# Patient Record
Sex: FEMALE | Race: WHITE | NOT HISPANIC OR LATINO | Employment: PART TIME | URBAN - METROPOLITAN AREA
[De-identification: names, ages, dates, MRNs, and addresses within clinical notes are randomized per-mention and may not be internally consistent; named-entity substitution may affect disease eponyms.]

---

## 2017-02-03 ENCOUNTER — GENERIC CONVERSION - ENCOUNTER (OUTPATIENT)
Dept: OTHER | Facility: OTHER | Age: 47
End: 2017-02-03

## 2017-02-06 ENCOUNTER — GENERIC CONVERSION - ENCOUNTER (OUTPATIENT)
Dept: OTHER | Facility: OTHER | Age: 47
End: 2017-02-06

## 2017-02-07 ENCOUNTER — ALLSCRIPTS OFFICE VISIT (OUTPATIENT)
Dept: OTHER | Facility: OTHER | Age: 47
End: 2017-02-07

## 2017-02-07 ENCOUNTER — LAB REQUISITION (OUTPATIENT)
Dept: LAB | Facility: HOSPITAL | Age: 47
End: 2017-02-07
Payer: MEDICARE

## 2017-02-07 DIAGNOSIS — N89.8 OTHER SPECIFIED NONINFLAMMATORY DISORDERS OF VAGINA: ICD-10-CM

## 2017-02-07 LAB
BACTERIA UR QL AUTO: NORMAL
BILIRUB UR QL STRIP: NEGATIVE
CLARITY UR: CLEAR
CLUE CELL (HISTORICAL): NORMAL
COLOR UR: YELLOW
GLUCOSE UR STRIP-MCNC: NEGATIVE MG/DL
HGB UR QL STRIP.AUTO: NEGATIVE
HYPHAL YEAST (HISTORICAL): NORMAL
KETONES UR STRIP-MCNC: NEGATIVE MG/DL
KOH PREP (HISTORICAL): NORMAL
LEUKOCYTE ESTERASE UR QL STRIP: NEGATIVE
NITRITE UR QL STRIP: NEGATIVE
PH UR STRIP.AUTO: 7 [PH] (ref 4.5–8)
PROT UR STRIP-MCNC: NEGATIVE MG/DL
SP GR UR STRIP.AUTO: 1.01 (ref 1–1.03)
TRICHOMONAS (HISTORICAL): NORMAL
UROBILINOGEN UR QL STRIP.AUTO: 1 E.U./DL
YEAST (HISTORICAL): NORMAL

## 2017-02-07 PROCEDURE — 87086 URINE CULTURE/COLONY COUNT: CPT | Performed by: OBSTETRICS & GYNECOLOGY

## 2017-02-07 PROCEDURE — 81003 URINALYSIS AUTO W/O SCOPE: CPT | Performed by: OBSTETRICS & GYNECOLOGY

## 2017-02-08 LAB — BACTERIA UR CULT: NORMAL

## 2017-02-14 ENCOUNTER — GENERIC CONVERSION - ENCOUNTER (OUTPATIENT)
Dept: OTHER | Facility: OTHER | Age: 47
End: 2017-02-14

## 2017-02-20 ENCOUNTER — GENERIC CONVERSION - ENCOUNTER (OUTPATIENT)
Dept: OTHER | Facility: OTHER | Age: 47
End: 2017-02-20

## 2017-05-04 ENCOUNTER — GENERIC CONVERSION - ENCOUNTER (OUTPATIENT)
Dept: OTHER | Facility: OTHER | Age: 47
End: 2017-05-04

## 2017-07-10 ENCOUNTER — GENERIC CONVERSION - ENCOUNTER (OUTPATIENT)
Dept: OTHER | Facility: OTHER | Age: 47
End: 2017-07-10

## 2017-08-30 ENCOUNTER — GENERIC CONVERSION - ENCOUNTER (OUTPATIENT)
Dept: OTHER | Facility: OTHER | Age: 47
End: 2017-08-30

## 2017-10-26 ENCOUNTER — GENERIC CONVERSION - ENCOUNTER (OUTPATIENT)
Dept: OTHER | Facility: OTHER | Age: 47
End: 2017-10-26

## 2017-10-26 DIAGNOSIS — R92.2 INCONCLUSIVE MAMMOGRAM: ICD-10-CM

## 2017-10-26 DIAGNOSIS — Z12.31 ENCOUNTER FOR SCREENING MAMMOGRAM FOR MALIGNANT NEOPLASM OF BREAST: ICD-10-CM

## 2017-11-11 ENCOUNTER — HOSPITAL ENCOUNTER (OUTPATIENT)
Dept: RADIOLOGY | Age: 47
Discharge: HOME | End: 2017-11-11
Payer: MEDICARE

## 2017-11-11 DIAGNOSIS — Z12.31 ENCOUNTER FOR SCREENING MAMMOGRAM FOR MALIGNANT NEOPLASM OF BREAST: ICD-10-CM

## 2017-11-11 PROCEDURE — 77063 BREAST TOMOSYNTHESIS BI: CPT

## 2017-11-11 PROCEDURE — G0202 SCR MAMMO BI INCL CAD: HCPCS

## 2017-11-14 ENCOUNTER — GENERIC CONVERSION - ENCOUNTER (OUTPATIENT)
Dept: OTHER | Facility: OTHER | Age: 47
End: 2017-11-14

## 2017-11-17 ENCOUNTER — GENERIC CONVERSION - ENCOUNTER (OUTPATIENT)
Dept: OTHER | Facility: OTHER | Age: 47
End: 2017-11-17

## 2017-11-30 ENCOUNTER — GENERIC CONVERSION - ENCOUNTER (OUTPATIENT)
Dept: OTHER | Facility: OTHER | Age: 47
End: 2017-11-30

## 2017-12-18 ENCOUNTER — GENERIC CONVERSION - ENCOUNTER (OUTPATIENT)
Dept: OTHER | Facility: OTHER | Age: 47
End: 2017-12-18

## 2018-01-08 ENCOUNTER — GENERIC CONVERSION - ENCOUNTER (OUTPATIENT)
Dept: OTHER | Facility: OTHER | Age: 48
End: 2018-01-08

## 2018-01-10 NOTE — MISCELLANEOUS
Message   Recorded as Task   Date: 11/14/2017 08:10 AM, Created By: Eugene Vieira   Task Name: Go to Result   Assigned To: La Brandon   Regarding Patient: Loretta Lacy, Status: In Progress   Comment:    Eugene Vieira - 14 Nov 2017 8:10 AM     TASK CREATED  her 3D mammogram is normal, they are calling her tissue extremely dense, could consider ABUS but would have to check coverage, may not be covered, if not, continue with annual 3D mammogram, she is not at elevated risk   Beverly Alexander - 14 Nov 2017 2:13 PM     TASK IN PROGRESS   CatherineBeverly - 14 Nov 2017 2:23 PM     TASK EDITED  letter and script mailed to pt        Active Problems    1  Acute right lower quadrant pain (789 03,338 19) (R10 31)   2  Contraception (V25 9) (Z30 9)   3  Contraceptive management (V25 9) (Z30 9)   4  Dense breasts (793 82) (R92 2)   5  Encounter for gynecological examination without abnormal finding (V72 31) (Z01 419)   6  Headache (784 0) (R51)   7  Herpes simplex virus (HSV) infection (054 9) (B00 9)   8  High risk medication use (V58 69) (Z79 899)   9  Denied: History of self breast exam   10  Migraine headache (346 90) (G43 909)   11  Vaginal discharge (623 5) (N89 8)   12  Visit for screening mammogram (V76 12) (Z12 31)    Current Meds   1  Nola 0 35 MG Oral Tablet; 1 TABLET DAILY; Therapy: 45Aut8794 to (Last Rx:26Oct2017)  Requested for: 26Oct2017 Ordered   2  Dicyclomine HCl - 10 MG Oral Capsule; Therapy: 06NSP8812 to Recorded   3  Elmiron 100 MG Oral Capsule; Therapy: (Recorded:04May2017) to Recorded   4  Gabapentin 100 MG Oral Capsule; Therapy: 51QIU1630 to Recorded   5  Heparin Sodium (Porcine) SOLN;   Therapy: (Recorded:04May2017) to Recorded   6  HydrOXYzine HCl - 10 MG Oral Tablet; Therapy: 17VSN9821 to Recorded   7  Kenalog 40 MG/ML Injection Suspension (Triamcinolone Acetonide); Therapy: (Recorded:04May2017) to Recorded   8  LORazepam 0 5 MG Oral Tablet;    Therapy: 92IIS8769 to Recorded 9  Multivitamins CAPS; Therapy: (Recorded:96Ydz6770) to Recorded   10  Myrbetriq 25 MG Oral Tablet Extended Release 24 Hour; Therapy: 44UDK6003 to Recorded   11  Naproxen Sodium 550 MG Oral Tablet; Therapy: 59KGT1866 to (Last Rx:29Mgo7194)  Requested for: 80MMS6371 Ordered   12  Ondansetron 4 MG Oral Tablet Disintegrating; Therapy: 44EYD8912 to Recorded   13  ProAir  (90 Base) MCG/ACT Inhalation Aerosol Solution; Therapy: 63TZT2604 to (Last Rx:62Ujd5311)  Requested for: 35Xku1659 Ordered   14  TraMADol HCl - 50 MG Oral Tablet; Therapy: 30VKK1580 to Recorded   15  Uribel 118 MG Oral Capsule; Therapy: (Recorded:26Oct2017) to Recorded   16  ValACYclovir HCl - 500 MG Oral Tablet (Valtrex); TAKE 1 TABLET DAILY; Therapy: 60DMH4120 to (Juju Dadds)  Requested for: 82ACQ4439; Last    Rx:77Xwk3322 Ordered    Allergies    1  Amoxicillin CAPS   2  Doxycycline Hyclate CAPS   3  Penicillins    Plan  Dense breasts    · US BREAST SCREENING BILATERAL COMPLETE (ABUS); Status:Hold For -  "[employer]  Requested PLQ:50RFC4862;     Signatures   Electronically signed by : Girish Gonsales, ; Nov 14 2017  2:23PM EST                       (Author)

## 2018-01-11 NOTE — MISCELLANEOUS
Message   Recorded as Task   Date: 06/16/2016 11:00 AM, Created By: Evelyn Crystal   Task Name: Follow Up   Assigned To: Beverly Alexander   Regarding Patient: Mireya Velasquez, Status: Active   Comment:    Beverly Alexander - 16 Jun 2016 11:00 AM     TASK CREATED  pt had 2 occurences in a row that she gets bleeding before her depo shot    should we move her up to 10 weeks? Henrik Ryan - 21 Jun 2016 1:07 PM     TASK REPLIED TO: Previously Assigned To Henrik Ryan  yes, she can try 10-11 weeks        Active Problems    1  SARA I (cervical intraepithelial neoplasia I) (622 11) (N87 0)   2  Contraceptive management (V25 9) (Z30 9)   3  Encounter for gynecological examination without abnormal finding (V72 31) (Z01 419)   4  Encounter for screening for malignant neoplasm of cervix (V76 2) (Z12 4)   5  Encounter for screening mammogram for malignant neoplasm of breast (V76 12)   (Z12 31)   6  Headache (784 0) (R51)   7  Human papilloma virus infection (079 4) (B97 7)   8  Migraine headache (346 90) (G43 909)   9  Pap smear abnormality of cervix with ASCUS favoring benign (795 01) (R87 610)   10  Screening for human papillomavirus (HPV) (V73 81) (Z11 51)    Current Meds   1  Dexilant 60 MG Oral Capsule Delayed Release; Therapy: 39HMY2647 to Recorded   2  Dulera 200-5 MCG/ACT Inhalation Aerosol; Therapy: 12MLZ4828 to Recorded   3  FLUoxetine HCl - 20 MG Oral Capsule; Therapy: 25Uij0410 to Recorded   4  LORazepam 0 5 MG Oral Tablet; Therapy: 33PVN7991 to Recorded   5  MedroxyPROGESTERone Acetate 150 MG/ML Intramuscular Suspension   (Depo-Provera); INJECT INTRAMUSCULARLY EVERY 12 WEEKS AS DIRECTED; Therapy: 76MEM0717 to (Evaluate:11Jun2017)  Requested for: 09JHY3759; Last   Rx:16Jun2016 Ordered   6  Multivitamins CAPS; Therapy: (Recorded:24Igd8550) to Recorded   7  Naproxen Sodium 550 MG Oral Tablet; Therapy: 99CGX9512 to (Last Rx:43Sug9721)  Requested for: 54PBF5325 Ordered   8   Pristiq 50 MG Oral Tablet Extended Release 24 Hour; Therapy: (Recorded:99Sbc9513) to Recorded   9  ProAir  (90 Base) MCG/ACT Inhalation Aerosol Solution; Therapy: 98TGG6950 to (Last Rx:62Dko1591)  Requested for: 90Bhm0910 Ordered   10  VESIcare 5 MG Oral Tablet; Therapy: 70YLT2452 to Recorded    Allergies    1  Amoxicillin CAPS   2  Doxycycline Hyclate CAPS   3   Penicillins    Signatures   Electronically signed by : Davide Dunlap, ; Jun 21 2016  3:44PM EST                       (Author)

## 2018-01-11 NOTE — MISCELLANEOUS
Message   Date: 04 Oct 2016 10:06 AM EST, Recorded By: Gustabo Vanegas For: Garrett Robison: Gris Williamson, Self   Phone: [phone] (Home)   Reason: Medical Complaint   Patient called c/o continued spotting with Depo Provera  Got latest injection at 10 weeks  Has slight pink spotting with wiping  Also c/o increased appetite  Thinks it is from Depo also  Advised try Ibuprofen with food to stop spotting, observe, keep diary regarding spotting and appetite  Call back a week before due for next injection to evaluate  Active Problems    1  Cervical cancer screening (V76 2) (Z12 4)   2  SARA I (cervical intraepithelial neoplasia I) (622 11) (N87 0)   3  Contraceptive management (V25 9) (Z30 9)   4  Encounter for gynecological examination without abnormal finding (V72 31) (Z01 419)   5  Encounter for screening for malignant neoplasm of cervix (V76 2) (Z12 4)   6  Encounter for screening mammogram for malignant neoplasm of breast (V76 12)   (Z12 31)   7  Headache (784 0) (R51)   8  High risk medication use (V58 69) (Z79 899)   9  Human papilloma virus infection (079 4) (B97 7)   10  Migraine headache (346 90) (G43 909)   11  Pap smear abnormality of cervix with ASCUS favoring benign (795 01) (R87 610)   12  Screening for human papillomavirus (HPV) (V73 81) (Z11 51)    Current Meds   1  Dexilant 60 MG Oral Capsule Delayed Release; Therapy: 56YIW1977 to Recorded   2  Dulera 200-5 MCG/ACT Inhalation Aerosol; Therapy: 40JZA9964 to Recorded   3  FLUoxetine HCl - 20 MG Oral Capsule; Therapy: 87Pnc1063 to Recorded   4  LORazepam 0 5 MG Oral Tablet; Therapy: 44UOV0060 to Recorded   5  MedroxyPROGESTERone Acetate 150 MG/ML Intramuscular Suspension   (Depo-Provera); inject intramuscularly every 10 weeks as directed; Therapy: 19LZC5174 to (Last Juan Sotomayor)  Requested for: 73NFE6522 Ordered   6  Multivitamins CAPS; Therapy: (Recorded:47Pjk5103) to Recorded   7   Naproxen Sodium 550 MG Oral Tablet; Therapy: 55OMG1773 to (Last Rx:18Ncv5875)  Requested for: 29ZZS7146 Ordered   8  ProAir  (90 Base) MCG/ACT Inhalation Aerosol Solution; Therapy: 91ZPM3758 to (Last Rx:11Lmt1456)  Requested for: 10Yrf9370 Ordered    Allergies    1  Amoxicillin CAPS   2  Doxycycline Hyclate CAPS   3   Penicillins    Signatures   Electronically signed by : Elisabet James, ; Oct  4 2016 10:10AM EST                       (Author)

## 2018-01-12 VITALS
BODY MASS INDEX: 27.18 KG/M2 | SYSTOLIC BLOOD PRESSURE: 122 MMHG | WEIGHT: 169.13 LBS | HEIGHT: 66 IN | DIASTOLIC BLOOD PRESSURE: 76 MMHG

## 2018-01-12 NOTE — MISCELLANEOUS
Message   Recorded as Task   Date: 02/13/2017 09:56 PM, Created By: Henrik Ryan   Task Name: Follow Up   Assigned To: Rito Presummike   Regarding Patient: Mireya Velasquez, Status: Active   CommentMacmahsa Schaffer - 13 Feb 2017 9:56 PM     TASK CREATED  negative cultures   Beverly Alexander - 14 Feb 2017 11:29 AM     TASK EDITED  pt informed        Active Problems    1  Acute right lower quadrant pain (789 03,338 19) (R10 31)   2  Contraceptive management (V25 9) (Z30 9)   3  Headache (784 0) (R51)   4  High risk medication use (V58 69) (Z79 899)   5  Denied: History of self breast exam   6  Migraine headache (346 90) (G43 909)   7  Vaginal discharge (623 5) (N89 8)    Current Meds   1  Dulera 200-5 MCG/ACT Inhalation Aerosol; Therapy: 72PVK4237 to Recorded   2  FLUoxetine HCl - 20 MG Oral Capsule; Therapy: 74Rcc6991 to Recorded   3  LORazepam 0 5 MG Oral Tablet; Therapy: 19DOB0229 to Recorded   4  MedroxyPROGESTERone Acetate 150 MG/ML Intramuscular Suspension   (Depo-Provera); inject intramuscularly every 10 weeks as directed; Therapy: 84UYQ9617 to (Last Rx:66Fbp7848)  Requested for: 05WKK4858 Ordered   5  Multivitamins CAPS; Therapy: (Recorded:13Qma7276) to Recorded   6  Naproxen Sodium 550 MG Oral Tablet; Therapy: 76MAE7315 to (Last Rx:58Uwl9434)  Requested for: 60VXW2211 Ordered   7  ProAir  (90 Base) MCG/ACT Inhalation Aerosol Solution; Therapy: 86KBM9122 to (Last Rx:20Kyx4424)  Requested for: 42Lqp0546 Ordered    Allergies    1  Amoxicillin CAPS   2  Doxycycline Hyclate CAPS   3   Penicillins    Signatures   Electronically signed by : Lea Headley, ; Feb 14 2017 11:29AM EST                       (Author)

## 2018-01-13 NOTE — MISCELLANEOUS
Message   Recorded as Task   Date: 10/14/2016 03:49 PM, Created By: Skip Lindsey   Task Name: Review Document   Assigned To: Louis Mejía   Regarding Patient: Ilemarcia Side, Status: In Progress   Comment:    Skip Lindsey - 14 Oct 2016 3:49 PM     TASK CREATED  ngadelemily osteopenia, CA, vit D and exercise, will recheck in 2 years   Beverly Alexander - 17 Oct 2016 3:21 PM     TASK IN PROGRESS        Active Problems    1  Cervical cancer screening (V76 2) (Z12 4)   2  SARA I (cervical intraepithelial neoplasia I) (622 11) (N87 0)   3  Contraceptive management (V25 9) (Z30 9)   4  Encounter for gynecological examination without abnormal finding (V72 31) (Z01 419)   5  Encounter for screening for malignant neoplasm of cervix (V76 2) (Z12 4)   6  Encounter for screening mammogram for malignant neoplasm of breast (V76 12)   (Z12 31)   7  Headache (784 0) (R51)   8  High risk medication use (V58 69) (Z79 899)   9  Human papilloma virus infection (079 4) (B97 7)   10  Migraine headache (346 90) (G43 909)   11  Pap smear abnormality of cervix with ASCUS favoring benign (795 01) (R87 610)   12  Screening for human papillomavirus (HPV) (V73 81) (Z11 51)    Current Meds   1  Dexilant 60 MG Oral Capsule Delayed Release; Therapy: 86XFN4568 to Recorded   2  Dulera 200-5 MCG/ACT Inhalation Aerosol; Therapy: 79KAN0012 to Recorded   3  FLUoxetine HCl - 20 MG Oral Capsule; Therapy: 09Urv0279 to Recorded   4  LORazepam 0 5 MG Oral Tablet; Therapy: 01WWS0089 to Recorded   5  MedroxyPROGESTERone Acetate 150 MG/ML Intramuscular Suspension   (Depo-Provera); inject intramuscularly every 10 weeks as directed; Therapy: 44PKZ4981 to (Last Paul Mccartney)  Requested for: 15XMX7993 Ordered   6  Multivitamins CAPS; Therapy: (Recorded:16Bkk5856) to Recorded   7  Naproxen Sodium 550 MG Oral Tablet; Therapy: 32TKF5985 to (Last Rx:33Etr5351)  Requested for: 78BZL6674 Ordered   8   ProAir  (90 Base) MCG/ACT Inhalation Aerosol Solution; Therapy: 70LTG0618 to (Last Rx:04Wti7801)  Requested for: 30Hnn4547 Ordered    Allergies    1  Amoxicillin CAPS   2  Doxycycline Hyclate CAPS   3   Penicillins    Signatures   Electronically signed by : Alisa Kohler, ; Oct 19 2016  2:06PM EST                       (Author)

## 2018-01-16 ENCOUNTER — GENERIC CONVERSION - ENCOUNTER (OUTPATIENT)
Dept: OTHER | Facility: OTHER | Age: 48
End: 2018-01-16

## 2018-01-16 NOTE — MISCELLANEOUS
Message   Recorded as Task   Date: 11/30/2017 10:14 AM, Created By: Colette Abarca   Task Name: Med Renewal Request   Assigned To: Jennifer Steele   Regarding Patient: Keyla Hardin, Status: Active   CommentUnice Ever - 30 Nov 2017 10:14 AM     TASK CREATED  Patient called c/o another herpes outbreak  Getting more frequently lately  Questions if it is from Progesterone pill  Needs refill of Valtrex  Wants daily dose now  Ok to send you an escript? Kevin Isaac - 30 Nov 2017 10:25 AM     TASK REPLIED TO: Previously Assigned To Kevin Isaac  yes, 500mg daily   Escript sent  Active Problems    1  Acute right lower quadrant pain (789 03,338 19) (R10 31)   2  Contraception (V25 9) (Z30 9)   3  Contraceptive management (V25 9) (Z30 9)   4  Dense breasts (793 82) (R92 2)   5  Encounter for gynecological examination without abnormal finding (V72 31) (Z01 419)   6  Headache (784 0) (R51)   7  Herpes simplex virus (HSV) infection (054 9) (B00 9)   8  High risk medication use (V58 69) (Z79 899)   9  Denied: History of self breast exam   10  Migraine headache (346 90) (G43 909)   11  Vaginal discharge (623 5) (N89 8)   12  Visit for screening mammogram (V76 12) (Z12 31)    Current Meds   1  Nola 0 35 MG Oral Tablet; 1 TABLET DAILY; Therapy: 12Vwn0522 to (Last Rx:26Oct2017)  Requested for: 26Oct2017 Ordered   2  Dicyclomine HCl - 10 MG Oral Capsule; Therapy: 75DZV3516 to Recorded   3  Elmiron 100 MG Oral Capsule; Therapy: (Recorded:04May2017) to Recorded   4  Gabapentin 100 MG Oral Capsule; Therapy: 76BBQ9306 to Recorded   5  Heparin Sodium (Porcine) SOLN;   Therapy: (Recorded:04May2017) to Recorded   6  HydrOXYzine HCl - 10 MG Oral Tablet; Therapy: 07CUE6234 to Recorded   7  Kenalog 40 MG/ML Injection Suspension (Triamcinolone Acetonide); Therapy: (Recorded:04May2017) to Recorded   8  LORazepam 0 5 MG Oral Tablet; Therapy: 72IBP5550 to Recorded   9  Multivitamins CAPS;    Therapy: (Recorded:87Shg8753) to Recorded   10  Myrbetriq 25 MG Oral Tablet Extended Release 24 Hour; Therapy: 78RAK6673 to Recorded   11  Naproxen Sodium 550 MG Oral Tablet; Therapy: 69WYL9971 to (Last Rx:03Gzo9942)  Requested for: 76XQT1927 Ordered   12  Ondansetron 4 MG Oral Tablet Disintegrating; Therapy: 20VSD8638 to Recorded   13  ProAir  (90 Base) MCG/ACT Inhalation Aerosol Solution; Therapy: 41CFY0352 to (Last Rx:30Rrd5530)  Requested for: 92Pcw5606 Ordered   14  TraMADol HCl - 50 MG Oral Tablet; Therapy: 77OAQ9586 to Recorded   15  Uribel 118 MG Oral Capsule; Therapy: (Recorded:26Oct2017) to Recorded   16  ValACYclovir HCl - 500 MG Oral Tablet (Valtrex); take 1 tablet PO BID x 3 days; Therapy: 64KCA5564 to (Deb Brownbury)  Requested for: 27KHY6853; Last    Rx:17Nov2017 Ordered    Allergies    1  Amoxicillin CAPS   2  Doxycycline Hyclate CAPS   3   Penicillins    Plan  Herpes simplex virus (HSV) infection    · ValACYclovir HCl - 500 MG Oral Tablet (Valtrex); TAKE 1 TABLET DAILY    Signatures   Electronically signed by : Brendan Trammell, ; Nov 30 2017  2:17PM EST                       (Author)

## 2018-01-16 NOTE — MISCELLANEOUS
Message   Recorded as Task   Date: 08/28/2017 03:24 PM, Created By: Nomi Yeh   Task Name: Care Coordination   Assigned To: Beverly Alexander   Regarding Patient: Liz Melton, Status: Active   Comment:    Beverly Alexander - 28 Aug 2017 3:24 PM     TASK CREATED  pt is having BTB with her depo    she wants to know if there is anything else she can do? Agustín Blackmon - 28 Aug 2017 10:15 PM     TASK REPLIED TO: Previously Assigned To Agustín Blackmon  we can't move it any closer, does she want to try something else? She cannot take estrogen because of her migraines   Beverly Alexander - 29 Aug 2017 7:24 AM     TASK REPLIED TO: Previously Assigned To Nomi Yeh  she did ask what else she could take    Agustín Blackmon - 29 Aug 2017 8:22 AM     TASK REPLIED TO: Previously Assigned To Agustín Blackmon  progesterone only pills, mirena or [facility] - 29 Aug 2017 10:44 AM     TASK REPLIED TO: Previously Assigned To [facility] she wants the progesterone only pills    If you want to put order in-- [facility]--  she has an apt later in the year    we could use that as a pill check    she wants to know when she could start this  Gabriela Cutting she was supposed to get her shot in a few weeks    Agustín Blackmon - 29 Aug 2017 4:04 PM     TASK REPLIED TO: Previously Assigned To Agustín Blackmon  I put the RX in and she can start them on Sunday and come in for yearly and pill check in 3 months   Beverly Alexander - 30 Aug 2017 10:22 AM     TASK EDITED  pt informed        Active Problems    1  Acute right lower quadrant pain (789 03,338 19) (R10 31)   2  Contraception (V25 9) (Z30 9)   3  Contraceptive management (V25 9) (Z30 9)   4  Headache (784 0) (R51)   5  High risk medication use (V58 69) (Z79 899)   6  Denied: History of self breast exam   7  Migraine headache (346 90) (G43 909)   8  Vaginal discharge (623 5) (N89 8)    Current Meds   1  Nola 0 35 MG Oral Tablet; TAKE 1 TABLET DAILY;    Therapy: 41Awc2183 to (Last Jessica Pritchard)  Requested for: 92Jlr1136 Ordered   2  Elmiron 100 MG Oral Capsule; Therapy: (Recorded:04Std5039) to Recorded   3  Heparin Sodium (Porcine) SOLN;   Therapy: (Recorded:06Lwf4623) to Recorded   4  Kenalog 40 MG/ML Injection Suspension (Triamcinolone Acetonide); Therapy: (Recorded:73Aci4286) to Recorded   5  LORazepam 0 5 MG Oral Tablet; Therapy: 77RPI6858 to Recorded   6  MedroxyPROGESTERone Acetate 150 MG/ML Intramuscular Suspension   (Depo-Provera); inject intramuscularly every 10 weeks as directed; Therapy: 88CTB8046 to (Last Rx:39Fqd8198)  Requested for: 75XIG5143 Ordered   7  Multivitamins CAPS; Therapy: (Recorded:64Jfv1432) to Recorded   8  Naproxen Sodium 550 MG Oral Tablet; Therapy: 55QKX5666 to (Last Rx:14Vnp8413)  Requested for: 98WWW3812 Ordered   9  ProAir  (90 Base) MCG/ACT Inhalation Aerosol Solution; Therapy: 64JLE1848 to (Last Rx:61Dvx8830)  Requested for: 28Pcc3144 Ordered    Allergies    1  Amoxicillin CAPS   2  Doxycycline Hyclate CAPS   3   Penicillins    Signatures   Electronically signed by : Stacia Jackson, ; Aug 30 2017 10:23AM EST                       (Author)

## 2018-01-17 ENCOUNTER — GENERIC CONVERSION - ENCOUNTER (OUTPATIENT)
Dept: OTHER | Facility: OTHER | Age: 48
End: 2018-01-17

## 2018-01-17 NOTE — MISCELLANEOUS
Message   Recorded as Task   Date: 08/29/2017 04:05 PM, Created By: Collette Egan   Task Name: Follow Up   Assigned To: Beverly Alexander   Regarding Patient: Iwona Bergman, Status: Active   Comment:    Collette Egan - 29 Aug 2017 4:05 PM     TASK CREATED  When you let her know I sent in the RX, remind her there are no placebo days  thanks   Beverly Alexander - 30 Aug 2017 10:23 AM     TASK EDITED  pt informed        Active Problems    1  Acute right lower quadrant pain (789 03,338 19) (R10 31)   2  Contraception (V25 9) (Z30 9)   3  Contraceptive management (V25 9) (Z30 9)   4  Headache (784 0) (R51)   5  High risk medication use (V58 69) (Z79 899)   6  Denied: History of self breast exam   7  Migraine headache (346 90) (G43 909)   8  Vaginal discharge (623 5) (N89 8)    Current Meds   1  Nola 0 35 MG Oral Tablet; TAKE 1 TABLET DAILY; Therapy: 56Qmp3367 to (Last Geraldine Junk)  Requested for: 14Abr9819 Ordered   2  Elmiron 100 MG Oral Capsule; Therapy: (Recorded:92Wmn7635) to Recorded   3  Heparin Sodium (Porcine) SOLN;   Therapy: (Recorded:61Muw7208) to Recorded   4  Kenalog 40 MG/ML Injection Suspension (Triamcinolone Acetonide); Therapy: (Recorded:81Fll4787) to Recorded   5  LORazepam 0 5 MG Oral Tablet; Therapy: 39KYI2586 to Recorded   6  MedroxyPROGESTERone Acetate 150 MG/ML Intramuscular Suspension   (Depo-Provera); inject intramuscularly every 10 weeks as directed; Therapy: 33TNX6422 to (Last Rx:30Pbc1383)  Requested for: 61HQN4369 Ordered   7  Multivitamins CAPS; Therapy: (Recorded:12Gjd1362) to Recorded   8  Naproxen Sodium 550 MG Oral Tablet; Therapy: 11QTE1346 to (Last Rx:84Nai5321)  Requested for: 12MDZ6747 Ordered   9  ProAir  (90 Base) MCG/ACT Inhalation Aerosol Solution; Therapy: 63LCW8443 to (Last Rx:30Pdp4083)  Requested for: 06Dmu2113 Ordered    Allergies    1  Amoxicillin CAPS   2  Doxycycline Hyclate CAPS   3   Penicillins    Signatures   Electronically signed by : Evy Thurman Dayna Baker, ; Aug 30 2017 10:23AM EST                       (Author)

## 2018-01-18 ENCOUNTER — GENERIC CONVERSION - ENCOUNTER (OUTPATIENT)
Dept: OTHER | Facility: OTHER | Age: 48
End: 2018-01-18

## 2018-01-18 NOTE — MISCELLANEOUS
Message   Recorded as Task   Date: 02/03/2017 10:00 AM, Created By: Denton Klein   Task Name: Medical Complaint Callback   Assigned To: Jennifer Steele   Regarding Patient: Liz Melton, Status: In Progress   Summer Larissa - 03 Feb 2017 10:00 AM     TASK CREATED  Patient called c/o persistent yeast infection after taking antibiotics for URI  PCP gave her a Diflucan but patient still c/o sx, itching, burning  irritation, vulvar swelling  OV or RX? Agustín Blackmon - 67 Feb 2017 10:40 AM     TASK REPLIED TO: Previously Assigned To Agustín Blackmon  up to her, if she wants to try OTC, would recommend 7 day treatment   Jennifer Steele - 03 Feb 2017 12:33 PM     TASK IN PROGRESS   Pt informed  Will try 7 day OTC and will call back for appointment if no improvement  Active Problems   1  Cervical cancer screening (V76 2) (Z12 4)  2  SARA I (cervical intraepithelial neoplasia I) (622 11) (N87 0)  3  Contraceptive management (V25 9) (Z30 9)  4  Encounter for gynecological examination without abnormal finding (V72 31) (Z01 419)  5  Encounter for prescription for depo-Provera (V25 49) (Z30 013)  6  Encounter for screening for malignant neoplasm of cervix (V76 2) (Z12 4)  7  Encounter for screening mammogram for malignant neoplasm of breast (V76 12)   (Z12 31)  8  Headache (784 0) (R51)  9  High risk medication use (V58 69) (Z79 899)  10  Human papilloma virus infection (079 4) (B97 7)  11  Migraine headache (346 90) (G43 909)  12  Pap smear abnormality of cervix with ASCUS favoring benign (795 01) (R87 610)  13  Screening for human papillomavirus (HPV) (V73 81) (Z11 51)    Current Meds  1  Dexilant 60 MG Oral Capsule Delayed Release; Therapy: 43JOT5865 to Recorded  2  Dulera 200-5 MCG/ACT Inhalation Aerosol; Therapy: 73EDV1695 to Recorded  3  FLUoxetine HCl - 20 MG Oral Capsule; Therapy: 46Igp9278 to Recorded  4  LORazepam 0 5 MG Oral Tablet; Therapy: 31NYP1215 to Recorded  5  MedroxyPROGESTERone Acetate 150 MG/ML Intramuscular Suspension   (Depo-Provera); inject intramuscularly every 10 weeks as directed; Therapy: 91RWQ3144 to (Last Mallory Reyes)  Requested for: 20NJO6069 Ordered  6  Multivitamins CAPS; Therapy: (Recorded:97Dxj0977) to Recorded  7  Naproxen Sodium 550 MG Oral Tablet; Therapy: 12HWS7292 to (Last Rx:19Ajj4678)  Requested for: 79SWQ2862 Ordered  8  ProAir  (90 Base) MCG/ACT Inhalation Aerosol Solution; Therapy: 94SDQ6267 to (Last Rx:73Ezw1404)  Requested for: 58Gzj2391 Ordered    Allergies   1  Amoxicillin CAPS  2  Doxycycline Hyclate CAPS  3   Penicillins    Signatures   Electronically signed by : Jordyn Ames, ; Feb  3 2017  1:14PM EST                       (Author)

## 2018-01-18 NOTE — MISCELLANEOUS
Message   Recorded as Task   Date: 11/17/2017 08:32 AM, Created By: Michelle Robison   Task Name: Med Renewal Request   Assigned To: Jennifer Steele   Regarding Patient: Loretta Lacy, Status: Active   CommentBryan Gonzalez - 17 Nov 2017 8:32 AM     TASK CREATED  Patient called c/o thinks she is getting another herpes outbreak  Needs refill of Valtrex  Escript sent to you  Patient also reports having mood swings, anger issues, irritability x 1 month  Not sure if it is from [place] or Lamictal   States is stopping Lamictal per her MD's instructions  Will continue Nola and observe if mood swings decrease  Sabina Blair - 17 Nov 2017 8:44 AM     TASK REPLIED TO: Previously Assigned To dk Mcknight        Active Problems    1  Acute right lower quadrant pain (789 03,338 19) (R10 31)   2  Contraception (V25 9) (Z30 9)   3  Contraceptive management (V25 9) (Z30 9)   4  Dense breasts (793 82) (R92 2)   5  Encounter for gynecological examination without abnormal finding (V72 31) (Z01 419)   6  Headache (784 0) (R51)   7  Herpes simplex virus (HSV) infection (054 9) (B00 9)   8  High risk medication use (V58 69) (Z79 899)   9  Denied: History of self breast exam   10  Migraine headache (346 90) (G43 909)   11  Vaginal discharge (623 5) (N89 8)   12  Visit for screening mammogram (V76 12) (Z12 31)    Current Meds   1  Nola 0 35 MG Oral Tablet; 1 TABLET DAILY; Therapy: 87Ajq7183 to (Last Rx:26Oct2017)  Requested for: 26Oct2017 Ordered   2  Dicyclomine HCl - 10 MG Oral Capsule; Therapy: 10INX5850 to Recorded   3  Elmiron 100 MG Oral Capsule; Therapy: (Recorded:69Yfx4583) to Recorded   4  Gabapentin 100 MG Oral Capsule; Therapy: 61USC3548 to Recorded   5  Heparin Sodium (Porcine) SOLN;   Therapy: (Recorded:24Meb3493) to Recorded   6  HydrOXYzine HCl - 10 MG Oral Tablet; Therapy: 25WYP2848 to Recorded   7  Kenalog 40 MG/ML Injection Suspension (Triamcinolone Acetonide);    Therapy: (Recorded:02Hiz7919) to Recorded   8  LORazepam 0 5 MG Oral Tablet; Therapy: 27HXW3159 to Recorded   9  Multivitamins CAPS; Therapy: (Recorded:22Hco3562) to Recorded   10  Myrbetriq 25 MG Oral Tablet Extended Release 24 Hour; Therapy: 76TWE1864 to Recorded   11  Naproxen Sodium 550 MG Oral Tablet; Therapy: 42WGB5158 to (Last Rx:55Wrj7486)  Requested for: 33XZS3800 Ordered   12  Ondansetron 4 MG Oral Tablet Disintegrating; Therapy: 40SGR6956 to Recorded   13  ProAir  (90 Base) MCG/ACT Inhalation Aerosol Solution; Therapy: 88FLY9955 to (Last Rx:43Lue2830)  Requested for: 62Rxo8463 Ordered   14  TraMADol HCl - 50 MG Oral Tablet; Therapy: 85WYT3172 to Recorded   15  Uribel 118 MG Oral Capsule; Therapy: (Recorded:26Oct2017) to Recorded   16  ValACYclovir HCl - 500 MG Oral Tablet (Valtrex); take 1 tablet PO BID x 3 days; Therapy: 38ZKF4182 to (Haylee Chambers)  Requested for: 86BVM9797; Last    Rx:17Nov2017 Ordered    Allergies    1  Amoxicillin CAPS   2  Doxycycline Hyclate CAPS   3   Penicillins    Signatures   Electronically signed by : Costa Go, ; Nov 17 2017  9:44AM EST                       (Author)

## 2018-01-18 NOTE — MISCELLANEOUS
Message   Date: 06 Feb 2017 10:00 AM EST, Recorded By: Sarahi Gibbons For: Bret Jefferson   Caller: Farzaneh Kendrick, Self   Phone: [phone] (Home)   Reason: Medical Complaint   pt thought that she has a yeast infection, but it is not going sonja y and she is going on humaira at the end of the week    pt will schedule appt           Active Problems    1  Cervical cancer screening (V76 2) (Z12 4)   2  SARA I (cervical intraepithelial neoplasia I) (622 11) (N87 0)   3  Contraceptive management (V25 9) (Z30 9)   4  Encounter for gynecological examination without abnormal finding (V72 31) (Z01 419)   5  Encounter for prescription for depo-Provera (V25 49) (Z30 013)   6  Encounter for screening for malignant neoplasm of cervix (V76 2) (Z12 4)   7  Encounter for screening mammogram for malignant neoplasm of breast (V76 12)   (Z12 31)   8  Headache (784 0) (R51)   9  High risk medication use (V58 69) (Z79 899)   10  Human papilloma virus infection (079 4) (B97 7)   11  Migraine headache (346 90) (G43 909)   12  Pap smear abnormality of cervix with ASCUS favoring benign (795 01) (R87 610)   13  Screening for human papillomavirus (HPV) (V73 81) (Z11 51)    Current Meds   1  Dexilant 60 MG Oral Capsule Delayed Release; Therapy: 69EAP5275 to Recorded   2  Dulera 200-5 MCG/ACT Inhalation Aerosol; Therapy: 56NFP8363 to Recorded   3  FLUoxetine HCl - 20 MG Oral Capsule; Therapy: 58Wrl9490 to Recorded   4  LORazepam 0 5 MG Oral Tablet; Therapy: 82JQX7731 to Recorded   5  MedroxyPROGESTERone Acetate 150 MG/ML Intramuscular Suspension   (Depo-Provera); inject intramuscularly every 10 weeks as directed; Therapy: 30UTK4573 to (Last Memory Kenroy)  Requested for: 75REY8383 Ordered   6  Multivitamins CAPS; Therapy: (Recorded:35Bma5013) to Recorded   7  Naproxen Sodium 550 MG Oral Tablet; Therapy: 91EPA7740 to (Last Rx:18Hjo8245)  Requested for: 89HOF8381 Ordered   8   ProAir  (90 Base) MCG/ACT Inhalation Aerosol Solution; Therapy: 39BZA5400 to (Last Rx:86Nvv1858)  Requested for: 34Gze3827 Ordered    Allergies    1  Amoxicillin CAPS   2  Doxycycline Hyclate CAPS   3   Penicillins    Signatures   Electronically signed by : Bela Jane, ; Feb 6 2017 10:02AM EST                       (Author)

## 2018-01-22 VITALS
BODY MASS INDEX: 25.75 KG/M2 | SYSTOLIC BLOOD PRESSURE: 98 MMHG | DIASTOLIC BLOOD PRESSURE: 62 MMHG | HEIGHT: 66 IN | WEIGHT: 160.25 LBS

## 2018-01-22 VITALS
BODY MASS INDEX: 27 KG/M2 | SYSTOLIC BLOOD PRESSURE: 112 MMHG | DIASTOLIC BLOOD PRESSURE: 78 MMHG | WEIGHT: 168 LBS | HEIGHT: 66 IN

## 2018-01-22 VITALS — DIASTOLIC BLOOD PRESSURE: 70 MMHG | SYSTOLIC BLOOD PRESSURE: 110 MMHG | WEIGHT: 155 LBS | BODY MASS INDEX: 25.4 KG/M2

## 2018-01-22 VITALS
DIASTOLIC BLOOD PRESSURE: 72 MMHG | BODY MASS INDEX: 24.75 KG/M2 | WEIGHT: 154 LBS | SYSTOLIC BLOOD PRESSURE: 110 MMHG | HEIGHT: 66 IN

## 2018-01-23 NOTE — MISCELLANEOUS
Message   Recorded as Task   Date: 01/17/2018 09:38 AM, Created By: Gilford Canada   Task Name: Care Coordination   Assigned To: Beverly Alexander   Regarding Patient: Zoey Lobo, Status: Active   Comment:    Gilford Canada - 17 Jan 2018 9:38 AM     TASK CREATED  pt wants to know if she can have the ablation while on the depo?     Glenna Santos - 18 Jan 2018 6:42 AM     TASK REPLIED TO: Previously Assigned To Glenna Santos  she can but that would not necessarily help with her migraines   Beverly Alexander - 18 Jan 2018 8:36 AM     TASK REPLIED TO: Previously Assigned To Sybil Hintonon said that she only uses the depo so she does not get a cycle because the cycle causes her migraines  Dennis Flores really wants the ablation so she can stop the depo    Should I get her set up for a surgery discussion? Glenna Tom - 18 Jan 2018 11:21 AM     TASK REPLIED TO: Previously Assigned To Glenna Santos  yes        Active Problems    1  Acute right lower quadrant pain (789 03,338 19) (R10 31)   2  Contraception (V25 9) (Z30 9)   3  Contraceptive management (V25 9) (Z30 9)   4  Dense breasts (793 82) (R92 2)   5  Encounter for gynecological examination without abnormal finding (V72 31) (Z01 419)   6  Headache (784 0) (R51)   7  Herpes simplex virus (HSV) infection (054 9) (B00 9)   8  High risk medication use (V58 69) (Z79 899)   9  Denied: History of self breast exam   10  Irregular menstrual cycle (626 4) (N92 6)   11  Migraine headache (346 90) (G43 909)   12  Vaginal discharge (623 5) (N89 8)   13  Visit for screening mammogram (V76 12) (Z12 31)    Current Meds   1  Nola 0 35 MG Oral Tablet; 1 TABLET DAILY; Therapy: 45Rjb6419 to (Last Rx:26Oct2017)  Requested for: 26Oct2017 Ordered   2  Dicyclomine HCl - 10 MG Oral Capsule; Therapy: 70HNL9736 to Recorded   3  Elmiron 100 MG Oral Capsule; Therapy: (Recorded:44Rvy9813) to Recorded   4  Gabapentin 100 MG Oral Capsule; Therapy: 33PJO5453 to Recorded   5   Heparin Sodium (Porcine) SOLN;   Therapy: (Recorded:73Gsj6466) to Recorded   6  HydrOXYzine HCl - 10 MG Oral Tablet; Therapy: 56YCF4339 to Recorded   7  Kenalog 40 MG/ML Injection Suspension (Triamcinolone Acetonide); Therapy: (Recorded:66Tzp5940) to Recorded   8  LORazepam 0 5 MG Oral Tablet; Therapy: 25JUL7347 to Recorded   9  MedroxyPROGESTERone Acetate 10 MG Oral Tablet; Take one tablet daily; Therapy: 67WQM0389 to (Last Rx:27Fmk1079)  Requested for: 73ULZ6067 Ordered   10  MedroxyPROGESTERone Acetate 150 MG/ML Intramuscular Suspension; INJECT    INTRAMUSCULARLY EVERY 12 WEEKS AS DIRECTED; Therapy: 55YZC9551 to (Last Rx:16Jan2018)  Requested for: 57QQE2003    Ordered   11  Multivitamins CAPS; Therapy: (Recorded:65Pgi1533) to Recorded   12  Myrbetriq 25 MG Oral Tablet Extended Release 24 Hour; Therapy: 05NSB1902 to Recorded   13  Naproxen Sodium 550 MG Oral Tablet; Therapy: 29XSF0955 to (Last Rx:44Ktt8115)  Requested for: 91GTG9096 Ordered   14  Norethindrone Acetate 5 MG Oral Tablet; One tablet daily; Therapy: 92FXT8050 to (Last Rx:09Jan2018)  Requested for: 87YNN8281 Ordered   15  Ondansetron 4 MG Oral Tablet Disintegrating; Therapy: 88VIM2111 to Recorded   16  ProAir  (90 Base) MCG/ACT Inhalation Aerosol Solution; Therapy: 79RUF7804 to (Last Rx:30Dec2011)  Requested for: 85Qdn2958 Ordered   17  TraMADol HCl - 50 MG Oral Tablet; Therapy: 86SQT7910 to Recorded   18  Uribel 118 MG Oral Capsule; Therapy: (Recorded:26Oct2017) to Recorded   19  ValACYclovir HCl - 500 MG Oral Tablet (Valtrex); TAKE 1 TABLET DAILY; Therapy: 60DWM7046 to (Evaluate:00Few8963)  Requested for: 65WLV8175; Last    Rx:30Nov2017 Ordered   20  ValACYclovir HCl - 500 MG Oral Tablet (Valtrex); take 1 tablet PO BID x 3 days; Therapy: 38ZQM4086 to (Star Copping)  Requested for: 12XWG4322; Last    Rx:17Nov2017 Ordered    Allergies    1  Amoxicillin CAPS   2  Doxycycline Hyclate CAPS   3  Penicillins    Signatures   Electronically signed by : Gage Holcomb, ; Jan 18 2018 11:49AM EST                       (Author)

## 2018-01-23 NOTE — MISCELLANEOUS
Message   Recorded as Task   Date: 01/05/2018 12:12 PM, Created By: Luzmaria Gunn   Task Name: Follow Up   Assigned To: Jennifer Steele   Regarding Patient: Anita Shearer, Status: Active   CommentNoman Figueroa - 05 Jan 2018 12:12 PM     TASK CREATED  Patient called to report taking Provera as directed since 12/19/17  Started spotting yesterday  Today has light flow  Questions what to do  Atul Marin - 05 Jan 2018 4:42 PM     TASK REPLIED TO: Previously Assigned To Hornbrook Tomas  she could get a menstrual cycle on this, can observe, if it persists more than 4-5 days, can double up or switch to aygestin   Patient informed  Will double up  Active Problems    1  Acute right lower quadrant pain (789 03,338 19) (R10 31)   2  Contraception (V25 9) (Z30 9)   3  Contraceptive management (V25 9) (Z30 9)   4  Dense breasts (793 82) (R92 2)   5  Encounter for gynecological examination without abnormal finding (V72 31) (Z01 419)   6  Headache (784 0) (R51)   7  Herpes simplex virus (HSV) infection (054 9) (B00 9)   8  High risk medication use (V58 69) (Z79 899)   9  Denied: History of self breast exam   10  Irregular menstrual cycle (626 4) (N92 6)   11  Migraine headache (346 90) (G43 909)   12  Vaginal discharge (623 5) (N89 8)   13  Visit for screening mammogram (V76 12) (Z12 31)    Current Meds   1  Nola 0 35 MG Oral Tablet; 1 TABLET DAILY; Therapy: 07Txm5808 to (Last Rx:26Oct2017)  Requested for: 26Oct2017 Ordered   2  Dicyclomine HCl - 10 MG Oral Capsule; Therapy: 55BAV3941 to Recorded   3  Elmiron 100 MG Oral Capsule; Therapy: (Recorded:04May2017) to Recorded   4  Gabapentin 100 MG Oral Capsule; Therapy: 51JZC2059 to Recorded   5  Heparin Sodium (Porcine) SOLN;   Therapy: (Recorded:04May2017) to Recorded   6  HydrOXYzine HCl - 10 MG Oral Tablet; Therapy: 30DZH7372 to Recorded   7  Kenalog 40 MG/ML Injection Suspension (Triamcinolone Acetonide);    Therapy: (Recorded:04May2017) to Recorded   8  LORazepam 0 5 MG Oral Tablet; Therapy: 51CGI5720 to Recorded   9  MedroxyPROGESTERone Acetate 10 MG Oral Tablet; Take one tablet daily; Therapy: 03IHC9263 to (Last Rx:60Zna8377)  Requested for: 41ITM6951 Ordered   10  Multivitamins CAPS; Therapy: (Recorded:90Wnj1742) to Recorded   11  Myrbetriq 25 MG Oral Tablet Extended Release 24 Hour; Therapy: 12PXW4999 to Recorded   12  Naproxen Sodium 550 MG Oral Tablet; Therapy: 41JCG6052 to (Last Rx:22Vps7324)  Requested for: 77NCU6683 Ordered   13  Ondansetron 4 MG Oral Tablet Disintegrating; Therapy: 47WIB4619 to Recorded   14  ProAir  (90 Base) MCG/ACT Inhalation Aerosol Solution; Therapy: 65VMS9738 to (Last Rx:24Vyg6955)  Requested for: 61Zhw4850 Ordered   15  TraMADol HCl - 50 MG Oral Tablet; Therapy: 44YFN0806 to Recorded   16  Uribel 118 MG Oral Capsule; Therapy: (Recorded:26Oct2017) to Recorded   17  ValACYclovir HCl - 500 MG Oral Tablet (Valtrex); TAKE 1 TABLET DAILY; Therapy: 55ELU0001 to (Evaluate:98Mzx9679)  Requested for: 83VVN1287; Last    Rx:30Nov2017 Ordered   18  ValACYclovir HCl - 500 MG Oral Tablet (Valtrex); take 1 tablet PO BID x 3 days; Therapy: 94GOO1294 to (Carol Isaac)  Requested for: 38RIE3261; Last    Rx:17Nov2017 Ordered    Allergies    1  Amoxicillin CAPS   2  Doxycycline Hyclate CAPS   3   Penicillins    Signatures   Electronically signed by : Jenny Sorenson, ; Jan 8 2018  9:00AM EST                       (Author)

## 2018-01-23 NOTE — MISCELLANEOUS
Message   Recorded as Task   Date: 01/17/2018 09:48 AM, Created By: Algis Goodell   Task Name: Care Coordination   Assigned To: Beverly Alexander   Regarding Patient: Adry Romero, Status: Active   Comment:    Beverly Alexander - 17 Jan 2018 9:48 AM     TASK CREATED  2nd task om this pt    should she get her depo at 9 or 10 weeks instead of 12 weeks? Bridget Reynaga - 18 Jan 2018 6:41 AM     TASK REPLIED TO: Previously Assigned To Bridget Reynaga  try 12 since she has been offit for a while and we can change it if necessary   Beverly Alexander - 18 Jan 2018 8:36 AM     TASK EDITED  pt pt on a reminder list  Active Problems    1  Acute right lower quadrant pain (789 03,338 19) (R10 31)   2  Contraception (V25 9) (Z30 9)   3  Contraceptive management (V25 9) (Z30 9)   4  Dense breasts (793 82) (R92 2)   5  Encounter for gynecological examination without abnormal finding (V72 31) (Z01 419)   6  Headache (784 0) (R51)   7  Herpes simplex virus (HSV) infection (054 9) (B00 9)   8  High risk medication use (V58 69) (Z79 899)   9  Denied: History of self breast exam   10  Irregular menstrual cycle (626 4) (N92 6)   11  Migraine headache (346 90) (G43 909)   12  Vaginal discharge (623 5) (N89 8)   13  Visit for screening mammogram (V76 12) (Z12 31)    Current Meds   1  Nola 0 35 MG Oral Tablet; 1 TABLET DAILY; Therapy: 50Uwg3228 to (Last Rx:26Oct2017)  Requested for: 26Oct2017 Ordered   2  Dicyclomine HCl - 10 MG Oral Capsule; Therapy: 03LEH2139 to Recorded   3  Elmiron 100 MG Oral Capsule; Therapy: (Recorded:04May2017) to Recorded   4  Gabapentin 100 MG Oral Capsule; Therapy: 61BRM7450 to Recorded   5  Heparin Sodium (Porcine) SOLN;   Therapy: (Recorded:04May2017) to Recorded   6  HydrOXYzine HCl - 10 MG Oral Tablet; Therapy: 66DBA5728 to Recorded   7  Kenalog 40 MG/ML Injection Suspension (Triamcinolone Acetonide); Therapy: (Recorded:04May2017) to Recorded   8  LORazepam 0 5 MG Oral Tablet;    Therapy: 21ZPP2783 to Recorded   9  MedroxyPROGESTERone Acetate 10 MG Oral Tablet; Take one tablet daily; Therapy: 43MVJ5832 to (Last Rx:94Iwy3132)  Requested for: 09TBF1265 Ordered   10  MedroxyPROGESTERone Acetate 150 MG/ML Intramuscular Suspension; INJECT    INTRAMUSCULARLY EVERY 12 WEEKS AS DIRECTED; Therapy: 17ODS4982 to (Last Rx:16Jan2018)  Requested for: 15UKU0352    Ordered   11  Multivitamins CAPS; Therapy: (Recorded:86Lfi5065) to Recorded   12  Myrbetriq 25 MG Oral Tablet Extended Release 24 Hour; Therapy: 00ULM3590 to Recorded   13  Naproxen Sodium 550 MG Oral Tablet; Therapy: 36TFM7462 to (Last Rx:63Hkq3150)  Requested for: 44OUN4422 Ordered   14  Norethindrone Acetate 5 MG Oral Tablet; One tablet daily; Therapy: 60QVG4524 to (Last Rx:09Jan2018)  Requested for: 73LYH8261 Ordered   15  Ondansetron 4 MG Oral Tablet Disintegrating; Therapy: 38RLJ3838 to Recorded   16  ProAir  (90 Base) MCG/ACT Inhalation Aerosol Solution; Therapy: 49XMX2375 to (Last Rx:74Kxv0237)  Requested for: 77Wuv6264 Ordered   17  TraMADol HCl - 50 MG Oral Tablet; Therapy: 66OCC2340 to Recorded   18  Uribel 118 MG Oral Capsule; Therapy: (Recorded:26Oct2017) to Recorded   19  ValACYclovir HCl - 500 MG Oral Tablet (Valtrex); TAKE 1 TABLET DAILY; Therapy: 50QTY4708 to (Evaluate:24Dgx4080)  Requested for: 92VXB5942; Last    Rx:30Nov2017 Ordered   20  ValACYclovir HCl - 500 MG Oral Tablet (Valtrex); take 1 tablet PO BID x 3 days; Therapy: 09RWM9917 to (Elidia Montanez)  Requested for: 15YFA0790; Last    Rx:17Nov2017 Ordered    Allergies    1  Amoxicillin CAPS   2  Doxycycline Hyclate CAPS   3   Penicillins    Signatures   Electronically signed by : Femi Menard, ; Jan 18 2018  8:41AM EST                       (Author)

## 2018-01-23 NOTE — MISCELLANEOUS
Message   Recorded as Task   Date: 12/06/2017 09:58 AM, Created By: Jessenia Ramsey   Task Name: Care Coordination   Assigned To: Beverly Alexander   Regarding Patient: Emperatriz Bruce, Status: Active   CommentYonatan Bravo - 06 Dec 2017 9:58 AM     TASK CREATED  the estrogen pills are not working    pt has a full period all of the time  Deanne Steiner she wants to know if there are any other options    she suggested hysterectomy   ? Jennifer Inman - 08 Dec 2017 7:07 AM     TASK REPLIED TO: Previously Assigned To Jennifer Inman  Is she interested in a Mirena, can send her the pamphlet   Jessenia Ramsey - 08 Dec 2017 7:54 AM     TASK REASSIGNED: Previously Assigned To Debbie Gonzalez - 08 Dec 2017 9:17 AM     TASK REASSIGNED: Previously Assigned To Michelle Lemos  Patient wants to try Mirena  Does not want a pamphlet because she knows about it  Please check her insurance coverage and then schedule her if it is covered  Adri Matos - 12 Dec 2017 10:05 AM     TASK EDITED  Medicare does not cover Mirena, called pt to inform her, LM to return my call   Adri Matos - 12 Dec 2017 11:31 AM     TASK REASSIGNED: Previously Assigned To [employer] is not covered for this pt at all  Called and informed her  She would like other suggestions  Jennifer Inman - 14 Dec 2017 9:37 PM     TASK REPLIED TO: Previously Assigned To Jennifer Inman  we could cycle her on provera or aygestin, it is a higher dose than the progesterone only pills and should keep her from getting a cycle, it is not contraception but since she had a tubal, will not matter           she would take one pill a day   Zofia Zuleta - 15 Dec 2017 8:41 AM     TASK REASSIGNED: Previously Assigned To Elise Barahona - 18 Dec 2017 10:53 AM     TASK EDITED  provera ordered    pt will call with any issues        Active Problems    1  Acute right lower quadrant pain (789 03,338 19) (R10 31)   2   Contraception (V25 9) (Z30 9)   3  Contraceptive management (V25 9) (Z30 9)   4  Dense breasts (793 82) (R92 2)   5  Encounter for gynecological examination without abnormal finding (V72 31) (Z01 419)   6  Headache (784 0) (R51)   7  Herpes simplex virus (HSV) infection (054 9) (B00 9)   8  High risk medication use (V58 69) (Z79 899)   9  Denied: History of self breast exam   10  Irregular menstrual cycle (626 4) (N92 6)   11  Migraine headache (346 90) (G43 909)   12  Vaginal discharge (623 5) (N89 8)   13  Visit for screening mammogram (V76 12) (Z12 31)    Current Meds   1  Nola 0 35 MG Oral Tablet; 1 TABLET DAILY; Therapy: 18Vql6301 to (Last Rx:26Oct2017)  Requested for: 26Oct2017 Ordered   2  Dicyclomine HCl - 10 MG Oral Capsule; Therapy: 21IMP9157 to Recorded   3  Elmiron 100 MG Oral Capsule; Therapy: (Recorded:59Stm3539) to Recorded   4  Gabapentin 100 MG Oral Capsule; Therapy: 74EUX7938 to Recorded   5  Heparin Sodium (Porcine) SOLN;   Therapy: (Recorded:72Spn8647) to Recorded   6  HydrOXYzine HCl - 10 MG Oral Tablet; Therapy: 69AVQ1647 to Recorded   7  Kenalog 40 MG/ML Injection Suspension (Triamcinolone Acetonide); Therapy: (Recorded:16Zlt9528) to Recorded   8  LORazepam 0 5 MG Oral Tablet; Therapy: 60BTS7161 to Recorded   9  Multivitamins CAPS; Therapy: (Recorded:29Fnw5132) to Recorded   10  Myrbetriq 25 MG Oral Tablet Extended Release 24 Hour; Therapy: 45EEX9448 to Recorded   11  Naproxen Sodium 550 MG Oral Tablet; Therapy: 31ZGF7840 to (Last Rx:09Kua1450)  Requested for: 58NDF0823 Ordered   12  Ondansetron 4 MG Oral Tablet Disintegrating; Therapy: 92RRS7691 to Recorded   13  ProAir  (90 Base) MCG/ACT Inhalation Aerosol Solution; Therapy: 20BNF3961 to (Last Rx:85Fgv9521)  Requested for: 20Xpj2468 Ordered   14  TraMADol HCl - 50 MG Oral Tablet; Therapy: 59YYM4610 to Recorded   15  Uribel 118 MG Oral Capsule;     Therapy: (Recorded:26Oct2017) to Recorded   16  ValACYclovir HCl - 500 MG Oral Tablet (Valtrex); TAKE 1 TABLET DAILY; Therapy: 77WSZ7258 to (Dk Baez)  Requested for: 45TCJ2893; Last    Rx:30Nov2017 Ordered   17  ValACYclovir HCl - 500 MG Oral Tablet (Valtrex); take 1 tablet PO BID x 3 days; Therapy: 87MHJ7473 to (Birgit Rodriguez)  Requested for: 02QHR2366; Last    Rx:17Nov2017 Ordered    Allergies    1  Amoxicillin CAPS   2  Doxycycline Hyclate CAPS   3  Penicillins    Plan  Irregular menstrual cycle    · MedroxyPROGESTERone Acetate 10 MG Oral Tablet;  Take one tablet daily    Signatures   Electronically signed by : Tisha Yeh, ; Dec 18 2017 10:53AM EST                       (Author)

## 2018-01-23 NOTE — MISCELLANEOUS
Message   Recorded as Task   Date: 01/16/2018 10:34 AM, Created By: Vik Duran   Task Name: Follow Up   Assigned To: Jennifer Steele   Regarding Patient: Laura Sweeney, Status: Active   CommentEstelita Motts - 16 Jan 2018 10:34 AM     TASK CREATED  Patient called questioning if you think she should go back on Depo Provera since she is still having irregular spotting and bleeding even though she is taking Provera  Sloane Paul - 16 Jan 2018 11:23 AM     TASK REPLIED TO: Previously Assigned To Sloane Paul  That is up to her, I felt she did well on Depo Provera but she was unhappy with the BTB   Jennifer Steele - 16 Jan 2018 12:22 PM     TASK REPLIED TO: Previously Assigned To Jennifer Steele  She definitely wants to go back on Depo  Escript sent  She will come to office tomorrow for an injection  She will take her BCP today as usual    Sloane Paul - 16 Jan 2018 12:55 PM     TASK REPLIED TO: Previously Assigned To Sloane Paul  ok        Active Problems    1  Acute right lower quadrant pain (789 03,338 19) (R10 31)   2  Contraception (V25 9) (Z30 9)   3  Contraceptive management (V25 9) (Z30 9)   4  Dense breasts (793 82) (R92 2)   5  Encounter for gynecological examination without abnormal finding (V72 31) (Z01 419)   6  Headache (784 0) (R51)   7  Herpes simplex virus (HSV) infection (054 9) (B00 9)   8  High risk medication use (V58 69) (Z79 899)   9  Denied: History of self breast exam   10  Irregular menstrual cycle (626 4) (N92 6)   11  Migraine headache (346 90) (G43 909)   12  Vaginal discharge (623 5) (N89 8)   13  Visit for screening mammogram (V76 12) (Z12 31)    Current Meds   1  Nola 0 35 MG Oral Tablet; 1 TABLET DAILY; Therapy: 80Wik2317 to (Last Rx:26Oct2017)  Requested for: 26Oct2017 Ordered   2  Dicyclomine HCl - 10 MG Oral Capsule; Therapy: 75CBK2767 to Recorded   3  Elmiron 100 MG Oral Capsule; Therapy: (Recorded:43Xft0178) to Recorded   4   Gabapentin 100 MG Oral Capsule; Therapy: 54ZCT7977 to Recorded   5  Heparin Sodium (Porcine) SOLN;   Therapy: (Recorded:04May2017) to Recorded   6  HydrOXYzine HCl - 10 MG Oral Tablet; Therapy: 10IRX9118 to Recorded   7  Kenalog 40 MG/ML Injection Suspension (Triamcinolone Acetonide); Therapy: (Recorded:04May2017) to Recorded   8  LORazepam 0 5 MG Oral Tablet; Therapy: 02XNB2276 to Recorded   9  MedroxyPROGESTERone Acetate 10 MG Oral Tablet; Take one tablet daily; Therapy: 93QPI0272 to (Last Rx:62Nxc4057)  Requested for: 41KQE9050 Ordered   10  MedroxyPROGESTERone Acetate 150 MG/ML Intramuscular Suspension; INJECT    INTRAMUSCULARLY EVERY 12 WEEKS AS DIRECTED; Therapy: 03MRL5255 to (Last Rx:16Jan2018)  Requested for: 63NSE2397    Ordered   11  Multivitamins CAPS; Therapy: (Recorded:00Qqi6762) to Recorded   12  Myrbetriq 25 MG Oral Tablet Extended Release 24 Hour; Therapy: 27COQ4690 to Recorded   13  Naproxen Sodium 550 MG Oral Tablet; Therapy: 01KLJ4399 to (Last Rx:21Wrc0183)  Requested for: 02FET3268 Ordered   14  Norethindrone Acetate 5 MG Oral Tablet; One tablet daily; Therapy: 79XWI1114 to (Last Rx:09Jan2018)  Requested for: 38RQB8362 Ordered   15  Ondansetron 4 MG Oral Tablet Disintegrating; Therapy: 81WQY9251 to Recorded   16  ProAir  (90 Base) MCG/ACT Inhalation Aerosol Solution; Therapy: 36KTU7285 to (Last Rx:30Dec2011)  Requested for: 06Ijd5376 Ordered   17  TraMADol HCl - 50 MG Oral Tablet; Therapy: 58LDR0088 to Recorded   18  Uribel 118 MG Oral Capsule; Therapy: (Recorded:26Oct2017) to Recorded   19  ValACYclovir HCl - 500 MG Oral Tablet (Valtrex); TAKE 1 TABLET DAILY; Therapy: 63SHV1481 to (Evaluate:09Iqc7370)  Requested for: 22QYJ9214; Last    Rx:30Nov2017 Ordered   20  ValACYclovir HCl - 500 MG Oral Tablet (Valtrex); take 1 tablet PO BID x 3 days; Therapy: 91FHA1967 to (Jorge Jones)  Requested for: 53CUB6909; Last    Rx:17Nov2017 Ordered    Allergies    1  Amoxicillin CAPS   2  Doxycycline Hyclate CAPS   3   Penicillins    Signatures   Electronically signed by : Matthew Leggett, ; Jan 16 2018  1:18PM EST                       (Author)

## 2018-01-24 VITALS — SYSTOLIC BLOOD PRESSURE: 112 MMHG | BODY MASS INDEX: 25.07 KG/M2 | WEIGHT: 153 LBS | DIASTOLIC BLOOD PRESSURE: 72 MMHG

## 2018-02-22 ENCOUNTER — OFFICE VISIT (OUTPATIENT)
Dept: OBGYN CLINIC | Facility: CLINIC | Age: 48
End: 2018-02-22
Payer: MEDICARE

## 2018-02-22 VITALS
WEIGHT: 147.8 LBS | SYSTOLIC BLOOD PRESSURE: 110 MMHG | HEIGHT: 65 IN | BODY MASS INDEX: 24.62 KG/M2 | DIASTOLIC BLOOD PRESSURE: 70 MMHG

## 2018-02-22 DIAGNOSIS — G43.709 CHRONIC MIGRAINE W/O AURA W/O STATUS MIGRAINOSUS, NOT INTRACTABLE: Primary | ICD-10-CM

## 2018-02-22 PROCEDURE — 99213 OFFICE O/P EST LOW 20 MIN: CPT | Performed by: OBSTETRICS & GYNECOLOGY

## 2018-02-22 RX ORDER — METHENAMINE, SODIUM PHOSPHATE, MONOBASIC, MONOHYDRATE, PHENYL SALICYLATE, METHYLENE BLUE, AND HYOSCYAMINE SULFATE 120; 40.8; 36; 10; .12 MG/1; MG/1; MG/1; MG/1; MG/1
CAPSULE ORAL
COMMUNITY
Start: 2017-09-30 | End: 2020-07-06

## 2018-02-22 RX ORDER — VALACYCLOVIR HYDROCHLORIDE 500 MG/1
500 TABLET, FILM COATED ORAL DAILY
Status: ON HOLD | COMMUNITY
Start: 2017-09-29 | End: 2018-11-21 | Stop reason: ALTCHOICE

## 2018-02-22 RX ORDER — LORAZEPAM 0.5 MG/1
1 TABLET ORAL
COMMUNITY
End: 2020-05-20

## 2018-02-22 RX ORDER — PENTOSAN POLYSULFATE SODIUM 100 MG/1
CAPSULE, GELATIN COATED ORAL
Refills: 0 | COMMUNITY
Start: 2018-01-30 | End: 2018-11-19 | Stop reason: ALTCHOICE

## 2018-02-22 RX ORDER — ALBUTEROL SULFATE 90 UG/1
AEROSOL, METERED RESPIRATORY (INHALATION)
COMMUNITY
Start: 2017-01-06 | End: 2019-10-31 | Stop reason: SDUPTHER

## 2018-02-22 RX ORDER — PROCHLORPERAZINE MALEATE 10 MG
TABLET ORAL
COMMUNITY
Start: 2017-12-13 | End: 2022-02-03

## 2018-02-22 RX ORDER — BENZONATATE 100 MG/1
100 CAPSULE ORAL
COMMUNITY
Start: 2018-01-21 | End: 2018-09-27

## 2018-02-22 RX ORDER — ONDANSETRON 4 MG/1
TABLET, FILM COATED ORAL
COMMUNITY
Start: 2017-06-06 | End: 2018-12-05

## 2018-02-22 RX ORDER — MEDROXYPROGESTERONE ACETATE 150 MG/ML
INJECTION, SUSPENSION INTRAMUSCULAR
COMMUNITY
Start: 2015-02-13 | End: 2018-06-22 | Stop reason: SDUPTHER

## 2018-02-22 RX ORDER — PREDNISONE 10 MG/1
TABLET ORAL
Refills: 0 | COMMUNITY
Start: 2017-12-28 | End: 2018-09-27

## 2018-02-22 RX ORDER — ALBUTEROL SULFATE 2.5 MG/3ML
SOLUTION RESPIRATORY (INHALATION)
COMMUNITY
Start: 2017-01-06 | End: 2019-10-31 | Stop reason: SDUPTHER

## 2018-02-22 RX ORDER — NAPROXEN SODIUM 550 MG/1
TABLET ORAL
COMMUNITY
Start: 2017-12-13 | End: 2019-04-09

## 2018-02-22 NOTE — PROGRESS NOTES
Assessment/Plan:     Migraines, exacerbated by menstrual cycles-I explained that the reason the Depo-Provera helps with her migraines is because it suppresses her ovarian function and that is why she does not get a menstrual cycle  An ablation or a Mirena IUD would cause her to be amenorrheic but her ovaries would still be cycling and therefore this would not necessarily help with her headaches  At this time I feel she would do best with continuing Depo-Provera  She is worried that she will have breakthrough bleeding again  I tried to reassure her that if this happens again we can try moving her doses closer together which we have done in the past   I considered adding a small amount of estrogen for short period of time but am not sure we are able to do this with her migraines  We briefly discussed removal of her ovaries, I do not think this is a good option at this point because she would then be menopausal and have concerns related to this, such as heart disease and osteoporosis  Also, we would not be able to use HRT for symptoms  We did discuss SSRI for menopausal symptoms  She will continue Depo-Provera and call if the break through bleeding recurs and becomes bothersome  There are no diagnoses linked to this encounter  Subjective:     Patient ID: Jake Lemus is a 41 y o  female  Patient here to discuss her menstrual cycles  She has been on Depo-Provera because she has severe migraines with her menstrual cycles  She is status post tubal and does not need contraception  Last year we stopped her Depo-Provera because she was having breakthrough bleeding  We put her on progesterone only pills but she had bleeding on these as well  She is not a candidate for combination OCs due to her severe migraines  She is getting Botox down at [facility] for these  Recently we restarted Depo-Provera and so far she has not had breakthrough bleeding      She is also frustrated because she is having bladder installations for interstitial cystitis and frequent herpes outbreaks  She feels very stressed and anxious over all of these problems  She wanted to come in to see if an ablation or something surgical could help her menstrual cycles  Review of Systems   Genitourinary: Positive for dysuria and frequency  All other systems reviewed and are negative          Objective:     Physical Exam

## 2018-04-10 ENCOUNTER — OFFICE VISIT (OUTPATIENT)
Dept: OBGYN CLINIC | Facility: CLINIC | Age: 48
End: 2018-04-10
Payer: MEDICARE

## 2018-04-10 VITALS — SYSTOLIC BLOOD PRESSURE: 112 MMHG | WEIGHT: 149.5 LBS | DIASTOLIC BLOOD PRESSURE: 76 MMHG | BODY MASS INDEX: 24.88 KG/M2

## 2018-04-10 DIAGNOSIS — Z30.42 ENCOUNTER FOR MANAGEMENT AND INJECTION OF DEPO-PROVERA: Primary | ICD-10-CM

## 2018-04-10 PROCEDURE — 96372 THER/PROPH/DIAG INJ SC/IM: CPT

## 2018-04-10 RX ORDER — MEDROXYPROGESTERONE ACETATE 150 MG/ML
150 INJECTION, SUSPENSION INTRAMUSCULAR ONCE
Status: COMPLETED | OUTPATIENT
Start: 2018-04-10 | End: 2018-04-10

## 2018-04-10 RX ADMIN — MEDROXYPROGESTERONE ACETATE 150 MG: 150 INJECTION, SUSPENSION INTRAMUSCULAR at 15:44

## 2018-04-10 NOTE — PROGRESS NOTES
PATIENT WAS SEEN TODAY FOR 12 WEEK DEPO INJECTION  PATIENT HAS NO CONCERNS OR COMPLAINTS  PATIENT WILL RETURN IN 15 WEEKS

## 2018-06-22 DIAGNOSIS — Z30.42 ENCOUNTER FOR SURVEILLANCE OF INJECTABLE CONTRACEPTIVE: Primary | ICD-10-CM

## 2018-06-22 RX ORDER — MEDROXYPROGESTERONE ACETATE 150 MG/ML
INJECTION, SUSPENSION INTRAMUSCULAR
Qty: 1 ML | Refills: 1 | Status: ON HOLD | OUTPATIENT
Start: 2018-06-22 | End: 2018-11-21 | Stop reason: DRUGHIGH

## 2018-06-28 ENCOUNTER — OFFICE VISIT (OUTPATIENT)
Dept: OBGYN CLINIC | Facility: CLINIC | Age: 48
End: 2018-06-28
Payer: MEDICARE

## 2018-06-28 VITALS — WEIGHT: 136.8 LBS | BODY MASS INDEX: 22.76 KG/M2 | SYSTOLIC BLOOD PRESSURE: 122 MMHG | DIASTOLIC BLOOD PRESSURE: 76 MMHG

## 2018-06-28 DIAGNOSIS — Z30.9 ENCOUNTER FOR CONTRACEPTIVE MANAGEMENT, UNSPECIFIED TYPE: Primary | ICD-10-CM

## 2018-06-28 DIAGNOSIS — N94.89 SUPPRESSION OF MENSES: Primary | ICD-10-CM

## 2018-06-28 DIAGNOSIS — B00.9 HSV (HERPES SIMPLEX VIRUS) INFECTION: ICD-10-CM

## 2018-06-28 PROCEDURE — 96372 THER/PROPH/DIAG INJ SC/IM: CPT

## 2018-06-28 RX ORDER — MEDROXYPROGESTERONE ACETATE 150 MG/ML
150 INJECTION, SUSPENSION INTRAMUSCULAR ONCE
Status: COMPLETED | OUTPATIENT
Start: 2018-06-28 | End: 2018-06-28

## 2018-06-28 RX ORDER — HEPARIN SODIUM 10000 [USP'U]/ML
INJECTION, SOLUTION INTRAVENOUS; SUBCUTANEOUS
COMMUNITY
Start: 2018-03-20 | End: 2018-11-19 | Stop reason: CLARIF

## 2018-06-28 RX ADMIN — MEDROXYPROGESTERONE ACETATE 150 MG: 150 INJECTION, SUSPENSION INTRAMUSCULAR at 14:18

## 2018-06-28 NOTE — TELEPHONE ENCOUNTER
Patient came to office for Depo Provera injection  States has been bleeding again x 2 weeks  Wants future injections to be every 10 weeks instead of 12 weeks, as previously discussed with Dr Kimbrough Hopping  Needs refills of Depo and also refills of Valtrex to have at home in case of an outbreak  Escripts sent  Patient also states that her [facility] told her that a prescription was waiting for her for Norethindrone 0 35 mg  She started taking it but since she was bleeding again she decided to stop  Patient questions if she should be taking this now that she is getting Depo Provera injections

## 2018-07-05 RX ORDER — VALACYCLOVIR HYDROCHLORIDE 500 MG/1
500 TABLET, FILM COATED ORAL 2 TIMES DAILY
Qty: 6 TABLET | Refills: 3 | Status: ON HOLD | OUTPATIENT
Start: 2018-07-05 | End: 2018-11-21 | Stop reason: ALTCHOICE

## 2018-07-05 RX ORDER — MEDROXYPROGESTERONE ACETATE 150 MG/ML
150 INJECTION, SUSPENSION INTRAMUSCULAR SEE ADMIN INSTRUCTIONS
Qty: 1 ML | Refills: 2 | Status: SHIPPED | OUTPATIENT
Start: 2018-07-05 | End: 2018-12-05

## 2018-07-05 NOTE — TELEPHONE ENCOUNTER
No, she should only be using the depo provera and can do this every 10-11 weeks to see if this helps the bleeding, she should not take the norethindrone   35mg

## 2018-07-06 DIAGNOSIS — B00.9 HSV (HERPES SIMPLEX VIRUS) INFECTION: Primary | ICD-10-CM

## 2018-07-06 NOTE — TELEPHONE ENCOUNTER
Patient informed  Will not take pill  Wants different Rx for Valtrex since she takes it daily  Escript sent

## 2018-07-09 RX ORDER — VALACYCLOVIR HYDROCHLORIDE 500 MG/1
500 TABLET, FILM COATED ORAL DAILY
Qty: 90 TABLET | Refills: 1 | Status: SHIPPED | OUTPATIENT
Start: 2018-07-09 | End: 2019-04-09 | Stop reason: ALTCHOICE

## 2018-07-13 ENCOUNTER — TELEPHONE (OUTPATIENT)
Dept: OBGYN CLINIC | Facility: CLINIC | Age: 48
End: 2018-07-13

## 2018-07-17 ENCOUNTER — TELEPHONE (OUTPATIENT)
Dept: OBGYN CLINIC | Facility: CLINIC | Age: 48
End: 2018-07-17

## 2018-07-17 NOTE — TELEPHONE ENCOUNTER
She is spotting on Depo Provera, we were going to move up her dose to ten weeks? Did she get this yet?   Another options would be to add a few days of aygestin

## 2018-07-17 NOTE — TELEPHONE ENCOUNTER
Patient called again to report still spotting  Sometimes it almost stops for a few days but then it starts again  Advised try to hod on until her 10 week Depo and maybe that will improve the spotting  Also can try Ibuprofen

## 2018-08-16 ENCOUNTER — TELEPHONE (OUTPATIENT)
Dept: OBGYN CLINIC | Facility: CLINIC | Age: 48
End: 2018-08-16

## 2018-08-16 NOTE — TELEPHONE ENCOUNTER
Patient called c/o ongoing light bleeding  Per Dr Diana Reardon advised needs Sono, possible EMB  All instructions given  Will schedule

## 2018-09-05 ENCOUNTER — CLINICAL SUPPORT (OUTPATIENT)
Dept: OBGYN CLINIC | Facility: CLINIC | Age: 48
End: 2018-09-05
Payer: MEDICARE

## 2018-09-05 VITALS
SYSTOLIC BLOOD PRESSURE: 140 MMHG | HEIGHT: 65 IN | DIASTOLIC BLOOD PRESSURE: 76 MMHG | WEIGHT: 137.4 LBS | BODY MASS INDEX: 22.89 KG/M2

## 2018-09-05 DIAGNOSIS — Z30.9 ENCOUNTER FOR CONTRACEPTIVE MANAGEMENT, UNSPECIFIED TYPE: Primary | ICD-10-CM

## 2018-09-05 PROCEDURE — 96372 THER/PROPH/DIAG INJ SC/IM: CPT | Performed by: OBSTETRICS & GYNECOLOGY

## 2018-09-05 RX ORDER — MEDROXYPROGESTERONE ACETATE 150 MG/ML
150 INJECTION, SUSPENSION INTRAMUSCULAR ONCE
Status: COMPLETED | OUTPATIENT
Start: 2018-09-05 | End: 2018-09-05

## 2018-09-05 RX ADMIN — MEDROXYPROGESTERONE ACETATE 150 MG: 150 INJECTION, SUSPENSION INTRAMUSCULAR at 10:05

## 2018-09-05 NOTE — PROGRESS NOTES
Pt was seen for depo injection  Pt has no complaints at this time  Pt will return in 13 weeks for next depo injection

## 2018-09-27 ENCOUNTER — PROCEDURE VISIT (OUTPATIENT)
Dept: OBGYN CLINIC | Facility: CLINIC | Age: 48
End: 2018-09-27
Payer: MEDICARE

## 2018-09-27 ENCOUNTER — ULTRASOUND (OUTPATIENT)
Dept: OBGYN CLINIC | Facility: CLINIC | Age: 48
End: 2018-09-27
Payer: MEDICARE

## 2018-09-27 VITALS — BODY MASS INDEX: 23.46 KG/M2 | WEIGHT: 141 LBS | SYSTOLIC BLOOD PRESSURE: 120 MMHG | DIASTOLIC BLOOD PRESSURE: 80 MMHG

## 2018-09-27 DIAGNOSIS — N93.9 ABNORMAL UTERINE BLEEDING (AUB): Primary | ICD-10-CM

## 2018-09-27 DIAGNOSIS — N92.1 MENORRHAGIA WITH IRREGULAR CYCLE: Primary | ICD-10-CM

## 2018-09-27 PROCEDURE — 76830 TRANSVAGINAL US NON-OB: CPT | Performed by: OBSTETRICS & GYNECOLOGY

## 2018-09-27 PROCEDURE — 58340 CATHETER FOR HYSTEROGRAPHY: CPT | Performed by: OBSTETRICS & GYNECOLOGY

## 2018-09-27 PROCEDURE — 88305 TISSUE EXAM BY PATHOLOGIST: CPT | Performed by: PATHOLOGY

## 2018-09-27 PROCEDURE — 58100 BIOPSY OF UTERUS LINING: CPT | Performed by: OBSTETRICS & GYNECOLOGY

## 2018-09-27 PROCEDURE — 76831 ECHO EXAM UTERUS: CPT | Performed by: OBSTETRICS & GYNECOLOGY

## 2018-09-27 NOTE — PROGRESS NOTES
CC:  Bleeding    HPI: Dutch Harris presents for SIS, biopsy and further discussion regarding her ongoing bleeding issues  This patient was had various medications to try to stem her abnormal uterine bleeding, has met with frustration because of the inability of these medications to help this  The patient has undergone an SIS and endometrial sampling  The ultrasound shows a normal size uterus with no abnormalities of the uterus endometrium or myometrium  Past Medical History:  Past Medical History:   Diagnosis Date    Anxiety     Asthma     SARA I (cervical intraepithelial neoplasia I)     RESOLVED: 93KUA2682    Depression     Pap smear abnormality of cervix with ASCUS favoring benign     RESOLVED: 66MBZ3811       Past Surgical History:  Past Surgical History:   Procedure Laterality Date    ENDOTRACHEAL INTUBATION EMERGENT  2012    TOOTH EXTRACTION      TUBAL LIGATION      ONSET: 29 DEC 2011       Past OB/Gyn History:  Menstrual cycles are almost continuous with various levels of bleeding  Denies any history of sexually transmitted infection  No history of abnormal pap smears  Her last pap smear was 2016, normal     ALLERGIES:   Allergies   Allergen Reactions    Azithromycin Diarrhea, Nausea Only and Other (See Comments)    Doxycycline     Erythromycin Base Other (See Comments)    Lamotrigine Other (See Comments)     Can't recall side effects       Lithium Itching    Mirtazapine      (remeron)    Moxifloxacin Nausea Only     Excessive vomiting  Excessive vomiting    Penicillins     Sulfamethoxazole-Trimethoprim Other (See Comments)    Indomethacin Rash       MEDS:   Current Outpatient Prescriptions:     albuterol (2 5 mg/3 mL) 0 083 % nebulizer solution    albuterol (PROAIR HFA) 90 mcg/act inhaler    ELMIRON 100 MG capsule    ERENUMAB-AOOE SC    Heparin Sodium, Porcine, (HEPARIN, PORCINE,) 90346 UNIT/ML    LORazepam (ATIVAN) 0 5 mg tablet    medroxyPROGESTERone (DEPO-PROVERA) 150 mg/mL injection    medroxyPROGESTERone (DEPO-PROVERA) 150 mg/mL injection    Meth-Hyo-M Bl-Na Phos-Ph Sal (URIBEL) 118 MG CAPS    Mirabegron ER (MYRBETRIQ) 25 MG TB24    Multiple Vitamin (MULTI VITAMIN DAILY PO)    naproxen sodium (ANAPROX) 550 mg tablet    ondansetron (ZOFRAN) 4 mg tablet    pentosan polysulfate (ELMIRON) 100 mg capsule    prochlorperazine (COMPAZINE) 10 mg tablet    valACYclovir (VALTREX) 500 mg tablet    valACYclovir (VALTREX) 500 mg tablet    valACYclovir (VALTREX) 500 mg tablet    Family History:  Family History   Problem Relation Age of Onset    Other Mother         HYSTERECTOMY FOR BENIGN DISEASE     Diabetes Mother     Lung cancer Father     Colon cancer Father     Other Sister         HYSTERECTOMY FOR BENIGN DISEASE     Ovarian cancer Sister     Diabetes Maternal Grandmother     Other Maternal Grandmother         HYSTERECTOMY FOR BENIGN DISEASE     Colon cancer Paternal Grandmother     Throat cancer Paternal Uncle        Social History:  Social History     Social History    Marital status:      Spouse name: N/A    Number of children: 2    Years of education: N/A     Occupational History    Not on file  Social History Main Topics    Smoking status: Never Smoker    Smokeless tobacco: Never Used    Alcohol use No    Drug use: No    Sexual activity: Not on file     Other Topics Concern    Not on file     Social History Narrative    Marital history- single as per all scripts    No caffeine use    [employer] affiliation [employer]    Uses safety equipment- seatbelts          Review of Systems:  Gen:   Denies fatigue, chills, nausea, vomiting, fever  Skin: No rashes or discolorations of any concern  RESP: Denies SOB, no cough  CV: Denies chest pain or palpitations  Breasts: Denies masses, pain, skin changes and nipple discharge  GI: Denies abdominal pain, heartburn, nausea, vomiting, changes in bowel habits     : Denies dysuria, frequency, CVA tenderness, incontinence and hematuria  Genitalia: Denies abnormal vaginal discharge, external lesions, rashes, pelvic pain, pressure, positive for abnormal bleeding  Rectal:  Denies pain, bleeding, hemorrhoids,    Physical Exam:  /80 (BP Location: Left arm, Patient Position: Sitting, Cuff Size: Standard)   Wt 64 kg (141 lb)   BMI 23 46 kg/m²    Gen: The patient was alert and oriented x3, pleasant well-appearing female in no acute distress  Neck:  Unremarkable, no anterior or posterior lymphadenopathy, no thyromegaly  CV:  RRR, no murmurs  Resp:  Clear to auscultation bilaterally, no wheezing  Abd:  Soft, nontender, nondistended, no masses or organomegaly  Back:  No CVA tenderness, no tenderness to palpation along spine  Pelvic  Normal appearing external female genitalia, no visible lesions, no rashes  Vagina is free of discharge, normal vaginal epithelium, no abnormal  lesions, no evidence of prolapse anteriorly or posteriorly  Normal appearing cervix, mobile and nontender  Uterus is normal size, mobile and, nontender  No palpable adnexal masses or tenderness  No anoperineal lesions  Skin:  No concerning lesions  Extremeties: No edema      Assessment & Plan:   1  Menorrhagia  Carmen and I spent a long amount of time going over surgical intervention for her bleeding since her medications have failed to control this  Of the 2 options presented, ablation versus hysterectomy, Carmen is in favor of an ablation method  We reviewed the NovaSure technique and Carmen would like to proceed with this  She was personally consented by myself

## 2018-09-27 NOTE — PROGRESS NOTES
Endometrial biopsy  Date/Time: 9/27/2018 9:00 AM  Performed by: Yimi Guzman by: Aubrey Burnham     Consent:     Consent obtained:  Written    Consent given by:  Patient    Procedural risks discussed:  Bleeding, infection and possible continued pain    Patient questions answered: yes      Patient agrees, verbalizes understanding, and wants to proceed: yes      Educational handouts given: no      Instructions and paperwork completed: yes    Indication:     Indications:  Other disorder of menstruation and other abnormal bleeding from female genital tract    Pre-procedure:     Pre-procedure timeout performed: yes    Procedure:     Procedure: endometrial biopsy with Pipelle      A bivalve speculum was placed in the vagina: yes      Cervix cleaned and prepped: yes      A paracervical block was performed: no      An intracervical block was performed: no      The cervix was dilated: yes      Uterus sounded: yes      Uterus sound depth (cm):  7    Specimen collected: specimen collected and sent to pathology      Patient tolerated procedure well with no complications: yes    Findings:     Uterus size:  Non-gravid    Cervix: normal      Adnexa: normal

## 2018-10-02 ENCOUNTER — TELEPHONE (OUTPATIENT)
Dept: OBGYN CLINIC | Facility: CLINIC | Age: 48
End: 2018-10-02

## 2018-10-02 NOTE — TELEPHONE ENCOUNTER
----- Message from Nba Zelaya MD sent at 10/1/2018 10:40 AM EDT -----  Please inform patient her results are normal

## 2018-10-02 NOTE — TELEPHONE ENCOUNTER
----- Message from Adriana Gustafson MD sent at 10/1/2018 10:40 AM EDT -----  Please inform patient her results are normal

## 2018-10-03 ENCOUNTER — TELEPHONE (OUTPATIENT)
Dept: OBGYN CLINIC | Facility: CLINIC | Age: 48
End: 2018-10-03

## 2018-10-03 NOTE — TELEPHONE ENCOUNTER
Called pt to schedule surgery date  Message states that voice mailbox is full and cannot except messages at this time

## 2018-10-09 ENCOUNTER — TELEPHONE (OUTPATIENT)
Dept: OBGYN CLINIC | Facility: CLINIC | Age: 48
End: 2018-10-09

## 2018-10-10 PROBLEM — N92.1 MENORRHAGIA WITH IRREGULAR CYCLE: Status: ACTIVE | Noted: 2018-10-10

## 2018-11-14 ENCOUNTER — CLINICAL SUPPORT (OUTPATIENT)
Dept: OBGYN CLINIC | Facility: CLINIC | Age: 48
End: 2018-11-14
Payer: MEDICARE

## 2018-11-14 VITALS
WEIGHT: 140.4 LBS | BODY MASS INDEX: 23.39 KG/M2 | HEIGHT: 65 IN | DIASTOLIC BLOOD PRESSURE: 84 MMHG | SYSTOLIC BLOOD PRESSURE: 130 MMHG

## 2018-11-14 DIAGNOSIS — Z30.42 ENCOUNTER FOR DEPO-PROVERA CONTRACEPTION: Primary | ICD-10-CM

## 2018-11-14 PROCEDURE — 96372 THER/PROPH/DIAG INJ SC/IM: CPT | Performed by: OBSTETRICS & GYNECOLOGY

## 2018-11-14 RX ORDER — MEDROXYPROGESTERONE ACETATE 150 MG/ML
150 INJECTION, SUSPENSION INTRAMUSCULAR ONCE
Status: COMPLETED | OUTPATIENT
Start: 2018-11-14 | End: 2018-11-14

## 2018-11-14 RX ADMIN — MEDROXYPROGESTERONE ACETATE 150 MG: 150 INJECTION, SUSPENSION INTRAMUSCULAR at 09:38

## 2018-11-14 NOTE — PROGRESS NOTES
Patient is being seen today for 12 week depo injection  Patient has no concerns or complaints  Patient will return in 12 weeks for next appointment

## 2018-11-19 NOTE — PRE-PROCEDURE INSTRUCTIONS
Pre-Surgery Instructions:   Medication Instructions    albuterol (2 5 mg/3 mL) 0 083 % nebulizer solution Patient was instructed by Physician and understands   albuterol (PROAIR HFA) 90 mcg/act inhaler Patient was instructed by Physician and understands   ERENUMAB-AOOE SC Patient was instructed by Physician and understands   HEPARIN SODIUM, PORCINE, IJ Patient was instructed by Physician and understands   LORazepam (ATIVAN) 0 5 mg tablet Patient was instructed by Physician and understands   Meth-Hyo-M Bl-Na Phos-Ph Sal (URIBEL) 118 MG CAPS Patient was instructed by Physician and understands   Mirabegron ER (MYRBETRIQ) 25 MG TB24 Patient was instructed by Physician and understands   Multiple Vitamin (MULTI VITAMIN DAILY PO) Patient was instructed by Physician and understands   naproxen sodium (ANAPROX) 550 mg tablet Patient was instructed by Physician and understands   ondansetron (ZOFRAN) 4 mg tablet Patient was instructed by Physician and understands   patient supplied medication Patient was instructed by Physician and understands   pentosan polysulfate (ELMIRON) 100 mg capsule Patient was instructed by Physician and understands   prochlorperazine (COMPAZINE) 10 mg tablet Patient was instructed by Physician and understands   valACYclovir (VALTREX) 500 mg tablet Patient was instructed by Physician and understands   [DISCONTINUED] Heparin Sodium, Porcine, (HEPARIN, PORCINE,) 64028 UNIT/ML Patient was instructed by Physician and understands  [facility] preop instructions reviewed with pt  Pt has surgical soap

## 2018-11-20 ENCOUNTER — ANESTHESIA EVENT (OUTPATIENT)
Dept: PERIOP | Facility: HOSPITAL | Age: 48
End: 2018-11-20
Payer: MEDICARE

## 2018-11-21 ENCOUNTER — ANESTHESIA (OUTPATIENT)
Dept: PERIOP | Facility: HOSPITAL | Age: 48
End: 2018-11-21
Payer: MEDICARE

## 2018-11-21 ENCOUNTER — HOSPITAL ENCOUNTER (OUTPATIENT)
Facility: HOSPITAL | Age: 48
Setting detail: OUTPATIENT SURGERY
Discharge: HOME | End: 2018-11-21
Attending: OBSTETRICS & GYNECOLOGY | Admitting: OBSTETRICS & GYNECOLOGY
Payer: MEDICARE

## 2018-11-21 VITALS
BODY MASS INDEX: 22.5 KG/M2 | RESPIRATION RATE: 16 BRPM | HEART RATE: 78 BPM | SYSTOLIC BLOOD PRESSURE: 118 MMHG | TEMPERATURE: 97.8 F | OXYGEN SATURATION: 100 % | DIASTOLIC BLOOD PRESSURE: 73 MMHG | HEIGHT: 66 IN | WEIGHT: 140 LBS

## 2018-11-21 DIAGNOSIS — N92.1 MENORRHAGIA WITH IRREGULAR CYCLE: ICD-10-CM

## 2018-11-21 LAB
EXT PREGNANCY TEST URINE: NEGATIVE
HCT VFR BLD AUTO: 40.3 % (ref 34.8–46.1)

## 2018-11-21 PROCEDURE — 88305 TISSUE EXAM BY PATHOLOGIST: CPT | Performed by: PATHOLOGY

## 2018-11-21 PROCEDURE — 85014 HEMATOCRIT: CPT | Performed by: OBSTETRICS & GYNECOLOGY

## 2018-11-21 PROCEDURE — 58563 HYSTEROSCOPY ABLATION: CPT | Performed by: OBSTETRICS & GYNECOLOGY

## 2018-11-21 PROCEDURE — 81025 URINE PREGNANCY TEST: CPT | Performed by: OBSTETRICS & GYNECOLOGY

## 2018-11-21 RX ORDER — IBUPROFEN 600 MG/1
600 TABLET ORAL EVERY 6 HOURS PRN
Status: DISCONTINUED | OUTPATIENT
Start: 2018-11-21 | End: 2018-11-21 | Stop reason: HOSPADM

## 2018-11-21 RX ORDER — FENTANYL CITRATE 50 UG/ML
INJECTION, SOLUTION INTRAMUSCULAR; INTRAVENOUS AS NEEDED
Status: DISCONTINUED | OUTPATIENT
Start: 2018-11-21 | End: 2018-11-21 | Stop reason: SURG

## 2018-11-21 RX ORDER — ONDANSETRON 2 MG/ML
4 INJECTION INTRAMUSCULAR; INTRAVENOUS ONCE AS NEEDED
Status: DISCONTINUED | OUTPATIENT
Start: 2018-11-21 | End: 2018-11-21 | Stop reason: HOSPADM

## 2018-11-21 RX ORDER — SODIUM CHLORIDE 9 MG/ML
125 INJECTION, SOLUTION INTRAVENOUS CONTINUOUS
Status: DISCONTINUED | OUTPATIENT
Start: 2018-11-21 | End: 2018-11-21 | Stop reason: HOSPADM

## 2018-11-21 RX ORDER — FERRIC SUBSULFATE 0.21 G/G
LIQUID TOPICAL AS NEEDED
Status: DISCONTINUED | OUTPATIENT
Start: 2018-11-21 | End: 2018-11-21 | Stop reason: HOSPADM

## 2018-11-21 RX ORDER — KETOROLAC TROMETHAMINE 30 MG/ML
INJECTION, SOLUTION INTRAMUSCULAR; INTRAVENOUS AS NEEDED
Status: DISCONTINUED | OUTPATIENT
Start: 2018-11-21 | End: 2018-11-21 | Stop reason: SURG

## 2018-11-21 RX ORDER — SCOLOPAMINE TRANSDERMAL SYSTEM 1 MG/1
1 PATCH, EXTENDED RELEASE TRANSDERMAL ONCE AS NEEDED
Status: DISCONTINUED | OUTPATIENT
Start: 2018-11-21 | End: 2018-11-21 | Stop reason: HOSPADM

## 2018-11-21 RX ORDER — FENTANYL CITRATE/PF 50 MCG/ML
50 SYRINGE (ML) INJECTION
Status: DISCONTINUED | OUTPATIENT
Start: 2018-11-21 | End: 2018-11-21 | Stop reason: HOSPADM

## 2018-11-21 RX ORDER — IBUPROFEN 800 MG/1
800 TABLET ORAL EVERY 6 HOURS PRN
Qty: 12 TABLET | Refills: 0 | OUTPATIENT
Start: 2018-11-21 | End: 2018-11-24

## 2018-11-21 RX ORDER — PROPOFOL 10 MG/ML
INJECTION, EMULSION INTRAVENOUS AS NEEDED
Status: DISCONTINUED | OUTPATIENT
Start: 2018-11-21 | End: 2018-11-21 | Stop reason: SURG

## 2018-11-21 RX ORDER — ONDANSETRON 2 MG/ML
4 INJECTION INTRAMUSCULAR; INTRAVENOUS EVERY 6 HOURS PRN
Status: DISCONTINUED | OUTPATIENT
Start: 2018-11-21 | End: 2018-11-21 | Stop reason: HOSPADM

## 2018-11-21 RX ORDER — ONDANSETRON 2 MG/ML
INJECTION INTRAMUSCULAR; INTRAVENOUS AS NEEDED
Status: DISCONTINUED | OUTPATIENT
Start: 2018-11-21 | End: 2018-11-21 | Stop reason: SURG

## 2018-11-21 RX ADMIN — DEXAMETHASONE SODIUM PHOSPHATE 4 MG: 10 INJECTION INTRAMUSCULAR; INTRAVENOUS at 11:35

## 2018-11-21 RX ADMIN — PROPOFOL 200 MG: 10 INJECTION, EMULSION INTRAVENOUS at 11:11

## 2018-11-21 RX ADMIN — KETOROLAC TROMETHAMINE 30 MG: 30 INJECTION, SOLUTION INTRAMUSCULAR at 11:36

## 2018-11-21 RX ADMIN — FENTANYL CITRATE 25 MCG: 50 INJECTION, SOLUTION INTRAMUSCULAR; INTRAVENOUS at 11:28

## 2018-11-21 RX ADMIN — SODIUM CHLORIDE: 0.9 INJECTION, SOLUTION INTRAVENOUS at 11:32

## 2018-11-21 RX ADMIN — FENTANYL CITRATE 25 MCG: 50 INJECTION, SOLUTION INTRAMUSCULAR; INTRAVENOUS at 11:15

## 2018-11-21 RX ADMIN — SODIUM CHLORIDE 125 ML/HR: 0.9 INJECTION, SOLUTION INTRAVENOUS at 10:28

## 2018-11-21 RX ADMIN — IBUPROFEN 600 MG: 600 TABLET ORAL at 13:13

## 2018-11-21 RX ADMIN — ONDANSETRON HYDROCHLORIDE 4 MG: 2 INJECTION, SOLUTION INTRAVENOUS at 11:35

## 2018-11-21 NOTE — DISCHARGE INSTRUCTIONS
Endometrial Ablation   WHAT YOU NEED TO KNOW:   Endometrial ablation is a procedure to destroy the endometrium (lining of your uterus)  You may need this procedure if you have heavy or abnormal vaginal bleeding  DISCHARGE INSTRUCTIONS:   Call 911 for any of the following:   · You feel lightheaded, short of breath, and have chest pain  · You cough up blood  Seek care immediately if:   · Your arm or leg feels warm, tender, and painful  It may look swollen and red  · You feel dizzy, weak, and confused  · You cannot stop vomiting  · You have severe pain  · You are not able to urinate  Contact your healthcare provider if:   · You have a fever  · You have vaginal bleeding and it is not time for your monthly period  · The bleeding during your monthly period has not decreased  · You have pain when you urinate or see blood in your urine  · You have questions or concerns about your condition or care  Medicines:   · Medicines  can help decrease pain, calm your stomach, and control vomiting  · Take your medicine as directed  Contact your healthcare provider if you think your medicine is not helping or if you have side effects  Tell him or her if you are allergic to any medicine  Keep a list of the medicines, vitamins, and herbs you take  Include the amounts, and when and why you take them  Bring the list or the pill bottles to follow-up visits  Carry your medicine list with you in case of an emergency  Activity:  Ask when you can return to your usual activities  Do not have sex or use tampons or douches for 6 weeks after your procedure, or as directed  Birth control: You may still need to use birth control to prevent pregnancy  Pregnancy risks, such as a miscarriage and tubal pregnancy, are higher after this procedure  Talk to your healthcare provider about birth control or pregnancy after endometrial ablation    Follow up with your healthcare provider as directed:  Write down your questions so you remember to ask them during your visits  © 2017 [employer] Information is for End User's use only and may not be sold, redistributed or otherwise used for commercial purposes  All illustrations and images included in CareNotes® are the copyrighted property of A D [employer] M , Inc  or [employer]  The above information is an  only  It is not intended as medical advice for individual conditions or treatments  Talk to your doctor, nurse or pharmacist before following any medical regimen to see if it is safe and effective for you

## 2018-11-21 NOTE — ANESTHESIA PREPROCEDURE EVALUATION
Review of Systems/Medical History  Patient summary reviewed  Chart reviewed  No history of anesthetic complications     Cardiovascular   Pulmonary  Asthma (with colds) , well controlled/ stable Last rescue: < 1 month ago ,        GI/Hepatic  Negative GI/hepatic ROS          Negative  ROS   Comment: IC     Endo/Other  Negative endo/other ROS      GYN  Negative gynecology ROS          Hematology  Negative hematology ROS      Musculoskeletal  Negative musculoskeletal ROS        Neurology    Headaches,   Comment: Hx motion sickness    Hx of migraines last migraine was 2 weeks ago Psychology   Anxiety, Depression ,              Physical Exam    Airway    Mallampati score: II  TM Distance: >3 FB  Neck ROM: full     Dental   No notable dental hx     Cardiovascular  Rhythm: regular, Rate: normal, Cardiovascular exam normal    Pulmonary  Pulmonary exam normal Breath sounds clear to auscultation,     Other Findings        Anesthesia Plan  ASA Score- 2     Anesthesia Type- general with ASA Monitors  Additional Monitors:   Airway Plan:     Comment: SCOP patch ordered and refused by patient  Plan Factors-Patient not instructed to abstain from smoking on day of procedure  Patient did not smoke on day of surgery  Induction- intravenous  Postoperative Plan- Plan for postoperative opioid use  Informed Consent- Anesthetic plan and risks discussed with patient  I personally reviewed this patient with the CRNA  Discussed and agreed on the Anesthesia Plan with the CRNA  Hanny Yen

## 2018-11-21 NOTE — H&P
CC:  Menorrhagia       HPI:  Carmen has had a long history of abnormal uterine bleeding  She is frustrated with her bleeding since her medications have failed to control this  In the office, we discussed extensively ablation versus hysterectomy, Arcelia Matthews is in favor of an ablation method  We reviewed the NovaSure technique and Carmen would like to proceed with this  She was personally consented by myself  The patient has had normal endometrial sampling along with a normal current Pap smear      Past Medical History:  Medical History        Past Medical History:   Diagnosis Date    Anxiety      Asthma      SARA I (cervical intraepithelial neoplasia I)       RESOLVED: [id number]    Depression      Pap smear abnormality of cervix with ASCUS favoring benign       RESOLVED: [id number]            Past Surgical History:  Surgical History         Past Surgical History:   Procedure Laterality Date    ENDOTRACHEAL INTUBATION EMERGENT   2012    TOOTH EXTRACTION        TUBAL LIGATION         ONSET: 29 DEC 2011            Past OB/Gyn History:  Menstrual cycles are almost continuous with various levels of bleeding  Denies any history of sexually transmitted infection  No history of abnormal pap smears  Her last pap smear was 2016, normal      ALLERGIES:         Allergies   Allergen Reactions    Azithromycin Diarrhea, Nausea Only and Other (See Comments)    Doxycycline      Erythromycin Base Other (See Comments)    Lamotrigine Other (See Comments)       Can't recall side effects       Lithium Itching    Mirtazapine         (remeron)    Moxifloxacin Nausea Only       Excessive vomiting  Excessive vomiting    Penicillins      Sulfamethoxazole-Trimethoprim Other (See Comments)    Indomethacin Rash         MEDS:   Current Medications     Current Outpatient Prescriptions:     albuterol (2 5 mg/3 mL) 0 083 % nebulizer solution    albuterol (PROAIR HFA) 90 mcg/act inhaler    ELMIRON 100 MG capsule    ERENUMAB-AOOE SC    Heparin Sodium, Porcine, (HEPARIN, PORCINE,) 70711 UNIT/ML    LORazepam (ATIVAN) 0 5 mg tablet    medroxyPROGESTERone (DEPO-PROVERA) 150 mg/mL injection    medroxyPROGESTERone (DEPO-PROVERA) 150 mg/mL injection    Meth-Hyo-M Bl-Na Phos-Ph Sal (URIBEL) 118 MG CAPS    Mirabegron ER (MYRBETRIQ) 25 MG TB24    Multiple Vitamin (MULTI VITAMIN DAILY PO)    naproxen sodium (ANAPROX) 550 mg tablet    ondansetron (ZOFRAN) 4 mg tablet    pentosan polysulfate (ELMIRON) 100 mg capsule    prochlorperazine (COMPAZINE) 10 mg tablet    valACYclovir (VALTREX) 500 mg tablet    valACYclovir (VALTREX) 500 mg tablet    valACYclovir (VALTREX) 500 mg tablet        Family History:        Family History   Problem Relation Age of Onset    Other Mother           HYSTERECTOMY FOR BENIGN DISEASE     Diabetes Mother      Lung cancer Father      Colon cancer Father      Other Sister           HYSTERECTOMY FOR BENIGN DISEASE     Ovarian cancer Sister      Diabetes Maternal Grandmother      Other Maternal Grandmother           HYSTERECTOMY FOR BENIGN DISEASE     Colon cancer Paternal Grandmother      Throat cancer Paternal Uncle           Social History:  Social History   Social History            Social History    Marital status:        Spouse name: N/A    Number of children: 2    Years of education: N/A          Occupational History    Not on file            Social History Main Topics    Smoking status: Never Smoker    Smokeless tobacco: Never Used    Alcohol use No    Drug use: No    Sexual activity: Not on file           Other Topics Concern    Not on file          Social History Narrative     Marital history- single as per all scripts     No caffeine use     Religion affiliation [employer]     Uses safety equipment- seatbelts                Review of Systems:  Gen:   Denies fatigue, chills, nausea, vomiting, fever  Skin: No rashes or discolorations of any concern  RESP: Denies SOB, no cough  CV: Denies chest pain or palpitations  Breasts: Denies masses, pain, skin changes and nipple discharge  GI: Denies abdominal pain, heartburn, nausea, vomiting, changes in bowel habits  : Denies dysuria, frequency, CVA tenderness, incontinence and hematuria  Genitalia: Denies abnormal vaginal discharge, external lesions, rashes, pelvic pain, pressure, positive for various levels of bleeding better almost continuous  Rectal:  Denies pain, bleeding, hemorrhoids,     Physical Exam:  Vital signs: As per those reported by nursing     Gen: The patient was alert and oriented x3, pleasant well-appearing female in no acute distress  Neck:  Unremarkable, no anterior or posterior lymphadenopathy, no thyromegaly  CV:  RRR, no murmurs  Resp:  Clear to auscultation bilaterally, no wheezing, good effort  Abd:  Soft, nontender, nondistended, no masses or organomegaly, no guarding or rebound  Back:  No CVA tenderness, no tenderness to palpation along spine  Pelvic  Normal appearing external female genitalia, no visible lesions, no rashes  Vagina is free of discharge, normal vaginal epithelium, no abnormal  lesions, no evidence of prolapse anteriorly or posteriorly  Normal appearing cervix, mobile and nontender  Uterus is normal size, mobile and, nontender  No palpable adnexal masses or tenderness  No anoperineal lesions  Skin:  No concerning lesions or rashes  Extremeties: No edema or deformities        Assessment & Plan:   1  Menorrhagia  Plan is for hysteroscopy with the NovaSure ablation method

## 2018-11-22 NOTE — OP NOTE
OPERATIVE REPORT  PATIENT NAME: Shanika Kim    :  1970  MRN: [id number]  Pt Location: [facility] OR ROOM 04    SURGERY DATE: 2018    Surgeon(s) and Role:     Velasquez Bishop MD - Primary    Preop Diagnosis:  Menorrhagia with irregular cycle [N92 1]    Post-Op Diagnosis Codes:     * Menorrhagia with irregular cycle [N92 1]   Cervical polyp    Procedure(s) (LRB):  HYSTEROSCOPY (N/A)  ABLATION ENDOMETRIAL NOVASURE (N/A)   Cervical polypectomy    Specimen(s):  ID Type Source Tests Collected by Time Destination   1 : cervical polyp Tissue Polyp, Cervical/Endometrial TISSUE EXAM Cata Hill MD 2018 [id number]        Estimated Blood Loss:   Minimal    Drains: none       Anesthesia Type:   General    Operative Indications:  Menorrhagia with irregular cycle [N92 1]  Cervical polyp the      Operative Findings:  Anteverted uterus, small in size sounded to 7 cm, free of any overt pathology, polyps, fibroids on hysteroscopic exam, bilateral ostia visualized  Small 8 x 8 mm cervical polyp at 5 o'clock noted  Good cauterization of the entire uterine surface visualized through hysteroscopy after the NovaSure procedure  Complications:   None    Procedure and Technique:  Having identified the patient as Dilan Carrero on the operating room table, the patient was placed under general anesthesia, carefully positioned in the dorsal lithotomy position, prepped and draped in usual sterile manner  A time-out was obtained and then a bimanual exam showed an anteverted, slightly small uterus that was mobile with no adnexal masses  The bladder was drained for approximately 100 cc of clear yellow urine  A weighted speculum was now placed in the vaginal vault and the cervix identified and grasped with a single-tooth tenaculum on the anterior lip for purposes of traction  At this time a small cervical polyp was noted in the 5 o'clock region  The polyp was grasped with an Allis clamp twisted off and sent for surgical pathology    A small amount of oozing was controlled with Monsel's solution  The uterus was then sounded to 7 cm, carefully dilated up to a size 16 dilator  Using the standard 25 degree hysteroscope with saline as distending media, hysteroscopy was performed with the findings noted above  After photo documentation, the hysteroscope was removed and the cervix to be dilated up to a size 18 dilator  The NovaSure instrument was then carefully placed into the uterine cavity, extended to the 2 8 cm zahira  The working length was 4 5 cm  After passing safety checks, and uninterrupted 1 minutes and 38 second cauterization cycle was performed  The NovaSure instrument was then removed and found to be intact  Repeat hysteroscopy showed good cauterization of all endometrial surfaces and no signs of perforation  The procedure was then concluded with all sponge and instrument counts coincided with the preoperative count     I was present for the entire procedure and A qualified resident physician was not available    Patient Disposition:  PACU     SIGNATURE: Hayden Islas MD  DATE: November 22, 2018  TIME: 8:43 AM

## 2018-11-27 ENCOUNTER — TELEPHONE (OUTPATIENT)
Dept: OBGYN CLINIC | Facility: CLINIC | Age: 48
End: 2018-11-27

## 2018-11-27 NOTE — TELEPHONE ENCOUNTER
----- Message from Chaya Martinez MD sent at 11/27/2018 12:02 PM EST -----  Please inform patient her results are normal

## 2018-12-03 ENCOUNTER — HOSPITAL ENCOUNTER (OUTPATIENT)
Dept: RADIOLOGY | Facility: IMAGING CENTER | Age: 48
Discharge: HOME | End: 2018-12-03
Payer: MEDICARE

## 2018-12-03 ENCOUNTER — OFFICE VISIT (OUTPATIENT)
Dept: FAMILY MEDICINE CLINIC | Facility: CLINIC | Age: 48
End: 2018-12-03
Payer: MEDICARE

## 2018-12-03 ENCOUNTER — TRANSCRIBE ORDERS (OUTPATIENT)
Dept: ADMINISTRATIVE | Facility: HOSPITAL | Age: 48
End: 2018-12-03

## 2018-12-03 ENCOUNTER — TELEPHONE (OUTPATIENT)
Dept: FAMILY MEDICINE CLINIC | Facility: CLINIC | Age: 48
End: 2018-12-03

## 2018-12-03 VITALS
SYSTOLIC BLOOD PRESSURE: 128 MMHG | HEIGHT: 66 IN | HEART RATE: 87 BPM | BODY MASS INDEX: 22.5 KG/M2 | WEIGHT: 140 LBS | DIASTOLIC BLOOD PRESSURE: 86 MMHG | TEMPERATURE: 99.3 F | RESPIRATION RATE: 16 BRPM | OXYGEN SATURATION: 98 %

## 2018-12-03 DIAGNOSIS — E78.2 MIXED HYPERLIPIDEMIA: ICD-10-CM

## 2018-12-03 DIAGNOSIS — M54.40 ACUTE MIDLINE LOW BACK PAIN WITH SCIATICA, SCIATICA LATERALITY UNSPECIFIED: Primary | ICD-10-CM

## 2018-12-03 DIAGNOSIS — M54.40 ACUTE MIDLINE LOW BACK PAIN WITH SCIATICA, SCIATICA LATERALITY UNSPECIFIED: ICD-10-CM

## 2018-12-03 PROCEDURE — 72110 X-RAY EXAM L-2 SPINE 4/>VWS: CPT

## 2018-12-03 PROCEDURE — 99214 OFFICE O/P EST MOD 30 MIN: CPT | Performed by: FAMILY MEDICINE

## 2018-12-03 RX ORDER — PREDNISONE 10 MG/1
TABLET ORAL
Qty: 32 TABLET | Refills: 0 | Status: SHIPPED | OUTPATIENT
Start: 2018-12-03 | End: 2018-12-03 | Stop reason: SDUPTHER

## 2018-12-03 RX ORDER — METHOCARBAMOL 500 MG/1
500 TABLET, FILM COATED ORAL 2 TIMES DAILY
Qty: 60 TABLET | Refills: 0 | Status: SHIPPED | OUTPATIENT
Start: 2018-12-03 | End: 2018-12-03 | Stop reason: SDUPTHER

## 2018-12-03 RX ORDER — PREDNISONE 10 MG/1
TABLET ORAL
Qty: 32 TABLET | Refills: 0 | Status: SHIPPED | OUTPATIENT
Start: 2018-12-03 | End: 2018-12-05

## 2018-12-03 RX ORDER — METHOCARBAMOL 500 MG/1
500 TABLET, FILM COATED ORAL 2 TIMES DAILY
Qty: 60 TABLET | Refills: 0 | Status: SHIPPED | OUTPATIENT
Start: 2018-12-03 | End: 2018-12-05

## 2018-12-03 NOTE — PROGRESS NOTES
Assessment/Plan:    No problem-specific Assessment & Plan notes found for this encounter  Diagnoses and all orders for this visit:    Acute midline low back pain with sciatica, sciatica laterality unspecified  Comments:  started on prednisone and muscle relaxnt,  physical therapy  heat/ice  xray  Orders:  -     Discontinue: predniSONE 10 mg tablet; 4 tab po x 4 days, 3 tab po x 3 days, 2 tab po x 2 days and 1 tab po x 1 day after breakfast  -     Discontinue: methocarbamol (ROBAXIN) 500 mg tablet; Take 1 tablet (500 mg total) by mouth 2 (two) times a day  -     methocarbamol (ROBAXIN) 500 mg tablet; Take 1 tablet (500 mg total) by mouth 2 (two) times a day  -     predniSONE 10 mg tablet; 4 tab po x 4 days, 3 tab po x 3 days, 2 tab po x 2 days and 1 tab po x 1 day after breakfast  -     XR spine lumbar minimum 4 views non injury; Future  -     Ambulatory referral to Physical Therapy; Future    Mixed hyperlipidemia  Comments:  Repeat blood work to see her LDL  Orders:  -     Lipid Panel with Direct LDL reflex; Future  -     Comprehensive metabolic panel; Future          Subjective:      Patient ID: Rojas Vasquez is a 57 y o  female  HPI   Patient  his heavy dog and since then has the back pain , cannot sleep because of the pain pain is radiating to the right leg till the knee    The following portions of the patient's history were reviewed and updated as appropriate:   She  has a past medical history of Anxiety; Asthma; SARA I (cervical intraepithelial neoplasia I); Depression; IC (interstitial cystitis); Migraine; Motion sickness; and Pap smear abnormality of cervix with ASCUS favoring benign    Current Outpatient Prescriptions   Medication Sig Dispense Refill    albuterol (2 5 mg/3 mL) 0 083 % nebulizer solution USE 1 VIAL VIA NEBULIZER 3 TIMES A DAY      albuterol (PROAIR HFA) 90 mcg/act inhaler INHALE 2 PUFFS EVERY 4 TO 6 HOURS AS NEEDED      ERENUMAB-AOOE  mg Pt receiving Migraine injections at [address] [address] [place] SODIUM, PORCINE, IJ by Transurethral route Given along with Triamcinolone       LORazepam (ATIVAN) 0 5 mg tablet Take 1 mg by mouth daily at bedtime        medroxyPROGESTERone (DEPO-PROVERA) 150 mg/mL injection Inject 1 mL (150 mg total) into a muscle see administration instructions 1 injection every 10 weeks 1 mL 2    Meth-Hyo-M Bl-Na Phos-Ph Sal (URIBEL) 118 MG CAPS TAKE BY MOUTH AS DIRECTED FOR IC SYMPTOMS, qday      Mirabegron ER (MYRBETRIQ) 25 MG TB24 Take 50 mg by mouth daily        Multiple Vitamin (MULTI VITAMIN DAILY PO) Take by mouth      naproxen sodium (ANAPROX) 550 mg tablet 1 tab po TID prn migraine  Take with food  Limit use to 3 days per week   ondansetron (ZOFRAN) 4 mg tablet TAKE 1 TABLET BY ORAL ROUTE EVERY 8 HOURS AS NEEDED      pentosan polysulfate (ELMIRON) 100 mg capsule Take 100 mg by mouth 3 (three) times a day before meals        prochlorperazine (COMPAZINE) 10 mg tablet 1 tab po TID prn headache  Max 3 days per week      methocarbamol (ROBAXIN) 500 mg tablet Take 1 tablet (500 mg total) by mouth 2 (two) times a day 60 tablet 0    predniSONE 10 mg tablet 4 tab po x 4 days, 3 tab po x 3 days, 2 tab po x 2 days and 1 tab po x 1 day after breakfast 32 tablet 0    valACYclovir (VALTREX) 500 mg tablet Take 1 tablet (500 mg total) by mouth daily for 90 days 90 tablet 1     No current facility-administered medications for this visit  She is allergic to azithromycin; doxycycline; erythromycin base; lamotrigine; lithium; mirtazapine; moxifloxacin; sulfamethoxazole-trimethoprim; indomethacin; and penicillins  Review of Systems   Constitutional: Negative  HENT: Negative  Eyes: Negative  Respiratory: Negative  Cardiovascular: Negative  Gastrointestinal: Negative  Genitourinary: Negative  Psychiatric/Behavioral: Negative            Objective:      /86 (BP Location: Left arm, Patient Position: Sitting, Cuff Size: Large)   Pulse 87   Temp 99 3 °F (37 4 °C) (Tympanic)   Resp 16   Ht 5' 5 5" (1 664 m)   Wt 63 5 kg (140 lb)   SpO2 98%   BMI 22 94 kg/m²          Physical Exam   Constitutional: She is oriented to person, place, and time  She appears well-developed  HENT:   Head: Normocephalic  Mouth/Throat: Oropharynx is clear and moist    Neck: Normal range of motion  Cardiovascular: Normal rate and regular rhythm  Pulmonary/Chest: Effort normal and breath sounds normal    Abdominal: Soft  Bowel sounds are normal    Musculoskeletal:        Arms:  Neurological: She is alert and oriented to person, place, and time  Skin: Skin is warm  Psychiatric: She has a normal mood and affect

## 2018-12-05 ENCOUNTER — OFFICE VISIT (OUTPATIENT)
Dept: OBGYN CLINIC | Facility: CLINIC | Age: 48
End: 2018-12-05
Payer: MEDICARE

## 2018-12-05 VITALS — SYSTOLIC BLOOD PRESSURE: 118 MMHG | DIASTOLIC BLOOD PRESSURE: 78 MMHG | WEIGHT: 140.4 LBS | BODY MASS INDEX: 23.01 KG/M2

## 2018-12-05 DIAGNOSIS — N92.1 MENORRHAGIA WITH IRREGULAR CYCLE: Primary | ICD-10-CM

## 2018-12-05 PROBLEM — N30.10 INTERSTITIAL CYSTITIS: Status: ACTIVE | Noted: 2017-05-02

## 2018-12-05 PROBLEM — G43.719 INTRACTABLE CHRONIC MIGRAINE WITHOUT AURA AND WITHOUT STATUS MIGRAINOSUS: Status: ACTIVE | Noted: 2017-01-31

## 2018-12-05 PROBLEM — R92.2 DENSE BREASTS: Status: ACTIVE | Noted: 2017-11-14

## 2018-12-05 PROBLEM — F31.81 BIPOLAR II DISORDER (HCC): Status: ACTIVE | Noted: 2017-05-12

## 2018-12-05 PROBLEM — R92.30 DENSE BREASTS: Status: ACTIVE | Noted: 2017-11-14

## 2018-12-05 PROCEDURE — 99213 OFFICE O/P EST LOW 20 MIN: CPT | Performed by: OBSTETRICS & GYNECOLOGY

## 2018-12-05 NOTE — PROGRESS NOTES
Carmen Parsons is here today following an uneventful hysteroscopy with uterine ablation  She is doing well and offers no complaints or concerns at this time  /78 (BP Location: Left arm, Patient Position: Sitting, Cuff Size: Standard)   Wt 63 7 kg (140 lb 6 4 oz)   BMI 23 01 kg/m²   Review of Systems:  Skin: No rashes or discolorations of any concern  RESP: Denies SOB, no cough  CV: Denies chest pain or palpitations  GI: Denies abdominal pain, heartburn, nausea, vomiting, changes in bowel habits  : Denies dysuria, frequency, CVA tenderness, incontinence and hematuria  Genitalia: Denies abnormal vaginal discharge, external lesions, rashes, pelvic pain, pressure, abnormal bleeding  Rectal:  Denies pain, bleeding, hemorrhoids,    Her pelvic exam reveals normal size uterus that is mobile and nontender  Very little cervical discharge at this time   We reviewed the surgical findings and the pathology from the procedure  Recommendations are return to all normal activity   Patient is to return for annual visit or as needed

## 2018-12-06 ENCOUNTER — TELEPHONE (OUTPATIENT)
Dept: OBGYN CLINIC | Facility: CLINIC | Age: 48
End: 2018-12-06

## 2018-12-06 NOTE — TELEPHONE ENCOUNTER
----- Message from Kishore Rose DO sent at 12/6/2018 10:47 AM EST -----  Fabrice Mckeon    This pt has been on Depo Provera for control of migraines  She just had an endometrial ablation done and wants to know if she could stop depo  Can you please let her know that she can try stopping it and see how she feels? I am not sure that the ablation will have an effect on her migraines but since she will not have a menstrual cycle or have a very minimal menstrual cycles, her migraines may not be too bad  If they are, we can restart the depo      Thanks  Callie Torrez

## 2018-12-12 ENCOUNTER — LAB (OUTPATIENT)
Dept: LAB | Facility: IMAGING CENTER | Age: 48
End: 2018-12-12
Payer: MEDICARE

## 2018-12-12 ENCOUNTER — TRANSCRIBE ORDERS (OUTPATIENT)
Dept: ADMINISTRATIVE | Facility: HOSPITAL | Age: 48
End: 2018-12-12

## 2018-12-12 DIAGNOSIS — E78.2 MIXED HYPERLIPIDEMIA: ICD-10-CM

## 2018-12-12 LAB
ALBUMIN SERPL BCP-MCNC: 4 G/DL (ref 3.5–5)
ALP SERPL-CCNC: 73 U/L (ref 46–116)
ALT SERPL W P-5'-P-CCNC: 20 U/L (ref 12–78)
ANION GAP SERPL CALCULATED.3IONS-SCNC: 7 MMOL/L (ref 4–13)
AST SERPL W P-5'-P-CCNC: 11 U/L (ref 5–45)
BILIRUB SERPL-MCNC: 0.39 MG/DL (ref 0.2–1)
BUN SERPL-MCNC: 13 MG/DL (ref 5–25)
CALCIUM SERPL-MCNC: 9.8 MG/DL (ref 8.3–10.1)
CHLORIDE SERPL-SCNC: 108 MMOL/L (ref 100–108)
CHOLEST SERPL-MCNC: 194 MG/DL (ref 50–200)
CO2 SERPL-SCNC: 24 MMOL/L (ref 21–32)
CREAT SERPL-MCNC: 0.82 MG/DL (ref 0.6–1.3)
GFR SERPL CREATININE-BSD FRML MDRD: 86 ML/MIN/1.73SQ M
GLUCOSE P FAST SERPL-MCNC: 88 MG/DL (ref 65–99)
HDLC SERPL-MCNC: 39 MG/DL (ref 40–60)
LDLC SERPL CALC-MCNC: 131 MG/DL (ref 0–100)
POTASSIUM SERPL-SCNC: 4.6 MMOL/L (ref 3.5–5.3)
PROT SERPL-MCNC: 7.2 G/DL (ref 6.4–8.2)
SODIUM SERPL-SCNC: 139 MMOL/L (ref 136–145)
TRIGL SERPL-MCNC: 122 MG/DL

## 2018-12-12 PROCEDURE — 36415 COLL VENOUS BLD VENIPUNCTURE: CPT

## 2018-12-12 PROCEDURE — 80053 COMPREHEN METABOLIC PANEL: CPT

## 2018-12-12 PROCEDURE — 80061 LIPID PANEL: CPT

## 2018-12-13 ENCOUNTER — TELEPHONE (OUTPATIENT)
Dept: FAMILY MEDICINE CLINIC | Facility: CLINIC | Age: 48
End: 2018-12-13

## 2018-12-13 NOTE — TELEPHONE ENCOUNTER
----- Message from Guillermo Holman MD sent at 12/13/2018 10:25 AM EST -----  Blood work is fine, LDL is 131 but I think its better than before

## 2018-12-27 DIAGNOSIS — Z12.31 VISIT FOR SCREENING MAMMOGRAM: Primary | ICD-10-CM

## 2018-12-29 ENCOUNTER — HOSPITAL ENCOUNTER (OUTPATIENT)
Dept: RADIOLOGY | Age: 48
Discharge: HOME | End: 2018-12-29
Payer: MEDICARE

## 2018-12-29 VITALS — WEIGHT: 140 LBS | HEIGHT: 66 IN | BODY MASS INDEX: 22.5 KG/M2

## 2018-12-29 DIAGNOSIS — Z12.31 VISIT FOR SCREENING MAMMOGRAM: ICD-10-CM

## 2018-12-29 PROCEDURE — 77067 SCR MAMMO BI INCL CAD: CPT

## 2018-12-29 PROCEDURE — 77063 BREAST TOMOSYNTHESIS BI: CPT

## 2019-01-03 DIAGNOSIS — B00.9 HSV (HERPES SIMPLEX VIRUS) INFECTION: ICD-10-CM

## 2019-01-15 DIAGNOSIS — G43.909 MIGRAINE WITHOUT STATUS MIGRAINOSUS, NOT INTRACTABLE, UNSPECIFIED MIGRAINE TYPE: Primary | ICD-10-CM

## 2019-01-15 RX ORDER — MEDROXYPROGESTERONE ACETATE 150 MG/ML
150 INJECTION, SUSPENSION INTRAMUSCULAR
Qty: 1 ML | Refills: 1 | Status: SHIPPED | OUTPATIENT
Start: 2019-01-15 | End: 2019-04-09 | Stop reason: SDUPTHER

## 2019-01-15 RX ORDER — VALACYCLOVIR HYDROCHLORIDE 500 MG/1
500 TABLET, FILM COATED ORAL DAILY
Qty: 90 TABLET | Refills: 1 | OUTPATIENT
Start: 2019-01-15 | End: 2019-04-15

## 2019-01-15 NOTE — TELEPHONE ENCOUNTER
She should probably continue it for now because the ablation would not help her migraines which was her main problem  Also does she need more Valtrex, I keep getting requests from the pharmacy

## 2019-01-15 NOTE — TELEPHONE ENCOUNTER
She will continue the depo    I sent you a new script    She is ok and does not need the Valmigel Guzmán Le

## 2019-01-16 ENCOUNTER — OFFICE VISIT (OUTPATIENT)
Dept: OBGYN CLINIC | Facility: CLINIC | Age: 49
End: 2019-01-16
Payer: MEDICARE

## 2019-01-16 VITALS — BODY MASS INDEX: 23.11 KG/M2 | DIASTOLIC BLOOD PRESSURE: 78 MMHG | WEIGHT: 141 LBS | SYSTOLIC BLOOD PRESSURE: 120 MMHG

## 2019-01-16 DIAGNOSIS — Z30.42 ENCOUNTER FOR DEPO-PROVERA CONTRACEPTION: Primary | ICD-10-CM

## 2019-01-16 PROCEDURE — 96372 THER/PROPH/DIAG INJ SC/IM: CPT | Performed by: OBSTETRICS & GYNECOLOGY

## 2019-01-16 RX ORDER — HEPARIN SODIUM 10000 [USP'U]/ML
1 INJECTION, SOLUTION INTRAVENOUS; SUBCUTANEOUS
Refills: 6 | COMMUNITY
Start: 2019-01-05 | End: 2019-05-24 | Stop reason: ALTCHOICE

## 2019-01-16 RX ORDER — MEDROXYPROGESTERONE ACETATE 150 MG/ML
150 INJECTION, SUSPENSION INTRAMUSCULAR ONCE
Status: COMPLETED | OUTPATIENT
Start: 2019-01-16 | End: 2019-01-16

## 2019-01-16 RX ADMIN — MEDROXYPROGESTERONE ACETATE 150 MG: 150 INJECTION, SUSPENSION INTRAMUSCULAR at 09:17

## 2019-01-16 NOTE — PROGRESS NOTES
Patient is being seen for a 10 week depo injection  Patient has no concerns or complaints  Patient will return in 10 weeks for next injection

## 2019-03-27 ENCOUNTER — OFFICE VISIT (OUTPATIENT)
Dept: OBGYN CLINIC | Facility: CLINIC | Age: 49
End: 2019-03-27
Payer: MEDICARE

## 2019-03-27 VITALS
HEIGHT: 66 IN | WEIGHT: 148.8 LBS | DIASTOLIC BLOOD PRESSURE: 70 MMHG | SYSTOLIC BLOOD PRESSURE: 126 MMHG | BODY MASS INDEX: 23.91 KG/M2

## 2019-03-27 DIAGNOSIS — Z30.42 ENCOUNTER FOR DEPO-PROVERA CONTRACEPTION: Primary | ICD-10-CM

## 2019-03-27 PROCEDURE — 99213 OFFICE O/P EST LOW 20 MIN: CPT | Performed by: OBSTETRICS & GYNECOLOGY

## 2019-03-27 PROCEDURE — 96372 THER/PROPH/DIAG INJ SC/IM: CPT | Performed by: OBSTETRICS & GYNECOLOGY

## 2019-03-27 RX ORDER — MEDROXYPROGESTERONE ACETATE 150 MG/ML
150 INJECTION, SUSPENSION INTRAMUSCULAR ONCE
Status: COMPLETED | OUTPATIENT
Start: 2019-03-27 | End: 2019-03-27

## 2019-03-27 RX ADMIN — MEDROXYPROGESTERONE ACETATE 150 MG: 150 INJECTION, SUSPENSION INTRAMUSCULAR at 11:04

## 2019-03-27 NOTE — PROGRESS NOTES
Patient was seen for 10 week depo injection    Injection was given and patient will return in 10 weeks for next Depo

## 2019-04-09 ENCOUNTER — ANNUAL EXAM (OUTPATIENT)
Dept: OBGYN CLINIC | Facility: CLINIC | Age: 49
End: 2019-04-09
Payer: MEDICARE

## 2019-04-09 VITALS
SYSTOLIC BLOOD PRESSURE: 122 MMHG | WEIGHT: 149.8 LBS | BODY MASS INDEX: 23.51 KG/M2 | DIASTOLIC BLOOD PRESSURE: 76 MMHG | HEIGHT: 67 IN

## 2019-04-09 DIAGNOSIS — G43.909 MIGRAINE WITHOUT STATUS MIGRAINOSUS, NOT INTRACTABLE, UNSPECIFIED MIGRAINE TYPE: ICD-10-CM

## 2019-04-09 DIAGNOSIS — Z01.419 ENCOUNTER FOR ANNUAL ROUTINE GYNECOLOGICAL EXAMINATION: Primary | ICD-10-CM

## 2019-04-09 DIAGNOSIS — Z12.31 ENCOUNTER FOR SCREENING MAMMOGRAM FOR BREAST CANCER: ICD-10-CM

## 2019-04-09 PROCEDURE — G0101 CA SCREEN;PELVIC/BREAST EXAM: HCPCS | Performed by: OBSTETRICS & GYNECOLOGY

## 2019-04-09 RX ORDER — ONDANSETRON 4 MG/1
4 TABLET, ORALLY DISINTEGRATING ORAL EVERY 8 HOURS PRN
COMMUNITY
Start: 2019-02-20 | End: 2020-05-20

## 2019-04-09 RX ORDER — FLUOROURACIL 50 MG/G
CREAM TOPICAL
COMMUNITY
Start: 2018-12-17 | End: 2020-07-06

## 2019-04-09 RX ORDER — ELUXADOLINE 100 MG/1
1 TABLET, FILM COATED ORAL EVERY MORNING
Refills: 5 | COMMUNITY
Start: 2019-01-27 | End: 2020-12-24 | Stop reason: ALTCHOICE

## 2019-04-09 RX ORDER — DIPHENOXYLATE HYDROCHLORIDE AND ATROPINE SULFATE 2.5; .025 MG/1; MG/1
TABLET ORAL
COMMUNITY
Start: 2016-05-13 | End: 2019-05-01

## 2019-04-09 RX ORDER — MEDROXYPROGESTERONE ACETATE 150 MG/ML
150 INJECTION, SUSPENSION INTRAMUSCULAR
Qty: 1 ML | Refills: 3 | Status: SHIPPED | OUTPATIENT
Start: 2019-04-09 | End: 2020-04-06 | Stop reason: SDUPTHER

## 2019-04-09 RX ORDER — NAPROXEN 500 MG/1
TABLET ORAL
COMMUNITY
Start: 2019-01-16

## 2019-05-01 ENCOUNTER — OFFICE VISIT (OUTPATIENT)
Dept: FAMILY MEDICINE CLINIC | Facility: CLINIC | Age: 49
End: 2019-05-01
Payer: MEDICARE

## 2019-05-01 VITALS
OXYGEN SATURATION: 99 % | RESPIRATION RATE: 16 BRPM | DIASTOLIC BLOOD PRESSURE: 80 MMHG | TEMPERATURE: 99.2 F | HEART RATE: 80 BPM | BODY MASS INDEX: 23.67 KG/M2 | WEIGHT: 150.8 LBS | SYSTOLIC BLOOD PRESSURE: 142 MMHG | HEIGHT: 67 IN

## 2019-05-01 DIAGNOSIS — J02.8 PHARYNGITIS DUE TO OTHER ORGANISM: Primary | ICD-10-CM

## 2019-05-01 PROCEDURE — 99214 OFFICE O/P EST MOD 30 MIN: CPT | Performed by: NURSE PRACTITIONER

## 2019-05-01 RX ORDER — CIPROFLOXACIN 500 MG/1
500 TABLET, FILM COATED ORAL EVERY 12 HOURS SCHEDULED
Qty: 14 TABLET | Refills: 0 | Status: SHIPPED | OUTPATIENT
Start: 2019-05-01 | End: 2019-05-08

## 2019-05-02 ENCOUNTER — APPOINTMENT (OUTPATIENT)
Dept: LAB | Age: 49
End: 2019-05-02
Payer: MEDICARE

## 2019-05-02 DIAGNOSIS — J02.8 PHARYNGITIS DUE TO OTHER ORGANISM: ICD-10-CM

## 2019-05-02 LAB
BASOPHILS # BLD AUTO: 0.03 THOUSANDS/ΜL (ref 0–0.1)
BASOPHILS NFR BLD AUTO: 1 % (ref 0–1)
EOSINOPHIL # BLD AUTO: 0.08 THOUSAND/ΜL (ref 0–0.61)
EOSINOPHIL NFR BLD AUTO: 2 % (ref 0–6)
ERYTHROCYTE [DISTWIDTH] IN BLOOD BY AUTOMATED COUNT: 13.2 % (ref 11.6–15.1)
HCT VFR BLD AUTO: 41.8 % (ref 34.8–46.1)
HGB BLD-MCNC: 14.1 G/DL (ref 11.5–15.4)
IMM GRANULOCYTES # BLD AUTO: 0.01 THOUSAND/UL (ref 0–0.2)
IMM GRANULOCYTES NFR BLD AUTO: 0 % (ref 0–2)
LYMPHOCYTES # BLD AUTO: 2.08 THOUSANDS/ΜL (ref 0.6–4.47)
LYMPHOCYTES NFR BLD AUTO: 41 % (ref 14–44)
MCH RBC QN AUTO: 29.7 PG (ref 26.8–34.3)
MCHC RBC AUTO-ENTMCNC: 33.7 G/DL (ref 31.4–37.4)
MCV RBC AUTO: 88 FL (ref 82–98)
MONOCYTES # BLD AUTO: 0.36 THOUSAND/ΜL (ref 0.17–1.22)
MONOCYTES NFR BLD AUTO: 7 % (ref 4–12)
NEUTROPHILS # BLD AUTO: 2.57 THOUSANDS/ΜL (ref 1.85–7.62)
NEUTS SEG NFR BLD AUTO: 49 % (ref 43–75)
NRBC BLD AUTO-RTO: 0 /100 WBCS
PLATELET # BLD AUTO: 242 THOUSANDS/UL (ref 149–390)
PMV BLD AUTO: 10.9 FL (ref 8.9–12.7)
RBC # BLD AUTO: 4.75 MILLION/UL (ref 3.81–5.12)
WBC # BLD AUTO: 5.13 THOUSAND/UL (ref 4.31–10.16)

## 2019-05-02 PROCEDURE — 85025 COMPLETE CBC W/AUTO DIFF WBC: CPT

## 2019-05-02 PROCEDURE — 36415 COLL VENOUS BLD VENIPUNCTURE: CPT

## 2019-05-02 PROCEDURE — 86308 HETEROPHILE ANTIBODY SCREEN: CPT

## 2019-05-03 LAB — HETEROPH AB SER QL: NEGATIVE

## 2019-05-10 ENCOUNTER — TELEPHONE (OUTPATIENT)
Dept: FAMILY MEDICINE CLINIC | Facility: CLINIC | Age: 49
End: 2019-05-10

## 2019-05-10 DIAGNOSIS — J02.8 PHARYNGITIS DUE TO OTHER ORGANISM: Primary | ICD-10-CM

## 2019-05-15 PROBLEM — J35.01 CHRONIC TONSILLITIS: Status: ACTIVE | Noted: 2019-05-15

## 2019-05-15 PROBLEM — J35.8 TONSILLAR CALCULUS: Status: ACTIVE | Noted: 2019-05-15

## 2019-05-17 ENCOUNTER — TELEPHONE (OUTPATIENT)
Dept: FAMILY MEDICINE CLINIC | Facility: CLINIC | Age: 49
End: 2019-05-17

## 2019-05-20 DIAGNOSIS — M79.10 MYALGIA: Primary | ICD-10-CM

## 2019-05-22 ENCOUNTER — APPOINTMENT (OUTPATIENT)
Dept: LAB | Facility: IMAGING CENTER | Age: 49
End: 2019-05-22
Payer: MEDICARE

## 2019-05-22 ENCOUNTER — TRANSCRIBE ORDERS (OUTPATIENT)
Dept: ADMINISTRATIVE | Facility: HOSPITAL | Age: 49
End: 2019-05-22

## 2019-05-22 DIAGNOSIS — M79.10 MYALGIA: ICD-10-CM

## 2019-05-22 LAB
BASOPHILS # BLD AUTO: 0.02 THOUSANDS/ΜL (ref 0–0.1)
BASOPHILS NFR BLD AUTO: 0 % (ref 0–1)
EOSINOPHIL # BLD AUTO: 0.08 THOUSAND/ΜL (ref 0–0.61)
EOSINOPHIL NFR BLD AUTO: 2 % (ref 0–6)
ERYTHROCYTE [DISTWIDTH] IN BLOOD BY AUTOMATED COUNT: 13.2 % (ref 11.6–15.1)
ERYTHROCYTE [SEDIMENTATION RATE] IN BLOOD: 7 MM/HOUR (ref 0–20)
HCT VFR BLD AUTO: 42.5 % (ref 34.8–46.1)
HGB BLD-MCNC: 13.5 G/DL (ref 11.5–15.4)
IMM GRANULOCYTES # BLD AUTO: 0.02 THOUSAND/UL (ref 0–0.2)
IMM GRANULOCYTES NFR BLD AUTO: 0 % (ref 0–2)
LYMPHOCYTES # BLD AUTO: 1.77 THOUSANDS/ΜL (ref 0.6–4.47)
LYMPHOCYTES NFR BLD AUTO: 39 % (ref 14–44)
MCH RBC QN AUTO: 28.9 PG (ref 26.8–34.3)
MCHC RBC AUTO-ENTMCNC: 31.8 G/DL (ref 31.4–37.4)
MCV RBC AUTO: 91 FL (ref 82–98)
MONOCYTES # BLD AUTO: 0.33 THOUSAND/ΜL (ref 0.17–1.22)
MONOCYTES NFR BLD AUTO: 7 % (ref 4–12)
NEUTROPHILS # BLD AUTO: 2.27 THOUSANDS/ΜL (ref 1.85–7.62)
NEUTS SEG NFR BLD AUTO: 52 % (ref 43–75)
NRBC BLD AUTO-RTO: 0 /100 WBCS
PLATELET # BLD AUTO: 222 THOUSANDS/UL (ref 149–390)
PMV BLD AUTO: 10.6 FL (ref 8.9–12.7)
RBC # BLD AUTO: 4.67 MILLION/UL (ref 3.81–5.12)
WBC # BLD AUTO: 4.49 THOUSAND/UL (ref 4.31–10.16)

## 2019-05-22 PROCEDURE — 86618 LYME DISEASE ANTIBODY: CPT

## 2019-05-22 PROCEDURE — 85025 COMPLETE CBC W/AUTO DIFF WBC: CPT

## 2019-05-22 PROCEDURE — 36415 COLL VENOUS BLD VENIPUNCTURE: CPT

## 2019-05-22 PROCEDURE — 85652 RBC SED RATE AUTOMATED: CPT

## 2019-05-23 LAB
B BURGDOR IGG SER IA-ACNC: 0.15
B BURGDOR IGM SER IA-ACNC: 0.45

## 2019-05-24 ENCOUNTER — OFFICE VISIT (OUTPATIENT)
Dept: FAMILY MEDICINE CLINIC | Facility: CLINIC | Age: 49
End: 2019-05-24
Payer: MEDICARE

## 2019-05-24 VITALS
WEIGHT: 150.2 LBS | OXYGEN SATURATION: 98 % | TEMPERATURE: 99.1 F | SYSTOLIC BLOOD PRESSURE: 110 MMHG | RESPIRATION RATE: 16 BRPM | DIASTOLIC BLOOD PRESSURE: 72 MMHG | HEART RATE: 87 BPM | HEIGHT: 67 IN | BODY MASS INDEX: 23.57 KG/M2

## 2019-05-24 DIAGNOSIS — M54.50 LUMBAR BACK PAIN: ICD-10-CM

## 2019-05-24 DIAGNOSIS — J30.2 SEASONAL ALLERGIES: Primary | ICD-10-CM

## 2019-05-24 PROCEDURE — 99214 OFFICE O/P EST MOD 30 MIN: CPT | Performed by: NURSE PRACTITIONER

## 2019-05-24 PROCEDURE — G0438 PPPS, INITIAL VISIT: HCPCS | Performed by: NURSE PRACTITIONER

## 2019-05-24 RX ORDER — FEXOFENADINE HCL AND PSEUDOEPHEDRINE HCI 180; 240 MG/1; MG/1
1 TABLET, EXTENDED RELEASE ORAL DAILY
Qty: 30 TABLET | Refills: 1 | Status: SHIPPED | OUTPATIENT
Start: 2019-05-24 | End: 2020-03-17 | Stop reason: SDUPTHER

## 2019-05-24 RX ORDER — HEPARIN SODIUM 1000 [USP'U]/ML
10000 INJECTION, SOLUTION INTRAVENOUS; SUBCUTANEOUS ONCE
COMMUNITY
End: 2019-05-24 | Stop reason: CLARIF

## 2019-06-19 ENCOUNTER — OFFICE VISIT (OUTPATIENT)
Dept: OBGYN CLINIC | Facility: CLINIC | Age: 49
End: 2019-06-19
Payer: MEDICARE

## 2019-06-19 VITALS — WEIGHT: 149.8 LBS | DIASTOLIC BLOOD PRESSURE: 70 MMHG | BODY MASS INDEX: 23.82 KG/M2 | SYSTOLIC BLOOD PRESSURE: 122 MMHG

## 2019-06-19 DIAGNOSIS — IMO0001 BIRTH CONTROL: Primary | ICD-10-CM

## 2019-06-19 PROCEDURE — 96372 THER/PROPH/DIAG INJ SC/IM: CPT | Performed by: OBSTETRICS & GYNECOLOGY

## 2019-06-19 RX ORDER — LORAZEPAM 1 MG/1
TABLET ORAL
Refills: 1 | COMMUNITY
Start: 2019-05-28

## 2019-06-20 RX ORDER — MEDROXYPROGESTERONE ACETATE 150 MG/ML
150 INJECTION, SUSPENSION INTRAMUSCULAR ONCE
Status: COMPLETED | OUTPATIENT
Start: 2019-06-20 | End: 2019-06-20

## 2019-06-20 RX ADMIN — MEDROXYPROGESTERONE ACETATE 150 MG: 150 INJECTION, SUSPENSION INTRAMUSCULAR at 08:07

## 2019-06-26 ENCOUNTER — EVALUATION (OUTPATIENT)
Dept: PHYSICAL THERAPY | Facility: REHABILITATION | Age: 49
End: 2019-06-26
Payer: MEDICARE

## 2019-06-26 DIAGNOSIS — M54.16 LUMBAR RADICULOPATHY: Primary | ICD-10-CM

## 2019-06-26 PROCEDURE — 97112 NEUROMUSCULAR REEDUCATION: CPT | Performed by: PHYSICAL THERAPIST

## 2019-06-26 PROCEDURE — 97161 PT EVAL LOW COMPLEX 20 MIN: CPT | Performed by: PHYSICAL THERAPIST

## 2019-09-09 ENCOUNTER — CLINICAL SUPPORT (OUTPATIENT)
Dept: OBGYN CLINIC | Facility: CLINIC | Age: 49
End: 2019-09-09
Payer: MEDICARE

## 2019-09-09 DIAGNOSIS — Z30.42 ENCOUNTER FOR MANAGEMENT AND INJECTION OF DEPO-PROVERA: Primary | ICD-10-CM

## 2019-09-09 PROCEDURE — 96372 THER/PROPH/DIAG INJ SC/IM: CPT | Performed by: OBSTETRICS & GYNECOLOGY

## 2019-09-09 RX ORDER — MEDROXYPROGESTERONE ACETATE 150 MG/ML
150 INJECTION, SUSPENSION INTRAMUSCULAR ONCE
Status: COMPLETED | OUTPATIENT
Start: 2019-09-09 | End: 2019-09-09

## 2019-09-09 RX ADMIN — MEDROXYPROGESTERONE ACETATE 150 MG: 150 INJECTION, SUSPENSION INTRAMUSCULAR at 16:26

## 2019-09-11 ENCOUNTER — OFFICE VISIT (OUTPATIENT)
Dept: FAMILY MEDICINE CLINIC | Facility: CLINIC | Age: 49
End: 2019-09-11
Payer: MEDICARE

## 2019-09-11 VITALS
BODY MASS INDEX: 23.32 KG/M2 | RESPIRATION RATE: 16 BRPM | TEMPERATURE: 99 F | HEIGHT: 67 IN | DIASTOLIC BLOOD PRESSURE: 84 MMHG | SYSTOLIC BLOOD PRESSURE: 120 MMHG | OXYGEN SATURATION: 95 % | HEART RATE: 66 BPM | WEIGHT: 148.6 LBS

## 2019-09-11 DIAGNOSIS — R35.0 URINARY FREQUENCY: Primary | ICD-10-CM

## 2019-09-11 DIAGNOSIS — N30.10 INTERSTITIAL CYSTITIS: ICD-10-CM

## 2019-09-11 LAB
BACTERIA UR QL AUTO: ABNORMAL /HPF
BILIRUB UR QL STRIP: NEGATIVE
CLARITY UR: ABNORMAL
COLOR UR: YELLOW
GLUCOSE UR STRIP-MCNC: NEGATIVE MG/DL
HGB UR QL STRIP.AUTO: NEGATIVE
HYALINE CASTS #/AREA URNS LPF: ABNORMAL /LPF
KETONES UR STRIP-MCNC: NEGATIVE MG/DL
LEUKOCYTE ESTERASE UR QL STRIP: ABNORMAL
NITRITE UR QL STRIP: NEGATIVE
NON-SQ EPI CELLS URNS QL MICRO: ABNORMAL /HPF
PH UR STRIP.AUTO: 6.5 [PH]
PROT UR STRIP-MCNC: NEGATIVE MG/DL
RBC #/AREA URNS AUTO: ABNORMAL /HPF
SL AMB  POCT GLUCOSE, UA: ABNORMAL
SL AMB LEUKOCYTE ESTERASE,UA: ABNORMAL
SL AMB POCT BILIRUBIN,UA: ABNORMAL
SL AMB POCT BLOOD,UA: ABNORMAL
SL AMB POCT CLARITY,UA: CLEAR
SL AMB POCT COLOR,UA: YELLOW
SL AMB POCT KETONES,UA: ABNORMAL
SL AMB POCT NITRITE,UA: ABNORMAL
SL AMB POCT PH,UA: 6
SL AMB POCT SPECIFIC GRAVITY,UA: 1.01
SL AMB POCT URINE PROTEIN: ABNORMAL
SL AMB POCT UROBILINOGEN: ABNORMAL
SP GR UR STRIP.AUTO: 1.01 (ref 1–1.03)
UROBILINOGEN UR QL STRIP.AUTO: 0.2 E.U./DL
WBC #/AREA URNS AUTO: ABNORMAL /HPF

## 2019-09-11 PROCEDURE — 87086 URINE CULTURE/COLONY COUNT: CPT | Performed by: PHYSICIAN ASSISTANT

## 2019-09-11 PROCEDURE — 81001 URINALYSIS AUTO W/SCOPE: CPT | Performed by: PHYSICIAN ASSISTANT

## 2019-09-11 PROCEDURE — 81002 URINALYSIS NONAUTO W/O SCOPE: CPT | Performed by: PHYSICIAN ASSISTANT

## 2019-09-11 PROCEDURE — 99213 OFFICE O/P EST LOW 20 MIN: CPT | Performed by: PHYSICIAN ASSISTANT

## 2019-09-11 RX ORDER — DICYCLOMINE HYDROCHLORIDE 10 MG/1
10 CAPSULE ORAL 3 TIMES DAILY PRN
Refills: 3 | COMMUNITY
Start: 2019-07-17 | End: 2020-12-02 | Stop reason: SDUPTHER

## 2019-09-11 RX ORDER — ELUXADOLINE 75 MG/1
1 TABLET, FILM COATED ORAL EVERY MORNING
Refills: 5 | COMMUNITY
Start: 2019-08-25 | End: 2020-12-24 | Stop reason: ALTCHOICE

## 2019-09-11 RX ORDER — RIFAXIMIN 550 MG/1
550 TABLET ORAL 3 TIMES DAILY
Refills: 1 | COMMUNITY
Start: 2019-09-04 | End: 2020-07-06

## 2019-09-11 NOTE — PROGRESS NOTES
Assessment/Plan:    Urine dip done today only reveals leukocytes, no nitrates or blood  It will be sent for UA C&S  I did advise the patient to continue with increase clear liquids  -advised to call if any symptoms increase or if there is no improvement over the next 2 days  I did advise her take at least 2 days for the urine culture  Diagnoses and all orders for this visit:    Urinary frequency  -     POCT urine dip  -     Urine culture; Future  -     UA w Reflex to Microscopic w Reflex to Culture - Clinic Collect  -     Urine culture    Interstitial cystitis    Other orders  -     dicyclomine (BENTYL) 10 mg capsule; Take 10 mg by mouth 3 (three) times a day as needed  -     XIFAXAN 550 MG tablet; Take 550 mg by mouth 3 (three) times a day  -     VIBERZI 75 MG TABS; Take 1 tablet by mouth every morning          Subjective:      Patient ID: Howie Khalil is a 54 y o  female  Patient presents today with increased urinary frequency over the past 4 days  She denies any dysuria  She does have abdominal pain but she is also being seen by a GI specialist for chronic loose stools/abdominal pain  She states that the GI specialist put her on a trial of Xifaxan for 2 weeks which alleviated her symptoms  She states once the medication was discontinued her symptoms came back so the doctor put her back on the medication  At this time she does not feel as though it has been as effective  She denies any nausea, vomiting, mid back pain  She states that she has not been feeling well over the last several days because she has been feeling achy and over the weekend she felt as though her body was tingling  She states when she checked her temperature yesterday it was 100°  She has been taking Advil and her last dose was 3 hours ago  She does have a history of interstitial cystitis  And she is on medication for this  She has not had a urinary tract infection in about 9 years    She is allergic to quite a few antibiotics and was recently tested by her allergist for penicillin and was told that she was negative although when she recently took amoxicillin she felt a funny sensation in her throat which was alleviated with Benadryl  She did not notify her allergist of these symptoms  The following portions of the patient's history were reviewed and updated as appropriate:   She  has a past medical history of Anxiety, Asthma, SARA I (cervical intraepithelial neoplasia I), Depression, IC (interstitial cystitis), Migraine, Motion sickness, and Pap smear abnormality of cervix with ASCUS favoring benign  She   Patient Active Problem List    Diagnosis Date Noted    Urinary frequency 09/11/2019    Chronic tonsillitis 05/15/2019    Tonsillar calculus 05/15/2019    Menorrhagia with irregular cycle 10/10/2018    Dense breasts 11/14/2017    [facility]12[facility]    Interstitial cystitis 05/02/2017    Intractable chronic migraine without aura and without status migrainosus 01/31/2017    OCD (obsessive compulsive disorder) 09/29/2016    Urinary tract infection 08/27/2012    Cystitis 11/15/2010    Fatigue 11/15/2010    Stress incontinence 11/15/2010    Asthma 10/09/2009    Common migraine 10/09/2009    Constipation 10/09/2009    Depression 10/09/2009    Irritable bowel syndrome 10/09/2009    Hyperlipidemia 10/09/2009     She  has a past surgical history that includes Tooth extraction; Tubal ligation; Endotracheal intubation emergent (2012); LAPAROSCOPY; Colonoscopy; pr hysteroscopy,dx,sep proc (N/A, 11/21/2018); pr hysteroscopy,w/endometrial ablation (N/A, 11/21/2018); and Small intestine surgery  Her family history includes Colon cancer in her paternal grandmother; Diabetes in her maternal grandmother and mother; Lung cancer in her father; Other in her maternal grandmother, mother, and sister; Ovarian cancer in her sister; Throat cancer in her paternal uncle    She  reports that she has never smoked  She has never used smokeless tobacco  She reports that she does not drink alcohol or use drugs  Current Outpatient Medications   Medication Sig Dispense Refill    albuterol (2 5 mg/3 mL) 0 083 % nebulizer solution USE 1 VIAL VIA NEBULIZER 3 TIMES A DAY      albuterol (PROAIR HFA) 90 mcg/act inhaler INHALE 2 PUFFS EVERY 4 TO 6 HOURS AS NEEDED      dicyclomine (BENTYL) 10 mg capsule Take 10 mg by mouth 3 (three) times a day as needed  3    fexofenadine-pseudoephedrine (ALLEGRA-D 24) 180-240 MG per 24 hr tablet Take 1 tablet by mouth daily 30 tablet 1    fluorouracil (EFUDEX) 5 % cream APPLY TOPICALLY TWICE A DAY FOR 2 WEEKS      LORazepam (ATIVAN) 0 5 mg tablet Take 1 mg by mouth daily at bedtime        LORazepam (ATIVAN) 1 mg tablet TAKE 1 & 1/2 TABLETS DAILY AT BEDTIME AS NEEDED  1    medroxyPROGESTERone (DEPO-PROVERA) 150 mg/mL injection Inject 1 mL (150 mg total) into a muscle every 3 (three) months for 180 days 1 mL 3    Meth-Hyo-M Bl-Na Phos-Ph Sal (URIBEL) 118 MG CAPS TAKE BY MOUTH AS DIRECTED FOR IC SYMPTOMS, qday      Mirabegron ER (MYRBETRIQ) 50 MG TB24 TAKE 1 TABLET DAILY      Multiple Vitamin (MULTI VITAMIN DAILY PO) Take by mouth      naproxen (NAPROSYN) 500 mg tablet TAKE 1 TABLET BY MOUTH 3 TIMES A DAY AS NEEDED MIGRAINE  LIMIT USE TO 3 DAYS PER WEEK      ondansetron (ZOFRAN-ODT) 4 mg disintegrating tablet Take 4 mg by mouth every 8 (eight) hours as needed      pentosan polysulfate (ELMIRON) 100 mg capsule Take 100 mg by mouth 3 (three) times a day before meals        prochlorperazine (COMPAZINE) 10 mg tablet 1 tab po TID prn headache   Max 3 days per week      triamcinolone 40 MG/ML SUSP 40 mg to be instilled into the bladder in the urology office      VIBERZI 100 MG TABS Take 1 tablet by mouth 2 (two) times a day  5    VIBERZI 75 MG TABS Take 1 tablet by mouth every morning  5    XIFAXAN 550 MG tablet Take 550 mg by mouth 3 (three) times a day  1     No current facility-administered medications for this visit  Current Outpatient Medications on File Prior to Visit   Medication Sig    albuterol (2 5 mg/3 mL) 0 083 % nebulizer solution USE 1 VIAL VIA NEBULIZER 3 TIMES A DAY    albuterol (PROAIR HFA) 90 mcg/act inhaler INHALE 2 PUFFS EVERY 4 TO 6 HOURS AS NEEDED    dicyclomine (BENTYL) 10 mg capsule Take 10 mg by mouth 3 (three) times a day as needed    fexofenadine-pseudoephedrine (ALLEGRA-D 24) 180-240 MG per 24 hr tablet Take 1 tablet by mouth daily    fluorouracil (EFUDEX) 5 % cream APPLY TOPICALLY TWICE A DAY FOR 2 WEEKS    LORazepam (ATIVAN) 0 5 mg tablet Take 1 mg by mouth daily at bedtime      LORazepam (ATIVAN) 1 mg tablet TAKE 1 & 1/2 TABLETS DAILY AT BEDTIME AS NEEDED    medroxyPROGESTERone (DEPO-PROVERA) 150 mg/mL injection Inject 1 mL (150 mg total) into a muscle every 3 (three) months for 180 days    Meth-Hyo-M Bl-Na Phos-Ph Sal (URIBEL) 118 MG CAPS TAKE BY MOUTH AS DIRECTED FOR IC SYMPTOMS, qday    Mirabegron ER (MYRBETRIQ) 50 MG TB24 TAKE 1 TABLET DAILY    Multiple Vitamin (MULTI VITAMIN DAILY PO) Take by mouth    naproxen (NAPROSYN) 500 mg tablet TAKE 1 TABLET BY MOUTH 3 TIMES A DAY AS NEEDED MIGRAINE  LIMIT USE TO 3 DAYS PER WEEK    ondansetron (ZOFRAN-ODT) 4 mg disintegrating tablet Take 4 mg by mouth every 8 (eight) hours as needed    pentosan polysulfate (ELMIRON) 100 mg capsule Take 100 mg by mouth 3 (three) times a day before meals      prochlorperazine (COMPAZINE) 10 mg tablet 1 tab po TID prn headache  Max 3 days per week    triamcinolone 40 MG/ML SUSP 40 mg to be instilled into the bladder in the urology office    VIBERZI 100 MG TABS Take 1 tablet by mouth 2 (two) times a day    VIBERZI 75 MG TABS Take 1 tablet by mouth every morning    XIFAXAN 550 MG tablet Take 550 mg by mouth 3 (three) times a day     No current facility-administered medications on file prior to visit        She is allergic to azithromycin; clarithromycin; clavulanic acid; doxycycline; erythromycin base; lamotrigine; lithium; mirtazapine; moxifloxacin; sulfamethoxazole; sulfamethoxazole-trimethoprim; trimethoprim; indomethacin; and penicillins       Review of Systems   Constitutional: Positive for chills and fever  Gastrointestinal: Positive for abdominal pain and diarrhea  Negative for vomiting  Genitourinary: Positive for frequency  Negative for dysuria and hematuria  Objective:      /84 (BP Location: Left arm, Patient Position: Sitting, Cuff Size: Standard)   Pulse 66   Temp 99 °F (37 2 °C) (Tympanic)   Resp 16   Ht 5' 6 5" (1 689 m)   Wt 67 4 kg (148 lb 9 6 oz)   SpO2 95%   BMI 23 63 kg/m²          Physical Exam   Constitutional: She appears well-developed and well-nourished  No distress  HENT:   Head: Normocephalic and atraumatic  Cardiovascular: Normal rate, regular rhythm and normal heart sounds  No murmur heard  Pulmonary/Chest: Effort normal and breath sounds normal  No respiratory distress  She has no wheezes  She has no rales  Abdominal: Soft  Bowel sounds are normal  There is tenderness (She does have diffuse tenderness of her abdomen  No CVA tenderness bilaterally  )

## 2019-09-12 LAB — BACTERIA UR CULT: NORMAL

## 2019-10-15 RX ORDER — TRAMADOL HYDROCHLORIDE 50 MG/1
50 TABLET ORAL
COMMUNITY
Start: 2019-10-02 | End: 2019-11-01

## 2019-10-16 ENCOUNTER — APPOINTMENT (OUTPATIENT)
Dept: PHYSICAL THERAPY | Facility: REHABILITATION | Age: 49
End: 2019-10-16
Payer: MEDICARE

## 2019-10-18 ENCOUNTER — OFFICE VISIT (OUTPATIENT)
Dept: FAMILY MEDICINE CLINIC | Facility: CLINIC | Age: 49
End: 2019-10-18
Payer: MEDICARE

## 2019-10-18 VITALS
TEMPERATURE: 99.1 F | OXYGEN SATURATION: 98 % | SYSTOLIC BLOOD PRESSURE: 120 MMHG | BODY MASS INDEX: 23.26 KG/M2 | RESPIRATION RATE: 16 BRPM | WEIGHT: 148.2 LBS | DIASTOLIC BLOOD PRESSURE: 80 MMHG | HEIGHT: 67 IN | HEART RATE: 72 BPM

## 2019-10-18 DIAGNOSIS — M54.16 RIGHT LUMBAR RADICULOPATHY: Primary | ICD-10-CM

## 2019-10-18 DIAGNOSIS — E78.2 MIXED HYPERLIPIDEMIA: ICD-10-CM

## 2019-10-18 DIAGNOSIS — R42 INTERMITTENT LIGHTHEADEDNESS: ICD-10-CM

## 2019-10-18 PROCEDURE — 99214 OFFICE O/P EST MOD 30 MIN: CPT | Performed by: PHYSICIAN ASSISTANT

## 2019-10-18 NOTE — PROGRESS NOTES
Assessment/Plan:    -proceed with fasting blood work tomorrow  -physical therapy has been reordered for low back pain  -recommend follow-up in 1-2 weeks, go to the ER with any increasing symptoms    M*Modal software was used to dictate this note  It may contain errors with dictating incorrect words/spelling  Please contact provider directly for any questions  Diagnoses and all orders for this visit:    Right lumbar radiculopathy  -     Ambulatory referral to Physical Therapy; Future  -     CBC and differential  -     Comprehensive metabolic panel  -     Lipid panel    Intermittent lightheadedness  -     CBC and differential  -     Comprehensive metabolic panel  -     Lipid panel    Mixed hyperlipidemia  -     CBC and differential  -     Comprehensive metabolic panel  -     Lipid panel    Other orders  -     traMADol (ULTRAM) 50 mg tablet; Take 50 mg by mouth          Subjective:      Patient ID: Lisa Danielle is a 54 y o  female  Patient presents today for follow-up of continued low back pain  She states the pain is severe at times  She does notice intermittent pain radiating down her right thigh to her knee  She denies any numbness or tingling  She denies any loss of bladder or bowel control  She would like to resume physical therapy  She states that she prefers a female therapist versus a male  She was seen once back in June but never proceeded  She also states that over the last several weeks she has been feeling lightheaded as if she is almost going to pass out  She states that she has been noticing at the gym but not with any specific exercise  She does keep herself well hydrated  She does felt an episode now when she got up from a laying position  She denies any nausea, vomiting  She states that she does have blurred vision due to loss of vision but she does wear glasses  She does notice some chest tenderness    She denies cough, congestion, fever, chills, ear pain, throat pain, congestion  She states that she does have continued abdominal pain  She does follow with a GI specialist   She states she has had some loose bowels but she associates that with her interstitial cystitis  She denies any blood in her stools  The following portions of the patient's history were reviewed and updated as appropriate:   She  has a past medical history of Anxiety, Asthma, SARA I (cervical intraepithelial neoplasia I), Depression, IC (interstitial cystitis), Migraine, Motion sickness, and Pap smear abnormality of cervix with ASCUS favoring benign  She   Patient Active Problem List    Diagnosis Date Noted    Right lumbar radiculopathy 10/18/2019    Intermittent lightheadedness 10/18/2019    Urinary frequency 09/11/2019    Chronic tonsillitis 05/15/2019    Tonsillar calculus 05/15/2019    Menorrhagia with irregular cycle 10/10/2018    Dense breasts 11/14/2017    [facility] ) 05/12/2017    Interstitial cystitis 05/02/2017    Intractable chronic migraine without aura and without status migrainosus 01/31/2017    OCD (obsessive compulsive disorder) 09/29/2016    Urinary tract infection 08/27/2012    Cystitis 11/15/2010    Fatigue 11/15/2010    Stress incontinence 11/15/2010    Asthma 10/09/2009    Common migraine 10/09/2009    Constipation 10/09/2009    Depression 10/09/2009    Irritable bowel syndrome 10/09/2009    Mixed hyperlipidemia 10/09/2009    Insomnia 12/05/2006    Neck pain 05/12/2006     She  has a past surgical history that includes Tooth extraction; Tubal ligation; Endotracheal intubation emergent (2012); LAPAROSCOPY; Colonoscopy; pr hysteroscopy,dx,sep proc (N/A, 11/21/2018); pr hysteroscopy,w/endometrial ablation (N/A, 11/21/2018); and Small intestine surgery  Her family history includes Colon cancer in her paternal grandmother; Diabetes in her maternal grandmother and mother; Lung cancer in her father;  Other in her maternal grandmother, mother, and sister; Ovarian cancer in her sister; Throat cancer in her paternal uncle  She  reports that she has never smoked  She has never used smokeless tobacco  She reports that she does not drink alcohol or use drugs  Current Outpatient Medications   Medication Sig Dispense Refill    traMADol (ULTRAM) 50 mg tablet Take 50 mg by mouth      albuterol (2 5 mg/3 mL) 0 083 % nebulizer solution USE 1 VIAL VIA NEBULIZER 3 TIMES A DAY      albuterol (PROAIR HFA) 90 mcg/act inhaler INHALE 2 PUFFS EVERY 4 TO 6 HOURS AS NEEDED      dicyclomine (BENTYL) 10 mg capsule Take 10 mg by mouth 3 (three) times a day as needed  3    fexofenadine-pseudoephedrine (ALLEGRA-D 24) 180-240 MG per 24 hr tablet Take 1 tablet by mouth daily 30 tablet 1    fluorouracil (EFUDEX) 5 % cream APPLY TOPICALLY TWICE A DAY FOR 2 WEEKS      LORazepam (ATIVAN) 0 5 mg tablet Take 1 mg by mouth daily at bedtime        LORazepam (ATIVAN) 1 mg tablet TAKE 1 & 1/2 TABLETS DAILY AT BEDTIME AS NEEDED  1    medroxyPROGESTERone (DEPO-PROVERA) 150 mg/mL injection Inject 1 mL (150 mg total) into a muscle every 3 (three) months for 180 days 1 mL 3    Meth-Hyo-M Bl-Na Phos-Ph Sal (URIBEL) 118 MG CAPS TAKE BY MOUTH AS DIRECTED FOR IC SYMPTOMS, qday      Mirabegron ER (MYRBETRIQ) 50 MG TB24 TAKE 1 TABLET DAILY      Multiple Vitamin (MULTI VITAMIN DAILY PO) Take by mouth      naproxen (NAPROSYN) 500 mg tablet TAKE 1 TABLET BY MOUTH 3 TIMES A DAY AS NEEDED MIGRAINE  LIMIT USE TO 3 DAYS PER WEEK      ondansetron (ZOFRAN-ODT) 4 mg disintegrating tablet Take 4 mg by mouth every 8 (eight) hours as needed      pentosan polysulfate (ELMIRON) 100 mg capsule Take 100 mg by mouth 3 (three) times a day before meals        prochlorperazine (COMPAZINE) 10 mg tablet 1 tab po TID prn headache   Max 3 days per week      triamcinolone 40 MG/ML SUSP 40 mg to be instilled into the bladder in the urology office      VIBERZI 100 MG TABS Take 1 tablet by mouth 2 (two) times a day 5    VIBERZI 75 MG TABS Take 1 tablet by mouth every morning  5    XIFAXAN 550 MG tablet Take 550 mg by mouth 3 (three) times a day  1     No current facility-administered medications for this visit  Current Outpatient Medications on File Prior to Visit   Medication Sig    traMADol (ULTRAM) 50 mg tablet Take 50 mg by mouth    albuterol (2 5 mg/3 mL) 0 083 % nebulizer solution USE 1 VIAL VIA NEBULIZER 3 TIMES A DAY    albuterol (PROAIR HFA) 90 mcg/act inhaler INHALE 2 PUFFS EVERY 4 TO 6 HOURS AS NEEDED    dicyclomine (BENTYL) 10 mg capsule Take 10 mg by mouth 3 (three) times a day as needed    fexofenadine-pseudoephedrine (ALLEGRA-D 24) 180-240 MG per 24 hr tablet Take 1 tablet by mouth daily    fluorouracil (EFUDEX) 5 % cream APPLY TOPICALLY TWICE A DAY FOR 2 WEEKS    LORazepam (ATIVAN) 0 5 mg tablet Take 1 mg by mouth daily at bedtime      LORazepam (ATIVAN) 1 mg tablet TAKE 1 & 1/2 TABLETS DAILY AT BEDTIME AS NEEDED    medroxyPROGESTERone (DEPO-PROVERA) 150 mg/mL injection Inject 1 mL (150 mg total) into a muscle every 3 (three) months for 180 days    Meth-Hyo-M Bl-Na Phos-Ph Sal (URIBEL) 118 MG CAPS TAKE BY MOUTH AS DIRECTED FOR IC SYMPTOMS, qday    Mirabegron ER (MYRBETRIQ) 50 MG TB24 TAKE 1 TABLET DAILY    Multiple Vitamin (MULTI VITAMIN DAILY PO) Take by mouth    naproxen (NAPROSYN) 500 mg tablet TAKE 1 TABLET BY MOUTH 3 TIMES A DAY AS NEEDED MIGRAINE  LIMIT USE TO 3 DAYS PER WEEK    ondansetron (ZOFRAN-ODT) 4 mg disintegrating tablet Take 4 mg by mouth every 8 (eight) hours as needed    pentosan polysulfate (ELMIRON) 100 mg capsule Take 100 mg by mouth 3 (three) times a day before meals      prochlorperazine (COMPAZINE) 10 mg tablet 1 tab po TID prn headache   Max 3 days per week    triamcinolone 40 MG/ML SUSP 40 mg to be instilled into the bladder in the urology office    VIBERZI 100 MG TABS Take 1 tablet by mouth 2 (two) times a day    VIBERZI 75 MG TABS Take 1 tablet by mouth every morning    XIFAXAN 550 MG tablet Take 550 mg by mouth 3 (three) times a day     No current facility-administered medications on file prior to visit  She is allergic to azithromycin; clarithromycin; clavulanic acid; doxycycline; erythromycin base; lamotrigine; lithium; mirtazapine; moxifloxacin; sulfamethoxazole; sulfamethoxazole-trimethoprim; trimethoprim; indomethacin; and penicillins       Review of Systems   Constitutional: Negative  HENT:        As stated in HPI   Eyes:        As stated in HPI   Respiratory: Negative for cough, shortness of breath and wheezing  Cardiovascular: Negative for palpitations and leg swelling  Gastrointestinal:        As stated in HPI   Musculoskeletal:        As stated in HPI         Objective:      /80 (BP Location: Left arm, Patient Position: Sitting, Cuff Size: Standard)   Pulse 72   Temp 99 1 °F (37 3 °C) (Tympanic)   Resp 16   Ht 5' 6 5" (1 689 m)   Wt 67 2 kg (148 lb 3 2 oz)   SpO2 98%   BMI 23 56 kg/m²          Physical Exam   Constitutional: She appears well-developed and well-nourished  No distress  HENT:   Head: Normocephalic and atraumatic  Right Ear: External ear normal    Left Ear: External ear normal    Mouth/Throat: Oropharynx is clear and moist    Neck: Neck supple  No thyromegaly present  Cardiovascular: Normal rate, regular rhythm and normal heart sounds  Exam reveals no gallop and no friction rub  No murmur heard  Pulmonary/Chest: Effort normal and breath sounds normal  No respiratory distress  She has no wheezes  She has no rales  Abdominal: Soft  Bowel sounds are normal  She exhibits no mass  There is no tenderness  Musculoskeletal: She exhibits no edema or deformity  Lumbar spine:  She does have tenderness of the right SI joint  She does have good forward flexion  Negative straight leg raise bilaterally  No obvious atrophy of the lower extremities  Strength of the lower extremities is 5+/5 including EHL's    Reflexes are 1+ at the knees and 0 at the ankles  Lymphadenopathy:     She has no cervical adenopathy  Neurological: She is alert  Skin: Skin is warm  Psychiatric: She has a normal mood and affect

## 2019-10-19 ENCOUNTER — TRANSCRIBE ORDERS (OUTPATIENT)
Dept: ADMINISTRATIVE | Facility: HOSPITAL | Age: 49
End: 2019-10-19

## 2019-10-19 ENCOUNTER — APPOINTMENT (OUTPATIENT)
Dept: LAB | Facility: IMAGING CENTER | Age: 49
End: 2019-10-19
Payer: MEDICARE

## 2019-10-19 LAB
ALBUMIN SERPL BCP-MCNC: 4.1 G/DL (ref 3.5–5)
ALP SERPL-CCNC: 86 U/L (ref 46–116)
ALT SERPL W P-5'-P-CCNC: 20 U/L (ref 12–78)
ANION GAP SERPL CALCULATED.3IONS-SCNC: 6 MMOL/L (ref 4–13)
AST SERPL W P-5'-P-CCNC: 9 U/L (ref 5–45)
BASOPHILS # BLD AUTO: 0.03 THOUSANDS/ΜL (ref 0–0.1)
BASOPHILS NFR BLD AUTO: 1 % (ref 0–1)
BILIRUB SERPL-MCNC: 0.42 MG/DL (ref 0.2–1)
BUN SERPL-MCNC: 9 MG/DL (ref 5–25)
CALCIUM SERPL-MCNC: 9.5 MG/DL (ref 8.3–10.1)
CHLORIDE SERPL-SCNC: 109 MMOL/L (ref 100–108)
CHOLEST SERPL-MCNC: 200 MG/DL (ref 50–200)
CO2 SERPL-SCNC: 26 MMOL/L (ref 21–32)
CREAT SERPL-MCNC: 0.85 MG/DL (ref 0.6–1.3)
EOSINOPHIL # BLD AUTO: 0.06 THOUSAND/ΜL (ref 0–0.61)
EOSINOPHIL NFR BLD AUTO: 1 % (ref 0–6)
ERYTHROCYTE [DISTWIDTH] IN BLOOD BY AUTOMATED COUNT: 13.3 % (ref 11.6–15.1)
GFR SERPL CREATININE-BSD FRML MDRD: 82 ML/MIN/1.73SQ M
GLUCOSE P FAST SERPL-MCNC: 86 MG/DL (ref 65–99)
HCT VFR BLD AUTO: 44.8 % (ref 34.8–46.1)
HDLC SERPL-MCNC: 38 MG/DL (ref 40–60)
HGB BLD-MCNC: 14.3 G/DL (ref 11.5–15.4)
IMM GRANULOCYTES # BLD AUTO: 0.02 THOUSAND/UL (ref 0–0.2)
IMM GRANULOCYTES NFR BLD AUTO: 0 % (ref 0–2)
LDLC SERPL CALC-MCNC: 137 MG/DL (ref 0–100)
LYMPHOCYTES # BLD AUTO: 1.92 THOUSANDS/ΜL (ref 0.6–4.47)
LYMPHOCYTES NFR BLD AUTO: 38 % (ref 14–44)
MCH RBC QN AUTO: 28.7 PG (ref 26.8–34.3)
MCHC RBC AUTO-ENTMCNC: 31.9 G/DL (ref 31.4–37.4)
MCV RBC AUTO: 90 FL (ref 82–98)
MONOCYTES # BLD AUTO: 0.36 THOUSAND/ΜL (ref 0.17–1.22)
MONOCYTES NFR BLD AUTO: 7 % (ref 4–12)
NEUTROPHILS # BLD AUTO: 2.68 THOUSANDS/ΜL (ref 1.85–7.62)
NEUTS SEG NFR BLD AUTO: 53 % (ref 43–75)
NONHDLC SERPL-MCNC: 162 MG/DL
NRBC BLD AUTO-RTO: 0 /100 WBCS
PLATELET # BLD AUTO: 255 THOUSANDS/UL (ref 149–390)
PMV BLD AUTO: 10.7 FL (ref 8.9–12.7)
POTASSIUM SERPL-SCNC: 4.4 MMOL/L (ref 3.5–5.3)
PROT SERPL-MCNC: 7 G/DL (ref 6.4–8.2)
RBC # BLD AUTO: 4.99 MILLION/UL (ref 3.81–5.12)
SODIUM SERPL-SCNC: 141 MMOL/L (ref 136–145)
TRIGL SERPL-MCNC: 127 MG/DL
WBC # BLD AUTO: 5.07 THOUSAND/UL (ref 4.31–10.16)

## 2019-10-19 PROCEDURE — 85025 COMPLETE CBC W/AUTO DIFF WBC: CPT | Performed by: PHYSICIAN ASSISTANT

## 2019-10-19 PROCEDURE — 36415 COLL VENOUS BLD VENIPUNCTURE: CPT | Performed by: PHYSICIAN ASSISTANT

## 2019-10-19 PROCEDURE — 80053 COMPREHEN METABOLIC PANEL: CPT | Performed by: PHYSICIAN ASSISTANT

## 2019-10-19 PROCEDURE — 80061 LIPID PANEL: CPT | Performed by: PHYSICIAN ASSISTANT

## 2019-10-22 ENCOUNTER — TELEPHONE (OUTPATIENT)
Dept: FAMILY MEDICINE CLINIC | Facility: CLINIC | Age: 49
End: 2019-10-22

## 2019-10-23 ENCOUNTER — EVALUATION (OUTPATIENT)
Dept: PHYSICAL THERAPY | Facility: REHABILITATION | Age: 49
End: 2019-10-23
Payer: MEDICARE

## 2019-10-23 DIAGNOSIS — M54.16 RIGHT LUMBAR RADICULOPATHY: Primary | ICD-10-CM

## 2019-10-23 PROCEDURE — 97110 THERAPEUTIC EXERCISES: CPT | Performed by: PHYSICAL THERAPIST

## 2019-10-23 PROCEDURE — 97162 PT EVAL MOD COMPLEX 30 MIN: CPT | Performed by: PHYSICAL THERAPIST

## 2019-10-23 NOTE — PROGRESS NOTES
PT Evaluation     Today's date: 10/23/2019  Patient name: Valentine Boas  : 1970  MRN: [id number]  Referring provider: DON Luois  Dx:   Encounter Diagnosis     ICD-10-CM    1  Right lumbar radiculopathy M54 16                   Assessment  Assessment details: Pt is a 54year old female presenting to PT with acute on chronic low back pain with diagnosis of right lumbar radiculopathy  Pt presents with deficits in lumbar range of motion, bilateral lower extremity flexibility, left hip range of motion, bilateral hip and core strength  Due to her current deficits, Cici Patton is limited in her ability to perform stand or walk for extended period of time, sit for extend period of time or forward bend  Pt is a good candidate for skilled PT intervention and will benefit from treatment in order to address her limitations and to restore maximal function  Impairments: abnormal coordination, abnormal or restricted ROM, activity intolerance, impaired physical strength and pain with function  Understanding of Dx/Px/POC: good   Prognosis: good    Goals  Short Term Goals: To be achieved by 4 weeks    1) Patient to be independent with basic HEP  2) Decrease pain to 4/10 at worst   3) Increase ROM by 5 degrees or greater in all deficient planes  4) Increase strength by 1/2 MMT grade or greater in all deficient planes  5) Patient to report decreased sleep interruption secondary to pain  Long Term Goals: To be achieved by discharge    1) FOTO equal to or greater than 56   2) Patient to be independent with comprehensive HEP  3) Decrease pain to 2/10 at worst  for improved quality of life  4) Increase strength to 5/5 MMT grade in all deficient planes to improve a/iadls  5) Increase ROM to within normal limits  6) Patient to report no sleep interruption secondary to pain          Plan  Patient would benefit from: PT eval and skilled PT  Planned modality interventions: cryotherapy and thermotherapy: hydrocollator packs  Planned therapy interventions: IADL retraining, body mechanics training, flexibility, functional ROM exercises, home exercise program, neuromuscular re-education, manual therapy, postural training, strengthening, stretching, therapeutic activities, therapeutic exercise and joint mobilization  Frequency: 2x week  Duration in visits: 8  Duration in weeks: 4  Treatment plan discussed with: patient        Subjective Evaluation    History of Present Illness  Mechanism of injury: Pt is a 54year old female presenting to PT with acute on chronic low back pain  Pt reports she originally  injured her back in June 2018 when she picked up her son's 80 pound dog from the dog washing table and put him on the ground  Pt reports she went to an Urgent Care center, however, reports no diagnostic imaging performed and no medications prescribed  Pt reports she went to her PCP and requested PT for her back and she was referred to [facility]  in June 2019  Pt reports she came for her initial evaluation, but felt very uncomfortable with the therapist so didn't return for any follow-up appointments  Pt continued to have back pain and returned to PCP, was again referred to [facility]PP[facility]  No diagnostic imaging ordered, no medication prescribed  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  She reports she does have to lift and carry the 3year old up to his room, as well as lift him in and out of the crib  Lumbar X-Ray: There is no evidence of acute fracture or destructive osseous lesion  Alignment is unremarkable  No significant lumbar degenerative change noted  The pedicles appear intact  Transitional lumbosacral vertebra with partial lumbarization of S1 segment  Soft tissues are unremarkable        Pain Location: central lumbar spine, radiating to bilateral lateral hip region and anterior right thigh to knee region    Pain Type: aching in lumbar spine; numbness/tingling in right anterior thigh    Pain Intensity:  Current: 3  Best: 3  Worst: 8      Pt reports increased pain and/or difficulty with: walking > 30 minutes, standing > 20 minutes, sitting, forward bending; pt reports she has difficulty finding a comfortable position to fall asleep and is no longer able to sleep prone because her back gets "stuck"       Pt reports decreased pain with: arching her back, advil            Recurrent probem    Quality of life: good      Diagnostic Tests  X-ray: abnormal  Treatments  Previous treatment: physical therapy  Current treatment: medication  Patient Goals  Patient goals for therapy: increased strength, independence with ADLs/IADLs and decreased pain          Objective     Neurological Testing     Sensation     Lumbar   Left   Intact: light touch    Right   Intact: light touch    Reflexes   Left   Patellar (L4): normal (2+)  Achilles (S1): normal (2+)    Right   Patellar (L4): normal (2+)  Achilles (S1): normal (2+)    Active Range of Motion     Lumbar   Flexion:  with pain Restriction level: moderate  Extension:  WFL  Left lateral flexion:  WFL and with pain  Right lateral flexion:  WFL and with pain  Left rotation:  WFL  Right rotation:  WFL  Left Hip   Flexion: WFL  Extension: 0 degrees   Abduction: [facility]  External rotation (prone): 25 degrees   Internal rotation (prone): 20 degrees     Right Hip   Flexion: WFL  Extension: 5 degrees   Abduction: WFL  External rotation (prone): WFL  Internal rotation (prone): [facility]    Joint Play   Joints within functional limits: L1, L2, L3, L4, L5 and S1     Pain: L4, L5 and S1     Strength/Myotome Testing     Left Hip   Planes of Motion   Flexion: 5  Extension: 4  Abduction: 4  Adduction: 5  External rotation: 3+  Internal rotation: 3+    Right Hip   Planes of Motion   Flexion: 4+  Extension: 4  Abduction: 4  Adduction: 5  External rotation: 4+  Internal rotation: 4+    Left Knee   Flexion: 5  Extension: 5    Right Knee   Flexion: 5  Extension: 5    Left Ankle/Foot   Dorsiflexion: 5  Plantar flexion: 5  Inversion: 5  Eversion: 5    Right Ankle/Foot   Dorsiflexion: 5  Plantar flexion: 5  Inversion: 5  Eversion: 5    Additional Strength Details  Core Strength: Fair    Tests     Lumbar     Left   Negative passive SLR and slump test      Right   Negative passive SLR and slump test      Left Hip   Modified Pratik: Positive  90/90 SLR: Positive  Right Hip   Modified Pratik: Positive  90/90 SLR: Positive       Additional Tests Details  SIJ Cluster: negative      Flowsheet Rows      Most Recent Value   PT/OT G-Codes   Current Score  43   Projected Score  56        Precautions: asthma, migraines, IC, anxiety, depression

## 2019-10-30 ENCOUNTER — OFFICE VISIT (OUTPATIENT)
Dept: PHYSICAL THERAPY | Facility: REHABILITATION | Age: 49
End: 2019-10-30
Payer: MEDICARE

## 2019-10-30 DIAGNOSIS — M54.16 RIGHT LUMBAR RADICULOPATHY: Primary | ICD-10-CM

## 2019-10-30 PROCEDURE — 97112 NEUROMUSCULAR REEDUCATION: CPT | Performed by: PHYSICAL THERAPIST

## 2019-10-30 PROCEDURE — 97140 MANUAL THERAPY 1/> REGIONS: CPT | Performed by: PHYSICAL THERAPIST

## 2019-10-30 NOTE — PROGRESS NOTES
Daily Note     Today's date: 10/30/2019  Patient name: Ti Tim  : 1970  MRN: [id number]  Referring provider: DON Mcbride  Dx:   Encounter Diagnosis     ICD-10-CM    1  Right lumbar radiculopathy M54 16                   Subjective: Pt reports decreased pain since her initial evaluation, rates her pain at 2/10 prior to session today  Pt reports her home exercises are going well, "actually helped a lot "       Objective: See treatment diary  Precautions: asthma, migraines, IC, anxiety, depression      Assessment: Pt with good tolerance to initiation of manual techniques and progression of therapeutic exercise program reporting mild fatigue and no increase in pain with completion of session  She required minimal verbal and manual cues to ensure proper performance of all exercises  She noted appropriate challenge with all DLS exercises  Pt will benefit from continued skilled PT intervention in order to address her remaining limitations and to restore maximal function  Plan: Continue per plan of care

## 2019-10-31 ENCOUNTER — OFFICE VISIT (OUTPATIENT)
Dept: FAMILY MEDICINE CLINIC | Facility: CLINIC | Age: 49
End: 2019-10-31
Payer: MEDICARE

## 2019-10-31 VITALS
OXYGEN SATURATION: 96 % | HEIGHT: 67 IN | HEART RATE: 102 BPM | SYSTOLIC BLOOD PRESSURE: 122 MMHG | BODY MASS INDEX: 23.1 KG/M2 | DIASTOLIC BLOOD PRESSURE: 80 MMHG | TEMPERATURE: 99.1 F | RESPIRATION RATE: 16 BRPM | WEIGHT: 147.2 LBS

## 2019-10-31 DIAGNOSIS — J06.9 ACUTE UPPER RESPIRATORY INFECTION: ICD-10-CM

## 2019-10-31 DIAGNOSIS — J45.901 ASTHMA WITH ACUTE EXACERBATION, UNSPECIFIED ASTHMA SEVERITY, UNSPECIFIED WHETHER PERSISTENT: Primary | ICD-10-CM

## 2019-10-31 PROCEDURE — 99214 OFFICE O/P EST MOD 30 MIN: CPT | Performed by: FAMILY MEDICINE

## 2019-10-31 RX ORDER — ALBUTEROL SULFATE 90 UG/1
2 AEROSOL, METERED RESPIRATORY (INHALATION) 4 TIMES DAILY
Qty: 1 INHALER | Refills: 5 | Status: SHIPPED | OUTPATIENT
Start: 2019-10-31

## 2019-10-31 RX ORDER — PREDNISONE 50 MG/1
50 TABLET ORAL DAILY
Qty: 5 TABLET | Refills: 0 | Status: SHIPPED | OUTPATIENT
Start: 2019-10-31 | End: 2019-11-05

## 2019-10-31 RX ORDER — HEPARIN SODIUM 10000 [USP'U]/ML
INJECTION, SOLUTION INTRAVENOUS; SUBCUTANEOUS
Refills: 2 | COMMUNITY
Start: 2019-10-20 | End: 2022-02-03

## 2019-10-31 RX ORDER — ALBUTEROL SULFATE 2.5 MG/3ML
2.5 SOLUTION RESPIRATORY (INHALATION) 3 TIMES DAILY
Qty: 30 VIAL | Refills: 2 | Status: SHIPPED | OUTPATIENT
Start: 2019-10-31 | End: 2020-03-17 | Stop reason: SDUPTHER

## 2019-10-31 NOTE — PROGRESS NOTES
Assessment/Plan:    Asthma  Was given prescription for prednisone and refill her nebulizer and inhaler  Encourage oral hydration  If symptoms worse call or come back  Diagnoses and all orders for this visit:    Asthma with acute exacerbation, unspecified asthma severity, unspecified whether persistent  -     albuterol (2 5 mg/3 mL) 0 083 % nebulizer solution; Take 1 vial (2 5 mg total) by nebulization 3 (three) times a day  -     albuterol (PROAIR HFA) 90 mcg/act inhaler; Inhale 2 puffs 4 (four) times a day every 4 to 6 hours as needed  -     predniSONE 50 mg tablet; Take 1 tablet (50 mg total) by mouth daily for 5 days    Acute upper respiratory infection    Other orders  -     Heparin Sodium, Porcine, (HEPARIN, PORCINE,) 36698 UNIT/ML; 10,000 UNITS TO BE INSTILLED INTO THE BLADDER IN THE UROLOGY OFFICE          Subjective:      Patient ID: Yasmany Davidson is a 54 y o  female  T F  Is a 54year old female with a PMH Of asthma presenting for cold like symptoms x 4 days  Patient stated on Sunday she started with a sore throat and then developed a dry cough and nasal congestion  Patient denied any recent sick contacts, patient had her flu shot  Patient stated she has had to use her rescue inhaler 3 times yesterday, stated she normally does not need to use it  Patient has not been able to use her nebulizer because she ran out  Patient denied any fevers, but stated she has had some chills  The following portions of the patient's history were reviewed and updated as appropriate: allergies, current medications, past family history, past medical history, past social history, past surgical history and problem list     Review of Systems   Constitutional: Positive for chills  Negative for fever  HENT: Positive for congestion, rhinorrhea, sinus pressure and sore throat  Negative for ear pain  Eyes: Negative for discharge and itching  Respiratory: Positive for cough, shortness of breath and wheezing  Cardiovascular: Negative for chest pain and palpitations  Gastrointestinal: Negative for constipation, diarrhea, nausea and vomiting  Genitourinary: Negative for dysuria and hematuria  Musculoskeletal: Negative for myalgias  Skin: Negative for rash  Neurological: Negative for dizziness, light-headedness and headaches  Objective:      /80 (BP Location: Left arm, Patient Position: Sitting, Cuff Size: Standard)   Pulse 102   Temp 99 1 °F (37 3 °C) (Tympanic)   Resp 16   Ht 5' 6 5" (1 689 m)   Wt 66 8 kg (147 lb 3 2 oz)   SpO2 96%   BMI 23 40 kg/m²          Physical Exam   Constitutional: She is oriented to person, place, and time  She appears well-developed and well-nourished  HENT:   Head: Normocephalic and atraumatic  Right Ear: External ear normal    Left Ear: External ear normal    Nose: Nose normal    Mouth/Throat: Oropharynx is clear and moist  No oropharyngeal exudate  Right middle ear effusion  Eyes: Pupils are equal, round, and reactive to light  Conjunctivae and EOM are normal    Neck: Normal range of motion  Neck supple  Cardiovascular: Normal rate, regular rhythm and normal heart sounds  Pulmonary/Chest: Effort normal and breath sounds normal  She has no wheezes  She has no rales  Abdominal: Soft  Bowel sounds are normal  She exhibits no distension  There is no tenderness  Musculoskeletal: Normal range of motion  Neurological: She is alert and oriented to person, place, and time  Skin: Skin is warm and dry  Psychiatric: She has a normal mood and affect  Nursing note and vitals reviewed

## 2019-10-31 NOTE — ASSESSMENT & PLAN NOTE
Was given prescription for prednisone and refill her nebulizer and inhaler  Encourage oral hydration  If symptoms worse call or come back

## 2019-11-01 ENCOUNTER — TELEPHONE (OUTPATIENT)
Dept: FAMILY MEDICINE CLINIC | Facility: CLINIC | Age: 49
End: 2019-11-01

## 2019-11-01 NOTE — TELEPHONE ENCOUNTER
Patient was seen yesterday and you told her to call if worsening   She is worse than yesterday, constant blowing yellowing mucus, has chills etc   What else should she do?    [phone]

## 2019-11-04 ENCOUNTER — APPOINTMENT (OUTPATIENT)
Dept: PHYSICAL THERAPY | Facility: REHABILITATION | Age: 49
End: 2019-11-04
Payer: MEDICARE

## 2019-11-05 NOTE — TELEPHONE ENCOUNTER
Seen at asthma specialist yesterday and felt she was not responding to the one steroid she was on   She had xray of sinus done and was given cipro for sinus infection yesterday from asthma specialist  She will give the abx time to work and if no better she will  or our office

## 2019-11-06 ENCOUNTER — OFFICE VISIT (OUTPATIENT)
Dept: PHYSICAL THERAPY | Facility: REHABILITATION | Age: 49
End: 2019-11-06
Payer: MEDICARE

## 2019-11-06 DIAGNOSIS — M54.16 RIGHT LUMBAR RADICULOPATHY: Primary | ICD-10-CM

## 2019-11-06 DIAGNOSIS — J30.2 SEASONAL ALLERGIES: ICD-10-CM

## 2019-11-06 PROCEDURE — 97140 MANUAL THERAPY 1/> REGIONS: CPT

## 2019-11-06 PROCEDURE — 97112 NEUROMUSCULAR REEDUCATION: CPT

## 2019-11-06 RX ORDER — FEXOFENADINE HCL AND PSEUDOEPHEDRINE HCI 180; 240 MG/1; MG/1
1 TABLET, EXTENDED RELEASE ORAL DAILY
Qty: 30 TABLET | Refills: 1 | OUTPATIENT
Start: 2019-11-06

## 2019-11-06 NOTE — PROGRESS NOTES
Daily Note     Today's date: 2019  Patient name: Axel Mccartney  : 1970  MRN: [id number]  Referring provider: DON Harrison  Dx:   Encounter Diagnosis     ICD-10-CM    1  Right lumbar radiculopathy M54 16          Subjective: Patient noted R side low back pain into R hip and down to knee  Objective: See treatment diary below, flow chart scanned into epic at D/C  Assessment: Tolerated treatment well  Patient was able to perform exercises with no pain consider adding reverse clams and hip ext OET nv as per patient tolerance  Reviewed kneeling hip flexor stretch with patient for HEP, patient performed with correct form  Patient would benefit from continued PT  Plan: Continue per plan of care

## 2019-11-06 NOTE — TELEPHONE ENCOUNTER
Pt last seen 10/31/19 and is requesting refill on allegra   Sent to provider for review and approval

## 2019-11-13 ENCOUNTER — OFFICE VISIT (OUTPATIENT)
Dept: PHYSICAL THERAPY | Facility: REHABILITATION | Age: 49
End: 2019-11-13
Payer: MEDICARE

## 2019-11-13 DIAGNOSIS — M54.16 RIGHT LUMBAR RADICULOPATHY: Primary | ICD-10-CM

## 2019-11-13 PROCEDURE — 97112 NEUROMUSCULAR REEDUCATION: CPT | Performed by: PHYSICAL THERAPIST

## 2019-11-13 PROCEDURE — 97140 MANUAL THERAPY 1/> REGIONS: CPT | Performed by: PHYSICAL THERAPIST

## 2019-11-22 ENCOUNTER — OFFICE VISIT (OUTPATIENT)
Dept: PHYSICAL THERAPY | Facility: REHABILITATION | Age: 49
End: 2019-11-22
Payer: MEDICARE

## 2019-11-22 DIAGNOSIS — M54.16 RIGHT LUMBAR RADICULOPATHY: Primary | ICD-10-CM

## 2019-11-22 PROCEDURE — 97112 NEUROMUSCULAR REEDUCATION: CPT | Performed by: PHYSICAL THERAPIST

## 2019-11-22 PROCEDURE — 97140 MANUAL THERAPY 1/> REGIONS: CPT | Performed by: PHYSICAL THERAPIST

## 2019-11-22 NOTE — PROGRESS NOTES
Daily Note     Today's date: 2019  Patient name: Augustine Cornell  : 1970  MRN: [id number]  Referring provider: DON Landeros  Dx:   Encounter Diagnosis     ICD-10-CM    1  Right lumbar radiculopathy M54 16          Subjective: Had some pain with bending forward to reach something on the floor  This lasted into the next day when it finally eased up  Objective: See treatment diary below, flow chart scanned into epic at D/C  Lumbar ROM: Extension min limit, Flexion moderate limit with pain during motion back up  Lumbar extension in prone cause slight central LBP  This was abolished with L4,L5 PA mobilization and she was able to perform this pain free  Added this and hip flexor stretch to HEP  Precautions: asthma, migraines, IC, anxiety, depression    Assessment: Flexion activity irritated LBP so initiated a trial of lumbar extension exercises  This was done in prone as standing was uncomfortable  She demonstrated a good understanding of exercises and HEP  FOTO score met target  Plan: Continue per plan of care

## 2019-11-26 ENCOUNTER — APPOINTMENT (OUTPATIENT)
Dept: PHYSICAL THERAPY | Facility: REHABILITATION | Age: 49
End: 2019-11-26
Payer: MEDICARE

## 2019-11-27 ENCOUNTER — CLINICAL SUPPORT (OUTPATIENT)
Dept: OBGYN CLINIC | Facility: CLINIC | Age: 49
End: 2019-11-27
Payer: MEDICARE

## 2019-11-27 ENCOUNTER — OFFICE VISIT (OUTPATIENT)
Dept: PHYSICAL THERAPY | Facility: REHABILITATION | Age: 49
End: 2019-11-27
Payer: MEDICARE

## 2019-11-27 VITALS — BODY MASS INDEX: 24.55 KG/M2 | WEIGHT: 154.4 LBS | DIASTOLIC BLOOD PRESSURE: 76 MMHG | SYSTOLIC BLOOD PRESSURE: 118 MMHG

## 2019-11-27 DIAGNOSIS — Z30.42 ENCOUNTER FOR DEPO-PROVERA CONTRACEPTION: Primary | ICD-10-CM

## 2019-11-27 DIAGNOSIS — M54.16 RIGHT LUMBAR RADICULOPATHY: Primary | ICD-10-CM

## 2019-11-27 PROCEDURE — 96372 THER/PROPH/DIAG INJ SC/IM: CPT | Performed by: OBSTETRICS & GYNECOLOGY

## 2019-11-27 PROCEDURE — 97112 NEUROMUSCULAR REEDUCATION: CPT | Performed by: PHYSICAL THERAPIST

## 2019-11-27 PROCEDURE — 97140 MANUAL THERAPY 1/> REGIONS: CPT | Performed by: PHYSICAL THERAPIST

## 2019-11-27 RX ORDER — MEDROXYPROGESTERONE ACETATE 150 MG/ML
150 INJECTION, SUSPENSION INTRAMUSCULAR ONCE
Status: COMPLETED | OUTPATIENT
Start: 2019-11-27 | End: 2019-11-27

## 2019-11-27 RX ADMIN — MEDROXYPROGESTERONE ACETATE 150 MG: 150 INJECTION, SUSPENSION INTRAMUSCULAR at 11:15

## 2019-11-27 NOTE — PROGRESS NOTES
Pt is here for 11 week depo and has no complaints and has one more refill  and depo was given in right deltoid

## 2019-11-27 NOTE — PROGRESS NOTES
Daily Note     Today's date: 2019  Patient name: Miroslava Metz  : 1970  MRN: [id number]  Referring provider: DON Weber  Dx:   Encounter Diagnosis     ICD-10-CM    1  Right lumbar radiculopathy M54 16                   Subjective: Pt experiences pain in the morning, primarily when bending over and when putting pants and shoes on in the morning  She says that home exercises have been helpful  Press ups feel good  Objective: See treatment diary below  Assessment: Pt has most pain in the morning with bending suggesting that it is disc related  Spoke with patient about using a towel roll when sleeping and provided a lumbar roll to use in the evening when she is sitting watching TV  She will pay for it if she decides to keep it  Standing extension was still a little painful but improved after both MACARIO and MAURICE leon      Plan: Continue per plan of care  See flow sheet to be scanned in at D/C for treatment diary

## 2019-11-29 ENCOUNTER — OFFICE VISIT (OUTPATIENT)
Dept: PHYSICAL THERAPY | Facility: REHABILITATION | Age: 49
End: 2019-11-29
Payer: MEDICARE

## 2019-11-29 DIAGNOSIS — M54.16 RIGHT LUMBAR RADICULOPATHY: Primary | ICD-10-CM

## 2019-11-29 PROCEDURE — 97112 NEUROMUSCULAR REEDUCATION: CPT | Performed by: PHYSICAL THERAPIST

## 2019-11-29 PROCEDURE — 97140 MANUAL THERAPY 1/> REGIONS: CPT | Performed by: PHYSICAL THERAPIST

## 2019-11-29 NOTE — PROGRESS NOTES
Daily Note     Today's date: 2019  Patient name: Ti Tim  : 1970  MRN: [id number]  Referring provider: DON Mcbride  Dx:   Encounter Diagnosis     ICD-10-CM    1  Right lumbar radiculopathy M54 16                   Subjective: Still having some pain first thing in the morning with lifting her leg up in standing to put on her pants  Objective: See treatment diary below  Assessment: Checked SI movement with forward bending and with marcher test and the right appeared to be posteriorly rotated  Following exercises this had diminished significantly  Muscle energy mobs then resolved this  Pt is progressing very well and now only has a little pain first thing in the morning  Plan: Continue per plan of care  See flow sheet to be scanned in at D/C for treatment diary

## 2019-12-03 ENCOUNTER — OFFICE VISIT (OUTPATIENT)
Dept: PHYSICAL THERAPY | Facility: REHABILITATION | Age: 49
End: 2019-12-03
Payer: MEDICARE

## 2019-12-03 DIAGNOSIS — M54.16 RIGHT LUMBAR RADICULOPATHY: Primary | ICD-10-CM

## 2019-12-03 PROCEDURE — 97112 NEUROMUSCULAR REEDUCATION: CPT | Performed by: PHYSICAL THERAPIST

## 2019-12-03 PROCEDURE — 97140 MANUAL THERAPY 1/> REGIONS: CPT | Performed by: PHYSICAL THERAPIST

## 2019-12-03 PROCEDURE — 97110 THERAPEUTIC EXERCISES: CPT | Performed by: PHYSICAL THERAPIST

## 2019-12-03 NOTE — PROGRESS NOTES
Daily Note     Today's date: 12/3/2019  Patient name: Lisa Query  : 1970  MRN: [id number]  Referring provider: DON Diallo  Dx:   Encounter Diagnosis     ICD-10-CM    1  Right lumbar radiculopathy M54 16                   Subjective: Often she is pain free  Had mild pain coming in this morning  "I think I think about it more on the days I come here "        Objective: See treatment diary below  Assessment: Pt presented again with right side posteriorly rotated  MM energy and shot gun did not reduce pain  One set of press ups abolished pain  Second set was painful but she was better afterward  Pt is now able to go shopping for hours and 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

## 2019-12-26 ENCOUNTER — TELEPHONE (OUTPATIENT)
Dept: OBGYN CLINIC | Facility: CLINIC | Age: 49
End: 2019-12-26

## 2019-12-27 ENCOUNTER — OFFICE VISIT (OUTPATIENT)
Dept: OBGYN CLINIC | Facility: CLINIC | Age: 49
End: 2019-12-27
Payer: MEDICARE

## 2019-12-27 VITALS
DIASTOLIC BLOOD PRESSURE: 80 MMHG | BODY MASS INDEX: 23.66 KG/M2 | WEIGHT: 142 LBS | HEIGHT: 65 IN | SYSTOLIC BLOOD PRESSURE: 110 MMHG

## 2019-12-27 DIAGNOSIS — A64 STD (FEMALE): Primary | ICD-10-CM

## 2019-12-27 DIAGNOSIS — N89.8 VAGINAL DISCHARGE: ICD-10-CM

## 2019-12-27 PROCEDURE — 87591 N.GONORRHOEAE DNA AMP PROB: CPT | Performed by: OBSTETRICS & GYNECOLOGY

## 2019-12-27 PROCEDURE — 87210 SMEAR WET MOUNT SALINE/INK: CPT | Performed by: OBSTETRICS & GYNECOLOGY

## 2019-12-27 PROCEDURE — 99213 OFFICE O/P EST LOW 20 MIN: CPT | Performed by: OBSTETRICS & GYNECOLOGY

## 2019-12-27 PROCEDURE — 87491 CHLMYD TRACH DNA AMP PROBE: CPT | Performed by: OBSTETRICS & GYNECOLOGY

## 2019-12-27 RX ORDER — LORAZEPAM 0.5 MG/1
TABLET ORAL
COMMUNITY
End: 2020-05-20

## 2019-12-27 RX ORDER — FEXOFENADINE HCL AND PSEUDOEPHEDRINE HCI 180; 240 MG/1; MG/1
TABLET, EXTENDED RELEASE ORAL EVERY 24 HOURS
COMMUNITY
End: 2020-05-20

## 2019-12-27 RX ORDER — NAPROXEN 125 MG/5ML
SUSPENSION ORAL
COMMUNITY
End: 2020-05-20

## 2019-12-27 RX ORDER — OXYBUTYNIN CHLORIDE 5 MG/1
5 TABLET, EXTENDED RELEASE ORAL
COMMUNITY
Start: 2019-11-22 | End: 2020-07-06

## 2019-12-27 RX ORDER — ALBUTEROL SULFATE 90 UG/1
AEROSOL, METERED RESPIRATORY (INHALATION)
COMMUNITY
End: 2020-05-20

## 2019-12-27 RX ORDER — ONDANSETRON 4 MG/1
4 TABLET, FILM COATED ORAL EVERY 8 HOURS PRN
Refills: 5 | COMMUNITY
Start: 2019-12-09 | End: 2021-11-05 | Stop reason: SDUPTHER

## 2019-12-27 RX ORDER — OXYBUTYNIN CHLORIDE 5 MG/1
5 TABLET, EXTENDED RELEASE ORAL DAILY
Refills: 3 | COMMUNITY
Start: 2019-11-24 | End: 2020-12-24 | Stop reason: ALTCHOICE

## 2019-12-27 RX ORDER — TRIAMCINOLONE ACETONIDE 40 MG/ML
INJECTION, SUSPENSION INTRA-ARTICULAR; INTRAMUSCULAR
Refills: 2 | COMMUNITY
Start: 2019-12-12 | End: 2022-02-03

## 2019-12-27 NOTE — PROGRESS NOTES
Assessment/Plan:     Vaginal discharge, resolved-patient reassured that her wet mount is normal although there was not lactobacilli noted on the slide  She takes a probiotic  I recommended that she not take the 2nd Diflucan that she has as there is no sign of yeast   Chlamydia and gonorrhea done today  Pelvic pain/pressure-her exam is normal   we discussed a pelvic ultrasound  She would like to wait a week or so to see if this resolves as she feels that some of the discomfort is from her upper GI problems that she has been having  This is reasonable and she will call if she continues to have pelvic pain  There are no diagnoses linked to this encounter  Subjective:     Patient ID: Jessica Sullivan is a 54 y o  female  Patient here for evaluation for a possible vaginal infection  In October she was on antibiotics for 10 days for an upper respiratory tract infection  She thought she was getting a yeast infection and used generic Monistat with no problems  She then felt that the symptoms had not resolved and she used brand name Monistat last week which caused irritation  She then took a Diflucan last night  She now feels that the discharge has resolved and she does not have any odor or itching  She is worried about STDs  She did have a new partner  She does note some pelvic pain and pressure but she has also been having GI problems and has been losing weight  She is going to have her gallbladder evaluated soon          Review of Systems   Constitutional: Negative  Gastrointestinal: Positive for abdominal distention and abdominal pain  Genitourinary: Positive for pelvic pain  Negative for vaginal bleeding, vaginal discharge and vaginal pain  Objective:     Physical Exam   Genitourinary: Vagina normal and uterus normal  No labial fusion  There is no rash, tenderness, lesion or injury on the right labia  There is no rash, tenderness, lesion or injury on the left labia   Cervix exhibits discharge  Cervix exhibits no motion tenderness and no friability  Right adnexum displays no mass, no tenderness and no fullness  Left adnexum displays no mass, no tenderness and no fullness     Genitourinary Comments: Small amount of white discharge, normal in appearance

## 2019-12-31 LAB
C TRACH DNA SPEC QL NAA+PROBE: NEGATIVE
N GONORRHOEA DNA SPEC QL NAA+PROBE: NEGATIVE

## 2020-01-04 ENCOUNTER — HOSPITAL ENCOUNTER (OUTPATIENT)
Dept: RADIOLOGY | Age: 50
Discharge: HOME | End: 2020-01-04
Payer: MEDICARE

## 2020-01-04 VITALS — WEIGHT: 146 LBS | HEIGHT: 65 IN | BODY MASS INDEX: 24.32 KG/M2

## 2020-01-04 DIAGNOSIS — Z12.31 ENCOUNTER FOR SCREENING MAMMOGRAM FOR BREAST CANCER: ICD-10-CM

## 2020-01-04 PROCEDURE — 77063 BREAST TOMOSYNTHESIS BI: CPT

## 2020-01-04 PROCEDURE — 77067 SCR MAMMO BI INCL CAD: CPT

## 2020-01-06 ENCOUNTER — ULTRASOUND (OUTPATIENT)
Dept: OBGYN CLINIC | Facility: CLINIC | Age: 50
End: 2020-01-06
Payer: MEDICARE

## 2020-01-06 DIAGNOSIS — R10.2 PELVIC PAIN: ICD-10-CM

## 2020-01-06 DIAGNOSIS — R10.32 LLQ PAIN: Primary | ICD-10-CM

## 2020-01-06 PROCEDURE — 76856 US EXAM PELVIC COMPLETE: CPT

## 2020-01-06 PROCEDURE — 76830 TRANSVAGINAL US NON-OB: CPT | Performed by: OBSTETRICS & GYNECOLOGY

## 2020-01-06 NOTE — PROGRESS NOTES
AMB US Pelvic Non OB  Date/Time: 1/6/2020 2:22 PM  Performed by: Rachael Cerda  Authorized by: Terry Arzola DO     Procedure details:     Technique:  Transvaginal US, Non-OB    Position: lithotomy exam    Uterine findings:     Length (cm): 4 46    Height (cm):  1 85    Width (cm):  4 19    Endometrial stripe: identified      Endometrium thickness (mm):  4 8  Left ovary findings:     Left ovary:  Visualized    Length (cm): 3 76    Height (cm): 2 25    Width (cm): 1 81  Right ovary findings:     Right ovary:  Visualized    Length (cm): 3 35    Height (cm): 2 24    Width (cm): 2 17  Other findings:     Free pelvic fluid: not identified      Free peritoneal fluid: not identified    Post-Procedure Details:     Impression:  Anteverted uterus and bilateral ovaries appear within normal limits  Each ovary contains small follicles less than 1cm  The left ovary contains a 7 4CH follicle  No free fluid  Tolerance: Tolerated well, no immediate complications    Complications: no complications    Additional Procedure Comments:      [employer] F8 E8C-RS transvaginal transducer Serial #[id number] was used to perform the examination today and subsequently followed with high level disinfection utilizing Trophon EPR procedure  [address]trasound performed at:     [id number]  [address] [address] [address] [facility]  Phone:  [phone]  Fax:  [phone]

## 2020-01-13 NOTE — TELEPHONE ENCOUNTER
----- Message from Edgardo Phillips PA-C sent at 10/21/2019  6:58 AM EDT -----  Blood work is normal for kidneys, liver, blood sugar, blood count  Cholesterol has slightly increased to 200, previously 194  Normal is 200 and below    I would recommend a low-cholesterol diet
Pt aware of results and agreed to watch her diet   She canceled her f/u appt as she was given the results over phone
Detail Level: Simple
Add Postop Global No-Charge Code (93422)?: yes

## 2020-03-17 ENCOUNTER — TELEPHONE (OUTPATIENT)
Dept: FAMILY MEDICINE CLINIC | Facility: CLINIC | Age: 50
End: 2020-03-17

## 2020-03-17 DIAGNOSIS — J30.2 SEASONAL ALLERGIES: ICD-10-CM

## 2020-03-17 DIAGNOSIS — J45.901 ASTHMA WITH ACUTE EXACERBATION, UNSPECIFIED ASTHMA SEVERITY, UNSPECIFIED WHETHER PERSISTENT: ICD-10-CM

## 2020-03-17 RX ORDER — VALACYCLOVIR HYDROCHLORIDE 500 MG/1
TABLET, FILM COATED ORAL
Qty: 90 TABLET | Refills: 0 | OUTPATIENT
Start: 2020-03-17

## 2020-03-17 NOTE — TELEPHONE ENCOUNTER
Phone call from pt, states she has had a fever for 2 days, it has been 100 4, she states she is taking Advil & her temp does not come down  She had nausea yesterday,not today,she has a headache, achey in her neck, chills x 2 days & she has a h/o asthma & is wheezy  Please advise  Patient also mentioned that she needs her Allegra called in to [facility] (N)

## 2020-03-17 NOTE — TELEPHONE ENCOUNTER
Pt c/o fever, severe chills, body ached, headaches nausea, wheezing and SOB with steps  Pt has PMH of Asthma  Pt denied CP, No travel or contact with sick people  Pt refused urgent care or ER  Pt is requesting refill of allegra D, albuterol [place] solution, prednisone  Pt states she is allergic to all ABX except Cipro  Confirmed allergy to z an  Per Dr Nisa carrillo refill requested meds and give prednisone 50mg 1 tab daily x 5 d  Informed pt to go to ER or urgent if no improvement or worsening symptoms

## 2020-03-18 RX ORDER — FEXOFENADINE HCL AND PSEUDOEPHEDRINE HCI 180; 240 MG/1; MG/1
1 TABLET, EXTENDED RELEASE ORAL DAILY
Qty: 30 TABLET | Refills: 1 | Status: SHIPPED | OUTPATIENT
Start: 2020-03-18 | End: 2022-01-12

## 2020-03-18 RX ORDER — PREDNISONE 50 MG/1
50 TABLET ORAL DAILY
Qty: 5 TABLET | Refills: 0 | Status: SHIPPED | OUTPATIENT
Start: 2020-03-18 | End: 2020-07-06

## 2020-03-18 RX ORDER — ALBUTEROL SULFATE 2.5 MG/3ML
2.5 SOLUTION RESPIRATORY (INHALATION) 3 TIMES DAILY
Qty: 30 VIAL | Refills: 2 | Status: SHIPPED | OUTPATIENT
Start: 2020-03-18

## 2020-03-18 NOTE — TELEPHONE ENCOUNTER
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

## 2020-03-18 NOTE — TELEPHONE ENCOUNTER
Please let her know that Dr Khadra Paniagua did send medications to the pharmacy for her today  Any fevers beyond 5 days should be evaluated  Please have her keep up with her fluid intake  She can alternate Advil with Tylenol every 3 hours so she is taking 1 medication every 6 hours

## 2020-03-21 ENCOUNTER — NURSE TRIAGE (OUTPATIENT)
Dept: RADIOLOGY | Facility: CLINIC | Age: 50
End: 2020-03-21

## 2020-03-21 DIAGNOSIS — B34.2 CORONAVIRUS INFECTION: Primary | ICD-10-CM

## 2020-03-21 DIAGNOSIS — D83.9 CVID (COMMON VARIABLE IMMUNODEFICIENCY) (HCC): Primary | ICD-10-CM

## 2020-03-21 DIAGNOSIS — Z20.828 EXPOSURE TO SARS-ASSOCIATED CORONAVIRUS: Primary | ICD-10-CM

## 2020-03-21 DIAGNOSIS — Z20.828 EXPOSURE TO SARS-ASSOCIATED CORONAVIRUS: ICD-10-CM

## 2020-03-21 PROCEDURE — 87635 SARS-COV-2 COVID-19 AMP PRB: CPT | Performed by: INTERNAL MEDICINE

## 2020-03-21 NOTE — TELEPHONE ENCOUNTER
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

## 2020-03-21 NOTE — TELEPHONE ENCOUNTER
Additional Information   Negative: Severe difficulty breathing (e g , struggling for each breath, speak in single words, bluish lips)   Negative: Sounds like a life-threatening emergency to the triager   Negative: [1] Difficulty breathing occurs AND [2] within 14 days of COVID-19 EXPOSURE (Close Contact)   Negative: Patient sounds very sick or weak to the triager   Negative: [1] Wet cough (i e , white-yellow, yellow, green, or evelio colored sputum) AND [2] onset > 14 days after COVID-19 EXPOSURE   [1] Dry cough occurs AND [2] onset > 14 days after COVID-19 EXPOSURE   Negative: Severe difficulty breathing (e g , struggling for each breath, speaks in single words)   Negative: Bluish (or gray) lips or face now   Negative: [1] Rapid onset of cough AND [2] has hives   Negative: Coughing started suddenly after medicine, an allergic food or bee sting   Negative: [1] Difficulty breathing AND [2] exposure to flames, smoke, or fumes   Negative: [1] Stridor AND [2] difficulty breathing   Negative: Sounds like a life-threatening emergency to the triager   Negative: Choked on object of food that could be caught in the throat   Negative: Chest pain is main symptom   Negative: [1] Previous asthma attacks AND [2] this feels like asthma attack   Negative: Cough lasts > 3 weeks   Negative: Wet (productive) cough (i e , white-yellow, yellow, green, or evelio colored sputum)   Negative: Chest pain  (Exception: MILD central chest pain, present only when coughing)   Negative: Difficulty breathing   Negative: Patient sounds very sick or weak to the triager   Negative: [1] Coughed up blood AND [2] > 1 tablespoon (15 ml) (Exception: blood-tinged sputum)   Negative: Fever > 103 F (39 4 C)   Negative: [1] Fever > 101 F (38 3 C) AND [2] age > 61   Negative: [1] Fever > 100 0 F (37 8 C) AND [2] bedridden (e g , nursing home patient, CVA, chronic illness, recovering from surgery)   Negative: [1] Fever > 100 0 F (37 8 C) AND [2] diabetes mellitus or weak immune system (e g , HIV positive, cancer chemo, splenectomy, organ transplant, chronic steroids)   Negative: Wheezing is present    Answer Assessment - Initial Assessment Questions  1  CONFIRMED CASE: "Who is the person with the confirmed COVID-19 infection that you were exposed to?"      N/A  2  PLACE of CONTACT: "Where were you when you were exposed to COVID-19  (coronavirus disease 2019)?" (e g , city, state, country)      N/A  3  TYPE of CONTACT: "How much contact was there?" (e g , live in same house, work in same office, same school)      N/A  4  DATE of CONTACT: "When did you have contact with a coronavirus patient?" (e g , days)     N/A  5  DURATION of CONTACT: "How long were you in contact with the COVID-19 (coronavirus disease) patient?" (e g , a few seconds, passed by person, a few minutes, live with the patient)      N/A  6  SYMPTOMS: "Do you have any symptoms?" (e g , fever, cough, breathing difficulty)      Pt c/o chills, weakness, and body aches for 6 days, she has had a temperature of 100 4 consistently  Pt has cough and is concerned d/t hx of asthma  Pt said she has been SOB with ambulation  7  PREGNANCY OR POSTPARTUM: "Is there any chance you are pregnant?" "When was your last menstrual period?" "Did you deliver in the last 2 weeks?"      N/A  8  HIGH RISK: "Do you have any heart or lung problems? Do you have a weakened immune system?" (e g , CHF, COPD, asthma, HIV positive, chemotherapy, renal failure, diabetes mellitus, sickle cell anemia)      Pt has hx asthma      Protocols used: CORONAVIRUS (COVID-19) EXPOSURE-ADULT-AH, COUGH - ACUTE NON-PRODUCTIVE-ADULT-AH

## 2020-03-26 ENCOUNTER — TELEPHONE (OUTPATIENT)
Dept: FAMILY MEDICINE CLINIC | Facility: CLINIC | Age: 50
End: 2020-03-26

## 2020-03-26 NOTE — TELEPHONE ENCOUNTER
Pt is aware that her results are still pending   She is still running fever up to 100 1 and she is having bodyaches

## 2020-03-27 ENCOUNTER — TELEPHONE (OUTPATIENT)
Dept: INTERNAL MEDICINE CLINIC | Facility: CLINIC | Age: 50
End: 2020-03-27

## 2020-03-27 ENCOUNTER — TELEPHONE (OUTPATIENT)
Dept: FAMILY MEDICINE CLINIC | Facility: CLINIC | Age: 50
End: 2020-03-27

## 2020-03-27 LAB — SARS-COV-2 RNA SPEC QL NAA+PROBE: NOT DETECTED

## 2020-03-27 NOTE — TELEPHONE ENCOUNTER
I called patient and informed her of negative results  She has a primary care physician  At [facility] and I called them and her with notification of the results   I was given permission to speak with her PCP

## 2020-03-27 NOTE — TELEPHONE ENCOUNTER
Pt was made aware of results already when I called that test was not detected  She is continuing to run fever on and off  She will keep monitoring her temps and let us know if elevated temps continue

## 2020-04-06 ENCOUNTER — TELEPHONE (OUTPATIENT)
Dept: OBGYN CLINIC | Facility: CLINIC | Age: 50
End: 2020-04-06

## 2020-04-06 DIAGNOSIS — G43.909 MIGRAINE WITHOUT STATUS MIGRAINOSUS, NOT INTRACTABLE, UNSPECIFIED MIGRAINE TYPE: ICD-10-CM

## 2020-04-06 RX ORDER — MEDROXYPROGESTERONE ACETATE 150 MG/ML
150 INJECTION, SUSPENSION INTRAMUSCULAR
Qty: 1 ML | Refills: 0 | Status: SHIPPED | OUTPATIENT
Start: 2020-04-06 | End: 2020-04-08 | Stop reason: SDUPTHER

## 2020-04-08 ENCOUNTER — CLINICAL SUPPORT (OUTPATIENT)
Dept: OBGYN CLINIC | Facility: CLINIC | Age: 50
End: 2020-04-08
Payer: MEDICARE

## 2020-04-08 DIAGNOSIS — G43.829 MENSTRUAL MIGRAINE WITHOUT STATUS MIGRAINOSUS, NOT INTRACTABLE: Primary | ICD-10-CM

## 2020-04-08 DIAGNOSIS — G43.909 MIGRAINE WITHOUT STATUS MIGRAINOSUS, NOT INTRACTABLE, UNSPECIFIED MIGRAINE TYPE: ICD-10-CM

## 2020-04-08 PROCEDURE — 96372 THER/PROPH/DIAG INJ SC/IM: CPT

## 2020-04-08 RX ORDER — MEDROXYPROGESTERONE ACETATE 150 MG/ML
150 INJECTION, SUSPENSION INTRAMUSCULAR ONCE
Status: COMPLETED | OUTPATIENT
Start: 2020-04-08 | End: 2020-04-08

## 2020-04-08 RX ORDER — MEDROXYPROGESTERONE ACETATE 150 MG/ML
150 INJECTION, SUSPENSION INTRAMUSCULAR
Qty: 1 ML | Refills: 0 | Status: SHIPPED | OUTPATIENT
Start: 2020-04-08 | End: 2020-06-22

## 2020-04-08 RX ADMIN — MEDROXYPROGESTERONE ACETATE 150 MG: 150 INJECTION, SUSPENSION INTRAMUSCULAR at 10:00

## 2020-05-20 ENCOUNTER — APPOINTMENT (OUTPATIENT)
Dept: LAB | Facility: IMAGING CENTER | Age: 50
End: 2020-05-20
Payer: MEDICARE

## 2020-05-20 ENCOUNTER — OFFICE VISIT (OUTPATIENT)
Dept: FAMILY MEDICINE CLINIC | Facility: CLINIC | Age: 50
End: 2020-05-20
Payer: MEDICARE

## 2020-05-20 ENCOUNTER — HOSPITAL ENCOUNTER (OUTPATIENT)
Dept: RADIOLOGY | Facility: IMAGING CENTER | Age: 50
Discharge: HOME | End: 2020-05-20
Payer: MEDICARE

## 2020-05-20 VITALS
SYSTOLIC BLOOD PRESSURE: 116 MMHG | TEMPERATURE: 99.3 F | OXYGEN SATURATION: 100 % | HEART RATE: 92 BPM | HEIGHT: 65 IN | BODY MASS INDEX: 21.87 KG/M2 | WEIGHT: 131.25 LBS | DIASTOLIC BLOOD PRESSURE: 60 MMHG | RESPIRATION RATE: 16 BRPM

## 2020-05-20 DIAGNOSIS — Z20.828 EXPOSURE TO SARS-ASSOCIATED CORONAVIRUS: ICD-10-CM

## 2020-05-20 DIAGNOSIS — E78.49 OTHER HYPERLIPIDEMIA: ICD-10-CM

## 2020-05-20 DIAGNOSIS — R53.82 CHRONIC FATIGUE: ICD-10-CM

## 2020-05-20 DIAGNOSIS — J45.41 MODERATE PERSISTENT ASTHMA WITH ACUTE EXACERBATION: ICD-10-CM

## 2020-05-20 DIAGNOSIS — R06.02 SOB (SHORTNESS OF BREATH): ICD-10-CM

## 2020-05-20 DIAGNOSIS — R06.02 SOB (SHORTNESS OF BREATH): Primary | ICD-10-CM

## 2020-05-20 LAB
ALBUMIN SERPL BCP-MCNC: 4.2 G/DL (ref 3.5–5)
ALP SERPL-CCNC: 67 U/L (ref 46–116)
ALT SERPL W P-5'-P-CCNC: 25 U/L (ref 12–78)
ANION GAP SERPL CALCULATED.3IONS-SCNC: 4 MMOL/L (ref 4–13)
AST SERPL W P-5'-P-CCNC: 12 U/L (ref 5–45)
BASOPHILS # BLD AUTO: 0.03 THOUSANDS/ΜL (ref 0–0.1)
BASOPHILS NFR BLD AUTO: 1 % (ref 0–1)
BILIRUB SERPL-MCNC: 0.46 MG/DL (ref 0.2–1)
BUN SERPL-MCNC: 10 MG/DL (ref 5–25)
CALCIUM SERPL-MCNC: 9.3 MG/DL (ref 8.3–10.1)
CHLORIDE SERPL-SCNC: 109 MMOL/L (ref 100–108)
CHOLEST SERPL-MCNC: 169 MG/DL (ref 50–200)
CO2 SERPL-SCNC: 26 MMOL/L (ref 21–32)
CREAT SERPL-MCNC: 0.7 MG/DL (ref 0.6–1.3)
EOSINOPHIL # BLD AUTO: 0.06 THOUSAND/ΜL (ref 0–0.61)
EOSINOPHIL NFR BLD AUTO: 1 % (ref 0–6)
ERYTHROCYTE [DISTWIDTH] IN BLOOD BY AUTOMATED COUNT: 13.6 % (ref 11.6–15.1)
GFR SERPL CREATININE-BSD FRML MDRD: 104 ML/MIN/1.73SQ M
GLUCOSE P FAST SERPL-MCNC: 82 MG/DL (ref 65–99)
HCT VFR BLD AUTO: 42.8 % (ref 34.8–46.1)
HDLC SERPL-MCNC: 40 MG/DL
HGB BLD-MCNC: 13.4 G/DL (ref 11.5–15.4)
IMM GRANULOCYTES # BLD AUTO: 0.02 THOUSAND/UL (ref 0–0.2)
IMM GRANULOCYTES NFR BLD AUTO: 0 % (ref 0–2)
LDLC SERPL CALC-MCNC: 109 MG/DL (ref 0–100)
LYMPHOCYTES # BLD AUTO: 1.69 THOUSANDS/ΜL (ref 0.6–4.47)
LYMPHOCYTES NFR BLD AUTO: 37 % (ref 14–44)
MCH RBC QN AUTO: 28.9 PG (ref 26.8–34.3)
MCHC RBC AUTO-ENTMCNC: 31.3 G/DL (ref 31.4–37.4)
MCV RBC AUTO: 92 FL (ref 82–98)
MONOCYTES # BLD AUTO: 0.32 THOUSAND/ΜL (ref 0.17–1.22)
MONOCYTES NFR BLD AUTO: 7 % (ref 4–12)
NEUTROPHILS # BLD AUTO: 2.46 THOUSANDS/ΜL (ref 1.85–7.62)
NEUTS SEG NFR BLD AUTO: 54 % (ref 43–75)
NRBC BLD AUTO-RTO: 0 /100 WBCS
PLATELET # BLD AUTO: 219 THOUSANDS/UL (ref 149–390)
PMV BLD AUTO: 10.5 FL (ref 8.9–12.7)
POTASSIUM SERPL-SCNC: 4.8 MMOL/L (ref 3.5–5.3)
PROT SERPL-MCNC: 7.3 G/DL (ref 6.4–8.2)
RBC # BLD AUTO: 4.64 MILLION/UL (ref 3.81–5.12)
SODIUM SERPL-SCNC: 139 MMOL/L (ref 136–145)
TRIGL SERPL-MCNC: 102 MG/DL
TSH SERPL DL<=0.05 MIU/L-ACNC: 2.09 UIU/ML (ref 0.36–3.74)
WBC # BLD AUTO: 4.58 THOUSAND/UL (ref 4.31–10.16)

## 2020-05-20 PROCEDURE — 99214 OFFICE O/P EST MOD 30 MIN: CPT | Performed by: FAMILY MEDICINE

## 2020-05-20 PROCEDURE — U0003 INFECTIOUS AGENT DETECTION BY NUCLEIC ACID (DNA OR RNA); SEVERE ACUTE RESPIRATORY SYNDROME CORONAVIRUS 2 (SARS-COV-2) (CORONAVIRUS DISEASE [COVID-19]), AMPLIFIED PROBE TECHNIQUE, MAKING USE OF HIGH THROUGHPUT TECHNOLOGIES AS DESCRIBED BY CMS-2020-01-R: HCPCS

## 2020-05-20 PROCEDURE — 36415 COLL VENOUS BLD VENIPUNCTURE: CPT

## 2020-05-20 PROCEDURE — 1036F TOBACCO NON-USER: CPT | Performed by: FAMILY MEDICINE

## 2020-05-20 PROCEDURE — 80053 COMPREHEN METABOLIC PANEL: CPT

## 2020-05-20 PROCEDURE — 80061 LIPID PANEL: CPT

## 2020-05-20 PROCEDURE — 71046 X-RAY EXAM CHEST 2 VIEWS: CPT

## 2020-05-20 PROCEDURE — 3008F BODY MASS INDEX DOCD: CPT | Performed by: FAMILY MEDICINE

## 2020-05-20 PROCEDURE — 85025 COMPLETE CBC W/AUTO DIFF WBC: CPT

## 2020-05-20 PROCEDURE — 84443 ASSAY THYROID STIM HORMONE: CPT

## 2020-05-20 RX ORDER — PREDNISONE 50 MG/1
50 TABLET ORAL DAILY
Qty: 5 TABLET | Refills: 0 | Status: SHIPPED | OUTPATIENT
Start: 2020-05-20 | End: 2020-05-25

## 2020-05-21 ENCOUNTER — TELEPHONE (OUTPATIENT)
Dept: FAMILY MEDICINE CLINIC | Facility: CLINIC | Age: 50
End: 2020-05-21

## 2020-05-21 DIAGNOSIS — R55 SYNCOPE, UNSPECIFIED SYNCOPE TYPE: Primary | ICD-10-CM

## 2020-05-21 LAB — SARS-COV-2 RNA SPEC QL NAA+PROBE: NOT DETECTED

## 2020-05-28 ENCOUNTER — OFFICE VISIT (OUTPATIENT)
Dept: FAMILY MEDICINE CLINIC | Facility: CLINIC | Age: 50
End: 2020-05-28
Payer: MEDICARE

## 2020-05-28 VITALS
RESPIRATION RATE: 16 BRPM | DIASTOLIC BLOOD PRESSURE: 62 MMHG | OXYGEN SATURATION: 98 % | TEMPERATURE: 99.1 F | WEIGHT: 129.13 LBS | HEART RATE: 89 BPM | SYSTOLIC BLOOD PRESSURE: 116 MMHG | HEIGHT: 65 IN | BODY MASS INDEX: 21.51 KG/M2

## 2020-05-28 DIAGNOSIS — Z00.00 HEALTHCARE MAINTENANCE: ICD-10-CM

## 2020-05-28 DIAGNOSIS — F51.01 PRIMARY INSOMNIA: Primary | ICD-10-CM

## 2020-05-28 DIAGNOSIS — J30.2 SEASONAL ALLERGIES: ICD-10-CM

## 2020-05-28 DIAGNOSIS — G43.719 INTRACTABLE CHRONIC MIGRAINE WITHOUT AURA AND WITHOUT STATUS MIGRAINOSUS: ICD-10-CM

## 2020-05-28 DIAGNOSIS — R53.82 CHRONIC FATIGUE: ICD-10-CM

## 2020-05-28 DIAGNOSIS — R09.81 NASAL SINUS CONGESTION: ICD-10-CM

## 2020-05-28 PROCEDURE — 1036F TOBACCO NON-USER: CPT | Performed by: FAMILY MEDICINE

## 2020-05-28 PROCEDURE — 3008F BODY MASS INDEX DOCD: CPT | Performed by: FAMILY MEDICINE

## 2020-05-28 PROCEDURE — 99214 OFFICE O/P EST MOD 30 MIN: CPT | Performed by: FAMILY MEDICINE

## 2020-05-28 PROCEDURE — G0439 PPPS, SUBSEQ VISIT: HCPCS | Performed by: FAMILY MEDICINE

## 2020-05-28 RX ORDER — METHYLPREDNISOLONE 4 MG/1
TABLET ORAL
Qty: 21 EACH | Refills: 0 | Status: SHIPPED | OUTPATIENT
Start: 2020-05-28 | End: 2020-07-06

## 2020-06-09 ENCOUNTER — TELEPHONE (OUTPATIENT)
Dept: OBGYN CLINIC | Facility: CLINIC | Age: 50
End: 2020-06-09

## 2020-06-12 ENCOUNTER — TELEPHONE (OUTPATIENT)
Dept: OBGYN CLINIC | Facility: CLINIC | Age: 50
End: 2020-06-12

## 2020-06-12 ENCOUNTER — CLINICAL SUPPORT (OUTPATIENT)
Dept: OBGYN CLINIC | Facility: CLINIC | Age: 50
End: 2020-06-12
Payer: MEDICARE

## 2020-06-12 DIAGNOSIS — Z30.42 ENCOUNTER FOR DEPO-PROVERA CONTRACEPTION: Primary | ICD-10-CM

## 2020-06-12 PROCEDURE — 96372 THER/PROPH/DIAG INJ SC/IM: CPT | Performed by: OBSTETRICS & GYNECOLOGY

## 2020-06-12 RX ORDER — MEDROXYPROGESTERONE ACETATE 150 MG/ML
150 INJECTION, SUSPENSION INTRAMUSCULAR ONCE
Status: COMPLETED | OUTPATIENT
Start: 2020-06-12 | End: 2020-06-12

## 2020-06-12 RX ADMIN — MEDROXYPROGESTERONE ACETATE 150 MG: 150 INJECTION, SUSPENSION INTRAMUSCULAR at 11:06

## 2020-06-18 ENCOUNTER — TELEPHONE (OUTPATIENT)
Dept: OBGYN CLINIC | Facility: CLINIC | Age: 50
End: 2020-06-18

## 2020-06-22 DIAGNOSIS — G43.909 MIGRAINE WITHOUT STATUS MIGRAINOSUS, NOT INTRACTABLE, UNSPECIFIED MIGRAINE TYPE: ICD-10-CM

## 2020-06-22 RX ORDER — MEDROXYPROGESTERONE ACETATE 150 MG/ML
150 INJECTION, SUSPENSION INTRAMUSCULAR
Qty: 1 ML | Refills: 0 | Status: SHIPPED | OUTPATIENT
Start: 2020-06-22 | End: 2020-07-06 | Stop reason: SDUPTHER

## 2020-06-29 ENCOUNTER — OFFICE VISIT (OUTPATIENT)
Dept: SLEEP CENTER | Facility: CLINIC | Age: 50
End: 2020-06-29
Payer: MEDICARE

## 2020-06-29 VITALS
BODY MASS INDEX: 20.57 KG/M2 | SYSTOLIC BLOOD PRESSURE: 110 MMHG | HEIGHT: 66 IN | DIASTOLIC BLOOD PRESSURE: 70 MMHG | WEIGHT: 128 LBS

## 2020-06-29 DIAGNOSIS — G47.09 OTHER INSOMNIA: Primary | ICD-10-CM

## 2020-06-29 DIAGNOSIS — J35.01 CHRONIC TONSILLITIS: ICD-10-CM

## 2020-06-29 DIAGNOSIS — G43.719 INTRACTABLE CHRONIC MIGRAINE WITHOUT AURA AND WITHOUT STATUS MIGRAINOSUS: ICD-10-CM

## 2020-06-29 DIAGNOSIS — R06.02 SOB (SHORTNESS OF BREATH): ICD-10-CM

## 2020-06-29 DIAGNOSIS — N30.10 INTERSTITIAL CYSTITIS: ICD-10-CM

## 2020-06-29 DIAGNOSIS — R51.9 HEADACHE UPON AWAKENING: ICD-10-CM

## 2020-06-29 DIAGNOSIS — F41.9 ANXIETY AND DEPRESSION: ICD-10-CM

## 2020-06-29 DIAGNOSIS — F32.A ANXIETY AND DEPRESSION: ICD-10-CM

## 2020-06-29 DIAGNOSIS — J45.41 MODERATE PERSISTENT ASTHMA WITH ACUTE EXACERBATION: ICD-10-CM

## 2020-06-29 DIAGNOSIS — J30.2 SEASONAL ALLERGIES: ICD-10-CM

## 2020-06-29 DIAGNOSIS — F42.9 OBSESSIVE-COMPULSIVE DISORDER, UNSPECIFIED TYPE: ICD-10-CM

## 2020-06-29 DIAGNOSIS — R00.2 PALPITATIONS: ICD-10-CM

## 2020-06-29 DIAGNOSIS — Z79.899 MEDICAL MARIJUANA USE: ICD-10-CM

## 2020-06-29 PROCEDURE — 99204 OFFICE O/P NEW MOD 45 MIN: CPT | Performed by: INTERNAL MEDICINE

## 2020-07-06 ENCOUNTER — TELEPHONE (OUTPATIENT)
Dept: FAMILY MEDICINE CLINIC | Facility: CLINIC | Age: 50
End: 2020-07-06

## 2020-07-06 ENCOUNTER — ANNUAL EXAM (OUTPATIENT)
Dept: OBGYN CLINIC | Facility: CLINIC | Age: 50
End: 2020-07-06
Payer: MEDICARE

## 2020-07-06 VITALS
HEIGHT: 65 IN | WEIGHT: 126.8 LBS | BODY MASS INDEX: 21.13 KG/M2 | SYSTOLIC BLOOD PRESSURE: 132 MMHG | DIASTOLIC BLOOD PRESSURE: 84 MMHG

## 2020-07-06 DIAGNOSIS — R42 DIZZINESSES: ICD-10-CM

## 2020-07-06 DIAGNOSIS — G43.909 MIGRAINE WITHOUT STATUS MIGRAINOSUS, NOT INTRACTABLE, UNSPECIFIED MIGRAINE TYPE: ICD-10-CM

## 2020-07-06 DIAGNOSIS — R42 INTERMITTENT LIGHTHEADEDNESS: Primary | ICD-10-CM

## 2020-07-06 DIAGNOSIS — Z01.419 ENCOUNTER FOR ANNUAL ROUTINE GYNECOLOGICAL EXAMINATION: Primary | ICD-10-CM

## 2020-07-06 DIAGNOSIS — R55 SYNCOPE, UNSPECIFIED SYNCOPE TYPE: ICD-10-CM

## 2020-07-06 DIAGNOSIS — Z12.31 ENCOUNTER FOR SCREENING MAMMOGRAM FOR BREAST CANCER: ICD-10-CM

## 2020-07-06 PROCEDURE — 87624 HPV HI-RISK TYP POOLED RSLT: CPT | Performed by: OBSTETRICS & GYNECOLOGY

## 2020-07-06 PROCEDURE — G0145 SCR C/V CYTO,THINLAYER,RESCR: HCPCS | Performed by: OBSTETRICS & GYNECOLOGY

## 2020-07-06 PROCEDURE — G0101 CA SCREEN;PELVIC/BREAST EXAM: HCPCS | Performed by: OBSTETRICS & GYNECOLOGY

## 2020-07-06 RX ORDER — SUMATRIPTAN 50 MG/1
TABLET, FILM COATED ORAL
COMMUNITY
Start: 2020-06-01 | End: 2020-07-06

## 2020-07-06 RX ORDER — MEDROXYPROGESTERONE ACETATE 150 MG/ML
INJECTION, SUSPENSION INTRAMUSCULAR
Qty: 1 ML | Refills: 4 | Status: SHIPPED | OUTPATIENT
Start: 2020-07-06 | End: 2021-07-15 | Stop reason: SDUPTHER

## 2020-07-06 RX ORDER — OLANZAPINE 5 MG/1
TABLET, ORALLY DISINTEGRATING ORAL
COMMUNITY
Start: 2020-06-23 | End: 2020-07-06

## 2020-07-06 NOTE — TELEPHONE ENCOUNTER
Pt called stating she is still having lightheadedness, dizziness, weight loss, pressure behind her eyes, and fatigue  This has been going on for a while now  She was last seen 5/20/20 and 5/28/20 for this  She stated BW came back normal and would like to know if there is anything else Dr Raina Vasquez can order  I spoke to Dr Raina Vasquez and he stated to put a referral in for ENT     Referral placed    I called and LM for her to call office back  (to let her know we are referring her to ENT)

## 2020-07-06 NOTE — TELEPHONE ENCOUNTER
Pt returning  Call states she has an appt with Endocardiologist 7/8/20  Pt states she was referred by us  Pts ph #[phone]

## 2020-07-06 NOTE — PROGRESS NOTES
Assessment/Plan:    Pap and HPV done today  mammogram reviewed with her including breast density  RX given for next January    Discussed self breast exams    colon cancer screening-she has colonoscopy is regularly through her GI doctor because she has irritable bowel  She believes she is due next year  Weight loss-I recommended that she discuss this again with her family doctor but I also recommended that she keep track she is eating because may be she is taking calories  Decreased libido-I feel that this could be related to the Depo-Provera and also to stress  She is also taking Ativan which aggravate this as well  Migraines- she will continue with Depo-Provera, I wrote the prescription for every 10 weeks    discussed preventive care, regular exercise and a healthy diet      No problem-specific Assessment & Plan notes found for this encounter  Diagnoses and all orders for this visit:    Encounter for annual routine gynecological examination    Encounter for screening mammogram for breast cancer  -     Mammo screening bilateral w 3d & cad; Future    Other orders  -     Discontinue: SUMAtriptan (IMITREX) 50 mg tablet; ONE TABLET AT ONSET OF MIGRAINE  MAY REPEAT DOSE ONCE IN 2 HOURS IF NO RELIEF  MAX 2 DOSES/24 HRS  -     Discontinue: OLANZapine (ZyPREXA ZYDIS) 5 mg dispersible tablet          Subjective:      Patient ID: Aleida Curry is a 52 y o  female  Patient here for for Depo-Provera  She is using this to control her migraines  She has had an endometrial ablation and has been amenorrheic since  She does well when the Depo-Provera as given every 10 weeks, when we give it at 11 or 12 weeks, she has headache, nausea and breakthrough bleeding  Lately, her turns has not been covering it at every 10 weeks  She did by 1 dose out of pocket so that she could get it early  She is complaining of decreased libido  This has been a longstanding issue for    Also, she has lost 23 lb since last year   She did discuss this with her family doctor and they ordered labs that were normal   They recommended that she see ENT  She states that she eats 3 meals a day but does not snack  She states that she has never been a big eater  She does exercise regularly although less so during the COVID situation  Normal pap in 2016  Normal 3D mammogram in January showed dense tissue and average risk  The following portions of the patient's history were reviewed and updated as appropriate: allergies, current medications, past family history, past medical history, past social history, past surgical history and problem list     Review of Systems   Constitutional: Positive for unexpected weight change  Gastrointestinal: Positive for nausea  Negative for diarrhea  Genitourinary: Negative  Neurological: Positive for headaches  Objective: There were no vitals taken for this visit  Physical Exam   Constitutional: She appears well-developed  Neck: No tracheal deviation present  No thyromegaly present  Cardiovascular: Normal rate and regular rhythm  Pulmonary/Chest: Effort normal and breath sounds normal  Right breast exhibits no inverted nipple, no mass, no nipple discharge, no skin change and no tenderness  Left breast exhibits no inverted nipple, no mass, no nipple discharge, no skin change and no tenderness  Breasts are symmetrical    Examined seated and supine   Abdominal: Soft  She exhibits no distension and no mass  There is no tenderness  Genitourinary: Vagina normal and uterus normal  No labial fusion  There is no rash, tenderness, lesion or injury on the right labia  There is no rash, tenderness, lesion or injury on the left labia  Cervix exhibits no motion tenderness, no discharge and no friability  Right adnexum displays no mass, no tenderness and no fullness  Left adnexum displays no mass, no tenderness and no fullness     Genitourinary Comments: Patient declines rectal Vitals reviewed

## 2020-07-08 ENCOUNTER — HOSPITAL ENCOUNTER (OUTPATIENT)
Dept: NON INVASIVE DIAGNOSTICS | Facility: HOSPITAL | Age: 50
Discharge: HOME | End: 2020-07-08
Attending: FAMILY MEDICINE
Payer: MEDICARE

## 2020-07-08 DIAGNOSIS — R55 SYNCOPE, UNSPECIFIED SYNCOPE TYPE: ICD-10-CM

## 2020-07-08 PROCEDURE — 93306 TTE W/DOPPLER COMPLETE: CPT

## 2020-07-08 PROCEDURE — 93306 TTE W/DOPPLER COMPLETE: CPT | Performed by: INTERNAL MEDICINE

## 2020-07-10 LAB
HPV HR 12 DNA CVX QL NAA+PROBE: NEGATIVE
HPV16 DNA CVX QL NAA+PROBE: NEGATIVE
HPV18 DNA CVX QL NAA+PROBE: NEGATIVE
LAB AP GYN PRIMARY INTERPRETATION: NORMAL
Lab: NORMAL

## 2020-07-10 NOTE — ANESTHESIA POSTPROCEDURE EVALUATION
Post-Op Assessment Note      CV Status:  Stable    Mental Status:  Alert and awake    Hydration Status:  Euvolemic    PONV Controlled:  Controlled    Airway Patency:  Patent    Post Op Vitals Reviewed: Yes          Staff: Anesthesiologist           BP      Temp      Pulse     Resp      SpO2 no

## 2020-07-15 DIAGNOSIS — B37.3 VAGINAL YEAST INFECTION: Primary | ICD-10-CM

## 2020-07-16 RX ORDER — FLUCONAZOLE 150 MG/1
150 TABLET ORAL ONCE
Qty: 1 TABLET | Refills: 0 | OUTPATIENT
Start: 2020-07-16 | End: 2020-07-16

## 2020-07-28 ENCOUNTER — TELEPHONE (OUTPATIENT)
Dept: OBGYN CLINIC | Facility: CLINIC | Age: 50
End: 2020-07-28

## 2020-07-28 NOTE — TELEPHONE ENCOUNTER
[facility]  Now still has white discharge  Requests refill be sent to [facility] in [place]

## 2020-07-29 ENCOUNTER — TELEPHONE (OUTPATIENT)
Dept: FAMILY MEDICINE CLINIC | Facility: CLINIC | Age: 50
End: 2020-07-29

## 2020-07-29 NOTE — TELEPHONE ENCOUNTER
If she only has discharge and the itching has resolved, she should wait as the discharge will resolve also

## 2020-07-29 NOTE — TELEPHONE ENCOUNTER
Glucose level were low will have them fax copy to our office  Gi dr wanted her to inform her primary    GI Dr ran ultrasound of all organ and she is getting a Biopsy of her stomach on Monday 8/3

## 2020-08-03 PROCEDURE — 88305 TISSUE EXAM BY PATHOLOGIST: CPT | Performed by: PATHOLOGY

## 2020-08-04 ENCOUNTER — LAB REQUISITION (OUTPATIENT)
Dept: LAB | Facility: HOSPITAL | Age: 50
End: 2020-08-04
Payer: MEDICARE

## 2020-08-04 DIAGNOSIS — R10.13 EPIGASTRIC PAIN: ICD-10-CM

## 2020-08-04 DIAGNOSIS — R11.0 NAUSEA: ICD-10-CM

## 2020-08-19 ENCOUNTER — CLINICAL SUPPORT (OUTPATIENT)
Dept: OBGYN CLINIC | Facility: CLINIC | Age: 50
End: 2020-08-19
Payer: MEDICARE

## 2020-08-19 VITALS
WEIGHT: 127 LBS | SYSTOLIC BLOOD PRESSURE: 108 MMHG | BODY MASS INDEX: 21.13 KG/M2 | DIASTOLIC BLOOD PRESSURE: 66 MMHG | TEMPERATURE: 97.6 F

## 2020-08-19 DIAGNOSIS — G43.821 MENSTRUAL MIGRAINE WITH STATUS MIGRAINOSUS, NOT INTRACTABLE: Primary | ICD-10-CM

## 2020-08-19 PROCEDURE — 96372 THER/PROPH/DIAG INJ SC/IM: CPT

## 2020-08-19 RX ORDER — MEDROXYPROGESTERONE ACETATE 150 MG/ML
150 INJECTION, SUSPENSION INTRAMUSCULAR ONCE
Status: COMPLETED | OUTPATIENT
Start: 2020-08-19 | End: 2020-08-19

## 2020-08-19 RX ADMIN — MEDROXYPROGESTERONE ACETATE 150 MG: 150 INJECTION, SUSPENSION INTRAMUSCULAR at 11:09

## 2020-08-19 NOTE — PROGRESS NOTES
Patient is here for 10 week depo injection, patient has no concerns/complaints at this time, patient is to return to office in approx 10 weeks for next depo

## 2020-10-28 ENCOUNTER — CLINICAL SUPPORT (OUTPATIENT)
Dept: OBGYN CLINIC | Facility: CLINIC | Age: 50
End: 2020-10-28
Payer: MEDICARE

## 2020-10-28 DIAGNOSIS — Z30.42 ENCOUNTER FOR SURVEILLANCE OF INJECTABLE CONTRACEPTIVE: Primary | ICD-10-CM

## 2020-10-28 DIAGNOSIS — N92.1 MENORRHAGIA WITH IRREGULAR CYCLE: ICD-10-CM

## 2020-10-28 DIAGNOSIS — G43.821 MENSTRUAL MIGRAINE WITH STATUS MIGRAINOSUS, NOT INTRACTABLE: Primary | ICD-10-CM

## 2020-10-28 DIAGNOSIS — N92.6 IRREGULAR BLEEDING: Primary | ICD-10-CM

## 2020-10-28 PROCEDURE — 96372 THER/PROPH/DIAG INJ SC/IM: CPT

## 2020-10-28 RX ORDER — MEDROXYPROGESTERONE ACETATE 150 MG/ML
150 INJECTION, SUSPENSION INTRAMUSCULAR ONCE
Status: CANCELLED | OUTPATIENT
Start: 2020-10-28 | End: 2020-10-28

## 2020-10-28 RX ADMIN — MEDROXYPROGESTERONE ACETATE 150 MG: 150 INJECTION, SUSPENSION INTRAMUSCULAR at 09:02

## 2020-10-30 RX ORDER — MEDROXYPROGESTERONE ACETATE 150 MG/ML
150 INJECTION, SUSPENSION INTRAMUSCULAR ONCE
Status: COMPLETED | OUTPATIENT
Start: 2020-10-30 | End: 2020-10-28

## 2020-11-05 ENCOUNTER — TELEPHONE (OUTPATIENT)
Dept: FAMILY MEDICINE CLINIC | Facility: CLINIC | Age: 50
End: 2020-11-05

## 2020-11-10 ENCOUNTER — OFFICE VISIT (OUTPATIENT)
Dept: FAMILY MEDICINE CLINIC | Facility: CLINIC | Age: 50
End: 2020-11-10
Payer: MEDICARE

## 2020-11-10 VITALS
HEIGHT: 66 IN | RESPIRATION RATE: 16 BRPM | WEIGHT: 121 LBS | BODY MASS INDEX: 19.44 KG/M2 | OXYGEN SATURATION: 97 % | HEART RATE: 96 BPM | TEMPERATURE: 99.1 F | DIASTOLIC BLOOD PRESSURE: 78 MMHG | SYSTOLIC BLOOD PRESSURE: 110 MMHG

## 2020-11-10 DIAGNOSIS — N30.10 INTERSTITIAL CYSTITIS: Primary | ICD-10-CM

## 2020-11-10 PROCEDURE — 99213 OFFICE O/P EST LOW 20 MIN: CPT | Performed by: PHYSICIAN ASSISTANT

## 2020-11-10 RX ORDER — TRAMADOL HYDROCHLORIDE 50 MG/1
50 TABLET ORAL EVERY 6 HOURS PRN
COMMUNITY
Start: 2020-08-20 | End: 2020-11-10

## 2020-11-10 RX ORDER — TRAMADOL HYDROCHLORIDE 50 MG/1
50 TABLET ORAL
Qty: 15 TABLET | Refills: 0 | Status: SHIPPED | OUTPATIENT
Start: 2020-11-10 | End: 2022-02-03

## 2020-11-10 RX ORDER — METHENAMINE, SODIUM PHOSPHATE, MONOBASIC, MONOHYDRATE, PHENYL SALICYLATE, METHYLENE BLUE, AND HYOSCYAMINE SULFATE 120; 40.8; 36; 10; .12 MG/1; MG/1; MG/1; MG/1; MG/1
1 CAPSULE ORAL AS NEEDED
COMMUNITY
Start: 2020-07-14

## 2020-11-10 RX ORDER — FLUTICASONE PROPIONATE 50 MCG
SPRAY, SUSPENSION (ML) NASAL AS NEEDED
COMMUNITY
Start: 2020-10-14

## 2020-11-17 ENCOUNTER — TELEPHONE (OUTPATIENT)
Dept: OBGYN CLINIC | Facility: CLINIC | Age: 50
End: 2020-11-17

## 2020-11-17 DIAGNOSIS — R10.2 PELVIC PAIN: Primary | ICD-10-CM

## 2020-11-18 ENCOUNTER — HOSPITAL ENCOUNTER (OUTPATIENT)
Dept: RADIOLOGY | Facility: IMAGING CENTER | Age: 50
Discharge: HOME | End: 2020-11-18
Payer: MEDICARE

## 2020-11-18 DIAGNOSIS — R10.2 PELVIC PAIN: ICD-10-CM

## 2020-11-18 PROCEDURE — 76856 US EXAM PELVIC COMPLETE: CPT

## 2020-11-18 PROCEDURE — 76830 TRANSVAGINAL US NON-OB: CPT

## 2020-11-25 ENCOUNTER — TELEPHONE (OUTPATIENT)
Dept: FAMILY MEDICINE CLINIC | Facility: CLINIC | Age: 50
End: 2020-11-25

## 2020-11-25 DIAGNOSIS — E78.49 OTHER HYPERLIPIDEMIA: ICD-10-CM

## 2020-11-25 DIAGNOSIS — R53.82 CHRONIC FATIGUE: Primary | ICD-10-CM

## 2020-11-27 ENCOUNTER — LAB (OUTPATIENT)
Dept: LAB | Facility: IMAGING CENTER | Age: 50
End: 2020-11-27
Payer: MEDICARE

## 2020-11-27 DIAGNOSIS — E78.49 OTHER HYPERLIPIDEMIA: ICD-10-CM

## 2020-11-27 DIAGNOSIS — R53.82 CHRONIC FATIGUE: ICD-10-CM

## 2020-11-27 LAB
ALBUMIN SERPL BCP-MCNC: 4.1 G/DL (ref 3.5–5)
ALP SERPL-CCNC: 65 U/L (ref 46–116)
ALT SERPL W P-5'-P-CCNC: 22 U/L (ref 12–78)
ANION GAP SERPL CALCULATED.3IONS-SCNC: 4 MMOL/L (ref 4–13)
AST SERPL W P-5'-P-CCNC: 9 U/L (ref 5–45)
BASOPHILS # BLD AUTO: 0.03 THOUSANDS/ΜL (ref 0–0.1)
BASOPHILS NFR BLD AUTO: 1 % (ref 0–1)
BILIRUB SERPL-MCNC: 0.57 MG/DL (ref 0.2–1)
BUN SERPL-MCNC: 11 MG/DL (ref 5–25)
CALCIUM SERPL-MCNC: 9.9 MG/DL (ref 8.3–10.1)
CHLORIDE SERPL-SCNC: 110 MMOL/L (ref 100–108)
CHOLEST SERPL-MCNC: 150 MG/DL (ref 50–200)
CO2 SERPL-SCNC: 26 MMOL/L (ref 21–32)
CREAT SERPL-MCNC: 0.86 MG/DL (ref 0.6–1.3)
EOSINOPHIL # BLD AUTO: 0.04 THOUSAND/ΜL (ref 0–0.61)
EOSINOPHIL NFR BLD AUTO: 1 % (ref 0–6)
ERYTHROCYTE [DISTWIDTH] IN BLOOD BY AUTOMATED COUNT: 13.9 % (ref 11.6–15.1)
GFR SERPL CREATININE-BSD FRML MDRD: 80 ML/MIN/1.73SQ M
GLUCOSE P FAST SERPL-MCNC: 85 MG/DL (ref 65–99)
HCT VFR BLD AUTO: 44.3 % (ref 34.8–46.1)
HDLC SERPL-MCNC: 41 MG/DL
HGB BLD-MCNC: 14.2 G/DL (ref 11.5–15.4)
IMM GRANULOCYTES # BLD AUTO: 0.01 THOUSAND/UL (ref 0–0.2)
IMM GRANULOCYTES NFR BLD AUTO: 0 % (ref 0–2)
LDLC SERPL CALC-MCNC: 91 MG/DL (ref 0–100)
LYMPHOCYTES # BLD AUTO: 1.83 THOUSANDS/ΜL (ref 0.6–4.47)
LYMPHOCYTES NFR BLD AUTO: 31 % (ref 14–44)
MCH RBC QN AUTO: 29.4 PG (ref 26.8–34.3)
MCHC RBC AUTO-ENTMCNC: 32.1 G/DL (ref 31.4–37.4)
MCV RBC AUTO: 92 FL (ref 82–98)
MONOCYTES # BLD AUTO: 0.4 THOUSAND/ΜL (ref 0.17–1.22)
MONOCYTES NFR BLD AUTO: 7 % (ref 4–12)
NEUTROPHILS # BLD AUTO: 3.69 THOUSANDS/ΜL (ref 1.85–7.62)
NEUTS SEG NFR BLD AUTO: 60 % (ref 43–75)
NRBC BLD AUTO-RTO: 0 /100 WBCS
PLATELET # BLD AUTO: 262 THOUSANDS/UL (ref 149–390)
PMV BLD AUTO: 10.3 FL (ref 8.9–12.7)
POTASSIUM SERPL-SCNC: 4.2 MMOL/L (ref 3.5–5.3)
PROT SERPL-MCNC: 7.2 G/DL (ref 6.4–8.2)
RBC # BLD AUTO: 4.83 MILLION/UL (ref 3.81–5.12)
SODIUM SERPL-SCNC: 140 MMOL/L (ref 136–145)
TRIGL SERPL-MCNC: 88 MG/DL
TSH SERPL DL<=0.05 MIU/L-ACNC: 2.5 UIU/ML (ref 0.36–3.74)
WBC # BLD AUTO: 6 THOUSAND/UL (ref 4.31–10.16)

## 2020-11-27 PROCEDURE — 80061 LIPID PANEL: CPT

## 2020-11-27 PROCEDURE — 84443 ASSAY THYROID STIM HORMONE: CPT

## 2020-11-27 PROCEDURE — 85025 COMPLETE CBC W/AUTO DIFF WBC: CPT

## 2020-11-27 PROCEDURE — 36415 COLL VENOUS BLD VENIPUNCTURE: CPT

## 2020-11-27 PROCEDURE — 80053 COMPREHEN METABOLIC PANEL: CPT

## 2020-12-03 ENCOUNTER — TELEPHONE (OUTPATIENT)
Dept: FAMILY MEDICINE CLINIC | Facility: CLINIC | Age: 50
End: 2020-12-03

## 2021-01-23 ENCOUNTER — HOSPITAL ENCOUNTER (OUTPATIENT)
Dept: RADIOLOGY | Age: 51
Discharge: HOME | End: 2021-01-23
Payer: MEDICARE

## 2021-01-23 VITALS — BODY MASS INDEX: 19.93 KG/M2 | HEIGHT: 66 IN | WEIGHT: 124 LBS

## 2021-01-23 DIAGNOSIS — Z12.31 ENCOUNTER FOR SCREENING MAMMOGRAM FOR BREAST CANCER: ICD-10-CM

## 2021-01-23 PROCEDURE — 77063 BREAST TOMOSYNTHESIS BI: CPT

## 2021-01-23 PROCEDURE — 77067 SCR MAMMO BI INCL CAD: CPT

## 2021-03-10 DIAGNOSIS — Z23 ENCOUNTER FOR IMMUNIZATION: ICD-10-CM

## 2021-03-17 ENCOUNTER — OFFICE VISIT (OUTPATIENT)
Dept: OBGYN CLINIC | Facility: CLINIC | Age: 51
End: 2021-03-17
Payer: MEDICARE

## 2021-03-17 VITALS
DIASTOLIC BLOOD PRESSURE: 72 MMHG | SYSTOLIC BLOOD PRESSURE: 118 MMHG | BODY MASS INDEX: 20.15 KG/M2 | HEIGHT: 66 IN | WEIGHT: 125.4 LBS

## 2021-03-17 DIAGNOSIS — Z30.42 ENCOUNTER FOR DEPO-PROVERA CONTRACEPTION: Primary | ICD-10-CM

## 2021-03-17 PROCEDURE — 96372 THER/PROPH/DIAG INJ SC/IM: CPT | Performed by: OBSTETRICS & GYNECOLOGY

## 2021-03-17 RX ORDER — MEDROXYPROGESTERONE ACETATE 150 MG/ML
150 INJECTION, SUSPENSION INTRAMUSCULAR ONCE
Status: COMPLETED | OUTPATIENT
Start: 2021-03-17 | End: 2021-03-17

## 2021-03-17 RX ADMIN — MEDROXYPROGESTERONE ACETATE 150 MG: 150 INJECTION, SUSPENSION INTRAMUSCULAR at 11:00

## 2021-04-07 ENCOUNTER — EVALUATION (OUTPATIENT)
Dept: PHYSICAL THERAPY | Facility: REHABILITATION | Age: 51
End: 2021-04-07
Payer: MEDICARE

## 2021-04-07 ENCOUNTER — TRANSCRIBE ORDERS (OUTPATIENT)
Dept: PHYSICAL THERAPY | Facility: REHABILITATION | Age: 51
End: 2021-04-07

## 2021-04-07 DIAGNOSIS — M54.12 RADICULOPATHY, CERVICAL REGION: Primary | ICD-10-CM

## 2021-04-07 DIAGNOSIS — M54.12 CERVICAL RADICULOPATHY: Primary | ICD-10-CM

## 2021-04-07 PROCEDURE — 97162 PT EVAL MOD COMPLEX 30 MIN: CPT | Performed by: PHYSICAL THERAPIST

## 2021-04-07 PROCEDURE — 97110 THERAPEUTIC EXERCISES: CPT | Performed by: PHYSICAL THERAPIST

## 2021-04-07 NOTE — LETTER
May 5, 2021    DON Ribera  [address] [facility] [address]  [facility] [address]    Patient: Abisai Levine   YOB: 1970   Date of Visit: 2021     Encounter Diagnosis     ICD-10-CM    1  Cervical radiculopathy  M54 12        Dear Dr Bon Browne: Thank you for your recent referral of Abisai Levine  Please review the attached evaluation summary from Carmen's recent visit  Please verify that you agree with the plan of care by signing the attached order  If you have any questions or concerns, please do not hesitate to call  I sincerely appreciate the opportunity to share in the care of one of your patients and hope to have another opportunity to work with you in the near future  Sincerely,    Deena Thompson, PT      Referring Provider:      I certify that I have read the below Plan of Care and certify the need for these services furnished under this plan of treatment while under my care  DON Ribera  [address] [facility] [address]   Via Fax: [phone]          PT Evaluation     Today's date: 2021  Patient name: Abisai Levine  : 1970  MRN: [id number]  Referring provider: DON Rothman  Dx: No diagnosis found  Assessment  Assessment details: Pt is a 53 yo female with chronic headaches and acute right shoulder pain and left cervical pain that will result in a headache if she uses her right UE  She sleeps with 2 pillows due to the chronic headaches but notes that recently she has not been able to sleep due to neck and arm pain  She presents with forward head posture and shoulder protraction  Her scalenes are very tight and her 1st rib is hypomobile  She has (+) ULTT on the right  She would benefit from therapeutic exercises and manual therapy to restore soft tissue mobility and decrease compression on the nerve to restore mobility and pain free function      Impairments: abnormal muscle tone, abnormal or restricted ROM, lacks appropriate home exercise program, pain with function, poor posture  and poor body mechanics    Symptom irritability: highUnderstanding of Dx/Px/POC: excellent  Goals  STG: in 4 weeks  Maintains improved posture 50% of the time  Decrease nerve tension  Able to sleep without increased pain  LTG: by discharge  Able to do overhead activities without increased pain  Able to resume weight lifting  Independent with HEP    Plan  Planned modality interventions: TENS and thermotherapy: hydrocollator packs  Planned therapy interventions: joint mobilization, manual therapy, massage, neuromuscular re-education, patient education, strengthening, stretching, therapeutic exercise and home exercise program  Frequency: 2x week  Duration in weeks: 11  Treatment plan discussed with: patient        Subjective Evaluation    History of Present Illness  Mechanism of injury: For about 2 months she has had cervical pain left greater than right and into her right arm down to about her elbow  She had started working out a bit but has not been able to due pain in her arm and head  Pt has a history of migraines  She notes that she has to sleep with her head propped otherwise these occur  Pain  Current pain ratin  At best pain ratin  At worst pain rating: 10  Location: Left greater than right cervical mm, right arm to the elbow and back of the head  Quality: sharp  Relieving factors: heat      Diagnostic Tests  No diagnostic tests performed  Treatments  No previous or current treatments  Patient Goals  Patient goals for therapy: decreased pain  Patient goal: resume working working out, playing with childre        Objective     Concurrent Complaints  Positive for night pain, disturbed sleep, headaches and nausea/motion sickness   Negative for dizziness, faints, tinnitus, trouble swallowing, difficulty breathing, respiratory pain and visual change    Postural Observations  Seated posture: poor  Standing posture: poor  Correction of posture: has no consistent effect    Additional Postural Observation Details  Pt sits with forward head and shoulder protraction    Palpation   Left   No palpable tenderness to the suboccipitals  Hypertonic in the cervical paraspinals, levator scapulae, rhomboids, scalenes, sternocleidomastoid and upper trapezius  Muscle spasm in the middle trapezius  Tenderness of the scalenes and upper trapezius  Right   No palpable tenderness to the suboccipitals  Hypertonic in the cervical paraspinals, levator scapulae, rhomboids, scalenes, sternocleidomastoid and upper trapezius  Muscle spasm in the middle trapezius  Tenderness of the cervical paraspinals, scalenes and upper trapezius       Neurological Testing     Sensation   Cervical/Thoracic   Left   Intact: light touch    Right   Intact: light touch    Active Range of Motion   Cervical/Thoracic Spine       Cervical  Subcranial protraction:  WFL   Subcranial retraction:   Restriction level: moderate  Flexion: 25 degrees  with pain  Extension: 28 degrees      Left lateral flexion: 25 degrees      Right lateral flexion: 30 degrees     with pain  Left rotation: 60 degrees with pain  Right rotation: 50 degrees    with pain    Joint Play   Joints within functional limits: T10, T11 and T12     Hypomobile: C2, C3, C4, C5, C6, C7, T1, T2, T3, T4, T5, T6, T7, T8, T9 and 1st rib     Pain: C3 and 1st rib   1st rib comments: pain on L  Mechanical Assessment    Cervical    Seated Protrusion: repeated movements   Pain location: no change  Seated retraction: repeated movements   Pain location: no change  Seated Left Sidebend: repeated movements   Pain location:no change  Seated right sidebend: repeated movements  Pain location: no change    Thoracic      Lumbar      Strength/Myotome Testing   Cervical Spine     Left   Normal strength    Right   Normal strength    Additional Strength Details  Pain with testing right flexion and abduction    Tests Cervical   Positive neck flexor muscle endurance test   Negative vertical compression, lumbar distraction, alar ligament test and VBI  Right Shoulder   Positive ULTT1, ULTT3 and ULTT4  Lumbar   Negative vertical compression  Neuro Exam:     Headaches   Patient reports headaches: Yes  Precautions: none      Manuals 4/7            PA glides C3 and C7-T7             1st rib mob NT            STM scalenes, SCM, UT                          Neuro Re-Ed                                                                              Ther Ex             Scalene stre 20"x5            scap retr 5"x10            cerv retr supine?              Levator stretch             pec stretch                                                                              Ther Activity                                       Gait Training                                       Modalities

## 2021-04-07 NOTE — PROGRESS NOTES
PT Evaluation     Today's date: 2021  Patient name: Mihaela Chisholm  : 1970  MRN: [id number]  Referring provider: DON Dawson  Dx: No diagnosis found  Assessment  Assessment details: Pt is a 53 yo female with chronic headaches and acute right shoulder pain and left cervical pain that will result in a headache if she uses her right UE  She sleeps with 2 pillows due to the chronic headaches but notes that recently she has not been able to sleep due to neck and arm pain  She presents with forward head posture and shoulder protraction  Her scalenes are very tight and her 1st rib is hypomobile  She has (+) ULTT on the right  She would benefit from therapeutic exercises and manual therapy to restore soft tissue mobility and decrease compression on the nerve to restore mobility and pain free function  Impairments: abnormal muscle tone, abnormal or restricted ROM, lacks appropriate home exercise program, pain with function, poor posture  and poor body mechanics    Symptom irritability: highUnderstanding of Dx/Px/POC: excellent  Goals  STG: in 4 weeks  Maintains improved posture 50% of the time  Decrease nerve tension  Able to sleep without increased pain  LTG: by discharge  Able to do overhead activities without increased pain  Able to resume weight lifting  Independent with HEP    Plan  Planned modality interventions: TENS and thermotherapy: hydrocollator packs  Planned therapy interventions: joint mobilization, manual therapy, massage, neuromuscular re-education, patient education, strengthening, stretching, therapeutic exercise and home exercise program  Frequency: 2x week  Duration in weeks: 11  Treatment plan discussed with: patient        Subjective Evaluation    History of Present Illness  Mechanism of injury: For about 2 months she has had cervical pain left greater than right and into her right arm down to about her elbow    She had started working out a bit but has not been able to due pain in her arm and head  Pt has a history of migraines  She notes that she has to sleep with her head propped otherwise these occur  Pain  Current pain ratin  At best pain ratin  At worst pain rating: 10  Location: Left greater than right cervical mm, right arm to the elbow and back of the head  Quality: sharp  Relieving factors: heat      Diagnostic Tests  No diagnostic tests performed  Treatments  No previous or current treatments  Patient Goals  Patient goals for therapy: decreased pain  Patient goal: resume working working out, playing with childre        Objective     Concurrent Complaints  Positive for night pain, disturbed sleep, headaches and nausea/motion sickness  Negative for dizziness, faints, tinnitus, trouble swallowing, difficulty breathing, respiratory pain and visual change    Postural Observations  Seated posture: poor  Standing posture: poor  Correction of posture: has no consistent effect    Additional Postural Observation Details  Pt sits with forward head and shoulder protraction    Palpation   Left   No palpable tenderness to the suboccipitals  Hypertonic in the cervical paraspinals, levator scapulae, rhomboids, scalenes, sternocleidomastoid and upper trapezius  Muscle spasm in the middle trapezius  Tenderness of the scalenes and upper trapezius  Right   No palpable tenderness to the suboccipitals  Hypertonic in the cervical paraspinals, levator scapulae, rhomboids, scalenes, sternocleidomastoid and upper trapezius  Muscle spasm in the middle trapezius  Tenderness of the cervical paraspinals, scalenes and upper trapezius       Neurological Testing     Sensation   Cervical/Thoracic   Left   Intact: light touch    Right   Intact: light touch    Active Range of Motion   Cervical/Thoracic Spine       Cervical  Subcranial protraction:  WFL   Subcranial retraction:   Restriction level: moderate  Flexion: 25 degrees  with pain  Extension: 28 degrees      Left lateral flexion: 25 degrees      Right lateral flexion: 30 degrees     with pain  Left rotation: 60 degrees with pain  Right rotation: 50 degrees    with pain    Joint Play   Joints within functional limits: T10, T11 and T12     Hypomobile: C2, C3, C4, C5, C6, C7, T1, T2, T3, T4, T5, T6, T7, T8, T9 and 1st rib     Pain: C3 and 1st rib   1st rib comments: pain on L  Mechanical Assessment    Cervical    Seated Protrusion: repeated movements   Pain location: no change  Seated retraction: repeated movements   Pain location: no change  Seated Left Sidebend: repeated movements   Pain location:no change  Seated right sidebend: repeated movements  Pain location: no change    Thoracic      Lumbar      Strength/Myotome Testing   Cervical Spine     Left   Normal strength    Right   Normal strength    Additional Strength Details  Pain with testing right flexion and abduction    Tests   Cervical   Positive neck flexor muscle endurance test   Negative vertical compression, lumbar distraction, alar ligament test and VBI  Right Shoulder   Positive ULTT1, ULTT3 and ULTT4  Lumbar   Negative vertical compression  Neuro Exam:     Headaches   Patient reports headaches: Yes  Precautions: none      Manuals 4/7            PA glides C3 and C7-T7             1st rib mob NT            STM scalenes, SCM, UT                          Neuro Re-Ed                                                                              Ther Ex             Scalene stre 20"x5            scap retr 5"x10            cerv retr supine?              Levator stretch             pec stretch                                                                              Ther Activity                                       Gait Training                                       Modalities

## 2021-04-13 ENCOUNTER — OFFICE VISIT (OUTPATIENT)
Dept: PHYSICAL THERAPY | Facility: REHABILITATION | Age: 51
End: 2021-04-13
Payer: MEDICARE

## 2021-04-13 DIAGNOSIS — M54.12 CERVICAL RADICULOPATHY: Primary | ICD-10-CM

## 2021-04-13 PROCEDURE — 97110 THERAPEUTIC EXERCISES: CPT | Performed by: PHYSICAL THERAPIST

## 2021-04-13 PROCEDURE — 97140 MANUAL THERAPY 1/> REGIONS: CPT | Performed by: PHYSICAL THERAPIST

## 2021-04-13 NOTE — PROGRESS NOTES
Daily Note     Today's date: 2021  Patient name: Padma Lewis  : 1970  MRN: [id number]  Referring provider: DON Beck  Dx:   Encounter Diagnosis     ICD-10-CM    1  Cervical radiculopathy  M54 12                   Subjective: Pt reports overall she has been a little better but woke this morning with increased pain and decreased ROM  Had to take migraine medication  Objective: See treatment diary below  CROM limited in all directions  Cervical retraction 1 min decreased pain with rotation and increased left rotation  Therapist OP decreased pain with right rotation  Prone retraction: no further effect  Seated flexion with OP: worse-decreased ROM  Seated retraction with extension: Increased pain radiated into the UE, no change in mechanics  Supine traction retraction and extension: increased pain into  Right UE  Assessment: Pt sent home doing sustained retraction 1 min every hour  May have to address lateral component to get to extension  Recheck ROM and attempt extension again  If still limited try lateral   Assess ULTT right as well  Plan: Continue per plan of care        Precautions: none      Manuals            PA glides C3 and C7-T7  C7-T7           1st rib mob NT            TP levator  NT           Sustained cerv retr w/PT OP  NT           Supine tx, retr and ext  10x                                                                            Ther Ex             Scalene stre 20"x5 20"x3           scap retr 5"x10 5"x10           cerv retr ext supine  10x           Self supine retraction  10x           pec stretch                                                                              Ther Activity                                       Gait Training                                       Modalities

## 2021-04-16 ENCOUNTER — OFFICE VISIT (OUTPATIENT)
Dept: PHYSICAL THERAPY | Facility: REHABILITATION | Age: 51
End: 2021-04-16
Payer: MEDICARE

## 2021-04-16 DIAGNOSIS — M54.12 CERVICAL RADICULOPATHY: Primary | ICD-10-CM

## 2021-04-16 PROCEDURE — 97140 MANUAL THERAPY 1/> REGIONS: CPT

## 2021-04-16 PROCEDURE — 97110 THERAPEUTIC EXERCISES: CPT

## 2021-04-16 NOTE — PROGRESS NOTES
Daily Note     Today's date: 2021  Patient name: Shanika Kim  : 1970  MRN: [id number]  Referring provider: DON Reza  Dx:   Encounter Diagnosis     ICD-10-CM    1  Cervical radiculopathy  M54 12                   Subjective: Pt reports that her symptoms are not as bad in the morning as they used to be  Notes that she is still in moderate amounts of pain 6/10  She has been doing her exercises that have been helping  Objective: See treatment diary below  CROM limited in all directions  Cervical retraction 1 min decreased pain with rotation and increased left rotation  Supine traction retraction and extension: increased pain into  Right UE  Assessment: Pt continues to favor an extension based approach  Cervical loading caused increased cervical pain compared to supine  Increased motion and decreased pain was present with supine towel cervical retraction and ext  Pt left feeling a little better but still present with pain  Will follow up with sx's NV  Plan: Continue per plan of care        Precautions: none      Manuals           PA glides C3 and C7-T7  C7-T7           1st rib mob NT            TP levator  NT MM          Sustained cerv retr w/PT OP  NT MM          Supine tx, retr and ext  10x 10x                                                                           Ther Ex             Scalene stre 20"x5 20"x3 20"x3          scap retr 5"x10 5"x10 5"x20          cerv retr ext supine  10x 10x          Self supine retraction  10x 10x          pec stretch   doorway 20"x3          Static prove t/c spine ext   3'                                                              Ther Activity                                       Gait Training                                       Modalities

## 2021-04-20 ENCOUNTER — OFFICE VISIT (OUTPATIENT)
Dept: PHYSICAL THERAPY | Facility: REHABILITATION | Age: 51
End: 2021-04-20
Payer: MEDICARE

## 2021-04-20 DIAGNOSIS — M54.12 CERVICAL RADICULOPATHY: Primary | ICD-10-CM

## 2021-04-20 PROCEDURE — 97110 THERAPEUTIC EXERCISES: CPT | Performed by: PHYSICAL THERAPIST

## 2021-04-20 PROCEDURE — 97140 MANUAL THERAPY 1/> REGIONS: CPT | Performed by: PHYSICAL THERAPIST

## 2021-04-20 NOTE — PROGRESS NOTES
Daily Note     Today's date: 2021  Patient name: Tej Basilio  : 1970  MRN: [id number]  Referring provider: DON Schilling  Dx:   Encounter Diagnosis     ICD-10-CM    1  Cervical radiculopathy  M54 12                   Subjective: Pt reports that she was sore for about 2 hours after PT last visit but overall has been better  Seated retraction and extension increased pain the most   Has noted improved right UE ability to reach back  Objective: See treatment diary below  Limited in left rotation and extension this morning  All motions caused end range pain primarily on the left  Assessment: Pt still unable to tolerate seated extension well  Will continue to address restricted mobility  Her right shoulder appears to have subacromial impingement which is easily irritated  Will have to modify some activity including pec stretch and scapular stabilizing exercises  Plan: Continue per plan of care        Precautions: none      Manuals          PA glides C3 and C7-T7  C7-T7  UPA C3,4,5   PA C7-T7         1st rib mob NT            TP levator  NT MM NT         Sustained cerv retr w/PT OP  NT MM NT         Supine tx, retr and ext  10x 10x          SNAGS C3 w/L rotation    NT x3                                                             Ther Ex             Scalene stre 20"x5 20"x3 20"x3 20"x3         scap retr 5"x10 5"x10 5"x20 TB rows GR 5"x10         TB shoulder ext    Gr 5"x10         cerv retr ext supine  10x 10x HEP         Self supine retraction  10x 10x HEP         pec stretch   doorway 20"x3 20"x5         Static prone t/c spine ext   3' 3'/worse         Thoracic ext over chair     5x                                                Ther Activity                                       Gait Training                                       Modalities

## 2021-04-21 ENCOUNTER — OFFICE VISIT (OUTPATIENT)
Dept: PHYSICAL THERAPY | Facility: REHABILITATION | Age: 51
End: 2021-04-21
Payer: MEDICARE

## 2021-04-21 DIAGNOSIS — M54.12 CERVICAL RADICULOPATHY: Primary | ICD-10-CM

## 2021-04-21 PROCEDURE — 97140 MANUAL THERAPY 1/> REGIONS: CPT

## 2021-04-21 PROCEDURE — 97110 THERAPEUTIC EXERCISES: CPT

## 2021-04-21 NOTE — PROGRESS NOTES
Daily Note     Today's date: 2021  Patient name: Shanika Kim  : 1970  MRN: [id number]  Referring provider: DON Reza  Dx:   Encounter Diagnosis     ICD-10-CM    1  Cervical radiculopathy  M54 12                   Subjective:   Patient presents with reports of increased neck pain and a pressure type headache  Patient reports no change in her R shoulder pain  Objective: See treatment diary below      Assessment: Tolerated treatment fair  Patient performed modified ex this session secondary to reports of increased pain  Performed STM B UT/LS/cervical paraspinals   + R levator scap and cervical paraspinal muscular tightness/spasm noted  Soft tissue restriction improved post STM with patient report of decreased neck pain and intensity of headache  Patient would benefit from continued PT to address deficits  Plan: Continue per plan of care  Progress treatment as tolerated         Precautions: none      Manuals         PA glides C3 and C7-T7  C7-T7  UPA C3,4,5   PA C7-T7         1st rib mob NT            TP levator  NT MM NT         Sustained cerv retr w/PT OP  NT MM NT         Supine tx, retr and ext  10x 10x          SNAGS C3 w/L rotation    NT x3         STM     CC                                               Ther Ex             Scalene stre 20"x5 20"x3 20"x3 20"x3         scap retr 5"x10 5"x10 5"x20 TB rows GR 5"x10 NT        TB shoulder ext    Gr 5"x10 NT        cerv retr ext supine  10x 10x HEP 10x        Self supine retraction  10x 10x HEP 10x        pec stretch   doorway 20"x3 20"x5 NT        Static prone t/c spine ext   3' 3'/worse 3'        Thoracic ext over chair     5x 5x  10"                                               Ther Activity                                       Gait Training                                       Modalities

## 2021-04-26 ENCOUNTER — OFFICE VISIT (OUTPATIENT)
Dept: PHYSICAL THERAPY | Facility: REHABILITATION | Age: 51
End: 2021-04-26
Payer: MEDICARE

## 2021-04-26 DIAGNOSIS — M54.12 CERVICAL RADICULOPATHY: Primary | ICD-10-CM

## 2021-04-26 PROCEDURE — 97140 MANUAL THERAPY 1/> REGIONS: CPT | Performed by: PHYSICAL THERAPIST

## 2021-04-26 PROCEDURE — 97110 THERAPEUTIC EXERCISES: CPT | Performed by: PHYSICAL THERAPIST

## 2021-04-26 NOTE — PROGRESS NOTES
Daily Note     Today's date: 2021  Patient name: Dona Argueta  : 1970  MRN: [id number]  Referring provider: DON Wetzel  Dx:   Encounter Diagnosis     ICD-10-CM    1  Cervical radiculopathy  M54 12                   Subjective:  Pt felt better the rest of the day after therapy and then had a really bad day on Thursday where she had to call into work  This morning she is also really uncomfortable  She feels, "off" and her right shoulder is painful  She was just doing cervical retraction and some SB and rotation as her HEP  Objective: See treatment diary below      Assessment: Started with MH and then retractions  They continue to be most effective at reducing pain and increasing mobility however she still was unable to extend or turn without end range pain  Levator TP persists  Traction retraction and extension in supine was pain free  After manual therapy she was much better  Posture still needs improvement  Went back to supine extension as she has been unable to extend in weight bearing  Plan: Continue per plan of care  Progress treatment as tolerated         Precautions: none      Manuals        PA glides C3 and C7-T7  C7-T7  UPA C3,4,5   PA C7-T7  Rot mob C2       1st rib mob NT            TP levator  NT MM NT  NT       Sustained cerv retr w/PT OP  NT MM NT         Supine tx, retr and ext  10x 10x   x15       SNAGS C3 w/L rotation    NT x3  NT 2x3       STM     CC NT                                              Ther Ex             Scalene stre 20"x5 20"x3 20"x3 20"x3         scap retr 5"x10 5"x10 5"x20 TB rows GR 5"x10 NT No TB 5"x10         TB shoulder ext    Gr 5"x10 NT        cerv retr ext supine  10x 10x HEP 10x NV       Self supine retraction  10x 10x HEP 10x 10x       pec stretch   doorway 20"x3 20"x5 NT        Static prone t/c spine ext   3' 3'/worse 3' Hold       Thoracic ext over chair     5x 5x  10" Hold Ther Activity                                       Gait Training                                       Modalities             MH pre     10 min

## 2021-04-27 ENCOUNTER — TELEMEDICINE (OUTPATIENT)
Dept: FAMILY MEDICINE CLINIC | Facility: CLINIC | Age: 51
End: 2021-04-27
Payer: MEDICARE

## 2021-04-27 VITALS — BODY MASS INDEX: 20.41 KG/M2 | WEIGHT: 127 LBS | TEMPERATURE: 99.2 F | HEIGHT: 66 IN

## 2021-04-27 DIAGNOSIS — B34.9 VIRAL INFECTION, UNSPECIFIED: ICD-10-CM

## 2021-04-27 DIAGNOSIS — R06.02 SHORTNESS OF BREATH: ICD-10-CM

## 2021-04-27 DIAGNOSIS — Z20.822 SUSPECTED COVID-19 VIRUS INFECTION: ICD-10-CM

## 2021-04-27 DIAGNOSIS — R06.02 SOB (SHORTNESS OF BREATH): ICD-10-CM

## 2021-04-27 DIAGNOSIS — Z20.822 SUSPECTED COVID-19 VIRUS INFECTION: Primary | ICD-10-CM

## 2021-04-27 LAB
FLUAV RNA RESP QL NAA+PROBE: NEGATIVE
FLUBV RNA RESP QL NAA+PROBE: NEGATIVE
RSV RNA RESP QL NAA+PROBE: NEGATIVE
SARS-COV-2 RNA RESP QL NAA+PROBE: NEGATIVE

## 2021-04-27 PROCEDURE — 99214 OFFICE O/P EST MOD 30 MIN: CPT | Performed by: NURSE PRACTITIONER

## 2021-04-27 PROCEDURE — 0241U HB NFCT DS VIR RESP RNA 4 TRGT: CPT | Performed by: NURSE PRACTITIONER

## 2021-04-27 RX ORDER — PREDNISONE 50 MG/1
50 TABLET ORAL DAILY
Qty: 5 TABLET | Refills: 0 | Status: SHIPPED | OUTPATIENT
Start: 2021-04-27 | End: 2021-05-02

## 2021-04-27 NOTE — ASSESSMENT & PLAN NOTE
- Continue albuterol inhaler as needed  - Prescription sent for prednisone 50 mg 1 tablet daily for 5 days  Advised of side effects  Take on full stomach  - Proceed to ER with worsening symptoms   - Recommend follow up in 1 week

## 2021-04-27 NOTE — ASSESSMENT & PLAN NOTE
- This is day 7 of symptoms  Will send patient for COVID testing   - She did receive 2 doses of Pfizer vaccine, the last one given on 4/13   - She was advised to proceed to ER with worsening symptoms   - Quarantine guidelines reviewed  - Will contact patient with results

## 2021-04-27 NOTE — PROGRESS NOTES
COVID-19 Outpatient Progress Note    Assessment/Plan:    Problem List Items Addressed This Visit        Other    SOB (shortness of breath)     - Continue albuterol inhaler as needed  - Prescription sent for prednisone 50 mg 1 tablet daily for 5 days  Advised of side effects  Take on full stomach  - Proceed to ER with worsening symptoms   - Recommend follow up in 1 week  Suspected COVID-19 virus infection - Primary     - This is day 7 of symptoms  Will send patient for COVID testing   - She did receive 2 doses of Pfizer vaccine, the last one given on 4/13   - She was advised to proceed to ER with worsening symptoms   - Quarantine guidelines reviewed  - Will contact patient with results  Relevant Medications    predniSONE 50 mg tablet    Other Relevant Orders    Multiplex COVID19, Influenza A/B, RSV PCR, [facility] - Collected at [place] [address] or [facility]    Viral infection, unspecified    Relevant Orders    Multiplex COVID19, Influenza A/B, RSV PCR, [facility] - Collected at [place] [address] or [facility]      Other Visit Diagnoses     Shortness of breath        Relevant Medications    predniSONE 50 mg tablet         Disposition:     I referred patient to one of our centralized sites for a COVID-19 swab  I have spent 15 minutes directly with the patient  Greater than 50% of this time was spent in counseling/coordination of care regarding: instructions for management, patient and family education, importance of treatment compliance, risk factor reductions and impressions  Encounter provider DON Wilson    Provider located at [address]   [facility] [address] [facility] [address]    Recent Visits  No visits were found meeting these conditions     Showing recent visits within past 7 days and meeting all other requirements     Today's Visits  Date Type Provider Dept   04/27/21 Telemedicine Juan Pablo Singh [facility]   Showing today's visits and meeting all other requirements     Future Appointments  No visits were found meeting these conditions  Showing future appointments within next 150 days and meeting all other requirements      This virtual check-in was done via [employer] and patient was informed that this is not a secure, HIPAA-compliant platform  She agrees to proceed  Patient agrees to participate in a virtual check in via telephone or video visit instead of presenting to the office to address urgent/immediate medical needs  Patient is aware this is a billable service  After connecting through [facility], the patient was identified by name and date of birth  Carmen Canela was informed that this was a telemedicine visit and that the exam was being conducted confidentially over secure lines  My office door was closed  No one else was in the room  Carmen Canela acknowledged consent and understanding of privacy and security of the telemedicine visit  I informed the patient that I have reviewed her record in Epic and presented the opportunity for her to ask any questions regarding the visit today  The patient agreed to participate  Subjective:   Desmond Houser is a 52 y o  female who is concerned about COVID-19  Patient's symptoms include fever, chills, fatigue, cough, shortness of breath and chest tightness  Patient denies congestion, anosmia, loss of taste, abdominal pain, myalgias and headaches       Date of symptom onset: 4/20/2021    Exposure:   Contact with a person who is under investigation (PUI) for or who is positive for COVID-19 within the last 14 days?: No    Hospitalized recently for fever and/or lower respiratory symptoms?: No      Currently a healthcare worker that is involved in direct patient care?: No      Works in a special setting where the risk of COVID-19 transmission may be high? (this may include long-term care, correctional and long term facilities; homeless shelters; assisted-living facilities and group homes ): No      Resident in a special setting where the risk of COVID-19 transmission may be high? (this may include long-term care, correctional and long-term facilities; homeless shelters; assisted-living facilities and group homes ): No      Patient presents today for virtual visit  She was seen in [facility] yesterday with fatigue and shortness of breath  Her pulse ox was 100% and they stated there was no need for COVID testing  They did order blood work  CBC, CMP, and TSH were all stable  She continues with symptoms today  She does have a history of asthma and uses an albuterol inhaler  She reports using it 3 times today and it provided no relief       Lab Results   Component Value Date    SARSCOV2 Not Detected 05/20/2020    [facility] [facility] [address] Not Detected 08/22/2020     Past Medical History:   Diagnosis Date    Anemia     Anxiety     Asthma     illness induced    SARA I (cervical intraepithelial neoplasia I)     RESOLVED: [id number]    Depression     Endometriosis     Functional ovarian cysts age 16    History of agoraphobia age 15    Hypercholesterolemia     IBS (irritable bowel syndrome) age 15    Constipation predominant    IC (interstitial cystitis)     Migraine     Motion sickness     Pap smear abnormality of cervix with ASCUS favoring benign     RESOLVED: [id number]    Seasonal allergies     with post-nasal drip and recurrent throat infections    Urinary tract infection     Varicella      Past Surgical History:   Procedure Laterality Date    CHOLECYSTECTOMY      CHOLECYSTECTOMY LAPAROSCOPIC  08/2020    COLONOSCOPY  06/2018    Nonbleeding angiodysplastic lesion mid ascending colon, questionable ulcerative proctitis-biopsy was negative for acute or chronic colitis    COLONOSCOPY  06/09/2016    Normal    COLONOSCOPY  05/23/2013    Adenoma at splenic flexure    EGD  08/02/2018    EGD  08/03/2020    Normal   Biopsy stomach negative for H  pylori, biopsy of the duodenum negative for celiac    EGD  08/03/2020    Normal   Biopsy of the stomach negative for H  pylori biopsied duodenum negative for celiac disease    ENDOTRACHEAL INTUBATION EMERGENT  2012    HEMORRHOID SURGERY  07/2009    Dr Cain Leetsdale HYSTEROSCOPY,DX,SEP [address] N/A 11/21/2018    Procedure: HYSTEROSCOPY;  Surgeon: Mario Solis MD;  Location: [facility];  Service: Gynecology    SD HYSTEROSCOPY,W/ENDOMETRIAL ABLATION N/A 11/21/2018    Procedure: Lucrecia Cueto;  Surgeon: Mario Solis MD;  Location: [facility];  Service: Gynecology    SMALL INTESTINE SURGERY      TOOTH EXTRACTION      TUBAL LIGATION      ONSET: 29 DEC 2011     Current Outpatient Medications   Medication Sig Dispense Refill    albuterol (PROAIR HFA) 90 mcg/act inhaler Inhale 2 puffs 4 (four) times a day every 4 to 6 hours as needed 1 Inhaler 5    colestipol (COLESTID) 5 g granules TAKE 5 G BY MOUTH 2 TIMES A  g 1    fexofenadine-pseudoephedrine (ALLEGRA-D 24) 180-240 MG per 24 hr tablet Take 1 tablet by mouth daily (Patient taking differently: Take 1 tablet by mouth daily as needed ) 30 tablet 1    fluticasone (FLONASE) 50 mcg/act nasal spray SPRAY 2 SPRAYS ONCE A DAY FOR 90 DAYS      medroxyPROGESTERone (DEPO-PROVERA) 150 mg/mL injection Please give in office every 10 weeks 1 mL 4    Meth-Hyo-M Bl-Na Phos-Ph Sal (Uribel) 118 MG CAPS Take 1 each by mouth as needed       Multiple Vitamin (MULTI VITAMIN DAILY PO) Take by mouth      Myrbetriq 50 MG TB24 Take 1 tablet by mouth daily      naproxen (NAPROSYN) 500 mg tablet TAKE 1 TABLET BY MOUTH 3 TIMES A DAY AS NEEDED MIGRAINE  LIMIT USE TO 3 DAYS PER WEEK      ondansetron (ZOFRAN) 4 mg tablet Take 4 mg by mouth every 8 (eight) hours as needed  5    pentosan polysulfate (ELMIRON) 100 mg capsule Take 100 mg by mouth 3 (three) times a day before meals        albuterol (2 5 mg/3 mL) 0 083 % nebulizer solution Take 1 vial (2 5 mg total) by nebulization 3 (three) times a day (Patient not taking: Reported on 4/27/2021) 30 vial 2    colestipol (COLESTID) 1 g tablet TAKE 1 TABLET (1 G TOTAL) BY MOUTH 3 (THREE) TIMES A  tablet 1    dicyclomine (BENTYL) 10 mg capsule TAKE 1 CAPSULE BY MOUTH THREE TIMES A DAY (Patient not taking: Reported on 4/27/2021) 270 capsule 3    Heparin Sodium, Porcine, (HEPARIN, PORCINE,) 63262 UNIT/ML 10,000 UNITS TO BE INSTILLED INTO THE BLADDER IN THE UROLOGY OFFICE  2    LORazepam (ATIVAN) 1 mg tablet TAKE 1 & 1/2 TABLETS DAILY AT BEDTIME AS NEEDED  1    pantoprazole (PROTONIX) 40 mg tablet TAKE 1 TABLET BY MOUTH DAILY 1/2 HOUR BEFORE BREAKFAST (Patient not taking: Reported on 4/27/2021) 90 tablet 3    predniSONE 50 mg tablet Take 1 tablet (50 mg total) by mouth daily for 5 days 5 tablet 0    prochlorperazine (COMPAZINE) 10 mg tablet 1 tab po TID prn headache  Max 3 days per week      SUMAtriptan (IMITREX) 50 mg tablet       traMADol (ULTRAM) 50 mg tablet Take 1 tablet (50 mg total) by mouth daily at bedtime as needed for moderate pain (Patient not taking: Reported on 12/2/2020) 15 tablet 0    triamcinolone acetonide (KENALOG-40) 40 mg/mL 40 MG TO BE INSTILLED INTO THE BLADDER IN THE UROLOGY OFFICE  2     Current Facility-Administered Medications   Medication Dose Route Frequency Provider Last Rate Last Admin    medroxyPROGESTERone (DEPO-PROVERA) IM injection 150 mg  150 mg Intramuscular Once Regina Livings, DO         Allergies   Allergen Reactions    Azithromycin Diarrhea, Nausea Only and Other (See Comments)    Clarithromycin     Clavulanic Acid     Doxycycline     Erythromycin Base Other (See Comments)    Lamotrigine Other (See Comments)     Can't recall side effects       Lithium Itching    Mirtazapine      (remeron)    Moxifloxacin Nausea Only     Excessive vomiting  Excessive vomiting    Sulfamethoxazole      Other reaction(s): Rash    Sulfamethoxazole-Trimethoprim Other (See Comments)  Trimethoprim      Other reaction(s): Rash    Indomethacin Rash    Penicillins Rash       Review of Systems   Constitutional: Positive for chills, fatigue and fever  HENT: Negative for congestion  Respiratory: Positive for cough, chest tightness and shortness of breath  Gastrointestinal: Negative for abdominal pain  Musculoskeletal: Negative for myalgias  Skin: Negative for rash  Neurological: Negative for headaches  Hematological: Negative for adenopathy  Objective:    Vitals:    04/27/21 1020   Temp: 99 2 °F (37 3 °C)   Weight: 57 6 kg (127 lb)   Height: 5' 6" (1 676 m)       Physical Exam  Vitals signs and nursing note reviewed  Constitutional:       General: She is not in acute distress  Appearance: She is well-developed  HENT:      Head: Normocephalic and atraumatic  Right Ear: External ear normal       Left Ear: External ear normal    Eyes:      Conjunctiva/sclera: Conjunctivae normal    Neck:      Musculoskeletal: Normal range of motion  Pulmonary:      Effort: No respiratory distress  Comments: Slight shortness of breath with conversation    Musculoskeletal: Normal range of motion  Neurological:      Mental Status: She is alert and oriented to person, place, and time  Psychiatric:         Mood and Affect: Mood normal          Behavior: Behavior normal        VIRTUAL VISIT DISCLAIMER    Carmen RAMIREZ Marni Brasher acknowledges that she has consented to an online visit or consultation  She understands that the online visit is based solely on information provided by her, and that, in the absence of a face-to-face physical evaluation by the physician, the diagnosis she receives is both limited and provisional in terms of accuracy and completeness  This is not intended to replace a full medical face-to-face evaluation by the physician  Too Talbert understands and accepts these terms

## 2021-04-29 ENCOUNTER — TELEPHONE (OUTPATIENT)
Dept: FAMILY MEDICINE CLINIC | Facility: CLINIC | Age: 51
End: 2021-04-29

## 2021-04-29 NOTE — TELEPHONE ENCOUNTER
----- [facility] at 4/28/2021  6:48 AM EDT -----  COVID, flu, and RSV all negative

## 2021-04-30 ENCOUNTER — TELEPHONE (OUTPATIENT)
Dept: FAMILY MEDICINE CLINIC | Facility: CLINIC | Age: 51
End: 2021-04-30

## 2021-04-30 NOTE — TELEPHONE ENCOUNTER
I called pt and she is having some shortness of breath and fatigue  I advised her to go to the care now or ER to be evaluated   Pt agreed and will go after work today

## 2021-04-30 NOTE — TELEPHONE ENCOUNTER
Just given prednisone at 1 tablet a day at 5 days and covid test was negative  Still has heaviness in chest, short of breath, very fatigue, and slight headache, Fever of 100 2 for 2 days  Not sure if she needs allergies (took allergy test except to cats) meds or antibiotic   Or another test?  Please call pt to advise

## 2021-05-03 ENCOUNTER — HOSPITAL ENCOUNTER (OUTPATIENT)
Dept: CT IMAGING | Facility: HOSPITAL | Age: 51
Discharge: HOME | End: 2021-05-03
Payer: MEDICARE

## 2021-05-03 ENCOUNTER — TRANSCRIBE ORDERS (OUTPATIENT)
Dept: ADMINISTRATIVE | Facility: HOSPITAL | Age: 51
End: 2021-05-03

## 2021-05-03 DIAGNOSIS — R05.9 COUGH: ICD-10-CM

## 2021-05-03 DIAGNOSIS — R05.9 COUGH: Primary | ICD-10-CM

## 2021-05-03 DIAGNOSIS — R06.02 SOB (SHORTNESS OF BREATH): ICD-10-CM

## 2021-05-03 DIAGNOSIS — M54.50 LUMBAR SPINE PAIN: ICD-10-CM

## 2021-05-03 PROCEDURE — 71250 CT THORAX DX C-: CPT

## 2021-05-05 ENCOUNTER — TELEMEDICINE (OUTPATIENT)
Dept: FAMILY MEDICINE CLINIC | Facility: CLINIC | Age: 51
End: 2021-05-05
Payer: MEDICARE

## 2021-05-05 ENCOUNTER — OFFICE VISIT (OUTPATIENT)
Dept: PHYSICAL THERAPY | Facility: REHABILITATION | Age: 51
End: 2021-05-05
Payer: MEDICARE

## 2021-05-05 ENCOUNTER — TELEPHONE (OUTPATIENT)
Dept: FAMILY MEDICINE CLINIC | Facility: CLINIC | Age: 51
End: 2021-05-05

## 2021-05-05 VITALS — HEIGHT: 66 IN | BODY MASS INDEX: 20.41 KG/M2 | WEIGHT: 127 LBS | TEMPERATURE: 99.9 F

## 2021-05-05 DIAGNOSIS — R06.02 SHORTNESS OF BREATH: Primary | ICD-10-CM

## 2021-05-05 DIAGNOSIS — J40 BRONCHITIS: ICD-10-CM

## 2021-05-05 DIAGNOSIS — R05.9 COUGH: ICD-10-CM

## 2021-05-05 DIAGNOSIS — M54.12 CERVICAL RADICULOPATHY: Primary | ICD-10-CM

## 2021-05-05 PROCEDURE — 97140 MANUAL THERAPY 1/> REGIONS: CPT

## 2021-05-05 PROCEDURE — 97110 THERAPEUTIC EXERCISES: CPT

## 2021-05-05 PROCEDURE — 99214 OFFICE O/P EST MOD 30 MIN: CPT | Performed by: NURSE PRACTITIONER

## 2021-05-05 PROCEDURE — 97010 HOT OR COLD PACKS THERAPY: CPT

## 2021-05-05 RX ORDER — METHYLPREDNISOLONE 4 MG/1
TABLET ORAL
Qty: 21 EACH | Refills: 0 | Status: SHIPPED | OUTPATIENT
Start: 2021-05-05 | End: 2021-05-26

## 2021-05-05 RX ORDER — CIPROFLOXACIN 500 MG/1
500 TABLET, FILM COATED ORAL EVERY 12 HOURS SCHEDULED
Qty: 14 TABLET | Refills: 0 | Status: SHIPPED | OUTPATIENT
Start: 2021-05-05 | End: 2021-05-12

## 2021-05-05 NOTE — TELEPHONE ENCOUNTER
Carmen has a virtual visit sched with you today & she wanted to make sure that you knew that she was seen both at [address] [place] [address] recently

## 2021-05-05 NOTE — ASSESSMENT & PLAN NOTE
-  Not well controlled  -  Advised to continue using albuterol inhaler and nebulizer  -  Proceed to ER with worsening symptoms of shortness of breath or chest pain  -  Referral sent to Pulmonary for further workup   -  Continue routine follow-ups with her current asthma specialist   -  Recommend follow-up in 1 month

## 2021-05-05 NOTE — PROGRESS NOTES
Virtual Regular Visit      Assessment/Plan:    Problem List Items Addressed This Visit        Respiratory    Bronchitis     -  Not well controlled  -  Prescription sent for Medrol Dosepak and Cipro  Patient states this is the only antibiotic she is able to take  Advised of side effects   -  Contact office with worsening symptoms  -  Will continue to monitor  Relevant Medications    methylPREDNISolone 4 MG tablet therapy pack    ciprofloxacin (CIPRO) 500 mg tablet       Other    Shortness of breath - Primary     -  Not well controlled  -  Advised to continue using albuterol inhaler and nebulizer  -  Proceed to ER with worsening symptoms of shortness of breath or chest pain  -  Referral sent to Pulmonary for further workup   -  Continue routine follow-ups with her current asthma specialist   -  Recommend follow-up in 1 month  Relevant Orders    Ambulatory referral to Pulmonology    Cough     -  Not well controlled  -  Prescription sent for Medrol Dosepak  Advised of side effects  Advised to take on full stomach  -  Advised to continue using albuterol inhaler and nebulizer  -  Proceed to ER with worsening symptoms of shortness of breath or chest pain  -  Referral sent to Pulmonary for further workup   -  Recommend follow-up in 1 month           Relevant Medications    methylPREDNISolone 4 MG tablet therapy pack    ciprofloxacin (CIPRO) 500 mg tablet    Other Relevant Orders    Ambulatory referral to Pulmonology               Reason for visit is   Chief Complaint   Patient presents with    Follow-up    Virtual Regular Visit        Encounter provider DON Rae    Provider located at [facility] AT [address]   [address] [address]      Recent Visits  Date Type Provider Dept   04/30/21 Telephone Ralph Chisholm MD [facility]   04/29/21 Telephone Kendy Ron [facility]   Showing recent visits within past 7 days and meeting all other requirements     Today's Visits  Date Type Provider Dept   05/05/21 Telephone [facility]   05/05/21 Telemedicine DON Denny Pg [facility]   Showing today's visits and meeting all other requirements     Future Appointments  No visits were found meeting these conditions  Showing future appointments within next 150 days and meeting all other requirements        The patient was identified by name and date of birth  Carmen Steve was informed that this is a telemedicine visit and that the visit is being conducted through [address] [place] [address] Now and patient was informed that this is a secure, HIPAA-compliant platform  She agrees to proceed     My office door was closed  No one else was in the room  She acknowledged consent and understanding of privacy and security of the video platform  The patient has agreed to participate and understands they can discontinue the visit at any time  Patient is aware this is a billable service  Subjective  Carmen Steve is a 52 y o  female    Patient presents today for follow-up appointment for cough and chest heaviness  She was tested for COVID on 04/27 which was negative  She was also negative for flu and RSV  She has been having continued symptoms of chest heaviness, cough, fatigue, and fever  She was seen in urgent care on 04/30  Her EKG and chest x-ray were normal   She was retested for COVID at this time which came back negative  She then saw her asthma specialist on 5/3  She states they did different lung tests in the office which she passed  He suggested that she get a stat CT scan  He noted the results were normal but advised her to proceed to the ER  She was seen in the ER and had another negative COVID test   Her D-dimer and troponin were also negative  She did have a normal EKG at this time as well    She was given prednisone once daily for 5 days which she states did not help her symptoms  She has been using her albuterol inhaler  She has not used her nebulizer  She has been taking ibuprofen for her fever  She did say that she had a loss of taste or smell over the Easter holiday  This was in between the time that she got her two vaccines  She was not tested for COVID at that time  Since then she has had several people that she has been around test positive  M*Modal software was used to dictate this note  It may contain errors with dictating incorrect words/spelling  Please contact provider directly for any questions            Past Medical History:   Diagnosis Date    Anemia     Anxiety     Asthma     illness induced    SARA I (cervical intraepithelial neoplasia I)     RESOLVED: [id number]    Depression     Endometriosis     Functional ovarian cysts age 16    History of agoraphobia age 15    Hypercholesterolemia     IBS (irritable bowel syndrome) age 15    Constipation predominant    IC (interstitial cystitis)     Migraine     Motion sickness     Pap smear abnormality of cervix with ASCUS favoring benign     RESOLVED: [id number]    Seasonal allergies     with post-nasal drip and recurrent throat infections    Urinary tract infection     Varicella        Past Surgical History:   Procedure Laterality Date    CHOLECYSTECTOMY      CHOLECYSTECTOMY LAPAROSCOPIC  08/2020    COLONOSCOPY  06/2018    Nonbleeding angiodysplastic lesion mid ascending colon, questionable ulcerative proctitis-biopsy was negative for acute or chronic colitis    COLONOSCOPY  06/09/2016    Normal    COLONOSCOPY  05/23/2013    Adenoma at splenic flexure    EGD  08/02/2018    EGD  08/03/2020    Normal   Biopsy stomach negative for H  pylori, biopsy of the duodenum negative for celiac    EGD  08/03/2020    Normal   Biopsy of the stomach negative for H  pylori biopsied duodenum negative for celiac disease    ENDOTRACHEAL INTUBATION EMERGENT  2012    HEMORRHOID SURGERY  07/2009    Dr Amelia Stevens HYSTEROSCOPY,DX,SEP [address] N/A 11/21/2018    Procedure: HYSTEROSCOPY;  Surgeon: Cata Hill MD;  Location: [facility];  Service: Gynecology    TX HYSTEROSCOPY,W/ENDOMETRIAL ABLATION N/A 11/21/2018    Procedure: Arethapatricia Abhinav;  Surgeon: Cata Hill MD;  Location: [facility];  Service: Gynecology    SMALL INTESTINE SURGERY      TOOTH EXTRACTION      TUBAL LIGATION      ONSET: 29 DEC 2011       Current Outpatient Medications   Medication Sig Dispense Refill    albuterol (PROAIR HFA) 90 mcg/act inhaler Inhale 2 puffs 4 (four) times a day every 4 to 6 hours as needed 1 Inhaler 5    colestipol (COLESTID) 1 g tablet TAKE 1 TABLET (1 G TOTAL) BY MOUTH 3 (THREE) TIMES A DAY (Patient taking differently: Take 1 g by mouth 2 (two) times a day ) 270 tablet 1    fexofenadine-pseudoephedrine (ALLEGRA-D 24) 180-240 MG per 24 hr tablet Take 1 tablet by mouth daily (Patient taking differently: Take 1 tablet by mouth daily as needed ) 30 tablet 1    fluticasone (FLONASE) 50 mcg/act nasal spray SPRAY 2 SPRAYS ONCE A DAY FOR 90 DAYS      LORazepam (ATIVAN) 1 mg tablet TAKE 1 & 1/2 TABLETS DAILY AT BEDTIME AS NEEDED  1    medroxyPROGESTERone (DEPO-PROVERA) 150 mg/mL injection Please give in office every 10 weeks 1 mL 4    Meth-Hyo-M Bl-Na Phos-Ph Sal (Uribel) 118 MG CAPS Take 1 each by mouth as needed       Multiple Vitamin (MULTI VITAMIN DAILY PO) Take by mouth      Myrbetriq 50 MG TB24 Take 1 tablet by mouth daily      naproxen (NAPROSYN) 500 mg tablet TAKE 1 TABLET BY MOUTH 3 TIMES A DAY AS NEEDED MIGRAINE  LIMIT USE TO 3 DAYS PER WEEK      ondansetron (ZOFRAN) 4 mg tablet Take 4 mg by mouth every 8 (eight) hours as needed  5    pentosan polysulfate (ELMIRON) 100 mg capsule Take 100 mg by mouth 3 (three) times a day before meals        prochlorperazine (COMPAZINE) 10 mg tablet 1 tab po TID prn headache   Max 3 days per week      triamcinolone acetonide (KENALOG-40) 40 mg/mL 40 MG TO BE INSTILLED INTO THE BLADDER IN THE UROLOGY OFFICE  2    albuterol (2 5 mg/3 mL) 0 083 % nebulizer solution Take 1 vial (2 5 mg total) by nebulization 3 (three) times a day (Patient not taking: Reported on 4/27/2021) 30 vial 2    ciprofloxacin (CIPRO) 500 mg tablet Take 1 tablet (500 mg total) by mouth every 12 (twelve) hours for 7 days 14 tablet 0    colestipol (COLESTID) 5 g granules TAKE 5 G BY MOUTH 2 TIMES A DAY (Patient not taking: Reported on 5/5/2021) 300 g 1    dicyclomine (BENTYL) 10 mg capsule TAKE 1 CAPSULE BY MOUTH THREE TIMES A DAY (Patient not taking: Reported on 4/27/2021) 270 capsule 3    Heparin Sodium, Porcine, (HEPARIN, PORCINE,) 56812 UNIT/ML 10,000 UNITS TO BE INSTILLED INTO THE BLADDER IN THE UROLOGY OFFICE  2    methylPREDNISolone 4 MG tablet therapy pack Use as directed on package 21 each 0    pantoprazole (PROTONIX) 40 mg tablet TAKE 1 TABLET BY MOUTH DAILY 1/2 HOUR BEFORE BREAKFAST (Patient not taking: Reported on 4/27/2021) 90 tablet 3    SUMAtriptan (IMITREX) 50 mg tablet       traMADol (ULTRAM) 50 mg tablet Take 1 tablet (50 mg total) by mouth daily at bedtime as needed for moderate pain (Patient not taking: Reported on 12/2/2020) 15 tablet 0     Current Facility-Administered Medications   Medication Dose Route Frequency Provider Last Rate Last Admin    medroxyPROGESTERone (DEPO-PROVERA) IM injection 150 mg  150 mg Intramuscular Once Lauren Vallejo DO            Allergies   Allergen Reactions    Azithromycin Diarrhea, Nausea Only and Other (See Comments)    Clarithromycin     Clavulanic Acid     Doxycycline     Erythromycin Base Other (See Comments)    Lamotrigine Other (See Comments)     Can't recall side effects       Lithium Itching    Mirtazapine      (remeron)    Moxifloxacin Nausea Only     Excessive vomiting  Excessive vomiting    Sulfamethoxazole      Other reaction(s): Rash    Sulfamethoxazole-Trimethoprim Other (See Comments)    Trimethoprim      Other reaction(s): Rash    Indomethacin Rash    Penicillins Rash       Review of Systems   Constitutional: Positive for fatigue and fever  HENT: Positive for congestion  Negative for sinus pressure and sinus pain  Eyes: Negative for pain  Respiratory: Positive for cough, chest tightness and shortness of breath  Cardiovascular: Negative for chest pain and palpitations  Gastrointestinal: Negative for abdominal pain  Genitourinary: Negative for difficulty urinating  Musculoskeletal: Negative for gait problem  Skin: Negative for rash  Neurological: Negative for dizziness and headaches  Hematological: Negative for adenopathy  Video Exam    Vitals:    05/05/21 1247   Temp: 99 9 °F (37 7 °C)   Weight: 57 6 kg (127 lb)   Height: 5' 6" (1 676 m)       Physical Exam  Vitals signs and nursing note reviewed  Constitutional:       General: She is not in acute distress  Appearance: Normal appearance  HENT:      Head: Normocephalic and atraumatic  Right Ear: External ear normal       Left Ear: External ear normal    Eyes:      Conjunctiva/sclera: Conjunctivae normal    Neck:      Musculoskeletal: Normal range of motion  Pulmonary:      Effort: Pulmonary effort is normal  No respiratory distress  Comments: Occasional dry cough during conversation  Neurological:      Mental Status: She is alert and oriented to person, place, and time  Psychiatric:         Mood and Affect: Mood normal          Behavior: Behavior normal           I spent 20 minutes directly with the patient during this visit      [facility] acknowledges that she has consented to an online visit or consultation   She understands that the online visit is based solely on information provided by her, and that, in the absence of a face-to-face physical evaluation by the physician, the diagnosis she receives is both limited and provisional in terms of accuracy and completeness  This is not intended to replace a full medical face-to-face evaluation by the physician  Eric Tomlinson understands and accepts these terms

## 2021-05-05 NOTE — ASSESSMENT & PLAN NOTE
-  Not well controlled  -  Prescription sent for Medrol Dosepak  Advised of side effects  Advised to take on full stomach  -  Advised to continue using albuterol inhaler and nebulizer  -  Proceed to ER with worsening symptoms of shortness of breath or chest pain  -  Referral sent to Pulmonary for further workup   -  Recommend follow-up in 1 month

## 2021-05-05 NOTE — ASSESSMENT & PLAN NOTE
-  Not well controlled  -  Prescription sent for Medrol Dosepak and Cipro  Patient states this is the only antibiotic she is able to take  Advised of side effects   -  Contact office with worsening symptoms  -  Will continue to monitor

## 2021-05-05 NOTE — PROGRESS NOTES
Daily Note     Today's date: 2021  Patient name: Duy Cabral  : 1970  MRN: [id number]  Referring provider: DON Lopez  Dx:   Encounter Diagnosis     ICD-10-CM    1  Cervical radiculopathy  M54 12                   Subjective:   Patient reports that she is feeling better which she feels is due to sleeping back in bed verses her couch  Patient reports some L sided pain with R cervical rotation  Patient denies having a headache today  Objective: See treatment diary below      Assessment: Tolerated treatment well  Patient presents with decreased reports of overall cervical pain and headache  Patient was able to tolerate increased ex today without an increase in her baseline pain level  +R>L palpable tightness noted although the patient reports pain on the L during cervical rotation  Patient noted improved mobility post treatment  Patient would benefit from continued PT to attain set goals  Plan: Continue per plan of care        Precautions: none      Manuals  5      PA glides C3 and C7-T7  C7-T7  UPA C3,4,5   PA C7-T7  Rot mob C2       1st rib mob NT            TP levator  NT MM NT  NT CC      Sustained cerv retr w/PT OP  NT MM NT         Supine tx, retr and ext  10x 10x   x15       SNAGS C3 w/L rotation    NT x3  NT 2x3 NT      STM     CC NT CC                                             Ther Ex             Scalene stre 20"x5 20"x3 20"x3 20"x3         scap retr 5"x10 5"x10 5"x20 TB rows GR 5"x10 NT No TB 5"x10   5"x10      TB shoulder ext    Gr 5"x10 NT        cerv retr ext supine  10x 10x HEP 10x NV       Self supine retraction  10x 10x HEP 10x 10x 10x  seated x10 with op      pec stretch   doorway 20"x3 20"x5 NT        Static prone t/c spine ext   3' 3'/worse 3' Hold       Thoracic ext over chair     5x 5x  10" Hold 5" x10       towelSnag to L       5"x10                                Ther Activity                                       Gait Training                                       Modalities             MH pre     10 min  10'

## 2021-05-08 ENCOUNTER — NURSE TRIAGE (OUTPATIENT)
Dept: OTHER | Facility: OTHER | Age: 51
End: 2021-05-08

## 2021-05-08 NOTE — TELEPHONE ENCOUNTER
Regarding: fever x 2 weeks   ----- Message from Traci Destinee sent at 5/8/2021 11:20 AM EDT -----  Pt states that she has had 2 virtual visits and been to the ED, Pt states that she was advised that she may have had covid and didn't know   Pt states that she continued to have SOB and a fever that continues to fluctuate between 101 and 104, Pt states that she did have a lot of blood work and was wondering if any of it would indicate an infection, she was advised previously by endodontist that she had an infection in her jaw that did not need to be treated at that time,  No jaw pain or tenderness she just wants to be sure, was prescribed oral steroids that she has not started because she was not sure if she should

## 2021-05-08 NOTE — TELEPHONE ENCOUNTER
Per patients last visit she is to continue her inhalers and start on steroids and cipro for bronchitis  Patient did begin steroids  Advised if continuing to have chest pain and shortness of breath to go to ED  Patient stated she has been to ED and was told she likely had covid and to "ride it out "    Reason for Disposition   Caller has medication question, adult has minor symptoms, caller declines triage, AND triager answers question    Answer Assessment - Initial Assessment Questions  1  NAME of MEDICATION: "What medicine are you calling about?"      Cipro     2  QUESTION: "What is your question?"      Should I start it? 3  PRESCRIBING HCP: "Who prescribed it?" Reason: if prescribed by specialist, call should be referred to that group  Serina Lorenzo    4  SYMPTOMS: "Do you have any symptoms?"      Yes     5  SEVERITY: If symptoms are present, ask "Are they mild, moderate or severe?"      Mild-moderate  Still having occasional chest pains and feeling winded      Protocols used: MEDICATION QUESTION CALL-ADULT-

## 2021-05-10 NOTE — TELEPHONE ENCOUNTER
Pt states she started her steroid the day she seen [facility]  She started antibiotics on Saturday  Pt feel " a little better, but still short of breath"  I offered appt but pt states has appt with her asthma doctor today , she will call if she need to follow up in our office

## 2021-05-11 ENCOUNTER — TELEPHONE (OUTPATIENT)
Dept: FAMILY MEDICINE CLINIC | Facility: CLINIC | Age: 51
End: 2021-05-11

## 2021-05-11 ENCOUNTER — TELEMEDICINE (OUTPATIENT)
Dept: FAMILY MEDICINE CLINIC | Facility: CLINIC | Age: 51
End: 2021-05-11
Payer: MEDICARE

## 2021-05-11 VITALS — BODY MASS INDEX: 20.57 KG/M2 | TEMPERATURE: 100.2 F | WEIGHT: 128 LBS | HEIGHT: 66 IN

## 2021-05-11 DIAGNOSIS — B34.9 VIRAL INFECTION, UNSPECIFIED: Primary | ICD-10-CM

## 2021-05-11 DIAGNOSIS — R06.02 SHORTNESS OF BREATH: ICD-10-CM

## 2021-05-11 DIAGNOSIS — J45.909 MODERATE ASTHMA, UNSPECIFIED WHETHER COMPLICATED, UNSPECIFIED WHETHER PERSISTENT: ICD-10-CM

## 2021-05-11 PROBLEM — Z20.822 SUSPECTED COVID-19 VIRUS INFECTION: Status: RESOLVED | Noted: 2021-04-27 | Resolved: 2021-05-11

## 2021-05-11 PROCEDURE — 99214 OFFICE O/P EST MOD 30 MIN: CPT | Performed by: NURSE PRACTITIONER

## 2021-05-11 NOTE — PROGRESS NOTES
Virtual Regular Visit      Assessment/Plan:    Problem List Items Addressed This Visit        Respiratory    Asthma     - Continue with prescribed inhalers for shortness of breath  - Continue routine follow up with allergist   - Will continue to monitor  Other    Shortness of breath     - Not well controlled  - Continue using albuterol inhaler and nebulizer  - Proceed with pulmonologist and cardiologist appointments for continued work up  - Advised patient to proceed to ER with worsening shortness of breath  Viral infection, unspecified - Primary     - Will send patient for COVID test and contact patient with results  - Reviewed quarantine guidelines  - Discussed supportive care and management  Relevant Orders    Novel Coronavirus (Covid-19),PCR [facility] or [facility]               Reason for visit is   Chief Complaint   Patient presents with    Shortness of Breath    Fever    Virtual Regular Visit        Encounter provider DON Camara    Provider located at [address]   [address]  [facility] [address]      Recent Visits  Date Type Provider Dept   05/05/21 Telephone Sharon [facility]   05/05/21 Telemedicine DON Camara [facility]   Showing recent visits within past 7 days and meeting all other requirements     Today's Visits  Date Type Provider Dept   05/11/21 Telemedicine DON Camara [facility]   05/11/21 Telephone Danielito 88 today's visits and meeting all other requirements     Future Appointments  No visits were found meeting these conditions  Showing future appointments within next 150 days and meeting all other requirements        The patient was identified by name and date of birth   Carmen Armendariz Lat was informed that this is a telemedicine visit and that the visit is being conducted through [address] [place] [address] Road [facility] and patient was informed that this is a secure, HIPAA-compliant platform  She agrees to proceed     My office door was closed  No one else was in the room  She acknowledged consent and understanding of privacy and security of the video platform  The patient has agreed to participate and understands they can discontinue the visit at any time  Patient is aware this is a billable service  Subjective  Carmen Land is a 52 y o  female    Patient presents today for virtual visit  She is still having complaints of fever, cough, chest tightness, fatigue, and shortness of breath  She has had ongoing symptoms since around [facility]  She was tested for COVID at that time and it was negative  She was seen in urgent care and the ER and had extensive workups that were negative  Two additional COVID tests have also come back negative  She did have her two series COVID vaccine  Her second dose was on 4/14  Last visit she was given an antibiotic and a medrol dose pack  She finished there steroid and reports feeling worse  She was seen by her allergist who prescribed a steroid inhaler  She has been using that and reports that also makes her feel worse  She was referred to pulmonary for further workup  She has an appointment later this month  She also has appointment with cardiologist  She is requesting she be tested for COVID again  She states her son and his girlfriend were recently positive  She also reports that everyone that is coming around her is getting sick         Past Medical History:   Diagnosis Date    Anemia     Anxiety     Asthma     illness induced    SARA I (cervical intraepithelial neoplasia I)     RESOLVED: [id number]    Depression     Endometriosis     Functional ovarian cysts age 16    History of agoraphobia age 15    Hypercholesterolemia     IBS (irritable bowel syndrome) age 15    Constipation predominant    IC (interstitial cystitis)     Migraine     Motion sickness     Pap smear abnormality of cervix with ASCUS favoring benign     RESOLVED: [id number]    Seasonal allergies     with post-nasal drip and recurrent throat infections    Urinary tract infection     Varicella        Past Surgical History:   Procedure Laterality Date    CHOLECYSTECTOMY      CHOLECYSTECTOMY LAPAROSCOPIC  08/2020    COLONOSCOPY  06/2018    Nonbleeding angiodysplastic lesion mid ascending colon, questionable ulcerative proctitis-biopsy was negative for acute or chronic colitis    COLONOSCOPY  06/09/2016    Normal    COLONOSCOPY  05/23/2013    Adenoma at splenic flexure    EGD  08/02/2018    EGD  08/03/2020    Normal   Biopsy stomach negative for H  pylori, biopsy of the duodenum negative for celiac    EGD  08/03/2020    Normal   Biopsy of the stomach negative for H  pylori biopsied duodenum negative for celiac disease    ENDOTRACHEAL INTUBATION EMERGENT  2012    HEMORRHOID SURGERY  07/2009    Dr Liborio Soulier HYSTEROSCOPY,DX,SEP [address] N/A 11/21/2018    Procedure: HYSTEROSCOPY;  Surgeon: Kelly Gonzáles MD;  Location: [facility];  Service: Gynecology    MN HYSTEROSCOPY,W/ENDOMETRIAL ABLATION N/A 11/21/2018    Procedure: ABLATION ENDOMETRIAL [place];  Surgeon: Kelly Gonzáles MD;  Location: [facility];  Service: Gynecology    SMALL INTESTINE SURGERY      TOOTH EXTRACTION      TUBAL LIGATION      ONSET: 29 DEC 2011       Current Outpatient Medications   Medication Sig Dispense Refill    albuterol (PROAIR HFA) 90 mcg/act inhaler Inhale 2 puffs 4 (four) times a day every 4 to 6 hours as needed 1 Inhaler 5    ciprofloxacin (CIPRO) 500 mg tablet Take 1 tablet (500 mg total) by mouth every 12 (twelve) hours for 7 days 14 tablet 0    colestipol (COLESTID) 1 g tablet TAKE 1 TABLET (1 G TOTAL) BY MOUTH 3 (THREE) TIMES A DAY (Patient taking differently: Take 1 g by mouth 2 (two) times a day ) 270 tablet 1    fexofenadine-pseudoephedrine (ALLEGRA-D 24) 180-240 MG per 24 hr tablet Take 1 tablet by mouth daily (Patient taking differently: Take 1 tablet by mouth daily as needed ) 30 tablet 1    fluticasone (FLONASE) 50 mcg/act nasal spray SPRAY 2 SPRAYS ONCE A DAY FOR 90 DAYS      LORazepam (ATIVAN) 1 mg tablet TAKE 1 & 1/2 TABLETS DAILY AT BEDTIME AS NEEDED  1    medroxyPROGESTERone (DEPO-PROVERA) 150 mg/mL injection Please give in office every 10 weeks 1 mL 4    Meth-Hyo-M Bl-Na Phos-Ph Sal (Uribel) 118 MG CAPS Take 1 each by mouth as needed       Multiple Vitamin (MULTI VITAMIN DAILY PO) Take by mouth      Myrbetriq 50 MG TB24 Take 1 tablet by mouth daily      naproxen (NAPROSYN) 500 mg tablet TAKE 1 TABLET BY MOUTH 3 TIMES A DAY AS NEEDED MIGRAINE  LIMIT USE TO 3 DAYS PER WEEK      pentosan polysulfate (ELMIRON) 100 mg capsule Take 100 mg by mouth 3 (three) times a day before meals        albuterol (2 5 mg/3 mL) 0 083 % nebulizer solution Take 1 vial (2 5 mg total) by nebulization 3 (three) times a day (Patient not taking: Reported on 4/27/2021) 30 vial 2    colestipol (COLESTID) 5 g granules TAKE 5 G BY MOUTH 2 TIMES A DAY (Patient not taking: Reported on 5/5/2021) 300 g 1    dicyclomine (BENTYL) 10 mg capsule TAKE 1 CAPSULE BY MOUTH THREE TIMES A DAY (Patient not taking: Reported on 4/27/2021) 270 capsule 3    Heparin Sodium, Porcine, (HEPARIN, PORCINE,) 91029 UNIT/ML 10,000 UNITS TO BE INSTILLED INTO THE BLADDER IN THE UROLOGY OFFICE  2    methylPREDNISolone 4 MG tablet therapy pack Use as directed on package (Patient not taking: Reported on 5/11/2021) 21 each 0    ondansetron (ZOFRAN) 4 mg tablet Take 4 mg by mouth every 8 (eight) hours as needed  5    pantoprazole (PROTONIX) 40 mg tablet TAKE 1 TABLET BY MOUTH DAILY 1/2 HOUR BEFORE BREAKFAST (Patient not taking: Reported on 4/27/2021) 90 tablet 3    prochlorperazine (COMPAZINE) 10 mg tablet 1 tab po TID prn headache   Max 3 days per week      traMADol (ULTRAM) 50 mg tablet Take 1 tablet (50 mg total) by mouth daily at bedtime as needed for moderate pain (Patient not taking: Reported on 12/2/2020) 15 tablet 0    triamcinolone acetonide (KENALOG-40) 40 mg/mL 40 MG TO BE INSTILLED INTO THE BLADDER IN THE UROLOGY OFFICE  2     Current Facility-Administered Medications   Medication Dose Route Frequency Provider Last Rate Last Admin    medroxyPROGESTERone (DEPO-PROVERA) IM injection 150 mg  150 mg Intramuscular Once Franchot Grippe, DO            Allergies   Allergen Reactions    Azithromycin Diarrhea, Nausea Only and Other (See Comments)    Clarithromycin     Clavulanic Acid     Doxycycline     Erythromycin Base Other (See Comments)    Lamotrigine Other (See Comments)     Can't recall side effects   Lithium Itching    Mirtazapine      (remeron)    Moxifloxacin Nausea Only     Excessive vomiting  Excessive vomiting    Sulfamethoxazole      Other reaction(s): Rash    Sulfamethoxazole-Trimethoprim Other (See Comments)    Trimethoprim      Other reaction(s): Rash    Indomethacin Rash    Penicillins Rash       Review of Systems   Constitutional: Positive for fatigue and fever  HENT: Negative for sore throat and trouble swallowing  Eyes: Negative for visual disturbance  Respiratory: Positive for cough, chest tightness and shortness of breath  Cardiovascular: Negative for chest pain and palpitations  Gastrointestinal: Negative for abdominal pain and blood in stool  Endocrine: Negative for cold intolerance and heat intolerance  Genitourinary: Negative for difficulty urinating and dysuria  Musculoskeletal: Negative for gait problem  Skin: Negative for rash  Neurological: Negative for dizziness, syncope and headaches  Hematological: Negative for adenopathy  Psychiatric/Behavioral: Negative for behavioral problems         Video Exam    Vitals:    05/11/21 0940   Temp: 100 2 °F (37 9 °C)   Weight: 58 1 kg (128 lb)   Height: 5' 6" (1 676 m)       Physical Exam  Vitals signs and nursing note reviewed  Constitutional:       General: She is not in acute distress  Appearance: She is well-developed  HENT:      Head: Normocephalic and atraumatic  Right Ear: External ear normal       Left Ear: External ear normal    Eyes:      Conjunctiva/sclera: Conjunctivae normal    Neck:      Musculoskeletal: Normal range of motion  Pulmonary:      Effort: Pulmonary effort is normal  No respiratory distress  Neurological:      Mental Status: She is alert and oriented to person, place, and time  Psychiatric:         Mood and Affect: Mood normal          Behavior: Behavior normal           I spent 20 minutes directly with the patient during this visit      [facility] acknowledges that she has consented to an online visit or consultation  She understands that the online visit is based solely on information provided by her, and that, in the absence of a face-to-face physical evaluation by the physician, the diagnosis she receives is both limited and provisional in terms of accuracy and completeness  This is not intended to replace a full medical face-to-face evaluation by the physician  Carrie Marquez understands and accepts these terms

## 2021-05-11 NOTE — TELEPHONE ENCOUNTER
Pt states she finished her steroid yesterday, pt still has a few day of abx  Left  (started Cipro on 5/8/21)  Pt c/o SOB, was given a new steroid inhaler by allergist but feels it made her symptoms worse  Pt sched for pulmonary and cardiology at end of month  Advised pt to go to ED if needed for SOB  sched same day appt

## 2021-05-11 NOTE — ASSESSMENT & PLAN NOTE
- Not well controlled  - Continue using albuterol inhaler and nebulizer  - Proceed with pulmonologist and cardiologist appointments for continued work up  - Advised patient to proceed to ER with worsening shortness of breath

## 2021-05-11 NOTE — ASSESSMENT & PLAN NOTE
- Will send patient for COVID test and contact patient with results  - Reviewed quarantine guidelines  - Discussed supportive care and management

## 2021-05-11 NOTE — ASSESSMENT & PLAN NOTE
- Continue with prescribed inhalers for shortness of breath  - Continue routine follow up with allergist   - Will continue to monitor

## 2021-05-12 ENCOUNTER — APPOINTMENT (OUTPATIENT)
Dept: PHYSICAL THERAPY | Facility: REHABILITATION | Age: 51
End: 2021-05-12
Payer: MEDICARE

## 2021-05-13 ENCOUNTER — TELEPHONE (OUTPATIENT)
Dept: FAMILY MEDICINE CLINIC | Facility: CLINIC | Age: 51
End: 2021-05-13

## 2021-05-13 ENCOUNTER — APPOINTMENT (OUTPATIENT)
Dept: PHYSICAL THERAPY | Facility: REHABILITATION | Age: 51
End: 2021-05-13
Payer: MEDICARE

## 2021-05-13 NOTE — TELEPHONE ENCOUNTER
Pt went to [address]  yesterday and had holter monitor, echocardiogram and an ultrasound of the heart and said everything looks fine  Did blood work and everything normal  She had 3 covid test and all are negative so they saying its sign of covid but she not sure  ER suggested for her to call her PCP and ask them to put in an order for another Echocardigram and Stress Test  She had virtual with her yesterday

## 2021-05-13 NOTE — TELEPHONE ENCOUNTER
Pt left a msg in voice mail, she went to [facility] ER last night for shortness of breath  They advised her to call her pcp & have an Echo & a stress test ordered  Please advise pt [phone]

## 2021-05-13 NOTE — TELEPHONE ENCOUNTER
Patient has appointment with pulmonary on 5/24 and cardiology 5/25  Please have her continue with these appointments so they can order the tests they want

## 2021-05-13 NOTE — TELEPHONE ENCOUNTER
Pt aware to consult with the cardiologist on 5/25/21 on what tests she needs to have done and she agreed

## 2021-05-20 ENCOUNTER — TELEPHONE (OUTPATIENT)
Dept: FAMILY MEDICINE CLINIC | Facility: CLINIC | Age: 51
End: 2021-05-20

## 2021-05-20 ENCOUNTER — APPOINTMENT (OUTPATIENT)
Dept: LAB | Age: 51
End: 2021-05-20
Payer: MEDICARE

## 2021-05-20 DIAGNOSIS — R53.83 FATIGUE, UNSPECIFIED TYPE: ICD-10-CM

## 2021-05-20 DIAGNOSIS — E55.9 VITAMIN D INSUFFICIENCY: ICD-10-CM

## 2021-05-20 DIAGNOSIS — R50.9 FEVER OF UNKNOWN ORIGIN: ICD-10-CM

## 2021-05-20 DIAGNOSIS — R53.83 FATIGUE, UNSPECIFIED TYPE: Primary | ICD-10-CM

## 2021-05-20 PROCEDURE — 86618 LYME DISEASE ANTIBODY: CPT

## 2021-05-20 PROCEDURE — 82306 VITAMIN D 25 HYDROXY: CPT

## 2021-05-20 PROCEDURE — 36415 COLL VENOUS BLD VENIPUNCTURE: CPT

## 2021-05-20 NOTE — TELEPHONE ENCOUNTER
Pt aware lab orders and referrals placed  Pt also has appt sched with Rheumatology   She will keep her cardiology and pulmonary appt

## 2021-05-20 NOTE — TELEPHONE ENCOUNTER
Pt is still very fatigue and fever (100 2) so someone suggested getting a test for lyme disease, can we put in an order for that for her and now her son has a rash, vomiting , and also got a covid test that came back negative  Son is still very fatigue but no other symptoms so not sure if its what she has and contagious or ironically a different thing

## 2021-05-20 NOTE — TELEPHONE ENCOUNTER
I'll place labs to check for Vitamin D and lyme  I am also going to place referral to infectious disease due to her continued fever  Please advise patient to still follow up with cardiology and pulmonary appointments

## 2021-05-21 LAB — 25(OH)D3 SERPL-MCNC: 34.5 NG/ML (ref 30–100)

## 2021-05-22 LAB — B BURGDOR IGG+IGM SER-ACNC: 18

## 2021-05-25 ENCOUNTER — CLINICAL SUPPORT (OUTPATIENT)
Dept: OBGYN CLINIC | Facility: CLINIC | Age: 51
End: 2021-05-25
Payer: MEDICARE

## 2021-05-25 ENCOUNTER — TELEPHONE (OUTPATIENT)
Dept: FAMILY MEDICINE CLINIC | Facility: CLINIC | Age: 51
End: 2021-05-25

## 2021-05-25 ENCOUNTER — OFFICE VISIT (OUTPATIENT)
Dept: CARDIOLOGY CLINIC | Facility: CLINIC | Age: 51
End: 2021-05-25
Payer: MEDICARE

## 2021-05-25 VITALS
HEIGHT: 66 IN | DIASTOLIC BLOOD PRESSURE: 74 MMHG | BODY MASS INDEX: 20.57 KG/M2 | SYSTOLIC BLOOD PRESSURE: 128 MMHG | TEMPERATURE: 99.6 F | RESPIRATION RATE: 18 BRPM | HEART RATE: 94 BPM | WEIGHT: 128 LBS | OXYGEN SATURATION: 99 %

## 2021-05-25 DIAGNOSIS — R06.02 SHORTNESS OF BREATH: Primary | ICD-10-CM

## 2021-05-25 DIAGNOSIS — N92.6 IRREGULAR BLEEDING: Primary | ICD-10-CM

## 2021-05-25 DIAGNOSIS — I73.9 IC (INTERMITTENT CLAUDICATION) (HCC): ICD-10-CM

## 2021-05-25 DIAGNOSIS — R07.9 CHEST PAIN IN ADULT: ICD-10-CM

## 2021-05-25 PROCEDURE — 99204 OFFICE O/P NEW MOD 45 MIN: CPT | Performed by: INTERNAL MEDICINE

## 2021-05-25 PROCEDURE — 93000 ELECTROCARDIOGRAM COMPLETE: CPT | Performed by: INTERNAL MEDICINE

## 2021-05-25 PROCEDURE — 96372 THER/PROPH/DIAG INJ SC/IM: CPT | Performed by: OBSTETRICS & GYNECOLOGY

## 2021-05-25 RX ORDER — MEDROXYPROGESTERONE ACETATE 150 MG/ML
150 INJECTION, SUSPENSION INTRAMUSCULAR ONCE
Status: COMPLETED | OUTPATIENT
Start: 2021-05-25 | End: 2021-05-25

## 2021-05-25 RX ADMIN — MEDROXYPROGESTERONE ACETATE 150 MG: 150 INJECTION, SUSPENSION INTRAMUSCULAR at 10:20

## 2021-05-25 NOTE — TELEPHONE ENCOUNTER
----- Message from Christy Yoo, Arthur Gardner sent at 5/25/2021  7:00 AM EDT -----  Lyme test and Vitamin D were normal

## 2021-05-25 NOTE — PROGRESS NOTES
Consultation - Cardiology Office  [facility]  Nilda Amador 52 y o  female MRN: [id number]  : 1970  Unit/Bed#:  Encounter: [id number]      ASSESSMENT:  Fever, Fatigue, Shortness of breath:  Echo, 2020:  EF 60%, trace anterior pericardial effusion    Chest pain/heaviness:  Retrosternal intermittent chest heaviness    RECOMMENDATIONS:  Repeat echocardiogram to evaluate for pericarditis or cardiomyopathy  Exercise stress test(as per patient she is able to walk on a treadmill)      Thank you for your consultation  If you have any question please call me at [phone]      Primary Care Physician Requesting Consult: Alexander Guardado MD      Reason for Consult / Principal Problem:  Chest heaviness and dyspnea        HPI :     Nilda Amador is a 52y o  year old female who was referred by primary care doctor for cardiac evaluation  As per patient her son had Dimitri per although she was in close contact with him, she tested negative she also received COVID vaccine and 6 days after the vaccine she started developing symptoms of recurrent fever, cough, shortness of breath, chest heaviness and fatigue her previous echocardiogram about 10 months ago head revealed small pericardial effusion  No  She has also had CT of the chest which did not reveal any pulmonary or cardiac pathology    REVIEW OF SYSTEMS:    Constitutional: Negative for activity change, appetite change, chills, positive for fatigue, fever   HENT: Negative for congestion, sore throat and trouble swallowing  Eyes: Negative for discharge and redness  Respiratory: Negative for apnea, positive for cough, chest tightness, shortness of breath   Cardiovascular: Negative for palpitations and leg swelling  Gastrointestinal: Negative for abdominal distention, abdominal pain, anal bleeding, blood in stool, constipation, diarrhea, nausea and vomiting  Endocrine: Negative for polydipsia, polyphagia and polyuria     Genitourinary: Negative for difficulty urinating, dysuria, flank pain and hematuria  Musculoskeletal: Negative for arthralgias, myalgias and neck stiffness  Skin: Negative for pallor and rash  Allergic/Immunologic: Negative for environmental allergies  Neurological: Negative for dizziness, syncope, light-headedness, numbness and headaches  Hematological: Negative for adenopathy  Does not bruise/bleed easily  Psychiatric/Behavioral: Negative for confusion and hallucinations  The patient is not nervous/anxious        Historical Information   Past Medical History:   Diagnosis Date    Anemia     Anxiety     Asthma     illness induced    SARA I (cervical intraepithelial neoplasia I)     RESOLVED: [id number]    Depression     Endometriosis     Functional ovarian cysts age 16    History of agoraphobia age 15    Hypercholesterolemia     IBS (irritable bowel syndrome) age 15    Constipation predominant    IC (interstitial cystitis)     Migraine     Motion sickness     Pap smear abnormality of cervix with ASCUS favoring benign     RESOLVED: [id number]    Seasonal allergies     with post-nasal drip and recurrent throat infections    Urinary tract infection     Varicella      Past Surgical History:   Procedure Laterality Date    CHOLECYSTECTOMY      CHOLECYSTECTOMY LAPAROSCOPIC  08/2020    COLONOSCOPY  06/2018    Nonbleeding angiodysplastic lesion mid ascending colon, questionable ulcerative proctitis-biopsy was negative for acute or chronic colitis    COLONOSCOPY  06/09/2016    Normal    COLONOSCOPY  05/23/2013    Adenoma at splenic flexure    EGD  08/02/2018    EGD  08/03/2020    Normal   Biopsy stomach negative for H  pylori, biopsy of the duodenum negative for celiac    EGD  08/03/2020    Normal   Biopsy of the stomach negative for H  pylori biopsied duodenum negative for celiac disease    ENDOTRACHEAL INTUBATION EMERGENT  2012    HEMORRHOID SURGERY  07/2009    Dr Sb Sigala HYSTEROSCOPY,DX,SEP PROC N/A 11/21/2018    Procedure: HYSTEROSCOPY;  Surgeon: Josefina Oleary MD;  [facility];  Service: Gynecology    GA HYSTEROSCOPY,W/ENDOMETRIAL ABLATION N/A 11/21/2018    Procedure: Jeanna Pay;  Surgeon: Josefina Oleary MD;  [facility];  Service: Gynecology    SMALL INTESTINE SURGERY      TOOTH EXTRACTION      TUBAL LIGATION      ONSET: 34 DEC 2011     Social History     Substance and Sexual Activity   Alcohol Use No     Social History     Substance and Sexual Activity   Drug Use No     Social History     Tobacco Use   Smoking Status Never Smoker   Smokeless Tobacco Never Used     Family History:   Family History   Problem Relation Age of Onset    Other Mother         HYSTERECTOMY FOR BENIGN DISEASE     Diabetes Mother     Lung cancer Father 79    Other Sister         HYSTERECTOMY FOR BENIGN DISEASE     Diabetes Maternal Grandmother     Other Maternal Grandmother         HYSTERECTOMY FOR BENIGN DISEASE     Colon cancer Paternal Grandmother 61    Throat cancer Paternal Uncle 61    No Known Problems Maternal Aunt     No Known Problems Paternal Aunt     No Known Problems Paternal Aunt        Meds/Allergies     Allergies   Allergen Reactions    Azithromycin Diarrhea, Nausea Only and Other (See Comments)    Clarithromycin     Clavulanic Acid     Doxycycline     Erythromycin Base Other (See Comments)    Lamotrigine Other (See Comments)     Can't recall side effects       Lithium Itching    Mirtazapine      (remeron)    Moxifloxacin Nausea Only     Excessive vomiting  Excessive vomiting    Sulfamethoxazole      Other reaction(s): Rash    Sulfamethoxazole-Trimethoprim Other (See Comments)    Trimethoprim      Other reaction(s): Rash    Indomethacin Rash    Penicillins Rash       Current Outpatient Medications:     albuterol (2 5 mg/3 mL) 0 083 % nebulizer solution, Take 1 vial (2 5 mg total) by nebulization 3 (three) times a day (Patient not taking: Reported on 4/27/2021), Disp: 30 vial, Rfl: 2    albuterol (PROAIR HFA) 90 mcg/act inhaler, Inhale 2 puffs 4 (four) times a day every 4 to 6 hours as needed, Disp: 1 Inhaler, Rfl: 5    colestipol (COLESTID) 1 g tablet, TAKE 1 TABLET (1 G TOTAL) BY MOUTH 3 (THREE) TIMES A DAY (Patient taking differently: Take 1 g by mouth 2 (two) times a day ), Disp: 270 tablet, Rfl: 1    colestipol (COLESTID) 5 g granules, TAKE 5 G BY MOUTH 2 TIMES A DAY (Patient not taking: Reported on 5/5/2021), Disp: 300 g, Rfl: 1    dicyclomine (BENTYL) 10 mg capsule, TAKE 1 CAPSULE BY MOUTH THREE TIMES A DAY (Patient not taking: Reported on 4/27/2021), Disp: 270 capsule, Rfl: 3    fexofenadine-pseudoephedrine (ALLEGRA-D 24) 180-240 MG per 24 hr tablet, Take 1 tablet by mouth daily (Patient taking differently: Take 1 tablet by mouth daily as needed ), Disp: 30 tablet, Rfl: 1    fluticasone (FLONASE) 50 mcg/act nasal spray, SPRAY 2 SPRAYS ONCE A DAY FOR 90 DAYS, Disp: , Rfl:     Heparin Sodium, Porcine, (HEPARIN, PORCINE,) 78621 UNIT/ML, 10,000 UNITS TO BE INSTILLED INTO THE BLADDER IN THE UROLOGY OFFICE, Disp: , Rfl: 2    LORazepam (ATIVAN) 1 mg tablet, TAKE 1 & 1/2 TABLETS DAILY AT BEDTIME AS NEEDED, Disp: , Rfl: 1    medroxyPROGESTERone (DEPO-PROVERA) 150 mg/mL injection, Please give in office every 10 weeks, Disp: 1 mL, Rfl: 4    Meth-Hyo-M Bl-Na Phos-Ph Sal (Uribel) 118 MG CAPS, Take 1 each by mouth as needed , Disp: , Rfl:     methylPREDNISolone 4 MG tablet therapy pack, Use as directed on package (Patient not taking: Reported on 5/11/2021), Disp: 21 each, Rfl: 0    Multiple Vitamin (MULTI VITAMIN DAILY PO), Take by mouth, Disp: , Rfl:     Myrbetriq 50 MG TB24, Take 1 tablet by mouth daily, Disp: , Rfl:     naproxen (NAPROSYN) 500 mg tablet, TAKE 1 TABLET BY MOUTH 3 TIMES A DAY AS NEEDED MIGRAINE  LIMIT USE TO 3 DAYS PER WEEK, Disp: , Rfl:     ondansetron (ZOFRAN) 4 mg tablet, Take 4 mg by mouth every 8 (eight) hours as needed, Disp: , Rfl: 5    pantoprazole (PROTONIX) 40 mg tablet, TAKE 1 TABLET BY MOUTH DAILY 1/2 HOUR BEFORE BREAKFAST (Patient not taking: Reported on 4/27/2021), Disp: 90 tablet, Rfl: 3    pentosan polysulfate (ELMIRON) 100 mg capsule, Take 100 mg by mouth 3 (three) times a day before meals  , Disp: , Rfl:     prochlorperazine (COMPAZINE) 10 mg tablet, 1 tab po TID prn headache  Max 3 days per week, Disp: , Rfl:     traMADol (ULTRAM) 50 mg tablet, Take 1 tablet (50 mg total) by mouth daily at bedtime as needed for moderate pain (Patient not taking: Reported on 12/2/2020), Disp: 15 tablet, Rfl: 0    triamcinolone acetonide (KENALOG-40) 40 mg/mL, 40 MG TO BE INSTILLED INTO THE BLADDER IN THE UROLOGY OFFICE, Disp: , Rfl: 2    Current Facility-Administered Medications:     medroxyPROGESTERone (DEPO-PROVERA) IM injection 150 mg, 150 mg, Intramuscular, Once, Jammie Tong DO    Vitals: not currently breastfeeding  There is no height or weight on file to calculate BMI  There were no vitals filed for this visit  BP Readings from Last 3 Encounters:   03/17/21 118/72   01/06/21 104/78   12/24/20 128/82         PHYSICAL EXAMINATION:  Neurologic:  Alert & oriented x 3, no new focal deficits, Not in any acute distress,  Constitutional:  Well developed, well nourished, non-toxic appearance   Eyes:  Pupil equal and reacting to light, conjunctiva normal, No JVP, No LNP   HENT:  Atraumatic, oropharynx moist, Neck- normal range of motion, no tenderness,  Neck supple   Respiratory:  Bilateral air entry, mostly clear to auscultation  Cardiovascular: S1-S2 regular with a I/VI systolic murmur   GI:  Soft, nondistended, normal bowel sounds, nontender, no hepatosplenomegaly appreciated  Musculoskeletal:  No edema, no tenderness, no deformities     Skin:  Well hydrated, no rash   Lymphatic:  No lymphadenopathy noted   Extremities:  No edema and distal pulses are present    Diagnostic Studies Review Cardio:      EKG: Normal sinus rhythm, heart rate 90 per minute    Cardiac testing:   Results for orders placed during the hospital encounter of 20   Echo complete with contrast if indicated    Narrative [facility]  [facility], [address]  [phone]    Transthoracic Echocardiogram  2D, M-mode, Doppler, and Color Doppler    Study date:  2020    Patient: Keith Gillespie  MR number: [id number]  Account number: [de-identified]  : 1970  Age: 52 years  Gender: Female  Status: Outpatient  [facility]: [facility]  Height: 65 in  Weight: 125 8 lb  BP: 132/ 84 mmHg    Indications: Syncope  Diagnoses: R55  - Syncope and collapse    Sonographer:  LUIS Ro  Primary Physician:  Yonatan Vaughan MD  Group:  [facility]  Interpreting Physician:  Juan Alicea DO    SUMMARY    LEFT VENTRICLE:  Systolic function was normal  Ejection fraction was estimated to be 60 %  There were no regional wall motion abnormalities  PERICARDIUM:  A trace pericardial effusion was identified anterior to the heart  SUMMARY MEASUREMENTS  2D measurements:  Unspecified Anatomy:   %FS was 32 9 %  Ao Diam was 2 9 cm   EDV(Teich) was 70 ml   EF(Teich) was 62 %  ESV(Teich) was 26 6 ml  IVSd was 0 7 cm  LA Diam was 2 7 cm  LAAs A2C was 11 1 cm2  LAAs A4C was 11 9 cm2  LAESV A-L A2C was 23 7  ml  LAESV A-L A4C was 26 6 ml  LAESV Index (A-L) was 15 6 ml/m2  LAESV MOD A2C was 22 8 ml  LAESV MOD A4C was 24 9 ml  LAESV(A-L) was 25 4 ml  LAESV(MOD BP) was 24 ml  LALs A2C was 4 4 cm  LALs A4C was 4 5 cm  LVEDV MOD A4C was 65  ml  LVEF MOD A4C was 69 4 %  LVESV MOD A4C was 19 9 ml  LVIDd was 4 cm  LVIDs was 2 7 cm  LVLd A4C was 8 5 cm  LVLs A4C was 6 7 cm  LVOT Diam was 1 8 cm  LVPWd was 0 7 cm  RVIDd was 3 1 cm  SV MOD A4C was 45 1 ml   SV(Teich) was  43 4 ml   PW measurements:  Unspecified Anatomy:   E' Avg was 0 1 m/s  E' Lat was 0 2 m/s    E' Sept was 0 1 m/s   E/E' Avg was 5 8   E/E' Lat was 4 6   E/E' Sept was 8   MV A Marquis was 0 6 m/s  MV Dec Whitley was 3 m/s2  MV DecT was 240 1 ms   MV E Marquis was 0 7 m/s  MV E/A Ratio was 1 2   MV PHT was 69 6 ms  MVA By PHT was 3 2 cm2  COMPARISONS:  The previous study was not available for direct comparison  HISTORY: PRIOR HISTORY: Patient has no history of cardiovascular disease  PROCEDURE: The study was performed in the [facility]  This was a routine study  The transthoracic approach was used  The study included complete 2D imaging, M-mode, complete spectral Doppler, and color Doppler  The heart rate was 82 bpm, at  the start of the study  Image quality was adequate  LEFT VENTRICLE: Size was normal  Systolic function was normal  Ejection fraction was estimated to be 60 %  There were no regional wall motion abnormalities  Wall thickness was normal  No evidence of apical thrombus  DOPPLER: Left  ventricular diastolic function parameters were normal     RIGHT VENTRICLE: The size was normal  Systolic function was normal  Wall thickness was normal     LEFT ATRIUM: Size was normal     RIGHT ATRIUM: Size was normal     MITRAL VALVE: Valve structure was normal  There was normal leaflet separation  DOPPLER: The transmitral velocity was within the normal range  There was no evidence for stenosis  There was no significant regurgitation  AORTIC VALVE: The valve was trileaflet  Leaflets exhibited normal thickness and normal cuspal separation  DOPPLER: Transaortic velocity was within the normal range  There was no evidence for stenosis  There was no significant  regurgitation  TRICUSPID VALVE: The valve structure was normal  There was normal leaflet separation  DOPPLER: The transtricuspid velocity was within the normal range  There was no evidence for stenosis  There was no significant regurgitation  PULMONIC VALVE: Leaflets exhibited normal thickness, no calcification, and normal cuspal separation   DOPPLER: The transpulmonic velocity was within the normal range  There was no significant regurgitation  PERICARDIUM: A trace pericardial effusion was identified anterior to the heart  AORTA: The root exhibited normal size  SYSTEMIC VEINS: IVC: The inferior vena cava was normal in size and course  The inferior vena cava was normal in size  Respirophasic changes were normal     SYSTEM MEASUREMENT TABLES    2D  %FS: 32 9 %  Ao Diam: 2 9 cm  EDV(Teich): 70 ml  EF(Teich): 62 %  ESV(Teich): 26 6 ml  IVSd: 0 7 cm  LA Diam: 2 7 cm  LAAs A2C: 11 1 cm2  LAAs A4C: 11 9 cm2  LAESV A-L A2C: 23 7 ml  LAESV A-L A4C: 26 6 ml  LAESV Index (A-L): 15 6 ml/m2  LAESV MOD A2C: 22 8 ml  LAESV MOD A4C: 24 9 ml  LAESV(A-L): 25 4 ml  LAESV(MOD BP): 24 ml  LALs A2C: 4 4 cm  LALs A4C: 4 5 cm  LVEDV MOD A4C: 65 ml  LVEF MOD A4C: 69 4 %  LVESV MOD A4C: 19 9 ml  LVIDd: 4 cm  LVIDs: 2 7 cm  LVLd A4C: 8 5 cm  LVLs A4C: 6 7 cm  LVOT Diam: 1 8 cm  LVPWd: 0 7 cm  RVIDd: 3 1 cm  SV MOD A4C: 45 1 ml  SV(Teich): 43 4 ml    PW  E' Av 1 m/s  E' Lat: 0 2 m/s  E' Sept: 0 1 m/s  E/E' Av 8  E/E' Lat: 4 6  E/E' Sept: 8  MV A Marquis: 0 6 m/s  MV Dec Eddy: 3 m/s2  MV DecT: 240 1 ms  MV E Marquis: 0 7 m/s  MV E/A Ratio: 1 2  MV PHT: 69 6 ms  MVA By PHT: 3 2 cm2    [employer] Accredited Echocardiography Laboratory    Prepared and electronically signed by    Ramin Almaraz DO  Signed 2020 16:40:58       No results found for this or any previous visit  No results found for this or any previous visit  No results found for this or any previous visit  No results found for this or any previous visit  No results found for this or any previous visit  Imaging:  Chest X-Ray:   No Chest XR results available for this patient  CT-scan of the chest:     No CTA results available for this patient    Lab Review   Lab Results   Component Value Date    WBC 6 00 2020    HGB 14 2 2020    HCT 44 3 2020    MCV 92 2020    RDW 13 9 11/27/2020     11/27/2020     BMP:  Lab Results   Component Value Date    SODIUM 140 11/27/2020    K 4 2 11/27/2020     (H) 11/27/2020    CO2 26 11/27/2020    BUN 11 11/27/2020    CREATININE 0 86 11/27/2020    GLUF 85 11/27/2020    CALCIUM 9 9 11/27/2020    EGFR 80 11/27/2020     LFT:  Lab Results   Component Value Date    AST 9 11/27/2020    ALT 22 11/27/2020    ALKPHOS 65 11/27/2020    TP 7 2 11/27/2020    ALB 4 1 11/27/2020      Lab Results   Component Value Date    JJE5VXSFCGWE 2 500 11/27/2020     No components found for: TSH3  No results found for: HGBA1C  Lipid Profile:   Lab Results   Component Value Date    CHOLESTEROL 150 11/27/2020    HDL 41 11/27/2020    LDLCALC 91 11/27/2020    TRIG 88 11/27/2020     Lab Results   Component Value Date    CHOLESTEROL 150 11/27/2020    CHOLESTEROL 169 05/20/2020     No results found for: CKTOTAL, CKMB, CKMBINDEX, TROPONINI  No results found for: NTBNP   Recent Results (from the past 672 hour(s))   Multiplex COVID19, Influenza A/B, RSV PCR, [facility] - Collected at [facility] or [facility]    Collection Time: 04/27/21  2:46 PM    Specimen: Nares   Result Value Ref Range    SARS-CoV-2 Negative Negative    INFLUENZA A PCR Negative Negative    INFLUENZA B PCR Negative Negative    RSV PCR Negative Negative   NOVEL CORONAVIRUS (COVID-19), PCR [facility]    Collection Time: 04/30/21  6:28 PM    Specimen type and source: Nasal, Nasopharynx (LAB)   Result Value Ref Range    SARS Coronavirus 2 PCR Not Detected Not Detected    Specimen Description Nasopharyngeal swab     First test? No     Prior test type? Molecular     Prior test result? Not Detected     Prior test date?       ^^^UNKNA^Unknown or not applicable^L^^^Unknown or not applicable    Employed in healthcare setting? No     Symptomatic as defined by CDC? Yes     Date of symptom onset?       ^^^UNKNA^Unknown or not applicable^L^^^Unknown or not applicable    Currently hospitalized? No     In ICU?  Unknown     Resident in congregate care setting? No     Pregnant? No    NOVEL CORONAVIRUS (COVID-19), PCR [facility]    Collection Time: 05/03/21 10:44 PM    Specimen type and source: Separate Accession, Nasopharynx (LAB)   Result Value Ref Range    SARS Coronavirus 2 PCR Not Detected Not Detected    Specimen Description NASOPHARYNGEAL SWAB     First test? No     Prior test type? MOLECULAR     Prior test result? NOT DETECTED     Prior test date?       ^^^UNKNA^Unknown or not applicable^L^^^Unknown or not applicable    Employed in healthcare setting? No     Symptomatic as defined by CDC? YES     Date of symptom onset?       ^^^UNKNA^Unknown or not applicable^L^^^Unknown or not applicable    Currently hospitalized? No     In ICU? Unknown     [facility]? No     Pregnant? UNKNOWN    NOVEL CORONAVIRUS (COVID-19), PCR [facility]    Collection Time: 05/11/21 12:00 AM    Specimen type and source: Separate Accession, Nasopharynx (LAB)   Result Value Ref Range    SARS Coronavirus 2 PCR Not Detected Not Detected    Specimen Description Nasopharyngeal swab     First test? No     Prior test type? Molecular     Prior test result? Not Detected     Prior test date? 20,210,503     Employed in healthcare setting? No     Symptomatic as defined by CDC? Yes     Date of symptom onset? 20,210,423     Currently hospitalized? No     In ICU? Unknown     [facility]? No     Pregnant? No    Lyme Total Antibody Profile with reflex to WB    Collection Time: 05/20/21  5:56 PM   Result Value Ref Range    Lyme total antibody 18 0 00 - 119   Vitamin D 25 hydroxy    Collection Time: 05/20/21  5:56 PM   Result Value Ref Range    Vit D, 25-Hydroxy 34 5 30 0 - 100 0 ng/mL           Dr Emely Thomson MD, [facility]      "This note has been constructed using a voice recognition system  Therefore there may be syntax, spelling, and/or grammatical errors   Please call if you have any questions  "

## 2021-05-25 NOTE — PROGRESS NOTES
Patient is here for depo, patient receives depo every 10 weeks, patient stated she was having a white vaginal discharge and is trying Monistat, will call if sx worsen, patient is to return to office in 10 weeks for next depo, patient has annual gyn exam scheduled for 07/15/21

## 2021-05-26 ENCOUNTER — DOCUMENTATION (OUTPATIENT)
Dept: PULMONOLOGY | Facility: CLINIC | Age: 51
End: 2021-05-26

## 2021-05-26 ENCOUNTER — APPOINTMENT (OUTPATIENT)
Dept: LAB | Facility: HOSPITAL | Age: 51
End: 2021-05-26
Attending: INTERNAL MEDICINE
Payer: MEDICARE

## 2021-05-26 ENCOUNTER — OFFICE VISIT (OUTPATIENT)
Dept: PULMONOLOGY | Facility: CLINIC | Age: 51
End: 2021-05-26
Payer: MEDICARE

## 2021-05-26 VITALS
WEIGHT: 128 LBS | DIASTOLIC BLOOD PRESSURE: 64 MMHG | HEIGHT: 66 IN | OXYGEN SATURATION: 99 % | TEMPERATURE: 99.3 F | BODY MASS INDEX: 20.57 KG/M2 | HEART RATE: 88 BPM | RESPIRATION RATE: 16 BRPM | SYSTOLIC BLOOD PRESSURE: 108 MMHG

## 2021-05-26 DIAGNOSIS — B37.3 VAGINAL YEAST INFECTION: Primary | ICD-10-CM

## 2021-05-26 DIAGNOSIS — J45.901 MODERATE ASTHMA WITH ACUTE EXACERBATION, UNSPECIFIED WHETHER PERSISTENT: ICD-10-CM

## 2021-05-26 DIAGNOSIS — J45.41 MODERATE PERSISTENT ASTHMA WITH ACUTE EXACERBATION: Primary | ICD-10-CM

## 2021-05-26 DIAGNOSIS — J45.41 MODERATE PERSISTENT ASTHMA WITH ACUTE EXACERBATION: ICD-10-CM

## 2021-05-26 DIAGNOSIS — J96.91 RESPIRATORY FAILURE WITH HYPOXIA, UNSPECIFIED CHRONICITY (HCC): ICD-10-CM

## 2021-05-26 DIAGNOSIS — R05.9 COUGH: ICD-10-CM

## 2021-05-26 DIAGNOSIS — J40 BRONCHITIS: ICD-10-CM

## 2021-05-26 DIAGNOSIS — R06.02 SHORTNESS OF BREATH: ICD-10-CM

## 2021-05-26 DIAGNOSIS — R06.02 SHORTNESS OF BREATH: Primary | ICD-10-CM

## 2021-05-26 LAB
SARS-COV-2 IGG SERPL QL IA: REACTIVE
SARS-COV-2 IGG+IGM SERPL QL IA: REACTIVE

## 2021-05-26 PROCEDURE — 94618 PULMONARY STRESS TESTING: CPT | Performed by: INTERNAL MEDICINE

## 2021-05-26 PROCEDURE — 86769 SARS-COV-2 COVID-19 ANTIBODY: CPT

## 2021-05-26 PROCEDURE — 36415 COLL VENOUS BLD VENIPUNCTURE: CPT

## 2021-05-26 PROCEDURE — 99205 OFFICE O/P NEW HI 60 MIN: CPT | Performed by: INTERNAL MEDICINE

## 2021-05-26 RX ORDER — FLUCONAZOLE 150 MG/1
150 TABLET ORAL ONCE
Qty: 1 TABLET | Refills: 0 | Status: SHIPPED | OUTPATIENT
Start: 2021-05-26 | End: 2021-05-26

## 2021-05-26 RX ORDER — PREDNISONE 10 MG/1
TABLET ORAL
Qty: 40 TABLET | Refills: 0 | Status: SHIPPED | OUTPATIENT
Start: 2021-05-26 | End: 2021-06-11

## 2021-05-26 RX ORDER — FLUTICASONE FUROATE AND VILANTEROL 200; 25 UG/1; UG/1
1 POWDER RESPIRATORY (INHALATION) DAILY
Qty: 1 EACH | Refills: 5 | Status: SHIPPED | OUTPATIENT
Start: 2021-05-26 | End: 2022-08-02

## 2021-05-26 NOTE — PROGRESS NOTES
Pulmonary Consultation   Sean Gresham 52 y o  female MRN: [id number]  5/26/2021        Chief Complaint:  Dyspnea with exertion    HPI:     57-year-old female with past medical history of moderate persistent asthma, interstitial cystitis, bipolar disorder presents for significant dyspnea on exertion and dry cough  Patient states that symptoms started after known COVID-19 exposure approximately 1 month ago  Patient has repeatedly tested negative for COVID-19 however has had a many symptoms including loss of taste and smell, dry cough, low-grade fevers between 99 and 101 F, and general malaise  Patient states that symptoms have not improved over the last 1 month  She denies any sputum production with the cough  She states that shortness of breath and chest tightness occurs predominantly with exercise which she  Considers is minor activity for her prior to this exacerbation of shortness of breath  Exercise Tolerance: Able to ambulate   Less than 1 block stairs      Past Medical Hx   moderate persistent asthma   Bipolar disorder   Interstitial cystitis  allergic rhinitis  anxiety     Past Surgical Hx  Tubal ligation      Family Hx   maternal-diabetes  Paternal- lung cancer      Occupational History:     No known previous inhalation injuries      Social History:    lifelong nonsmoker       Pertinent Meds   albuterol as needed      ROS:  Constitutional:  + Fatigue, - chills,  + fever, - weight change  HEENT: - rhinorrhea, - sneezing, - sore throat  Respiratory:  + cough, , - sputum production,  + shortness of breath, - wheezing  Cardiovascular: - chest pain,  -palpitations, - leg swelling  Gastrointestinal: - abdominal pain, - constipation, - diarrhea, - nausea, - vomiting  Endocrine: - cold intolerance, - heat intolerance  Genitourinary: - dysuria  Musculoskeletal: - arthralgias  Skin:- rash, - wound     Allergic/Immunologic: - allergies  Neurological: - dizziness, - numbness      Vitals: Blood pressure 108/64, pulse 88, temperature 99 3 °F (37 4 °C), temperature source Tympanic, resp  rate 16, height 5' 6" (1 676 m), weight 58 1 kg (128 lb), SpO2 99 %, not currently breastfeeding , Body mass index is 20 66 kg/m²  Physical Exam  GEN  NAD  NECK  supple, no JVD, no LAD  CV  +s1s2, no mrg, RRR  PULM  CTA BL, no wrr  ABD  soft, ntnd, + BS  EXT  no edema, no cyanosis, no clubbing  NEURO  Aox3, no focal weakness    Imaging and other studies     I have personally viewed and interpreted the following studies:   CT chest 05/03/2021 shows normal appearing lung parenchyma and pleura      Pulmonary function testing:    none      EKG, Pathology, and Other Studies:   none    Assessment:  1  Aacute bronchitis   2  Moderate persistent asthma with acute exacerbation   3  Hypoxia with ambulation  4  +Exposure to COVID19 with symptoms consistent with COVID-19    Plan:   This patient is constellation of symptoms are somewhat confusing  Despite testing negative multiple times for COVID-19, she exhibits symptoms including dry cough, loss of taste and smell, intermittent fevers and general malaise  In addition she has known contact with positive patient's     Patient had 6 minutes walk test performed today which showed a shraddha SpO2 of 80% on ambient air  Patient's oxygenation improved to greater than 88% with addition of 2 L of supplemental oxygen with ambulation   I have ordered 2 L of nasal cannula oxygen with exertion    I will check COVID 19 antibodies   On prescribe a tapered course of  Prednisone starting at 40 mg and decreasing by 10 mg every 4 days   Continue albuterol as needed    Discontinue QVAR    Start Breo 200 daily    Demonstrated  Ellipta usage    check V/Q scan    Check full PFTs with bronchodilator challenge and 6 minutes walk test    I agree with echocardiogram ordered by cardiologist but would recommend also performing a bubble study to evaluate for right-to-left shunt       Continue Flonase for allergic rhinitis    Return visit in  1 month  On next visit we will evaluate  Symptoms, a V/Q scan results, echocardiogram with bubble study results, COVID-19 antibody results, PFT results      [facility]

## 2021-05-26 NOTE — TELEPHONE ENCOUNTER
Pt has thick, white discharge and the Monistat did not work    pt is asking for diflucan that was given to her before

## 2021-05-27 ENCOUNTER — TELEPHONE (OUTPATIENT)
Dept: PULMONOLOGY | Facility: CLINIC | Age: 51
End: 2021-05-27

## 2021-05-27 ENCOUNTER — HOSPITAL ENCOUNTER (OUTPATIENT)
Dept: NON INVASIVE DIAGNOSTICS | Facility: CLINIC | Age: 51
Discharge: HOME | End: 2021-05-27
Attending: INTERNAL MEDICINE
Payer: MEDICARE

## 2021-05-27 ENCOUNTER — HOSPITAL ENCOUNTER (OUTPATIENT)
Dept: RADIOLOGY | Facility: HOSPITAL | Age: 51
Discharge: HOME | End: 2021-05-27
Attending: INTERNAL MEDICINE
Payer: MEDICARE

## 2021-05-27 DIAGNOSIS — R06.02 SHORTNESS OF BREATH: ICD-10-CM

## 2021-05-27 DIAGNOSIS — J45.41 MODERATE PERSISTENT ASTHMA WITH ACUTE EXACERBATION: ICD-10-CM

## 2021-05-27 PROCEDURE — 71046 X-RAY EXAM CHEST 2 VIEWS: CPT

## 2021-05-27 PROCEDURE — 93306 TTE W/DOPPLER COMPLETE: CPT | Performed by: INTERNAL MEDICINE

## 2021-05-27 PROCEDURE — 93306 TTE W/DOPPLER COMPLETE: CPT

## 2021-05-27 NOTE — TELEPHONE ENCOUNTER
Pt called saying that she had her lab work done and it shows it has resulted  She also said that she was able to move her imaging up for today    She would like a call back with results of lab

## 2021-05-28 ENCOUNTER — TELEPHONE (OUTPATIENT)
Dept: CARDIOLOGY CLINIC | Facility: CLINIC | Age: 51
End: 2021-05-28

## 2021-05-28 NOTE — TELEPHONE ENCOUNTER
----- Message from Nilda Colmenares MD sent at 5/28/2021  8:03 AM EDT -----  Please call and inform patient that the Echocardiogram showed normal pumping function of the heart  No significant valve abnormality was seen

## 2021-06-01 ENCOUNTER — TELEPHONE (OUTPATIENT)
Dept: PULMONOLOGY | Facility: CLINIC | Age: 51
End: 2021-06-01

## 2021-06-01 NOTE — TELEPHONE ENCOUNTER
Patient LM saying she does have a pulse ox ad she is checking her levels  She said she is just not getting that "immediate relief" from the oxygen  She said she does have humidification with it  She said "I'll see what I can do"

## 2021-06-01 NOTE — TELEPHONE ENCOUNTER
Patient LM saying she does not feel air coming out of her oxygen  She said it is stuffing her up and she is not getting any relief  She does feel it on her finger, but not when she puts it in her nose  She said while she was in the office, she felt good being on the oxygen and it helped her symptoms  She is asking if she should turn it up

## 2021-06-01 NOTE — TELEPHONE ENCOUNTER
Pilar Arroyo MD  to Rose Marie Hoyt, [address]          6/1/21 3:26 PM  If she's feeling it come out on her finger, I would not turn it up any further right now  If she has a pulse oximeter, she should check to see her SpO2 intermittently  If not, she should consider purchasing one  Goal SpO2 is >88%     Daryl

## 2021-06-01 NOTE — TELEPHONE ENCOUNTER
Called left voicemail for patient that she should try maintaining good sinus hygiene including water based nasal rinses, warm compresses, and Neti pot    -  her oxygen is likely drying out her sinuses  -  I did recommend getting pulse oximetry to monitor her oxygen level    - any further questions feel free to call office

## 2021-06-02 ENCOUNTER — HOSPITAL ENCOUNTER (OUTPATIENT)
Dept: PULMONOLOGY | Facility: HOSPITAL | Age: 51
Discharge: HOME | End: 2021-06-02
Attending: INTERNAL MEDICINE
Payer: MEDICARE

## 2021-06-02 DIAGNOSIS — J45.41 MODERATE PERSISTENT ASTHMA WITH ACUTE EXACERBATION: ICD-10-CM

## 2021-06-02 DIAGNOSIS — R06.02 SHORTNESS OF BREATH: ICD-10-CM

## 2021-06-02 PROCEDURE — 94729 DIFFUSING CAPACITY: CPT

## 2021-06-02 PROCEDURE — 94726 PLETHYSMOGRAPHY LUNG VOLUMES: CPT | Performed by: INTERNAL MEDICINE

## 2021-06-02 PROCEDURE — 94618 PULMONARY STRESS TESTING: CPT | Performed by: INTERNAL MEDICINE

## 2021-06-02 PROCEDURE — 94729 DIFFUSING CAPACITY: CPT | Performed by: INTERNAL MEDICINE

## 2021-06-02 PROCEDURE — 94010 BREATHING CAPACITY TEST: CPT

## 2021-06-02 PROCEDURE — 94761 N-INVAS EAR/PLS OXIMETRY MLT: CPT

## 2021-06-02 PROCEDURE — 94726 PLETHYSMOGRAPHY LUNG VOLUMES: CPT

## 2021-06-02 PROCEDURE — 94010 BREATHING CAPACITY TEST: CPT | Performed by: INTERNAL MEDICINE

## 2021-06-02 RX ORDER — ALBUTEROL SULFATE 2.5 MG/3ML
2.5 SOLUTION RESPIRATORY (INHALATION) ONCE AS NEEDED
Status: DISCONTINUED | OUTPATIENT
Start: 2021-06-02 | End: 2021-06-06 | Stop reason: HOSPADM

## 2021-06-03 ENCOUNTER — TELEPHONE (OUTPATIENT)
Dept: OTHER | Facility: OTHER | Age: 51
End: 2021-06-03

## 2021-06-03 NOTE — TELEPHONE ENCOUNTER
Scheduled with our recovery clinic provider Sheryl Copeland pt was not able to come in until June 9 or after

## 2021-06-03 NOTE — TELEPHONE ENCOUNTER
"I am a long hauler  I have been having trouble with my breathing  I was told that there is a recovery clinic  I would like to schedule an appointment "     Patient was advised that she needs to be scheduled with her PCP for an initial evaluation and then she will be referred to the Recovery Clinic if the PCP decides that it would be beneficial for her  I attempted to scheduled an appointment for her with dr Sadiq Duran, but couldn't find an appointment that fit her schedule  She was warm transferred to Hugo Underwood at [facility] for assistance

## 2021-06-04 ENCOUNTER — TELEPHONE (OUTPATIENT)
Dept: PULMONOLOGY | Facility: CLINIC | Age: 51
End: 2021-06-04

## 2021-06-04 DIAGNOSIS — R06.02 SHORTNESS OF BREATH: Primary | ICD-10-CM

## 2021-06-07 ENCOUNTER — TELEPHONE (OUTPATIENT)
Dept: PULMONOLOGY | Facility: CLINIC | Age: 51
End: 2021-06-07

## 2021-06-07 DIAGNOSIS — R06.02 SHORTNESS OF BREATH: Primary | ICD-10-CM

## 2021-06-08 ENCOUNTER — TELEPHONE (OUTPATIENT)
Dept: PULMONOLOGY | Facility: CLINIC | Age: 51
End: 2021-06-08

## 2021-06-08 DIAGNOSIS — R06.02 SHORTNESS OF BREATH: Primary | ICD-10-CM

## 2021-06-08 NOTE — TELEPHONE ENCOUNTER
Jenniffer Markham called from nuclear medicine and said test that is ordered is lung scan perfusion single breathe  Order is incorrect and should be NM lung perfusion imaging code [id number]  Pt is scheduled for tomorrow   Jenniffer Markham says she can be reached via tiger text as well

## 2021-06-09 ENCOUNTER — OFFICE VISIT (OUTPATIENT)
Dept: PHYSICAL THERAPY | Facility: REHABILITATION | Age: 51
End: 2021-06-09
Payer: MEDICARE

## 2021-06-09 ENCOUNTER — OFFICE VISIT (OUTPATIENT)
Dept: FAMILY MEDICINE CLINIC | Facility: CLINIC | Age: 51
End: 2021-06-09
Payer: MEDICARE

## 2021-06-09 ENCOUNTER — TELEPHONE (OUTPATIENT)
Dept: PULMONOLOGY | Facility: CLINIC | Age: 51
End: 2021-06-09

## 2021-06-09 ENCOUNTER — HOSPITAL ENCOUNTER (OUTPATIENT)
Dept: RADIOLOGY | Facility: HOSPITAL | Age: 51
Discharge: HOME | End: 2021-06-09
Attending: INTERNAL MEDICINE
Payer: MEDICARE

## 2021-06-09 VITALS
DIASTOLIC BLOOD PRESSURE: 60 MMHG | WEIGHT: 128.2 LBS | HEIGHT: 66 IN | RESPIRATION RATE: 16 BRPM | HEART RATE: 102 BPM | OXYGEN SATURATION: 98 % | BODY MASS INDEX: 20.6 KG/M2 | SYSTOLIC BLOOD PRESSURE: 104 MMHG | TEMPERATURE: 99.3 F

## 2021-06-09 DIAGNOSIS — R51.9 FREQUENT HEADACHES: Primary | ICD-10-CM

## 2021-06-09 DIAGNOSIS — U09.9 POST-ACUTE SEQUELAE OF COVID-19 (PASC): ICD-10-CM

## 2021-06-09 DIAGNOSIS — R06.02 SHORTNESS OF BREATH: ICD-10-CM

## 2021-06-09 DIAGNOSIS — R43.2 ALTERED TASTE: ICD-10-CM

## 2021-06-09 DIAGNOSIS — F31.81 BIPOLAR II DISORDER (HCC): ICD-10-CM

## 2021-06-09 DIAGNOSIS — G43.719 INTRACTABLE CHRONIC MIGRAINE WITHOUT AURA AND WITHOUT STATUS MIGRAINOSUS: Primary | ICD-10-CM

## 2021-06-09 DIAGNOSIS — M54.12 CERVICAL RADICULOPATHY: ICD-10-CM

## 2021-06-09 DIAGNOSIS — Z00.00 MEDICARE ANNUAL WELLNESS VISIT, SUBSEQUENT: ICD-10-CM

## 2021-06-09 PROCEDURE — A9540 TC99M MAA: HCPCS

## 2021-06-09 PROCEDURE — 97110 THERAPEUTIC EXERCISES: CPT | Performed by: PHYSICAL THERAPIST

## 2021-06-09 PROCEDURE — G0439 PPPS, SUBSEQ VISIT: HCPCS | Performed by: NURSE PRACTITIONER

## 2021-06-09 PROCEDURE — G1004 CDSM NDSC: HCPCS

## 2021-06-09 PROCEDURE — 97140 MANUAL THERAPY 1/> REGIONS: CPT | Performed by: PHYSICAL THERAPIST

## 2021-06-09 PROCEDURE — 99214 OFFICE O/P EST MOD 30 MIN: CPT | Performed by: NURSE PRACTITIONER

## 2021-06-09 PROCEDURE — 78580 LUNG PERFUSION IMAGING: CPT

## 2021-06-09 RX ORDER — TOPIRAMATE 25 MG/1
25 CAPSULE, COATED PELLETS ORAL 2 TIMES DAILY
Qty: 60 CAPSULE | Refills: 0 | Status: SHIPPED | OUTPATIENT
Start: 2021-06-09 | End: 2021-07-06

## 2021-06-09 NOTE — ASSESSMENT & PLAN NOTE
- Discussed immunizations, screening, healthy diet, exercise, safety measures  -  She did get her COVID vaccine  -  She is up-to-date with her mammogram   -  She does have regular dental, vision, and GYN visits

## 2021-06-09 NOTE — ASSESSMENT & PLAN NOTE
-  Patient is already being seen by Pulmonary and Cardiology  Continue to follow their recommendations  -  She does have altered taste  Referral to ENT sent  -  Will continue to monitor

## 2021-06-09 NOTE — PROGRESS NOTES
Daily Note     Today's date: 2021  Patient name: Marvin Ulloa  : 1970  MRN: [id number]  Referring provider: DON Wang  Dx:   Encounter Diagnosis     ICD-10-CM    1  Frequent headaches  R51 9    2  Cervical radiculopathy  M54 12                   Subjective:   Breathing has been much better  She went for nerve blocks last Tuesday and she was in a lot of pain afterward  She has had headaches since then  Objective: See treatment diary below  Active Range of Motion   Cervical/Thoracic Spine       Cervical  Subcranial protraction:  WFL   Subcranial retraction:   Restriction level: min  Flexion: 35 degrees  with pain  Extension: 70 degrees      Left lateral flexion: 25 degrees      Right lateral flexion: 30 degrees       Left rotation: 80 degrees with pain  Right rotation: 80 degrees       Joint Play   Hypomobility T1,2           Mechanical Assessment    Cervical    Seated Protrusion: repeated movements   Pain location: no change  Seated retraction: repeated movements   Pain location: no change  Seated Left Sidebend: repeated movements   Pain location:no change  Seated right sidebend: repeated movements  Pain location: no change    Thoracic      Lumbar      Strength/Myotome Testing   Cervical Spine     Left   Normal strength    Right   Normal strength    Additional Strength Details  Pain with testing right flexion and abduction        Headaches   Patient reports headaches: Yes  Assessment:   Pt returns after being treated for breathing issues post Covid  Pt presents with increased tone both levator mm and thoracic paraspinals  Cervical ROM is WNL with tightness reported with extension  She would benefit from several visits to get her back into her HEP and decrease spasm  Plan: Continue per plan of care        Precautions: none      Manuals  5/ 6/     PA glides C3 and C7-T7  C7-T7  UPA C3,4,5   PA C7-T7  Rot mob C2  PA T1,2     1st rib mob NT TP levator  NT MM NT  NT CC NT     Sustained cerv retr w/PT OP  NT MM NT         Supine tx, retr and ext  10x 10x   x15  x10     SNAGS C3 w/L rotation    NT x3  NT 2x3 NT      STM     CC NT CC NT                                            Ther Ex             Scalene stre 20"x5 20"x3 20"x3 20"x3         scap retr 5"x10 5"x10 5"x20 TB rows GR 5"x10 NT No TB 5"x10   5"x10 5"x10     TB shoulder ext    Gr 5"x10 NT        cerv retr ext supine  10x 10x HEP 10x NV       Self supine retraction  10x 10x HEP 10x 10x 10x  seated x10 with op 5"x10 w/op     pec stretch   doorway 20"x3 20"x5 NT        Static prone t/c spine ext   3' 3'/worse 3' Hold       Thoracic ext over chair     5x 5x  10" Hold 5" x10 5"x10      towelSnag to L       5"x10 5"x10                               Ther Activity                                       Gait Training                                       Modalities             MH pre     10 min  10'

## 2021-06-09 NOTE — PROGRESS NOTES
Assessment/Plan:    Altered taste  -  Referral sent to ENT  -  Will continue to follow-up  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PASC)  -  Patient is already being seen by Pulmonary and Cardiology  Continue to follow their recommendations  -  She does have altered taste  Referral to ENT sent  -  Will continue to monitor  Medicare annual wellness visit, subsequent  - Discussed immunizations, screening, healthy diet, exercise, safety measures  -  She did get her COVID vaccine  -  She is up-to-date with her mammogram   -  She does have regular dental, vision, and GYN visits  Intractable chronic migraine without aura and without status migrainosus  -  Patient currently gets nerve blocks and Botox for her migraines  She is not due for Botox yet  -  In the meantime, will try Topamax 25 mg twice daily for migraine prevention  Advised side effects  Advised patient to take 1 tablet a day for a week and then increase to 2 tablets daily  -  Will continue to monitor  Diagnoses and all orders for this visit:    Intractable chronic migraine without aura and without status migrainosus  -     topiramate (TOPAMAX) 25 mg sprinkle capsule; Take 1 capsule (25 mg total) by mouth 2 (two) times a day    Medicare annual wellness visit, subsequent    Altered taste  -     Ambulatory Referral to Otolaryngology; Future    Bipolar II disorder ([facility] [address] )    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PASC)        Subjective:      Patient ID: Sean Gresham is a 52 y o  female  Patient presents today for annual wellness visit  She has had a recent history of shortness of breath and fever for the past few months  She was seen in the ER multiple times  She was seen by Cardiology who ordered an echocardiogram to rule out pericarditis or cardiomyopathy  Her echocardiogram came back normal   She does have a stress test scheduled for Tuesday    She was seen by Pulmonary who completed a 6 minutes walk test   Her SpO2 dropped to 80% on room air and increased to above 95% with application of 2 L nasal cannula  They recommended she go home on 2 L of oxygen with exertion  They did check COVID antibodies which were positive  She did have a V/Q scan today which was negative for any pulmonary embolism  She is still complaining of some shortness of breath but admits that at times her breathing feels better  She is still having a fever and reports T-max of 100 3°  She was referred to Infectious Disease for fever of unknown origin but she has not been able to make an appointment yet  She is also complaining of headaches  She does receive nerve blocks for migraines and received one the other [facility] at [facility] , but it has not relieved her symptoms  She was on Imitrex before which helped her migraines but does not recall being on a daily preventive migraine medication  [employer] software was used to dictate this note  It may contain errors with dictating incorrect words/spelling  Please contact provider directly for any questions  The following portions of the patient's history were reviewed and updated as appropriate: allergies, current medications, past family history, past medical history, past social history, past surgical history and problem list     Review of Systems   Constitutional: Positive for fatigue and fever  HENT: Negative for rhinorrhea, sinus pain and trouble swallowing  Eyes: Negative for visual disturbance  Respiratory: Positive for shortness of breath  Negative for cough  Cardiovascular: Negative for chest pain and palpitations  Gastrointestinal: Negative for abdominal pain and blood in stool  Endocrine: Negative for cold intolerance and heat intolerance  Genitourinary: Negative for difficulty urinating and dysuria  Musculoskeletal: Negative for gait problem  Skin: Negative for rash  Neurological: Positive for headaches  Negative for dizziness and syncope     Hematological: Negative for adenopathy  Psychiatric/Behavioral: Negative for behavioral problems  Objective:      /60 (BP Location: Left arm, Patient Position: Sitting, Cuff Size: Adult)   Pulse 102   Temp 99 3 °F (37 4 °C) (Tympanic)   Resp 16   Ht 5' 6" (1 676 m)   Wt 58 2 kg (128 lb 3 2 oz)   SpO2 98%   BMI 20 69 kg/m²          Physical Exam  Vitals signs and nursing note reviewed  Constitutional:       General: She is not in acute distress  Appearance: Normal appearance  She is normal weight  HENT:      Head: Normocephalic and atraumatic  Right Ear: Tympanic membrane, ear canal and external ear normal       Left Ear: Tympanic membrane, ear canal and external ear normal       Nose: No congestion  Eyes:      Extraocular Movements: Extraocular movements intact  Conjunctiva/sclera: Conjunctivae normal       Pupils: Pupils are equal, round, and reactive to light  Neck:      Musculoskeletal: Normal range of motion  Cardiovascular:      Rate and Rhythm: Normal rate and regular rhythm  Heart sounds: Normal heart sounds  Pulmonary:      Effort: Pulmonary effort is normal       Breath sounds: Normal breath sounds  Abdominal:      General: Bowel sounds are normal       Palpations: Abdomen is soft  Tenderness: There is no abdominal tenderness  Musculoskeletal: Normal range of motion  Right lower leg: No edema  Left lower leg: No edema  Lymphadenopathy:      Cervical: No cervical adenopathy  Skin:     General: Skin is warm and dry  Neurological:      Mental Status: She is alert and oriented to person, place, and time  Cranial Nerves: No cranial nerve deficit  Coordination: Coordination is intact   Finger-Nose-Finger Test normal    Psychiatric:         Mood and Affect: Mood normal          Behavior: Behavior normal

## 2021-06-09 NOTE — ASSESSMENT & PLAN NOTE
-  Patient currently gets nerve blocks and Botox for her migraines  She is not due for Botox yet  -  In the meantime, will try Topamax 25 mg twice daily for migraine prevention  Advised side effects  Advised patient to take 1 tablet a day for a week and then increase to 2 tablets daily  -  Will continue to monitor

## 2021-06-09 NOTE — PATIENT INSTRUCTIONS
Medicare Preventive Visit Patient Instructions  Thank you for completing your Welcome to Medicare Visit or Medicare Annual Wellness Visit today  Your next wellness visit will be due in one year (6/10/2022)  The screening/preventive services that you may require over the next 5-10 years are detailed below  Some tests may not apply to you based off risk factors and/or age  Screening tests ordered at today's visit but not completed yet may show as past due  Also, please note that scanned in results may not display below  Preventive Screenings:  Service Recommendations Previous Testing/Comments   Colorectal Cancer Screening  * Colonoscopy    * Fecal Occult Blood Test (FOBT)/Fecal Immunochemical Test (FIT)  * Fecal DNA/Cologuard Test  * Flexible Sigmoidoscopy Age: 54-65 years old   Colonoscopy: every 10 years (may be performed more frequently if at higher risk)  OR  FOBT/FIT: every 1 year  OR  Cologuard: every 3 years  OR  Sigmoidoscopy: every 5 years  Screening may be recommended earlier than age 48 if at higher risk for colorectal cancer  Also, an individualized decision between you and your healthcare provider will decide whether screening between the ages of 74-80 would be appropriate  Colonoscopy: Not on file  FOBT/FIT: Not on file  Cologuard: Not on file  Sigmoidoscopy: Not on file          Breast Cancer Screening Age: 36 years old  Frequency: every 1-2 years  Not required if history of left and right mastectomy Mammogram: 01/23/2021    Screening Current   Cervical Cancer Screening Between the ages of 21-29, pap smear recommended once every 3 years  Between the ages of 33-67, can perform pap smear with HPV co-testing every 5 years     Recommendations may differ for women with a history of total hysterectomy, cervical cancer, or abnormal pap smears in past  Pap Smear: 07/06/2020    History Cervical Cancer   Hepatitis C Screening Once for adults born between 1945 and 1965  More frequently in patients at high risk for Hepatitis C Hep C Antibody: Not on file        Diabetes Screening 1-2 times per year if you're at risk for diabetes or have pre-diabetes Fasting glucose: 85 mg/dL   A1C: No results in last 5 years    Screening Current   Cholesterol Screening Once every 5 years if you don't have a lipid disorder  May order more often based on risk factors  Lipid panel: 11/27/2020    Screening Not Indicated  History Lipid Disorder     Other Preventive Screenings Covered by Medicare:  1  Abdominal Aortic Aneurysm (AAA) Screening: covered once if your at risk  You're considered to be at risk if you have a family history of AAA  2  Lung Cancer Screening: covers low dose CT scan once per year if you meet all of the following conditions: (1) Age 50-69; (2) No signs or symptoms of lung cancer; (3) Current smoker or have quit smoking within the last 15 years; (4) You have a tobacco smoking history of at least 30 pack years (packs per day multiplied by number of years you smoked); (5) You get a written order from a healthcare provider  3  Glaucoma Screening: covered annually if you're considered high risk: (1) You have diabetes OR (2) Family history of glaucoma OR (3)  aged 48 and older OR (3)  American aged 72 and older  3  Osteoporosis Screening: covered every 2 years if you meet one of the following conditions: (1) You're estrogen deficient and at risk for osteoporosis based off medical history and other findings; (2) Have a vertebral abnormality; (3) On glucocorticoid therapy for more than 3 months; (4) Have primary hyperparathyroidism; (5) On osteoporosis medications and need to assess response to drug therapy  · Last bone density test (DXA Scan): Not on file  5  HIV Screening: covered annually if you're between the age of 12-76  Also covered annually if you are younger than 13 and older than 72 with risk factors for HIV infection   For pregnant patients, it is covered up to 3 times per pregnancy  Immunizations:  Immunization Recommendations   Influenza Vaccine Annual influenza vaccination during flu season is recommended for all persons aged >= 6 months who do not have contraindications   Pneumococcal Vaccine (Prevnar and Pneumovax)  * Prevnar = PCV13  * Pneumovax = PPSV23   Adults 25-60 years old: 1-3 doses may be recommended based on certain risk factors  Adults 72 years old: Prevnar (PCV13) vaccine recommended followed by Pneumovax (PPSV23) vaccine  If already received PPSV23 since turning 65, then PCV13 recommended at least one year after PPSV23 dose  Hepatitis B Vaccine 3 dose series if at intermediate or high risk (ex: diabetes, end stage renal disease, liver disease)   Tetanus (Td) Vaccine - COST NOT COVERED BY MEDICARE PART B Following completion of primary series, a booster dose should be given every 10 years to maintain immunity against tetanus  Td may also be given as tetanus wound prophylaxis  Tdap Vaccine - COST NOT COVERED BY MEDICARE PART B Recommended at least once for all adults  For pregnant patients, recommended with each pregnancy  Shingles Vaccine (Shingrix) - COST NOT COVERED BY MEDICARE PART B  2 shot series recommended in those aged 48 and above     Health Maintenance Due:      Topic Date Due    HIV Screening  Never done    MAMMOGRAM  01/23/2022    Cervical Cancer Screening  07/06/2023     Immunizations Due:      Topic Date Due    Pneumococcal Vaccine: Pediatrics (0 to 5 Years) and At-Risk Patients (6 to 59 Years) (1 of 1 - PPSV23) Never done     Advance Directives   What are advance directives? Advance directives are legal documents that state your wishes and plans for medical care  These plans are made ahead of time in case you lose your ability to make decisions for yourself  Advance directives can apply to any medical decision, such as the treatments you want, and if you want to donate organs  What are the types of advance directives?   There are many types of advance directives, and each state has rules about how to use them  You may choose a combination of any of the following:  · Living will: This is a written record of the treatment you want  You can also choose which treatments you do not want, which to limit, and which to stop at a certain time  This includes surgery, medicine, IV fluid, and tube feedings  · Durable power of  for healthcare [facility]): This is a written record that states who you want to make healthcare choices for you when you are unable to make them for yourself  This person, called a proxy, is usually a family member or a friend  You may choose more than 1 proxy  · Do not resuscitate (DNR) order:  A DNR order is used in case your heart stops beating or you stop breathing  It is a request not to have certain forms of treatment, such as CPR  A DNR order may be included in other types of advance directives  · Medical directive: This covers the care that you want if you are in a coma, near death, or unable to make decisions for yourself  You can list the treatments you want for each condition  Treatment may include pain medicine, surgery, blood transfusions, dialysis, IV or tube feedings, and a ventilator (breathing machine)  · Values history: This document has questions about your views, beliefs, and how you feel and think about life  This information can help others choose the care that you would choose  Why are advance directives important? An advance directive helps you control your care  Although spoken wishes may be used, it is better to have your wishes written down  Spoken wishes can be misunderstood, or not followed  Treatments may be given even if you do not want them  An advance directive may make it easier for your family to make difficult choices about your care        © Copyright [address] 2018 Information is for End User's use only and may not be sold, redistributed or otherwise used for commercial purposes   All illustrations and images included in CareNotes® are the copyrighted property of A [employer] A M [employer]  or Libia Gardner

## 2021-06-09 NOTE — PROGRESS NOTES
Assessment and Plan:     Problem List Items Addressed This Visit        Cardiovascular and Mediastinum    Intractable chronic migraine without aura and without status migrainosus - Primary     -  Patient currently gets nerve blocks and Botox for her migraines  She is not due for Botox yet  -  In the meantime, will try Topamax 25 mg twice daily for migraine prevention  Advised side effects  Advised patient to take 1 tablet a day for a week and then increase to 2 tablets daily  -  Will continue to monitor  Relevant Medications    topiramate (TOPAMAX) 25 mg sprinkle capsule       Other    Bipolar II disorder ([facility] [address] )    Medicare annual wellness visit, subsequent     - Discussed immunizations, screening, healthy diet, exercise, safety measures  -  She did get her COVID vaccine  -  She is up-to-date with her mammogram   -  She does have regular dental, vision, and GYN visits  Altered taste     -  Referral sent to ENT  -  Will continue to follow-up  Relevant Orders    Ambulatory Referral to Otolaryngology    Post-acute sequelae of COVID-19 [facility])     -  Patient is already being seen by Pulmonary and Cardiology  Continue to follow their recommendations  -  She does have altered taste  Referral to ENT sent  -  Will continue to monitor  Preventive health issues were discussed with patient, and age appropriate screening tests were ordered as noted in patient's After Visit Summary  Personalized health advice and appropriate referrals for health education or preventive services given if needed, as noted in patient's After Visit Summary       History of Present Illness:     Patient presents for Medicare Annual Wellness visit    Patient Care Team:  Savi Guevara MD as PCP - General (Family Medicine)  Maxine Amor MD as PCP - [address] [place], [place] [address] [facility] ([facility])  Irma Carranza DO     Problem List:     Patient Active Problem List   Diagnosis    Menorrhagia with irregular cycle    Asthma    Bipolar II disorder ([facility] [address] )    Common migraine    Cystitis    Constipation    Dense breasts    Depression    Fatigue    Interstitial cystitis    Irritable bowel syndrome    Other hyperlipidemia    Intractable chronic migraine without aura and without status migrainosus    OCD (obsessive compulsive disorder)    Urinary tract infection    Stress incontinence    Chronic tonsillitis    Tonsillar calculus    Urinary frequency    Insomnia    Neck pain    Right lumbar radiculopathy    Intermittent lightheadedness    Acute upper respiratory infection    Shortness of breath    Moderate persistent asthma with acute exacerbation    Nasal sinus congestion    Seasonal allergies    Medicare annual wellness visit, subsequent    Viral infection, unspecified    Bronchitis    Cough    IC [facility]ent [facility]audication[facility])    Respiratory failure with hypoxia ([facility])    Altered taste    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[facility])      Past Medical and Surgical History:     Past Medical History:   Diagnosis Date    Anemia     Anxiety     Asthma     illness induced    SARA I (cervical intraepithelial neoplasia I)     RESOLVED: [id number]    Depression     Endometriosis     Functional ovarian cysts age 15    History of agoraphobia age 15    Hypercholesterolemia     IBS (irritable bowel syndrome) age 15    Constipation predominant    IC (interstitial cystitis)     Migraine     Motion sickness     Pap smear abnormality of cervix with ASCUS favoring benign     RESOLVED: [id number]    Seasonal allergies     with post-nasal drip and recurrent throat infections    Urinary tract infection     Varicella      Past Surgical History:   Procedure Laterality Date    CHOLECYSTECTOMY      CHOLECYSTECTOMY LAPAROSCOPIC  08/2020    COLONOSCOPY  06/2018    Nonbleeding angiodysplastic lesion mid ascending colon, questionable ulcerative proctitis-biopsy was negative for acute or chronic colitis    COLONOSCOPY  06/09/2016    Normal    COLONOSCOPY  05/23/2013    Adenoma at splenic flexure    EGD  08/02/2018    EGD  08/03/2020    Normal   Biopsy stomach negative for H  pylori, biopsy of the duodenum negative for celiac    EGD  08/03/2020    Normal   Biopsy of the stomach negative for H  pylori biopsied duodenum negative for celiac disease    ENDOTRACHEAL INTUBATION EMERGENT  2012    HEMORRHOID SURGERY  07/2009    Dr Wade Elizabeth HYSTEROSCOPY,DX,SEP [address] N/A 11/21/2018    Procedure: HYSTEROSCOPY;  Surgeon: Lashae Martínez MD;  Location: [facility];  Service: Gynecology    SC HYSTEROSCOPY,W/ENDOMETRIAL ABLATION N/A 11/21/2018    Procedure: ABLATION ENDOMETRIAL Scharlene Settler;  Surgeon: Lashae Martínez MD;  Location[facility];  Service: Gynecology    SMALL INTESTINE SURGERY      TOOTH EXTRACTION      TUBAL LIGATION      ONSET: 34 DEC 2011      Family History:     Family History   Problem Relation Age of Onset    Other Mother         HYSTERECTOMY FOR BENIGN DISEASE     Diabetes Mother     Lung cancer Father 79    Other Sister         HYSTERECTOMY FOR BENIGN DISEASE     Diabetes Maternal Grandmother     Other Maternal Grandmother         HYSTERECTOMY FOR BENIGN DISEASE     Colon cancer Paternal Grandmother 61    Throat cancer Paternal Uncle 61    No Known Problems Maternal Aunt     No Known Problems Paternal Aunt     No Known Problems Paternal Aunt       Social History:     E-Cigarette/Vaping    E-Cigarette Use Never User      E-Cigarette/Vaping Substances    Nicotine No     THC No     CBD No     Flavoring No     Other No     Unknown No      Social History     Socioeconomic History    Marital status:      Spouse name: None    Number of children: 2    Years of education: None    Highest education level: None   Occupational History    Occupation: [employer]   Social Needs    Financial resource strain: None    Food insecurity     Worry: None     Inability: None    Transportation needs     Medical: None     Non-medical: None   Tobacco Use    Smoking status: Never Smoker    Smokeless tobacco: Never Used   Substance and Sexual Activity    Alcohol use: No     Frequency: Never    Drug use: No    Sexual activity: Yes     Partners: Male   Lifestyle    Physical activity     Days per week: None     Minutes per session: None    Stress: None   Relationships    Social connections     Talks on phone: None     Gets together: None     Attends Pentecostal service: None     Active member of club or organization: None     Attends meetings of clubs or organizations: None     Relationship status: None    Intimate partner violence     Fear of current or ex partner: None     Emotionally abused: None     Physically abused: None     Forced sexual activity: None   Other Topics Concern    None   Social History Narrative    Marital history- single as per all scripts    No caffeine use    Jainism affiliation Christian    Uses safety equipment- seatbelts       Medications and Allergies:     Current Outpatient Medications   Medication Sig Dispense Refill    albuterol (2 5 mg/3 mL) 0 083 % nebulizer solution Take 1 vial (2 5 mg total) by nebulization 3 (three) times a day 30 vial 2    albuterol (PROAIR HFA) 90 mcg/act inhaler Inhale 2 puffs 4 (four) times a day every 4 to 6 hours as needed 1 Inhaler 5    fluticasone (FLONASE) 50 mcg/act nasal spray SPRAY 2 SPRAYS ONCE A DAY FOR 90 DAYS      fluticasone-vilanterol (BREO ELLIPTA) 200-25 MCG/INH inhaler Inhale 1 puff daily Rinse mouth after use   1 each 5    LORazepam (ATIVAN) 1 mg tablet TAKE 1 & 1/2 TABLETS DAILY AT BEDTIME AS NEEDED  1    medroxyPROGESTERone (DEPO-PROVERA) 150 mg/mL injection Please give in office every 10 weeks 1 mL 4    Multiple Vitamin (MULTI VITAMIN DAILY PO) Take by mouth      naproxen (NAPROSYN) 500 mg tablet TAKE 1 TABLET BY MOUTH 3 TIMES A DAY AS NEEDED MIGRAINE  LIMIT USE TO 3 DAYS PER WEEK      pentosan polysulfate (ELMIRON) 100 mg capsule Take 100 mg by mouth 3 (three) times a day before meals        colestipol (COLESTID) 1 g tablet TAKE 1 TABLET (1 G TOTAL) BY MOUTH 3 (THREE) TIMES A DAY (Patient taking differently: Take 1 g by mouth 2 (two) times a day ) 270 tablet 1    colestipol (COLESTID) 5 g granules TAKE 5 G BY MOUTH 2 TIMES A  g 1    dicyclomine (BENTYL) 10 mg capsule TAKE 1 CAPSULE BY MOUTH THREE TIMES A DAY (Patient not taking: Reported on 4/27/2021) 270 capsule 3    fexofenadine-pseudoephedrine (ALLEGRA-D 24) 180-240 MG per 24 hr tablet Take 1 tablet by mouth daily (Patient not taking: Reported on 5/25/2021) 30 tablet 1    Heparin Sodium, Porcine, (HEPARIN, PORCINE,) 27667 UNIT/ML 10,000 UNITS TO BE INSTILLED INTO THE BLADDER IN THE UROLOGY OFFICE  2    Meth-Hyo-M Bl-Na Phos-Ph Sal (Uribel) 118 MG CAPS Take 1 each by mouth as needed       Myrbetriq 50 MG TB24 Take 1 tablet by mouth daily      ondansetron (ZOFRAN) 4 mg tablet Take 4 mg by mouth every 8 (eight) hours as needed  5    pantoprazole (PROTONIX) 40 mg tablet TAKE 1 TABLET BY MOUTH DAILY 1/2 HOUR BEFORE BREAKFAST (Patient not taking: Reported on 4/27/2021) 90 tablet 3    predniSONE 10 mg tablet Take 4 tablets (40 mg total) by mouth daily for 4 days, THEN 3 tablets (30 mg total) daily for 4 days, THEN 2 tablets (20 mg total) daily for 4 days, THEN 1 tablet (10 mg total) daily for 4 days  (Patient not taking: Reported on 6/9/2021) 40 tablet 0    prochlorperazine (COMPAZINE) 10 mg tablet 1 tab po TID prn headache   Max 3 days per week      topiramate (TOPAMAX) 25 mg sprinkle capsule Take 1 capsule (25 mg total) by mouth 2 (two) times a day 60 capsule 0    traMADol (ULTRAM) 50 mg tablet Take 1 tablet (50 mg total) by mouth daily at bedtime as needed for moderate pain (Patient not taking: Reported on 12/2/2020) 15 tablet 0    triamcinolone acetonide (KENALOG-40) 40 mg/mL 40 MG TO BE INSTILLED INTO THE BLADDER IN THE UROLOGY OFFICE 2     Current Facility-Administered Medications   Medication Dose Route Frequency Provider Last Rate Last Admin    medroxyPROGESTERone (DEPO-PROVERA) IM injection 150 mg  150 mg Intramuscular Once Mclean Kyleem, DO         Allergies   Allergen Reactions    Azithromycin Diarrhea, Nausea Only and Other (See Comments)    Clarithromycin     Clavulanic Acid     Doxycycline     Erythromycin Base Other (See Comments)    Lamotrigine Other (See Comments)     Can't recall side effects       Lithium Itching    Mirtazapine      (remeron)    Moxifloxacin Nausea Only     Excessive vomiting  Excessive vomiting    Sulfamethoxazole      Other reaction(s): Rash    Sulfamethoxazole-Trimethoprim Other (See Comments)    Trimethoprim      Other reaction(s): Rash    Indomethacin Rash    Penicillins Rash      Immunizations:     Immunization History   Administered Date(s) Administered    H1N1, All Formulations 11/20/2009    INFLUENZA 12/06/2013, 10/25/2017, 10/25/2017, 10/10/2018, 10/11/2020    Influenza Injectable, MDCK, Preservative Free, Quadrivalent, 0 5 mL 10/21/2019, 10/11/2020    Influenza Quadrivalent Preservative Free 3 years and older IM 10/11/2020    Influenza Split 12/05/2013, 09/22/2014    Influenza, injectable, quadrivalent, preservative free 0 5 mL 10/11/2020    Influenza, seasonal, injectable 10/05/2010    SARS-CoV-2 / COVID-19 mRNA IM ([facility]chilles Kilofield) 03/17/2021, 04/14/2021    Td (adult), adsorbed 03/15/2012    Tdap 03/15/2012      Health Maintenance:         Topic Date Due    HIV Screening  Never done    MAMMOGRAM  01/23/2022    Cervical Cancer Screening  07/06/2023         Topic Date Due    Pneumococcal Vaccine: Pediatrics (0 to 5 Years) and At-Risk Patients (6 to 59 Years) (1 of 1 - PPSV23) Never done      Medicare Health Risk Assessment:     /60 (BP Location: Left arm, Patient Position: Sitting, Cuff Size: Adult)   Pulse 102   Temp 99 3 °F (37 4 °C) (Tympanic)   Resp 16   Ht 5' 6" (1 676 m) Wt 58 2 kg (128 lb 3 2 oz)   SpO2 98%   BMI 20 69 kg/m²      Carmen is here for her Subsequent Wellness visit  Last Medicare Wellness visit information reviewed, patient interviewed and updates made to the record today  Health Risk Assessment:   Patient rates overall health as good  Patient feels that their physical health rating is same  Patient is dissatisfied with their life  Eyesight was rated as slightly worse  Hearing was rated as same  Patient feels that their emotional and mental health rating is same  Patients states they are never, rarely angry  Patient states they are sometimes unusually tired/fatigued  Pain experienced in the last 7 days has been some  Patient's pain rating has been 10/10  Patient states that she has experienced weight loss or gain in last 6 months  Depression Screening:   PHQ-2 Score: 4  PHQ-9 Score: 14      Fall Risk Screening: In the past year, patient has experienced: no history of falling in past year      Urinary Incontinence Screening:   Patient has not leaked urine accidently in the last six months  Home Safety:  Patient has trouble with stairs inside or outside of their home  Patient has working smoke alarms and has working carbon monoxide detector  Home safety hazards include: none  Nutrition:   Current diet is Regular  Medications:   Patient is currently taking over-the-counter supplements  OTC medications include: see medication list  Patient is able to manage medications  Activities of Daily Living (ADLs)/Instrumental Activities of Daily Living (IADLs):   Walk and transfer into and out of bed and chair?: Yes  Dress and groom yourself?: Yes    Bathe or shower yourself?: Yes    Feed yourself?  Yes  Do your laundry/housekeeping?: Yes  Manage your money, pay your bills and track your expenses?: Yes  Make your own meals?: Yes    Do your own shopping?: Yes    Previous Hospitalizations:   Any hospitalizations or ED visits within the last 12 months?: No Advance Care Planning:   Living will: No    Durable POA for healthcare: No    Advanced directive: No      Cognitive Screening:   Provider or family/friend/caregiver concerned regarding cognition?: No    PREVENTIVE SCREENINGS      Cardiovascular Screening:    General: Screening Not Indicated and History Lipid Disorder      Diabetes Screening:     General: Screening Current      Colorectal Cancer Screening:     General: Screening Not Indicated      Breast Cancer Screening:     General: Screening Current      Cervical Cancer Screening:    General: History Cervical Cancer      Osteoporosis Screening:    General: Screening Not Indicated      Abdominal Aortic Aneurysm (AAA) Screening:        General: Screening Not Indicated      Lung Cancer Screening:     General: Screening Not Indicated      Hepatitis C Screening:    General: Screening Not Indicated    Screening, Brief Intervention, and Referral to Treatment (SBIRT)    Screening      AUDIT-C Screenin) How often did you have a drink containing alcohol in the past year? never  2) How many drinks did you have on a typical day when you were drinking in the past year? never  3) How often did you have 6 or more drinks on one occasion in the past year? never    AUDIT-C Score: 0  Interpretation: Score 0-2 (female): Negative screen for alcohol misuse      DON Kay

## 2021-06-10 ENCOUNTER — TELEPHONE (OUTPATIENT)
Dept: FAMILY MEDICINE CLINIC | Facility: CLINIC | Age: 51
End: 2021-06-10

## 2021-06-10 NOTE — TELEPHONE ENCOUNTER
Patient called and said she was seen here yesterday for what she said was supposed to be "Covid recovery symptoms" and therapy was supposed to be ordered    When she got here she was told she is here for a physical, which she was not    They did not tell her to do anything about the covid recovery so she would like a call back about this    [phone]

## 2021-06-11 ENCOUNTER — TELEPHONE (OUTPATIENT)
Dept: INFECTIOUS DISEASES | Facility: CLINIC | Age: 51
End: 2021-06-11

## 2021-06-11 ENCOUNTER — TELEPHONE (OUTPATIENT)
Dept: NON INVASIVE DIAGNOSTICS | Facility: HOSPITAL | Age: 51
End: 2021-06-11

## 2021-06-11 ENCOUNTER — OFFICE VISIT (OUTPATIENT)
Dept: PHYSICAL THERAPY | Facility: REHABILITATION | Age: 51
End: 2021-06-11
Payer: MEDICARE

## 2021-06-11 ENCOUNTER — TELEPHONE (OUTPATIENT)
Dept: OTHER | Facility: OTHER | Age: 51
End: 2021-06-11

## 2021-06-11 DIAGNOSIS — M54.12 CERVICAL RADICULOPATHY: ICD-10-CM

## 2021-06-11 DIAGNOSIS — R51.9 FREQUENT HEADACHES: Primary | ICD-10-CM

## 2021-06-11 PROCEDURE — 97112 NEUROMUSCULAR REEDUCATION: CPT

## 2021-06-11 PROCEDURE — 97140 MANUAL THERAPY 1/> REGIONS: CPT

## 2021-06-11 PROCEDURE — 97110 THERAPEUTIC EXERCISES: CPT

## 2021-06-11 NOTE — TELEPHONE ENCOUNTER
Pt called regarding her referral for ongoing fever after Covid-19/ FUO  Advised her that we would be happy to schedule, although we may not have much to offer  Also mentioned that she could call and speak to the Covid-clinic for on going long term symptoms  Provided phone number to her  She did mention that she was trying to get in there, and get some relief for her fever, sob, etc     She thanked me for the information and is going to continue to try and get some help from the clinic  If pt calls back I did inform her that it was alright to get her scheduled at our office

## 2021-06-11 NOTE — TELEPHONE ENCOUNTER
Pt in wanting to discuss if there is any other service with the [address] [place] [address]  Was seen on 6/9 for covid recovery s/s's and says she was told to continue to follow with her Pulmonologist and Cardiologist  Pt says she was told a nurse from the office would be calling her back yesterday about this and she has not heard from anyone yet  Please reach out to pt at earliest convenience

## 2021-06-11 NOTE — TELEPHONE ENCOUNTER
I explained to patient during her visit that the [facility] recovery visit just allows us to refer patients to different specialists based on their symptoms  She is already seeing Pulmonary and Cardiology  She should follow their recommendations  I did placed an referral to ENT regarding her altered taste  Im not sure what else she wants done that we aren't already doing

## 2021-06-11 NOTE — PROGRESS NOTES
Daily Note     Today's date: 2021  Patient name: Aminata Ayala  : 1970  MRN: [id number]  Referring provider: DON Goodwin  Dx:   Encounter Diagnosis     ICD-10-CM    1  Frequent headaches  R51 9    2  Cervical radiculopathy  M54 12                   Subjective: pt reports with c/o mild headache prior to beginning today  She noted taking Imitrex prior to therapy which provided relief  Denied adverse reaction following last visit  Objective: See treatment diary below      Assessment: Tolerated treatment well  Positive response with manuals today; HA abolished post  Patient demonstrated fatigue post treatment, exhibited good technique with therapeutic exercises and would benefit from continued PT      Plan: Continue per plan of care  Progress treatment as tolerated         Precautions: none      Manuals     PA glides C3 and C7-T7  C7-T7  UPA C3,4,5   PA C7-T7  Rot mob C2  PA T1,2     1st rib mob NT            TP levator  NT MM NT  NT CC NT TE    Sustained cerv retr w/PT OP  NT MM NT         Supine tx, retr and ext  10x 10x   x15  x10 TE  txn  only    SNAGS C3 w/L rotation    NT x3  NT 2x3 NT      STM     CC NT CC NT UT,  LS,  PSM's    SOR         TE                              Ther Ex             Scalene stre 20"x5 20"x3 20"x3 20"x3         scap retr 5"x10 5"x10 5"x20 TB rows GR 5"x10 NT No TB 5"x10   5"x10 5"x10 5"x10    TB shoulder ext    Gr 5"x10 NT        cerv retr ext supine  10x 10x HEP 10x NV       Self supine retraction  10x 10x HEP 10x 10x 10x  seated x10 with op 5"x10 w/op 5"x10 w/op    pec stretch   doorway 20"x3 20"x5 NT    Low 10"x5    Static prone t/c spine ext   3' 3'/worse 3' Hold       Thoracic ext over chair     5x 5x  10" Hold 5" x10 5"x10 5"x10     towelSnag to L       5"x10 5"x10 5"x10                              Ther Activity                                       Gait Training                                       Modalities  pre     10 min  10'  10' supine

## 2021-06-14 ENCOUNTER — OFFICE VISIT (OUTPATIENT)
Dept: PHYSICAL THERAPY | Facility: REHABILITATION | Age: 51
End: 2021-06-14
Payer: MEDICARE

## 2021-06-14 DIAGNOSIS — M54.12 CERVICAL RADICULOPATHY: ICD-10-CM

## 2021-06-14 DIAGNOSIS — R51.9 FREQUENT HEADACHES: Primary | ICD-10-CM

## 2021-06-14 PROCEDURE — 97140 MANUAL THERAPY 1/> REGIONS: CPT | Performed by: PHYSICAL THERAPIST

## 2021-06-14 PROCEDURE — 97110 THERAPEUTIC EXERCISES: CPT | Performed by: PHYSICAL THERAPIST

## 2021-06-14 PROCEDURE — 97112 NEUROMUSCULAR REEDUCATION: CPT | Performed by: PHYSICAL THERAPIST

## 2021-06-14 NOTE — PROGRESS NOTES
Daily Note     Today's date: 2021  Patient name: Laura Perkins  : 1970  MRN: [id number]  Referring provider: DON Elizondo Mai  Dx:   Encounter Diagnosis     ICD-10-CM    1  Frequent headaches  R51 9    2  Cervical radiculopathy  M54 12                   Subjective: Very slight headache this morning that relieved with Naproxyn  Exercises are going well  Objective: See treatment diary below      Assessment: Tolerated treatment well  Pt had a spasm right mid cervical paraspinals and right levator which decreased after STM  Improving pt's posture abolished pain with rotation right  SNAGS L at C2 abolished pain with left rotation  Plan: Continue per plan of care  Progress treatment as tolerated         Precautions: none      Manuals    PA glides C3 and C7-T7  C7-T7  UPA C3,4,5   PA C7-T7  Rot mob C2  PA T1,2  PA T1,2   1st rib mob NT            TP levator  NT MM NT  NT CC NT TE NT   Sustained cerv retr w/PT OP  NT MM NT         Supine tx, retr and ext  10x 10x   x15  x10 TE  txn  only NT   SNAGS C3 w/L rotation    NT x3  NT 2x3 NT      STM     CC NT CC NT UT,  LS,  PSM's UT, LS, PSM   SOR         TE NT                             Ther Ex             Scalene stre 20"x5 20"x3 20"x3 20"x3         scap retr 5"x10 5"x10 5"x20 TB rows GR 5"x10 NT No TB 5"x10   5"x10 5"x10 5"x10 Gr TB 5"x10   TB shoulder ext    Gr 5"x10 NT     5"x10   cerv retr ext supine  10x 10x HEP 10x NV       Self supine retraction  10x 10x HEP 10x 10x 10x  seated x10 with op 5"x10 w/op 5"x10 w/op 5"x10   pec stretch   doorway 20"x3 20"x5 NT    Low 10"x5 20"x4   Static prone t/c spine ext   3' 3'/worse 3' Hold       Thoracic ext over chair     5x 5x  10" Hold 5" x10 5"x10 5"x10 5"x10    towelSnag to L       5"x10 5"x10 5"x10 5"x10                             Ther Activity                                       Gait Training                                       Modalities  pre     10 min  10'  10' supine

## 2021-06-14 NOTE — TELEPHONE ENCOUNTER
I explained to pt that therapy is for patients that are having weakness causing difficulty in walking   She will do breathing exercises she read online or contact the pulmonologist

## 2021-06-15 ENCOUNTER — HOSPITAL ENCOUNTER (OUTPATIENT)
Dept: NON INVASIVE DIAGNOSTICS | Facility: CLINIC | Age: 51
Discharge: HOME | End: 2021-06-15
Attending: INTERNAL MEDICINE
Payer: MEDICARE

## 2021-06-15 DIAGNOSIS — R07.9 CHEST PAIN IN ADULT: ICD-10-CM

## 2021-06-15 DIAGNOSIS — R06.02 SHORTNESS OF BREATH: ICD-10-CM

## 2021-06-15 LAB
CHEST PAIN STATEMENT: NORMAL
MAX DIASTOLIC BP: 70 MMHG
MAX HEART RATE: 162 BPM
MAX PREDICTED HEART RATE: 172 BPM
MAX. SYSTOLIC BP: 172 MMHG
PROTOCOL NAME: NORMAL
REASON FOR TERMINATION: NORMAL
TARGET HR FORMULA: NORMAL
TEST INDICATION: NORMAL
TIME IN EXERCISE PHASE: NORMAL

## 2021-06-15 PROCEDURE — 93016 CV STRESS TEST SUPVJ ONLY: CPT | Performed by: INTERNAL MEDICINE

## 2021-06-15 PROCEDURE — 93017 CV STRESS TEST TRACING ONLY: CPT

## 2021-06-15 PROCEDURE — 93018 CV STRESS TEST I&R ONLY: CPT | Performed by: INTERNAL MEDICINE

## 2021-06-16 ENCOUNTER — TELEPHONE (OUTPATIENT)
Dept: CARDIOLOGY CLINIC | Facility: CLINIC | Age: 51
End: 2021-06-16

## 2021-06-16 ENCOUNTER — OFFICE VISIT (OUTPATIENT)
Dept: PHYSICAL THERAPY | Facility: REHABILITATION | Age: 51
End: 2021-06-16
Payer: MEDICARE

## 2021-06-16 DIAGNOSIS — M54.12 CERVICAL RADICULOPATHY: ICD-10-CM

## 2021-06-16 DIAGNOSIS — R51.9 FREQUENT HEADACHES: Primary | ICD-10-CM

## 2021-06-16 PROCEDURE — 97140 MANUAL THERAPY 1/> REGIONS: CPT

## 2021-06-16 PROCEDURE — 97110 THERAPEUTIC EXERCISES: CPT

## 2021-06-16 PROCEDURE — 97112 NEUROMUSCULAR REEDUCATION: CPT

## 2021-06-16 NOTE — TELEPHONE ENCOUNTER
----- Message from Bee Warner MD sent at 6/15/2021  2:02 PM EDT -----  Please inform the patient that the stress test showed NO evidence of any significant blockage in the blood vessels of your heart

## 2021-06-16 NOTE — PROGRESS NOTES
Daily Note     Today's date: 2021  Patient name: Cintia Lee  : 1970  MRN: [id number]  Referring provider: DON Landa  Dx:   Encounter Diagnosis     ICD-10-CM    1  Frequent headaches  R51 9    2  Cervical radiculopathy  M54 12                   Subjective: pt reports pressure sensation in upper cervical region pre-tx  She reported intermittent HA, but less frequent most recently  She noted no HA since last week  Objective: See treatment diary below      Assessment: Tolerated treatment well  Positive response with manuals  Held TB ext's due to c/o reproduction of CULVER last visit with doing so  Patient demonstrated fatigue post treatment, exhibited good technique with therapeutic exercises and would benefit from continued PT      Plan: Continue per plan of care  Progress treatment as tolerated         Precautions: none      Manuals    PA glides C3 and C7-T7  C7-T7  UPA C3,4,5   PA C7-T7  Rot mob C2  PA T1,2  PA T1,2   1st rib mob             TP levator  NT MM NT  NT CC NT TE NT   Sustained cerv retr w/PT OP  NT MM NT         Supine tx, retr and ext  10x 10x   x15  x10 TE  txn  only NT   SNAGS C3 w/L rotation    NT x3  NT 2x3 NT      STM     CC NT CC NT UT,  LS,  PSM's UT, LS, PSM   SOR         TE NT                             Ther Ex             Scalene stre 20"x5 20"x3 20"x3 20"x3         scap retr gtb 5"  2x10 5"x10 5"x20 TB rows GR 5"x10 NT No TB 5"x10   5"x10 5"x10 5"x10 Gr TB 5"x10   TB shoulder ext    Gr 5"x10 NT     5"x10   cerv retr ext supine  10x 10x HEP 10x NV       Self supine retraction 5"x10 10x 10x HEP 10x 10x 10x  seated x10 with op 5"x10 w/op 5"x10 w/op 5"x10   pec stretch Low 10"x10  doorway 20"x3 20"x5 NT    Low 10"x5 20"x4   Static prone t/c spine ext   3' 3'/worse 3' Hold       Thoracic ext over chair  5"x10   5x 5x  10" Hold 5" x10 5"x10 5"x10 5"x10    towelSnag to L 5"x10      5"x10 5"x10 5"x10 5"x10 Ther Activity                                       Gait Training                                       Modalities             MH pre 10'    10 min  10'  10' supine

## 2021-06-23 ENCOUNTER — OFFICE VISIT (OUTPATIENT)
Dept: PHYSICAL THERAPY | Facility: REHABILITATION | Age: 51
End: 2021-06-23
Payer: MEDICARE

## 2021-06-23 DIAGNOSIS — M54.12 CERVICAL RADICULOPATHY: ICD-10-CM

## 2021-06-23 DIAGNOSIS — R51.9 FREQUENT HEADACHES: Primary | ICD-10-CM

## 2021-06-23 PROCEDURE — 97140 MANUAL THERAPY 1/> REGIONS: CPT

## 2021-06-23 PROCEDURE — 97110 THERAPEUTIC EXERCISES: CPT

## 2021-06-23 PROCEDURE — 97112 NEUROMUSCULAR REEDUCATION: CPT

## 2021-06-23 NOTE — PROGRESS NOTES
Daily Note     Today's date: 2021  Patient name: Marii Long  : 1970  MRN: [id number]  Referring provider: DON Sandoval  Dx:   Encounter Diagnosis     ICD-10-CM    1  Frequent headaches  R51 9    2  Cervical radiculopathy  M54 12                   Subjective:  Carmen reports that she felt good for several days but started with some tightness and headache yesterday and today  Objective: See treatment diary below      Assessment: Tolerated treatment well  Patient performed ex and received manual therapy as noted below  Patient responded well to treatment and noted decreased tightness and intensity of headache post treatment  Patient exhibited good technique with therapeutic exercises and would benefit from continued PT to attain set goals  Plan: Continue per plan of care        Precautions: none      Manuals    PA glides C3 and C7-T7    UPA C3,4,5   PA C7-T7  Rot mob C2  PA T1,2  PA T1,2   1st rib mob             TP levator    NT  NT CC NT TE NT   Sustained cerv retr w/PT OP    NT         Supine tx, retr and ext      x15  x10 TE  txn  only NT   SNAGS C3 w/L rotation    NT x3  NT 2x3 NT      STM  CC   CC NT CC NT UT,  LS,  PSM's UT, LS, PSM   SOR  CC gentle       TE NT                             Ther Ex             Scalene stre 20"x5 20"x5  20"x3         scap retr gtb 5"  2x10 gtb 5" 2x10  TB rows GR 5"x10 NT No TB 5"x10   5"x10 5"x10 5"x10 Gr TB 5"x10   TB shoulder ext    Gr 5"x10 NT     5"x10   cerv retr ext supine    HEP 10x NV       Self supine retraction 5"x10 5"x10  HEP 10x 10x 10x  seated x10 with op 5"x10 w/op 5"x10 w/op 5"x10   pec stretch Low 10"x10 Low 10"x10  20"x5 NT    Low 10"x5 20"x4   Static prone t/c spine ext    3'/worse 3' Hold       Thoracic ext over chair  5"x10 5"x10  5x 5x  10" Hold 5" x10 5"x10 5"x10 5"x10    towelSnag to L 5"x10 5"x10     5"x10 5"x10 5"x10 5"x10                             Ther Activity Gait Training                                       Modalities             MH pre 10' 10' supine   10 min  10'  10' supine

## 2021-06-29 ENCOUNTER — OFFICE VISIT (OUTPATIENT)
Dept: PHYSICAL THERAPY | Facility: REHABILITATION | Age: 51
End: 2021-06-29
Payer: MEDICARE

## 2021-06-29 DIAGNOSIS — R51.9 FREQUENT HEADACHES: Primary | ICD-10-CM

## 2021-06-29 DIAGNOSIS — M54.12 CERVICAL RADICULOPATHY: ICD-10-CM

## 2021-06-29 PROCEDURE — 97110 THERAPEUTIC EXERCISES: CPT

## 2021-06-29 PROCEDURE — 97140 MANUAL THERAPY 1/> REGIONS: CPT

## 2021-06-29 PROCEDURE — 97112 NEUROMUSCULAR REEDUCATION: CPT

## 2021-06-29 NOTE — PROGRESS NOTES
Daily Note     Today's date: 2021  Patient name: George Brian  : 1970  MRN: [id number]  Referring provider: DON Arellano  Dx:   Encounter Diagnosis     ICD-10-CM    1  Frequent headaches  R51 9    2  Cervical radiculopathy  M54 12                   Subjective: pt reports mild HA prior to beginning today  Denied adverse reactions following last visit  Objective: See treatment diary below      Assessment: Tolerated treatment well  Added no monies with resistance to promote scapulothoracic strength  Patient demonstrated fatigue post treatment, exhibited good technique with therapeutic exercises and would benefit from continued PT      Plan: Continue per plan of care  Progress treatment as tolerated         Precautions: none      Manuals    PA glides C3 and C7-T7    UPA C3,4,5   PA C7-T7  Rot mob C2  PA T1,2  PA T1,2   1st rib mob             TP levator    NT  NT CC NT TE NT   Sustained cerv retr w/PT OP    NT         Supine tx, retr and ext      x15  x10 TE  txn  only NT   SNAGS C3 w/L rotation    NT x3  NT 2x3 NT      STM  CC TE  CC NT CC NT UT,  LS,  PSM's UT, LS, PSM   SOR  CC gentle TE      TE NT                             Ther Ex             Scalene stre 20"x5 20"x5 20"x5 20"x3         scap retr gtb 5"  2x10 gtb 5" 2x10 gtb 5" 2x10 TB rows GR 5"x10 NT No TB 5"x10   5"x10 5"x10 5"x10 Gr TB 5"x10   TB monies   gtb 5"x10          TB shoulder ext    Gr 5"x10 NT     5"x10   cerv retr ext supine    HEP 10x NV       Self supine retraction 5"x10 5"x10 5"x10 HEP 10x 10x 10x  seated x10 with op 5"x10 w/op 5"x10 w/op 5"x10   pec stretch Low 10"x10 Low 10"x10 Low 10"x10 20"x5 NT    Low 10"x5 20"x4   Static prone t/c spine ext    3'/worse 3' Hold       Thoracic ext over chair  5"x10 5"x10 5"x10 5x 5x  10" Hold 5" x10 5"x10 5"x10 5"x10    towelSnag to L 5"x10 5"x10 5"x10    5"x10 5"x10 5"x10 5"x10                             Ther Activity Gait Training                                       Modalities              pre 10' 10' supine 10' supine  10 min  10'  10' supine

## 2021-06-30 ENCOUNTER — OFFICE VISIT (OUTPATIENT)
Dept: PHYSICAL THERAPY | Facility: REHABILITATION | Age: 51
End: 2021-06-30
Payer: MEDICARE

## 2021-06-30 DIAGNOSIS — R51.9 FREQUENT HEADACHES: Primary | ICD-10-CM

## 2021-06-30 DIAGNOSIS — M54.12 CERVICAL RADICULOPATHY: ICD-10-CM

## 2021-06-30 PROCEDURE — 97140 MANUAL THERAPY 1/> REGIONS: CPT

## 2021-06-30 PROCEDURE — 97110 THERAPEUTIC EXERCISES: CPT

## 2021-06-30 PROCEDURE — 97112 NEUROMUSCULAR REEDUCATION: CPT

## 2021-06-30 NOTE — PROGRESS NOTES
Daily Note     Today's date: 2021  Patient name: Tej Basilio  : 1970  MRN: [id number]  Referring provider: DON Schilling  Dx:   Encounter Diagnosis     ICD-10-CM    1  Frequent headaches  R51 9    2  Cervical radiculopathy  M54 12                   Subjective: Pt reports mild HA prior to beginning today  She denied soreness or adverse reaction following yesterday's session  Objective: See treatment diary below      Assessment: Tolerated treatment well  Pt responds well with addition of foam roll series, but noted mild shoulder pain with snow angels; will assess technique NV  HA abolished upon completion of manuals  Patient demonstrated fatigue post treatment, exhibited good technique with therapeutic exercises and would benefit from continued PT  Issued/reviewed updated HEP this visit  Plan: Continue per plan of care  Progress treatment as tolerated         Precautions: none      Manuals    PA glides C3 and C7-T7      Rot mob C2  PA T1,2  PA T1,2   1st rib mob             TP levator      NT CC NT TE NT   Sustained cerv retr w/PT OP             Supine tx, retr and ext      x15  x10 TE  txn  only NT   SNAGS C3 w/L rotation      NT 2x3 NT      STM  CC TE TE CC NT CC NT UT,  LS,  PSM's UT, LS, PSM   SOR  CC gentle TE TE     TE NT                             Ther Ex             Scalene stre 20"x5 20"x5 20"x5          scap retr gtb 5"  2x10 gtb 5" 2x10 gtb 5" 2x10 gtb 5" 2x10 NT No TB 5"x10   5"x10 5"x10 5"x10 Gr TB 5"x10   TB monies   gtb 5"x10 gtb 5" 2x10         TB shoulder ext     NT     5"x10   cerv retr ext supine     10x NV       Self supine retraction 5"x10 5"x10 5"x10 5"2x10 10x 10x 10x  seated x10 with op 5"x10 w/op 5"x10 w/op 5"x10   pec stretch Low 10"x10 Low 10"x10 Low 10"x10 Low 10"x10 NT    Low 10"x5 20"x4   Static prone t/c spine ext     3' Hold       Thoracic ext over chair  5"x10 5"x10 5"x10 5"x10 5x  10" Hold 5" x10 5"x10 5"x10 5"x10    towelSnag to L 5"x10 5"x10 5"x10 5"x10   5"x10 5"x10 5"x10 5"x10   Foam roll t/s series    1 min                      Ther Activity                                       Gait Training                                       Modalities             MH pre 10' 10' supine 10' supine 10' supine 10 min  10'  10' supine

## 2021-07-03 DIAGNOSIS — G43.719 INTRACTABLE CHRONIC MIGRAINE WITHOUT AURA AND WITHOUT STATUS MIGRAINOSUS: ICD-10-CM

## 2021-07-06 RX ORDER — TOPIRAMATE 25 MG/1
CAPSULE, COATED PELLETS ORAL
Qty: 60 CAPSULE | Refills: 0 | Status: SHIPPED | OUTPATIENT
Start: 2021-07-06 | End: 2021-08-03

## 2021-07-07 ENCOUNTER — TELEPHONE (OUTPATIENT)
Dept: OBGYN CLINIC | Facility: CLINIC | Age: 51
End: 2021-07-07

## 2021-07-07 ENCOUNTER — OFFICE VISIT (OUTPATIENT)
Dept: PHYSICAL THERAPY | Facility: REHABILITATION | Age: 51
End: 2021-07-07
Payer: MEDICARE

## 2021-07-07 DIAGNOSIS — R51.9 FREQUENT HEADACHES: Primary | ICD-10-CM

## 2021-07-07 DIAGNOSIS — M54.12 CERVICAL RADICULOPATHY: ICD-10-CM

## 2021-07-07 PROCEDURE — 97110 THERAPEUTIC EXERCISES: CPT

## 2021-07-07 PROCEDURE — 97140 MANUAL THERAPY 1/> REGIONS: CPT

## 2021-07-07 NOTE — PROGRESS NOTES
Daily Note     Today's date: 2021  Patient name: Howie Khalil  : 1970  MRN: [id number]  Referring provider: DON Edwards  Dx:   Encounter Diagnosis     ICD-10-CM    1  Frequent headaches  R51 9    2  Cervical radiculopathy  M54 12                   Subjective:  Patient reports that she is having less headache and cervical tightness overall  Patient notes that she does have a headache currently with some floaters  Objective: See treatment diary below  Patient did not complete all ex this visit secondary to having another appt  Assessment: Tolerated treatment well  Patient noted decreased headache intensity without floaters post treatment  R>L upper cervical paraspinal tightness noted which improved with STM/SOR  Patient exhibited good technique with therapeutic exercises and would benefit from continued PT to attain set goals  Plan: Continue per plan of care        Precautions: none      Manuals    PA glides C3 and C7-T7        PA T1,2  PA T1,2   1st rib mob             TP levator       CC NT TE NT   Sustained cerv retr w/PT OP             Supine tx, retr and ext        x10 TE  txn  only NT   SNAGS C3 w/L rotation       NT      STM  CC TE TE CC  CC NT UT,  LS,  PSM's UT, LS, PSM   SOR  CC gentle TE TE CC    TE NT                             Ther Ex             Scalene stre 20"x5 20"x5 20"x5          scap retr gtb 5"  2x10 gtb 5" 2x10 gtb 5" 2x10 gtb 5" 2x10 np 2 time restrait    5"x10 5"x10 5"x10 Gr TB 5"x10   TB monies   gtb 5"x10 gtb 5" 2x10 np        TB shoulder ext          5"x10   cerv retr ext supine             Self supine retraction 5"x10 5"x10 5"x10 5"2x10 5"10x  10x  seated x10 with op 5"x10 w/op 5"x10 w/op 5"x10   pec stretch Low 10"x10 Low 10"x10 Low 10"x10 Low 10"x10 np    Low 10"x5 20"x4   Static prone t/c spine ext             Thoracic ext over chair  5"x10 5"x10 5"x10 5"x10 5"x 10  5" x10 5"x10 5"x10 5"x10 towelSnag to L 5"x10 5"x10 5"x10 5"x10 5"x10  5"x10 5"x10 5"x10 5"x10   Foam roll t/s series    1 min 1 min ea                     Ther Activity                                       Gait Training                                       Modalities              pre 10' 10' supine 10' supine 10' supine 10 min  10'  10' supine

## 2021-07-08 DIAGNOSIS — B37.3 VAGINAL YEAST INFECTION: Primary | ICD-10-CM

## 2021-07-08 RX ORDER — FLUCONAZOLE 150 MG/1
150 TABLET ORAL ONCE
Qty: 1 TABLET | Refills: 0 | Status: SHIPPED | OUTPATIENT
Start: 2021-07-08 | End: 2021-07-08

## 2021-07-14 ENCOUNTER — APPOINTMENT (OUTPATIENT)
Dept: PHYSICAL THERAPY | Facility: REHABILITATION | Age: 51
End: 2021-07-14
Payer: MEDICARE

## 2021-07-15 ENCOUNTER — ANNUAL EXAM (OUTPATIENT)
Dept: OBGYN CLINIC | Facility: CLINIC | Age: 51
End: 2021-07-15
Payer: MEDICARE

## 2021-07-15 VITALS
WEIGHT: 131 LBS | DIASTOLIC BLOOD PRESSURE: 80 MMHG | BODY MASS INDEX: 21.05 KG/M2 | SYSTOLIC BLOOD PRESSURE: 130 MMHG | HEIGHT: 66 IN

## 2021-07-15 DIAGNOSIS — Z79.818 LONG TERM (CURRENT) USE OF OTHER AGENTS AFFECTING ESTROGEN RECEPTORS AND ESTROGEN LEVELS: ICD-10-CM

## 2021-07-15 DIAGNOSIS — R92.2 DENSE BREAST TISSUE ON MAMMOGRAM: ICD-10-CM

## 2021-07-15 DIAGNOSIS — Z12.31 ENCOUNTER FOR SCREENING MAMMOGRAM FOR BREAST CANCER: ICD-10-CM

## 2021-07-15 DIAGNOSIS — G43.909 MIGRAINE WITHOUT STATUS MIGRAINOSUS, NOT INTRACTABLE, UNSPECIFIED MIGRAINE TYPE: ICD-10-CM

## 2021-07-15 DIAGNOSIS — Z01.419 ENCOUNTER FOR ANNUAL ROUTINE GYNECOLOGICAL EXAMINATION: Primary | ICD-10-CM

## 2021-07-15 DIAGNOSIS — Z79.899 HIGH RISK MEDICATION USE: ICD-10-CM

## 2021-07-15 PROCEDURE — G0101 CA SCREEN;PELVIC/BREAST EXAM: HCPCS | Performed by: OBSTETRICS & GYNECOLOGY

## 2021-07-15 RX ORDER — MEDROXYPROGESTERONE ACETATE 150 MG/ML
INJECTION, SUSPENSION INTRAMUSCULAR
Qty: 1 ML | Refills: 4 | Status: SHIPPED | OUTPATIENT
Start: 2021-07-15 | End: 2021-10-11 | Stop reason: SDUPTHER

## 2021-07-15 RX ORDER — FLUCONAZOLE 150 MG/1
TABLET ORAL
COMMUNITY
Start: 2021-07-08 | End: 2022-08-10

## 2021-07-15 NOTE — PROGRESS NOTES
Assessment/Plan:    pap is up to date    mammogram reviewed with her including breast density  RX given or January  Extremely dense breast tissue - discussed ABUS and she is interested in this  ABUS ordered and she will check on coverage  Discussed self breast exams    colon cancer screening - has regular colonoscopy, sees GI    Migraines - she will continue Depo Provera, due to her long time use, will check DEXA  She will look into coverage for this also  discussed preventive care, regular exercise and a healthy diet      No problem-specific Assessment & Plan notes found for this encounter  Diagnoses and all orders for this visit:    Encounter for annual routine gynecological examination    Encounter for screening mammogram for breast cancer  -     Mammo screening bilateral w 3d & cad; Future    Dense breast tissue on mammogram  -     US breast screening bilateral complete (ABUS); Future    Migraine without status migrainosus, not intractable, unspecified migraine type  -     medroxyPROGESTERone (DEPO-PROVERA) 150 mg/mL injection; Please give in office every 10 weeks    High risk medication use  -     DXA bone density spine hip and pelvis; Future    Long term (current) use of other agents affecting estrogen receptors and estrogen levels   -     DXA bone density spine hip and pelvis; Future    Other orders  -     fluconazole (DIFLUCAN) 150 mg tablet; TAKE 1 TABLET BY MOUTH AS ONE DOSE          Subjective:      Patient ID: Aura Keller is a 54 y o  female  Pt here for yearly  On Depo Provera for menstrual migraines  It is working fairly well  She has been on this for several years  She gets it every 10 weeks  She is also s/p endometrial ablation  Normal pap in 2020  Normal 3D mammogram in January with extremely dense breast tissue and average risk        The following portions of the patient's history were reviewed and updated as appropriate: allergies, current medications, past family history, past medical history, past social history, past surgical history and problem list     Review of Systems   Constitutional: Negative  Gastrointestinal: Negative  Genitourinary: Negative  Objective:      /80 (BP Location: Left arm, Patient Position: Sitting, Cuff Size: Standard)   Ht 5' 6" (1 676 m)   Wt 59 4 kg (131 lb)   BMI 21 14 kg/m²          Physical Exam  Vitals reviewed  Constitutional:       Appearance: She is well-developed  Neck:      Thyroid: No thyromegaly  Trachea: No tracheal deviation  Cardiovascular:      Rate and Rhythm: Normal rate and regular rhythm  Pulmonary:      Effort: Pulmonary effort is normal       Breath sounds: Normal breath sounds  Chest:      Breasts: Breasts are symmetrical          Right: No inverted nipple, mass, nipple discharge, skin change or tenderness  Left: No inverted nipple, mass, nipple discharge, skin change or tenderness  Abdominal:      General: There is no distension  Palpations: Abdomen is soft  There is no mass  Tenderness: There is no abdominal tenderness  Genitourinary:     Labia:         Right: No rash, tenderness, lesion or injury  Left: No rash, tenderness, lesion or injury  Vagina: Normal       Cervix: No cervical motion tenderness, discharge or friability  Adnexa:         Right: No mass, tenderness or fullness  Left: No mass, tenderness or fullness          Comments: Declines rectal

## 2021-07-16 ENCOUNTER — APPOINTMENT (OUTPATIENT)
Dept: PHYSICAL THERAPY | Facility: REHABILITATION | Age: 51
End: 2021-07-16
Payer: MEDICARE

## 2021-07-16 ENCOUNTER — OFFICE VISIT (OUTPATIENT)
Dept: PULMONOLOGY | Facility: CLINIC | Age: 51
End: 2021-07-16
Payer: MEDICARE

## 2021-07-16 VITALS
WEIGHT: 130 LBS | OXYGEN SATURATION: 98 % | SYSTOLIC BLOOD PRESSURE: 110 MMHG | DIASTOLIC BLOOD PRESSURE: 68 MMHG | TEMPERATURE: 98 F | HEIGHT: 66 IN | BODY MASS INDEX: 20.89 KG/M2 | HEART RATE: 90 BPM

## 2021-07-16 DIAGNOSIS — R06.02 SHORTNESS OF BREATH: ICD-10-CM

## 2021-07-16 DIAGNOSIS — U09.9 POST-ACUTE SEQUELAE OF COVID-19 (PASC): Primary | ICD-10-CM

## 2021-07-16 DIAGNOSIS — J96.91 RESPIRATORY FAILURE WITH HYPOXIA, UNSPECIFIED CHRONICITY (HCC): ICD-10-CM

## 2021-07-16 DIAGNOSIS — R09.81 NASAL SINUS CONGESTION: ICD-10-CM

## 2021-07-16 PROBLEM — J45.41 MODERATE PERSISTENT ASTHMA WITH ACUTE EXACERBATION: Status: RESOLVED | Noted: 2020-05-20 | Resolved: 2021-07-16

## 2021-07-16 PROBLEM — J06.9 ACUTE UPPER RESPIRATORY INFECTION: Status: RESOLVED | Noted: 2019-10-31 | Resolved: 2021-07-16

## 2021-07-16 PROBLEM — B34.9 VIRAL INFECTION, UNSPECIFIED: Status: RESOLVED | Noted: 2021-04-27 | Resolved: 2021-07-16

## 2021-07-16 PROBLEM — J45.30 MILD PERSISTENT ASTHMA WITHOUT COMPLICATION: Status: ACTIVE | Noted: 2021-07-16

## 2021-07-16 PROCEDURE — 99214 OFFICE O/P EST MOD 30 MIN: CPT | Performed by: NURSE PRACTITIONER

## 2021-07-16 NOTE — ASSESSMENT & PLAN NOTE
Continues to use supplemental O2 PRN and mainly at night  Primary complaint is fatigue  Overall her breathing has significantly improved  Antibody testing was in fact positive indicating previous infection    PFTs, chest CT, echo and stress test all normal    No further follow up testing indicated at this time

## 2021-07-16 NOTE — ASSESSMENT & PLAN NOTE
Significantly improved  Only notices with prolonged exertion or at the end of the day when she is overall more fatigued      She is currently working on improving her exercise tolerance as she has been fairly inactive for the past few months

## 2021-07-16 NOTE — ASSESSMENT & PLAN NOTE
Was prescribed oxygen in the past   Per repeat 6 MW she no longer requires supplemental O2 at rest or with exertion    She is currently using at night only    We can obtain overnight pulse oxymetry in order to qualify for continue o2 use as she feels that this is helpful in general when she is overly fatigued

## 2021-07-16 NOTE — PROGRESS NOTES
Pulmonary Follow Up Note   Zeny Márquez 54 y o  female MRN: [id number]  7/16/2021      Assessment:    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PASC)  Continues to use supplemental O2 PRN and mainly at night  Primary complaint is fatigue  Overall her breathing has significantly improved  Antibody testing was in fact positive indicating previous infection  PFTs, chest CT, echo and stress test all normal    No further follow up testing indicated at this time        Respiratory failure with hypoxia ([address] )  Was prescribed oxygen in the past   Per repeat 6 MW she no longer requires supplemental O2 at rest or with exertion    She is currently using at night only    We can obtain overnight pulse oxymetry in order to qualify for continue o2 use as she feels that this is helpful in general when she is overly fatigued    Nasal sinus congestion  Well controlled, uses Flonase as needed    Mild persistent asthma without complication  Suspect she likely has mild intermittent asthma at baseline as she reports to never requiring use of daily inhaler until COVID 19  She is currently useing Breo 200 dosing  As she did not require a daily inhaler in the past and her breathing has significantly improved she wishes to trial off Breo  She will continue albuterol MDI PRN  If symptoms return it is reasonable to restart breo at 100 dosing    Follow up in 6 months or sooner if needed    Shortness of breath  Significantly improved  Only notices with prolonged exertion or at the end of the day when she is overall more fatigued      She is currently working on improving her exercise tolerance as she has been fairly inactive for the past few months      Plan:    Diagnoses and all orders for this visit:    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PASC)    Respiratory failure with hypoxia, unspecified chronicity ([address] )    Nasal sinus congestion    Shortness of breath        Return in about 6 months (around 1/16/2022), or if symptoms worsen or fail to improve  Education provided at this visit:              Need for Vaccination:  Influenza NA, pneumococcal conjugate PCV NA pneumococcal polysaccharide PPV NA              Pulmonary Rehab:  NA              Smoking Cessation:  NA              Lung Cancer Screening:  NA              Inhaler Use: D/C         History of Present Illness   HPI:  Ilya Davidson is a 54 y o  female who presented for follow up on post covid 19 , acute hypoxia and asthma  She has been evaluated for dyspnea likely secondary to COVID 19 infection  She has undergone a complete workup for her dyspnea including: PFTs, chest CT, echocardiogram and cardiac stress test-all unremarkable  At this time her dyspnea ha significantly improved  Primary complaint continues to be fatigue  She does noticed a degree of dyspnea when she is more fatigued  Currently on uses supplemental oxygen during times of exhaustion or at night  Concerning her asthma symptoms are well controlled and she feels to be at her baseline  Denies: chest pain, fevers, chills, bronchospasm, hemoptysis, nausea, vomiting, diarrhea or dizzinesss    Review of Systems   Constitutional: Positive for activity change and fatigue  Negative for appetite change, chills, diaphoresis, fever and unexpected weight change  HENT: Negative for congestion, postnasal drip and trouble swallowing  Eyes: Negative for discharge and itching  Respiratory: Positive for shortness of breath  Negative for apnea, cough, choking, chest tightness, wheezing and stridor (mild, improving)  Cardiovascular: Negative for chest pain, palpitations and leg swelling  Gastrointestinal: Negative for abdominal distention and abdominal pain  Endocrine: Negative for cold intolerance and heat intolerance  Genitourinary: Negative for difficulty urinating  Musculoskeletal: Negative for arthralgias and back pain  Skin: Negative for color change, pallor, rash and wound     Allergic/Immunologic: Negative for food allergies  Neurological: Positive for headaches  Negative for dizziness  Hematological: Negative for adenopathy  Does not bruise/bleed easily  Psychiatric/Behavioral: Negative for agitation and behavioral problems  All other systems reviewed and are negative        Historical Information   Past Medical History:   Diagnosis Date    Anemia     Anxiety     Asthma     illness induced    SARA I (cervical intraepithelial neoplasia I)     RESOLVED: [id number]    Depression     Endometriosis     Functional ovarian cysts age 16    History of agoraphobia age 15    Hypercholesterolemia     IBS (irritable bowel syndrome) age 15    Constipation predominant    IC (interstitial cystitis)     Migraine     Motion sickness     Pap smear abnormality of cervix with ASCUS favoring benign     RESOLVED: [id number]    Seasonal allergies     with post-nasal drip and recurrent throat infections    Urinary tract infection     Varicella      Past Surgical History:   Procedure Laterality Date    CHOLECYSTECTOMY      CHOLECYSTECTOMY LAPAROSCOPIC  08/2020    COLONOSCOPY  06/2018    Nonbleeding angiodysplastic lesion mid ascending colon, questionable ulcerative proctitis-biopsy was negative for acute or chronic colitis    COLONOSCOPY  06/09/2016    Normal    COLONOSCOPY  05/23/2013    Adenoma at splenic flexure    EGD  08/02/2018    EGD  08/03/2020    Normal   Biopsy stomach negative for H  pylori, biopsy of the duodenum negative for celiac    EGD  08/03/2020    Normal   Biopsy of the stomach negative for H  pylori biopsied duodenum negative for celiac disease    ENDOTRACHEAL INTUBATION EMERGENT  2012    HEMORRHOID SURGERY  07/2009    Dr Moss Sessions HYSTEROSCOPY,DX,SEP [address] N/A 11/21/2018    Procedure: HYSTEROSCOPY;  Surgeon: Ruslan Ruvalcaba MD;  Location: [facility];  Service: Gynecology    TX HYSTEROSCOPY,W/ENDOMETRIAL ABLATION N/A 11/21/2018    Procedure: ABLATION ENDOMETRIAL Bambi Stephens; Surgeon: Joon Phillips MD;  Location: [facility];  Service: Gynecology    SMALL INTESTINE SURGERY      TOOTH EXTRACTION      TUBAL LIGATION      ONSET: 36 DEC 2011     Family History   Problem Relation Age of Onset    Other Mother         HYSTERECTOMY FOR BENIGN DISEASE     Diabetes Mother     Lung cancer Father 79    Other Sister         HYSTERECTOMY FOR BENIGN DISEASE     Diabetes Maternal Grandmother     Other Maternal Grandmother         HYSTERECTOMY FOR BENIGN DISEASE     Colon cancer Paternal Grandmother 61    Throat cancer Paternal Uncle 61    No Known Problems Maternal Aunt     No Known Problems Paternal Aunt     No Known Problems Paternal Aunt        Social History     Tobacco Use   Smoking Status Never Smoker   Smokeless Tobacco Never Used         Meds/Allergies     Current Outpatient Medications:     albuterol (2 5 mg/3 mL) 0 083 % nebulizer solution, Take 1 vial (2 5 mg total) by nebulization 3 (three) times a day, Disp: 30 vial, Rfl: 2    albuterol (PROAIR HFA) 90 mcg/act inhaler, Inhale 2 puffs 4 (four) times a day every 4 to 6 hours as needed, Disp: 1 Inhaler, Rfl: 5    colestipol (COLESTID) 1 g tablet, TAKE 1 TABLET (1 G TOTAL) BY MOUTH 3 (THREE) TIMES A DAY (Patient taking differently: Take 1 g by mouth 2 (two) times a day ), Disp: 270 tablet, Rfl: 1    colestipol (COLESTID) 5 g granules, TAKE 5 G BY MOUTH 2 TIMES A DAY, Disp: 300 g, Rfl: 1    fluconazole (DIFLUCAN) 150 mg tablet, TAKE 1 TABLET BY MOUTH AS ONE DOSE, Disp: , Rfl:     fluticasone (FLONASE) 50 mcg/act nasal spray, SPRAY 2 SPRAYS ONCE A DAY FOR 90 DAYS, Disp: , Rfl:     fluticasone-vilanterol (BREO ELLIPTA) 200-25 MCG/INH inhaler, Inhale 1 puff daily Rinse mouth after use , Disp: 1 each, Rfl: 5    Heparin Sodium, Porcine, (HEPARIN, PORCINE,) 89250 UNIT/ML, 10,000 UNITS TO BE INSTILLED INTO THE BLADDER IN THE UROLOGY OFFICE, Disp: , Rfl: 2    LORazepam (ATIVAN) 1 mg tablet, TAKE 1 & 1/2 TABLETS DAILY AT BEDTIME AS NEEDED, Disp: , Rfl: 1    medroxyPROGESTERone (DEPO-PROVERA) 150 mg/mL injection, Please give in office every 10 weeks, Disp: 1 mL, Rfl: 4    Meth-Hyo-M Bl-Na Phos-Ph Sal (Uribel) 118 MG CAPS, Take 1 each by mouth as needed , Disp: , Rfl:     Multiple Vitamin (MULTI VITAMIN DAILY PO), Take by mouth, Disp: , Rfl:     Myrbetriq 50 MG TB24, Take 1 tablet by mouth daily, Disp: , Rfl:     naproxen (NAPROSYN) 500 mg tablet, TAKE 1 TABLET BY MOUTH 3 TIMES A DAY AS NEEDED MIGRAINE  LIMIT USE TO 3 DAYS PER WEEK, Disp: , Rfl:     ondansetron (ZOFRAN) 4 mg tablet, Take 4 mg by mouth every 8 (eight) hours as needed, Disp: , Rfl: 5    pentosan polysulfate (ELMIRON) 100 mg capsule, Take 100 mg by mouth 3 (three) times a day before meals  , Disp: , Rfl:     prochlorperazine (COMPAZINE) 10 mg tablet, 1 tab po TID prn headache   Max 3 days per week, Disp: , Rfl:     topiramate (TOPAMAX) 25 mg sprinkle capsule, TAKE 1 CAPSULE (25 MG TOTAL) BY MOUTH 2 TIMES A DAY, Disp: 60 capsule, Rfl: 0    triamcinolone acetonide (KENALOG-40) 40 mg/mL, 40 MG TO BE INSTILLED INTO THE BLADDER IN THE UROLOGY OFFICE, Disp: , Rfl: 2    dicyclomine (BENTYL) 10 mg capsule, TAKE 1 CAPSULE BY MOUTH THREE TIMES A DAY (Patient not taking: Reported on 4/27/2021), Disp: 270 capsule, Rfl: 3    fexofenadine-pseudoephedrine (ALLEGRA-D 24) 180-240 MG per 24 hr tablet, Take 1 tablet by mouth daily (Patient not taking: Reported on 5/25/2021), Disp: 30 tablet, Rfl: 1    pantoprazole (PROTONIX) 40 mg tablet, TAKE 1 TABLET BY MOUTH DAILY 1/2 HOUR BEFORE BREAKFAST (Patient not taking: Reported on 4/27/2021), Disp: 90 tablet, Rfl: 3    traMADol (ULTRAM) 50 mg tablet, Take 1 tablet (50 mg total) by mouth daily at bedtime as needed for moderate pain (Patient not taking: Reported on 12/2/2020), Disp: 15 tablet, Rfl: 0    Current Facility-Administered Medications:     medroxyPROGESTERone (DEPO-PROVERA) IM injection 150 mg, 150 mg, Intramuscular, Once, Jessi Fare Ora Lola, DO  Allergies   Allergen Reactions    Azithromycin Diarrhea, Nausea Only and Other (See Comments)    Clarithromycin     Clavulanic Acid     Doxycycline     Erythromycin Base Other (See Comments)    Lamotrigine Other (See Comments)     Can't recall side effects   Lithium Itching    Mirtazapine      (remeron)    Moxifloxacin Nausea Only     Excessive vomiting  Excessive vomiting    Sulfamethoxazole      Other reaction(s): Rash    Sulfamethoxazole-Trimethoprim Other (See Comments)    Trimethoprim      Other reaction(s): Rash    Indomethacin Rash    Penicillins Rash       Vitals: Blood pressure 110/68, pulse 90, temperature 98 °F (36 7 °C), height 5' 6" (1 676 m), weight 59 kg (130 lb), SpO2 98 %, not currently breastfeeding  Body mass index is 20 98 kg/m²  Oxygen Therapy  SpO2: 98 %  Oxygen Therapy: None (Room air)    Physical Exam  Physical Exam  Vitals reviewed  Constitutional:       Appearance: Normal appearance  She is normal weight  HENT:      Head: Normocephalic and atraumatic  Nose: Nose normal       Mouth/Throat:      Mouth: Mucous membranes are dry  Eyes:      Extraocular Movements: Extraocular movements intact  Pupils: Pupils are equal, round, and reactive to light  Cardiovascular:      Rate and Rhythm: Normal rate and regular rhythm  Pulses: Normal pulses  Heart sounds: Normal heart sounds  Pulmonary:      Effort: Pulmonary effort is normal       Breath sounds: Normal breath sounds  Abdominal:      General: Abdomen is flat  Bowel sounds are normal       Palpations: Abdomen is soft  Musculoskeletal:         General: Normal range of motion  Cervical back: Normal range of motion  Skin:     General: Skin is warm and dry  Neurological:      General: No focal deficit present  Mental Status: She is alert and oriented to person, place, and time     Psychiatric:         Mood and Affect: Mood normal          Behavior: Behavior normal  Thought Content: Thought content normal          Judgment: Judgment normal          Labs: I have personally reviewed pertinent lab results  Lab Results   Component Value Date    WBC 6 00 11/27/2020    HGB 14 2 11/27/2020    HCT 44 3 11/27/2020    MCV 92 11/27/2020     11/27/2020     Lab Results   Component Value Date    CALCIUM 9 9 11/27/2020    K 4 2 11/27/2020    CO2 26 11/27/2020     (H) 11/27/2020    BUN 11 11/27/2020    CREATININE 0 86 11/27/2020     No results found for: IGE  Lab Results   Component Value Date    ALT 22 11/27/2020    AST 9 11/27/2020    ALKPHOS 65 11/27/2020       Imaging and other studies: I have personally reviewed pertinent reports  and I have personally reviewed pertinent films in PACS Chest CT 5/3/21  FINDINGS:     LUNGS:  Lungs are clear  There is no tracheal or endobronchial lesion      PLEURA:  Unremarkable      HEART/GREAT VESSELS:  Unremarkable for patient's age      MEDIASTINUM AND MALATHI:  Unremarkable      CHEST WALL AND LOWER NECK:   Unremarkable      VISUALIZED STRUCTURES IN THE UPPER ABDOMEN:  Unremarkable      OSSEOUS STRUCTURES:  No acute fracture or destructive osseous lesion                Pulmonary function testing:  Performed 6/2/21  Bronchodilator response was not performed     Results:  FEV1/FVC Ratio:  78%, FEV1 3 39 L/110%, FVC 4 32 L/111%        Lung volumes by body plethysmography:   Total Lung Capacity 6 05 L/109%, RV 1 84 L/95%      DLCO corrected for patients hemoglobin level:  84%      Interpretation:     · Spirometer showing no obstructive ventilatory defect   · Normal lung volumes  · Normal DLCO  · Normal airway resistance        Other Studies: I have personally reviewed pertinent reports     and echocardiogram 5/27/21    EF 60% with normal RV and normal systolic function

## 2021-07-16 NOTE — ASSESSMENT & PLAN NOTE
Suspect she likely has mild intermittent asthma at baseline as she reports to never requiring use of daily inhaler until COVID 19  She is currently useing Breo 200 dosing  As she did not require a daily inhaler in the past and her breathing has significantly improved she wishes to trial off Breo  She will continue albuterol MDI PRN    If symptoms return it is reasonable to restart breo at 100 dosing    Follow up in 6 months or sooner if needed

## 2021-08-02 DIAGNOSIS — G43.719 INTRACTABLE CHRONIC MIGRAINE WITHOUT AURA AND WITHOUT STATUS MIGRAINOSUS: ICD-10-CM

## 2021-08-02 PROBLEM — Z98.890 HX OF COLONOSCOPY: Status: ACTIVE | Noted: 2018-06-15

## 2021-08-03 RX ORDER — TOPIRAMATE 25 MG/1
CAPSULE, COATED PELLETS ORAL
Qty: 60 CAPSULE | Refills: 0 | Status: SHIPPED | OUTPATIENT
Start: 2021-08-03 | End: 2021-08-10

## 2021-08-04 ENCOUNTER — CLINICAL SUPPORT (OUTPATIENT)
Dept: OBGYN CLINIC | Facility: CLINIC | Age: 51
End: 2021-08-04
Payer: MEDICARE

## 2021-08-04 DIAGNOSIS — Z30.42 ENCOUNTER FOR DEPO-PROVERA CONTRACEPTION: Primary | ICD-10-CM

## 2021-08-04 PROCEDURE — 96372 THER/PROPH/DIAG INJ SC/IM: CPT

## 2021-08-04 RX ORDER — MEDROXYPROGESTERONE ACETATE 150 MG/ML
150 INJECTION, SUSPENSION INTRAMUSCULAR ONCE
Status: COMPLETED | OUTPATIENT
Start: 2021-08-04 | End: 2021-08-04

## 2021-08-04 RX ADMIN — MEDROXYPROGESTERONE ACETATE 150 MG: 150 INJECTION, SUSPENSION INTRAMUSCULAR at 11:39

## 2021-08-04 NOTE — PROGRESS NOTES
Depo given in Rt Deltoid on 8/4/21 @ 10:03  Patient will return in 10 weeks for her next Depo      George Ruvalcaba 47: [id number]  LOD#:   EXP: 8/31/2025

## 2021-08-09 DIAGNOSIS — G43.719 INTRACTABLE CHRONIC MIGRAINE WITHOUT AURA AND WITHOUT STATUS MIGRAINOSUS: ICD-10-CM

## 2021-08-10 RX ORDER — TOPIRAMATE 25 MG/1
CAPSULE, COATED PELLETS ORAL
Qty: 180 CAPSULE | Refills: 1 | Status: SHIPPED | OUTPATIENT
Start: 2021-08-10 | End: 2022-02-03

## 2021-08-11 DIAGNOSIS — B37.3 VAGINAL YEAST INFECTION: Primary | ICD-10-CM

## 2021-08-11 RX ORDER — FLUCONAZOLE 150 MG/1
150 TABLET ORAL ONCE
Qty: 1 TABLET | Refills: 2 | Status: CANCELLED | OUTPATIENT
Start: 2021-08-11 | End: 2021-08-11

## 2021-08-11 NOTE — TELEPHONE ENCOUNTER
Pt is on antibiotics for a tooth infection and already is starting with a yeast infection  Britney Fair

## 2021-10-01 ENCOUNTER — TELEPHONE (OUTPATIENT)
Dept: PULMONOLOGY | Facility: CLINIC | Age: 51
End: 2021-10-01

## 2021-10-01 DIAGNOSIS — J96.91 RESPIRATORY FAILURE WITH HYPOXIA, UNSPECIFIED CHRONICITY (HCC): Primary | ICD-10-CM

## 2021-10-11 DIAGNOSIS — G43.909 MIGRAINE WITHOUT STATUS MIGRAINOSUS, NOT INTRACTABLE, UNSPECIFIED MIGRAINE TYPE: ICD-10-CM

## 2021-10-12 RX ORDER — MEDROXYPROGESTERONE ACETATE 150 MG/ML
INJECTION, SUSPENSION INTRAMUSCULAR
Qty: 1 ML | Refills: 1 | Status: SHIPPED | OUTPATIENT
Start: 2021-10-12 | End: 2022-02-28 | Stop reason: SDUPTHER

## 2021-10-13 ENCOUNTER — CLINICAL SUPPORT (OUTPATIENT)
Dept: OBGYN CLINIC | Facility: CLINIC | Age: 51
End: 2021-10-13
Payer: MEDICARE

## 2021-10-13 VITALS — BODY MASS INDEX: 21.56 KG/M2 | DIASTOLIC BLOOD PRESSURE: 68 MMHG | WEIGHT: 133.6 LBS | SYSTOLIC BLOOD PRESSURE: 118 MMHG

## 2021-10-13 DIAGNOSIS — Z30.42 ENCOUNTER FOR DEPO-PROVERA CONTRACEPTION: Primary | ICD-10-CM

## 2021-10-13 PROCEDURE — 96372 THER/PROPH/DIAG INJ SC/IM: CPT | Performed by: OBSTETRICS & GYNECOLOGY

## 2021-10-13 RX ORDER — MEDROXYPROGESTERONE ACETATE 150 MG/ML
150 INJECTION, SUSPENSION INTRAMUSCULAR ONCE
Status: COMPLETED | OUTPATIENT
Start: 2021-10-13 | End: 2021-10-13

## 2021-10-13 RX ADMIN — MEDROXYPROGESTERONE ACETATE 150 MG: 150 INJECTION, SUSPENSION INTRAMUSCULAR at 12:35

## 2021-10-20 ENCOUNTER — OFFICE VISIT (OUTPATIENT)
Dept: FAMILY MEDICINE CLINIC | Facility: CLINIC | Age: 51
End: 2021-10-20
Payer: MEDICARE

## 2021-10-20 VITALS
RESPIRATION RATE: 12 BRPM | BODY MASS INDEX: 21.5 KG/M2 | TEMPERATURE: 98.8 F | OXYGEN SATURATION: 100 % | HEART RATE: 85 BPM | DIASTOLIC BLOOD PRESSURE: 80 MMHG | SYSTOLIC BLOOD PRESSURE: 120 MMHG | WEIGHT: 133.8 LBS | HEIGHT: 66 IN

## 2021-10-20 DIAGNOSIS — Z13.220 LIPID SCREENING: ICD-10-CM

## 2021-10-20 DIAGNOSIS — R42 DIZZINESS: Primary | ICD-10-CM

## 2021-10-20 DIAGNOSIS — Z00.00 HEALTHCARE MAINTENANCE: ICD-10-CM

## 2021-10-20 DIAGNOSIS — E16.2 LOW BLOOD SUGAR: ICD-10-CM

## 2021-10-20 LAB — SL AMB POCT GLUCOSE BLD: 92

## 2021-10-20 PROCEDURE — 99214 OFFICE O/P EST MOD 30 MIN: CPT | Performed by: NURSE PRACTITIONER

## 2021-10-20 PROCEDURE — 82948 REAGENT STRIP/BLOOD GLUCOSE: CPT | Performed by: NURSE PRACTITIONER

## 2021-10-27 ENCOUNTER — APPOINTMENT (OUTPATIENT)
Dept: LAB | Facility: IMAGING CENTER | Age: 51
End: 2021-10-27
Payer: MEDICARE

## 2021-10-27 DIAGNOSIS — Z13.220 LIPID SCREENING: ICD-10-CM

## 2021-10-27 DIAGNOSIS — Z00.00 HEALTHCARE MAINTENANCE: ICD-10-CM

## 2021-10-27 DIAGNOSIS — R42 DIZZINESS: ICD-10-CM

## 2021-10-27 LAB
ALBUMIN SERPL BCP-MCNC: 3.8 G/DL (ref 3.5–5)
ALP SERPL-CCNC: 81 U/L (ref 46–116)
ALT SERPL W P-5'-P-CCNC: 16 U/L (ref 12–78)
ANION GAP SERPL CALCULATED.3IONS-SCNC: 5 MMOL/L (ref 4–13)
AST SERPL W P-5'-P-CCNC: 9 U/L (ref 5–45)
BASOPHILS # BLD AUTO: 0.02 THOUSANDS/ΜL (ref 0–0.1)
BASOPHILS NFR BLD AUTO: 1 % (ref 0–1)
BILIRUB SERPL-MCNC: 0.59 MG/DL (ref 0.2–1)
BUN SERPL-MCNC: 14 MG/DL (ref 5–25)
CALCIUM SERPL-MCNC: 9.4 MG/DL (ref 8.3–10.1)
CHLORIDE SERPL-SCNC: 109 MMOL/L (ref 100–108)
CHOLEST SERPL-MCNC: 163 MG/DL (ref 50–200)
CO2 SERPL-SCNC: 24 MMOL/L (ref 21–32)
CREAT SERPL-MCNC: 0.85 MG/DL (ref 0.6–1.3)
EOSINOPHIL # BLD AUTO: 0.05 THOUSAND/ΜL (ref 0–0.61)
EOSINOPHIL NFR BLD AUTO: 1 % (ref 0–6)
ERYTHROCYTE [DISTWIDTH] IN BLOOD BY AUTOMATED COUNT: 13.2 % (ref 11.6–15.1)
GFR SERPL CREATININE-BSD FRML MDRD: 81 ML/MIN/1.73SQ M
GLUCOSE P FAST SERPL-MCNC: 82 MG/DL (ref 65–99)
HCT VFR BLD AUTO: 44.7 % (ref 34.8–46.1)
HDLC SERPL-MCNC: 45 MG/DL
HGB BLD-MCNC: 14.4 G/DL (ref 11.5–15.4)
IMM GRANULOCYTES # BLD AUTO: 0.02 THOUSAND/UL (ref 0–0.2)
IMM GRANULOCYTES NFR BLD AUTO: 1 % (ref 0–2)
LDLC SERPL CALC-MCNC: 107 MG/DL (ref 0–100)
LYMPHOCYTES # BLD AUTO: 1.52 THOUSANDS/ΜL (ref 0.6–4.47)
LYMPHOCYTES NFR BLD AUTO: 36 % (ref 14–44)
MCH RBC QN AUTO: 29.6 PG (ref 26.8–34.3)
MCHC RBC AUTO-ENTMCNC: 32.2 G/DL (ref 31.4–37.4)
MCV RBC AUTO: 92 FL (ref 82–98)
MONOCYTES # BLD AUTO: 0.31 THOUSAND/ΜL (ref 0.17–1.22)
MONOCYTES NFR BLD AUTO: 7 % (ref 4–12)
NEUTROPHILS # BLD AUTO: 2.32 THOUSANDS/ΜL (ref 1.85–7.62)
NEUTS SEG NFR BLD AUTO: 54 % (ref 43–75)
NRBC BLD AUTO-RTO: 0 /100 WBCS
PLATELET # BLD AUTO: 247 THOUSANDS/UL (ref 149–390)
PMV BLD AUTO: 10.1 FL (ref 8.9–12.7)
POTASSIUM SERPL-SCNC: 4.5 MMOL/L (ref 3.5–5.3)
PROT SERPL-MCNC: 7.2 G/DL (ref 6.4–8.2)
RBC # BLD AUTO: 4.86 MILLION/UL (ref 3.81–5.12)
SODIUM SERPL-SCNC: 138 MMOL/L (ref 136–145)
TRIGL SERPL-MCNC: 53 MG/DL
TSH SERPL DL<=0.05 MIU/L-ACNC: 1.32 UIU/ML (ref 0.36–3.74)
WBC # BLD AUTO: 4.24 THOUSAND/UL (ref 4.31–10.16)

## 2021-10-27 PROCEDURE — 80053 COMPREHEN METABOLIC PANEL: CPT

## 2021-10-27 PROCEDURE — 36415 COLL VENOUS BLD VENIPUNCTURE: CPT

## 2021-10-27 PROCEDURE — 80061 LIPID PANEL: CPT

## 2021-10-27 PROCEDURE — 85025 COMPLETE CBC W/AUTO DIFF WBC: CPT

## 2021-10-27 PROCEDURE — 84443 ASSAY THYROID STIM HORMONE: CPT

## 2021-10-28 ENCOUNTER — TELEPHONE (OUTPATIENT)
Dept: FAMILY MEDICINE CLINIC | Facility: CLINIC | Age: 51
End: 2021-10-28

## 2021-12-17 ENCOUNTER — HOSPITAL ENCOUNTER (OUTPATIENT)
Dept: RADIOLOGY | Facility: HOSPITAL | Age: 51
Discharge: HOME | End: 2021-12-17
Payer: MEDICARE

## 2021-12-17 ENCOUNTER — TELEPHONE (OUTPATIENT)
Dept: FAMILY MEDICINE CLINIC | Facility: CLINIC | Age: 51
End: 2021-12-17

## 2021-12-17 DIAGNOSIS — R05.9 COUGH: ICD-10-CM

## 2021-12-17 DIAGNOSIS — J45.20 MILD INTERMITTENT ASTHMA WITHOUT COMPLICATION: ICD-10-CM

## 2021-12-17 DIAGNOSIS — J02.9 SORE THROAT: Primary | ICD-10-CM

## 2021-12-17 DIAGNOSIS — R06.02 SHORTNESS OF BREATH: ICD-10-CM

## 2021-12-17 PROCEDURE — 0241U HB NFCT DS VIR RESP RNA 4 TRGT: CPT | Performed by: FAMILY MEDICINE

## 2021-12-17 PROCEDURE — 71046 X-RAY EXAM CHEST 2 VIEWS: CPT

## 2021-12-29 ENCOUNTER — HOSPITAL ENCOUNTER (OUTPATIENT)
Dept: CT IMAGING | Facility: HOSPITAL | Age: 51
Discharge: HOME | End: 2021-12-29
Attending: ALLERGY & IMMUNOLOGY
Payer: MEDICARE

## 2021-12-29 DIAGNOSIS — J20.9 ACUTE BRONCHITIS, UNSPECIFIED ORGANISM: ICD-10-CM

## 2021-12-29 PROCEDURE — G1004 CDSM NDSC: HCPCS

## 2021-12-29 PROCEDURE — 71250 CT THORAX DX C-: CPT

## 2021-12-30 ENCOUNTER — TELEPHONE (OUTPATIENT)
Dept: PULMONOLOGY | Facility: CLINIC | Age: 51
End: 2021-12-30

## 2021-12-30 ENCOUNTER — TELEMEDICINE (OUTPATIENT)
Dept: PULMONOLOGY | Facility: CLINIC | Age: 51
End: 2021-12-30
Payer: MEDICARE

## 2021-12-30 VITALS
HEIGHT: 66 IN | OXYGEN SATURATION: 98 % | BODY MASS INDEX: 21.86 KG/M2 | HEART RATE: 84 BPM | WEIGHT: 136 LBS | TEMPERATURE: 99.7 F

## 2021-12-30 DIAGNOSIS — R05.3 CHRONIC COUGH: Primary | ICD-10-CM

## 2021-12-30 PROCEDURE — 99442 PR PHYS/QHP TELEPHONE EVALUATION 11-20 MIN: CPT | Performed by: PHYSICIAN ASSISTANT

## 2021-12-30 RX ORDER — GUAIFENESIN 600 MG
600 TABLET, EXTENDED RELEASE 12 HR ORAL EVERY 12 HOURS SCHEDULED
Qty: 28 TABLET | Refills: 0 | Status: SHIPPED | OUTPATIENT
Start: 2021-12-30 | End: 2022-01-13

## 2021-12-30 RX ORDER — OMEPRAZOLE 40 MG/1
40 CAPSULE, DELAYED RELEASE ORAL DAILY
Qty: 14 CAPSULE | Refills: 0 | Status: SHIPPED | OUTPATIENT
Start: 2021-12-30 | End: 2022-02-03

## 2021-12-30 RX ORDER — HYDROCODONE POLISTIREX AND CHLORPHENIRAMINE POLISTIREX 10; 8 MG/5ML; MG/5ML
5 SUSPENSION, EXTENDED RELEASE ORAL EVERY 12 HOURS PRN
Qty: 120 ML | Refills: 0 | Status: SHIPPED | OUTPATIENT
Start: 2021-12-30 | End: 2022-01-04

## 2021-12-30 RX ORDER — BENZONATATE 100 MG/1
100 CAPSULE ORAL 3 TIMES DAILY PRN
Qty: 20 CAPSULE | Refills: 0 | Status: SHIPPED | OUTPATIENT
Start: 2021-12-30 | End: 2022-01-04 | Stop reason: SDUPTHER

## 2021-12-30 RX ORDER — ECHINACEA PURPUREA EXTRACT 125 MG
1 TABLET ORAL AS NEEDED
Qty: 480 ML | Refills: 0 | Status: SHIPPED | OUTPATIENT
Start: 2021-12-30 | End: 2022-04-15

## 2022-01-03 ENCOUNTER — TELEPHONE (OUTPATIENT)
Dept: FAMILY MEDICINE CLINIC | Facility: CLINIC | Age: 52
End: 2022-01-03

## 2022-01-03 DIAGNOSIS — R05.9 COUGH: Primary | ICD-10-CM

## 2022-01-03 DIAGNOSIS — Z91.89 AT INCREASED RISK OF EXPOSURE TO COVID-19 VIRUS: ICD-10-CM

## 2022-01-03 PROCEDURE — 87636 SARSCOV2 & INF A&B AMP PRB: CPT | Performed by: FAMILY MEDICINE

## 2022-01-03 NOTE — TELEPHONE ENCOUNTER
Patient said she has a URI, "breathing is bad"   I recommended the ER and she said she went to the ER already  Had covid exposure  But WAS NOT positive on her home tests per last note    She said she did not tell us that her tests were positive        She is anxious to know what to do

## 2022-01-03 NOTE — TELEPHONE ENCOUNTER
Phone call from pt, states she had covid exposure on 12/28  She has wheezing,cough,fever (up to 102) & congestion  Pt states she did 2 home tests for covid & they were positive  She is requesting an order for a test to be done at the lab  Advised pt if they did a home test & it was positive, we were not ordering additional test  Pt states she was told by a Dr, that the home tests are not that accurate  Please advise

## 2022-01-03 NOTE — TELEPHONE ENCOUNTER
I  called pt and she did get tessalon pearls from her asthma specialistt  She also has a call out to pulmonologist   Pt did deny any positive home test for covid  I placed an order for covid testing and pt is going today to have it done

## 2022-01-04 ENCOUNTER — TELEMEDICINE (OUTPATIENT)
Dept: PULMONOLOGY | Facility: CLINIC | Age: 52
End: 2022-01-04
Payer: MEDICARE

## 2022-01-04 VITALS — HEIGHT: 66 IN | BODY MASS INDEX: 21.86 KG/M2 | TEMPERATURE: 99.5 F | WEIGHT: 136 LBS

## 2022-01-04 DIAGNOSIS — J06.9 VIRAL URI WITH COUGH: ICD-10-CM

## 2022-01-04 DIAGNOSIS — R05.3 CHRONIC COUGH: ICD-10-CM

## 2022-01-04 DIAGNOSIS — J45.51 SEVERE PERSISTENT ASTHMA WITH ACUTE EXACERBATION: ICD-10-CM

## 2022-01-04 DIAGNOSIS — J45.51 SEVERE PERSISTENT ASTHMA WITH ACUTE EXACERBATION: Primary | ICD-10-CM

## 2022-01-04 PROCEDURE — 99442 PR PHYS/QHP TELEPHONE EVALUATION 11-20 MIN: CPT | Performed by: INTERNAL MEDICINE

## 2022-01-04 RX ORDER — BENZONATATE 100 MG/1
100 CAPSULE ORAL 3 TIMES DAILY PRN
Qty: 20 CAPSULE | Refills: 0 | Status: SHIPPED | OUTPATIENT
Start: 2022-01-04 | End: 2022-02-03

## 2022-01-04 RX ORDER — PREDNISONE 20 MG/1
40 TABLET ORAL DAILY
Qty: 5 TABLET | Refills: 0 | Status: SHIPPED | OUTPATIENT
Start: 2022-01-04 | End: 2022-01-04 | Stop reason: SDUPTHER

## 2022-01-04 RX ORDER — PREDNISONE 20 MG/1
40 TABLET ORAL DAILY
Qty: 10 TABLET | Refills: 0 | Status: SHIPPED | OUTPATIENT
Start: 2022-01-04 | End: 2022-01-09

## 2022-01-04 NOTE — ASSESSMENT & PLAN NOTE
· COVID-19 PCR pending after known COVID-19 exposure  · Rx Tessalon Perles  · Rx prednisone 40 mg p o   Daily for 5 days  · Patient instructed to go to the emergency department with any dizziness or lightheadedness or marked hypoxia when measuring pulse oximetry  · Will follow-up with sick visit in 1 week

## 2022-01-04 NOTE — ASSESSMENT & PLAN NOTE
· Asthma currently being managed by an allergist   · Encouraged patient to continue Breo 200 daily for now  · Unclear if allergist will be managing asthma or pulmonary Medicine  · See viral URI plan  · Will follow-up in 1 week

## 2022-01-04 NOTE — PROGRESS NOTES
Virtual Brief Visit    Patient is located in the following state in which I hold an active license [place]    Patient has had worsening in shortness of breath, cough and general malaise over the past 5 days  She has had a worsening cough over the past 1 month  She has tested negative with antigen test for COVID-19 early in December 2021  She has a PCR COVID 19 test pending since yesterday after known COVID-19 exposure in the last 1 week  She is still having fever up to 102 F  Assessment/Plan:    Problem List Items Addressed This Visit        Respiratory    Viral URI with cough     · COVID-19 PCR pending after known COVID-19 exposure  · Rx Tessalon Perles  · Rx prednisone 40 mg p o  Daily for 5 days  · Will follow-up with sick visit in 1 week         Severe persistent asthma with acute exacerbation - Primary     · Asthma currently being managed by an allergist   · Encouraged patient to continue Breo 200 daily for now  · Unclear if allergist will be managing asthma or pulmonary Medicine  · See viral URI plan  · Will follow-up in 1 week         Relevant Medications    predniSONE 20 mg tablet      Other Visit Diagnoses     Chronic cough        Relevant Medications    benzonatate (TESSALON PERLES) 100 mg capsule          Recent Visits  Date Type Provider Dept   12/30/21 Telephone Hellen Villarreal MD [facility]   Showing recent visits within past 7 days and meeting all other requirements  Today's Visits  Date Type Provider Dept   01/04/22 Telemedicine Hellen Villarreal MD [facility]   Showing today's visits and meeting all other requirements  Future Appointments  No visits were found meeting these conditions    Showing future appointments within next 150 days and meeting all other requirements         I spent 14 minutes directly with the patient during this visit

## 2022-01-12 ENCOUNTER — OFFICE VISIT (OUTPATIENT)
Dept: PULMONOLOGY | Facility: CLINIC | Age: 52
End: 2022-01-12
Payer: MEDICARE

## 2022-01-12 VITALS
SYSTOLIC BLOOD PRESSURE: 130 MMHG | HEIGHT: 66 IN | OXYGEN SATURATION: 99 % | HEART RATE: 102 BPM | BODY MASS INDEX: 22.5 KG/M2 | TEMPERATURE: 99.4 F | DIASTOLIC BLOOD PRESSURE: 78 MMHG | WEIGHT: 140 LBS

## 2022-01-12 DIAGNOSIS — J06.9 VIRAL UPPER RESPIRATORY TRACT INFECTION: ICD-10-CM

## 2022-01-12 DIAGNOSIS — R00.0 TACHYCARDIA: ICD-10-CM

## 2022-01-12 DIAGNOSIS — R06.02 SHORTNESS OF BREATH: ICD-10-CM

## 2022-01-12 DIAGNOSIS — U09.9 POST-ACUTE SEQUELAE OF COVID-19 (PASC): Primary | ICD-10-CM

## 2022-01-12 PROCEDURE — 99215 OFFICE O/P EST HI 40 MIN: CPT | Performed by: INTERNAL MEDICINE

## 2022-01-12 NOTE — PROGRESS NOTES
Pulmonary Follow Up Note  Beto Do 54 y o  female MRN: [id number]  1/12/2022      HPI:    Patient has been having symptoms of inappropriate spontaneous tachycardia, shortness of breath and nonproductive cough for approximately 1 month  Patient had a known exposure to COVID-19 towards the end of December  She has subsequently tested negative for COVID-19 flu approximately 1 week ago  She has intermittent spontaneous chills and fevers  She had similar symptoms after what appeared to be PASC in the middle of last year  Her symptoms were resolved until recently  Patient also endorses spontaneous tachycardia including waking her up from sleep from palpitations     Meds:  Breo 200 daily  Albuterol only as needed    ROS:    Constitutional:  + Fatigue, + chills, +  fever, - weight change  HEENT: - rhinorrhea, - sneezing, - sore throat  Respiratory:  + cough, +  shortness of breath, - wheezing  Cardiovascular:  +  chest pain, + palpitations, - leg swelling  Gastrointestinal: - abdominal pain, - constipation, - diarrhea, - nausea, - vomiting  Endocrine: + cold intolerance, - heat intolerance  Genitourinary: - dysuria  Musculoskeletal: + arthralgias  Skin:- rash, - wound  Allergic/Immunologic: - allergies  Neurological: - dizziness, - numbness        Vitals: Blood pressure 130/78, pulse 102, temperature 99 4 °F (37 4 °C), temperature source Tympanic, height 5' 6" (1 676 m), weight 63 5 kg (140 lb), SpO2 99 %, not currently breastfeeding , Body mass index is 22 6 kg/m²      Physical Exam:  GEN  NAD  NECK  supple, no JVD, no LAD  CV  +s1s2, no mrg, RRR  PULM  CTA BL, no wrr  ABD  soft, ntnd, + BS  EXT  no edema, no cyanosis, no clubbing  NEURO  Aox3, no focal weakness    Imaging and other studies:   I have personally viewed and interpreted the following imaging studies:  CT chest 12/29/2021 shows normal lung parenchyma and pleura    Pulmonary function testing:   I personally interpreted the following pulmonary function test from 06/02/2021:  Normal spirometry  Normal lung volumes  Normal diffusing capacity  Normal flow volume loop  No significant desaturation with 6 minutes walk test    Assessment:  Pulmonary risk stratification for general anesthesia  [facility]  Acute URI  Inappropriate tachycardia, palpitations    Plan:  · Patient appears to be recovering from an acute URI  · Continue supportive care  · Check echocardiogram given inappropriate tachycardia  · PFTs and CT chest does not show any evidence of lung disease that would cause hypoxia  · 6 minutes walk test does not reveal any hypoxia  · Given the lack of risk factors overall but acute URI, patient is likely a low to moderate risk for general anesthesia  Return visit in 1 month  On next visit we will evaluate symptoms    PRINCE Valdez    [facility]

## 2022-01-26 ENCOUNTER — HOSPITAL ENCOUNTER (OUTPATIENT)
Dept: RADIOLOGY | Age: 52
Discharge: HOME | End: 2022-01-26
Payer: MEDICARE

## 2022-01-26 ENCOUNTER — APPOINTMENT (OUTPATIENT)
Dept: LAB | Age: 52
End: 2022-01-26
Payer: MEDICARE

## 2022-01-26 VITALS — BODY MASS INDEX: 22.5 KG/M2 | HEIGHT: 66 IN | WEIGHT: 140 LBS

## 2022-01-26 DIAGNOSIS — R39.15 URGENCY OF URINATION: ICD-10-CM

## 2022-01-26 DIAGNOSIS — Z12.31 ENCOUNTER FOR SCREENING MAMMOGRAM FOR MALIGNANT NEOPLASM OF BREAST: ICD-10-CM

## 2022-01-26 DIAGNOSIS — Z12.31 ENCOUNTER FOR SCREENING MAMMOGRAM FOR BREAST CANCER: ICD-10-CM

## 2022-01-26 DIAGNOSIS — Z01.818 OTHER SPECIFIED PRE-OPERATIVE EXAMINATION: ICD-10-CM

## 2022-01-26 LAB
ANION GAP SERPL CALCULATED.3IONS-SCNC: 4 MMOL/L (ref 4–13)
BASOPHILS # BLD AUTO: 0.04 THOUSANDS/ΜL (ref 0–0.1)
BASOPHILS NFR BLD AUTO: 1 % (ref 0–1)
BUN SERPL-MCNC: 10 MG/DL (ref 5–25)
CALCIUM SERPL-MCNC: 9.1 MG/DL (ref 8.3–10.1)
CHLORIDE SERPL-SCNC: 107 MMOL/L (ref 100–108)
CO2 SERPL-SCNC: 27 MMOL/L (ref 21–32)
CREAT SERPL-MCNC: 0.77 MG/DL (ref 0.6–1.3)
EOSINOPHIL # BLD AUTO: 0.21 THOUSAND/ΜL (ref 0–0.61)
EOSINOPHIL NFR BLD AUTO: 3 % (ref 0–6)
ERYTHROCYTE [DISTWIDTH] IN BLOOD BY AUTOMATED COUNT: 13 % (ref 11.6–15.1)
GFR SERPL CREATININE-BSD FRML MDRD: 90 ML/MIN/1.73SQ M
GLUCOSE SERPL-MCNC: 84 MG/DL (ref 65–140)
HCT VFR BLD AUTO: 44.1 % (ref 34.8–46.1)
HGB BLD-MCNC: 14.2 G/DL (ref 11.5–15.4)
IMM GRANULOCYTES # BLD AUTO: 0.04 THOUSAND/UL (ref 0–0.2)
IMM GRANULOCYTES NFR BLD AUTO: 1 % (ref 0–2)
LYMPHOCYTES # BLD AUTO: 1.59 THOUSANDS/ΜL (ref 0.6–4.47)
LYMPHOCYTES NFR BLD AUTO: 22 % (ref 14–44)
MCH RBC QN AUTO: 29.8 PG (ref 26.8–34.3)
MCHC RBC AUTO-ENTMCNC: 32.2 G/DL (ref 31.4–37.4)
MCV RBC AUTO: 93 FL (ref 82–98)
MONOCYTES # BLD AUTO: 0.44 THOUSAND/ΜL (ref 0.17–1.22)
MONOCYTES NFR BLD AUTO: 6 % (ref 4–12)
NEUTROPHILS # BLD AUTO: 4.95 THOUSANDS/ΜL (ref 1.85–7.62)
NEUTS SEG NFR BLD AUTO: 67 % (ref 43–75)
NRBC BLD AUTO-RTO: 0 /100 WBCS
PLATELET # BLD AUTO: 305 THOUSANDS/UL (ref 149–390)
PMV BLD AUTO: 9.2 FL (ref 8.9–12.7)
POTASSIUM SERPL-SCNC: 4.6 MMOL/L (ref 3.5–5.3)
RBC # BLD AUTO: 4.76 MILLION/UL (ref 3.81–5.12)
SODIUM SERPL-SCNC: 138 MMOL/L (ref 136–145)
WBC # BLD AUTO: 7.27 THOUSAND/UL (ref 4.31–10.16)

## 2022-01-26 PROCEDURE — 77067 SCR MAMMO BI INCL CAD: CPT

## 2022-01-26 PROCEDURE — 87086 URINE CULTURE/COLONY COUNT: CPT

## 2022-01-26 PROCEDURE — 77063 BREAST TOMOSYNTHESIS BI: CPT

## 2022-01-26 PROCEDURE — 85025 COMPLETE CBC W/AUTO DIFF WBC: CPT

## 2022-01-26 PROCEDURE — 80048 BASIC METABOLIC PNL TOTAL CA: CPT

## 2022-01-26 PROCEDURE — 36415 COLL VENOUS BLD VENIPUNCTURE: CPT

## 2022-01-27 LAB — BACTERIA UR CULT: NORMAL

## 2022-01-28 PROBLEM — E04.2 MULTIPLE THYROID NODULES: Status: ACTIVE | Noted: 2021-11-02

## 2022-01-28 PROBLEM — C73 MALIGNANT NEOPLASM OF THYROID GLAND (HCC): Status: ACTIVE | Noted: 2021-12-14

## 2022-02-02 ENCOUNTER — HOSPITAL ENCOUNTER (OUTPATIENT)
Dept: NON INVASIVE DIAGNOSTICS | Facility: HOSPITAL | Age: 52
Discharge: HOME | End: 2022-02-02
Attending: INTERNAL MEDICINE
Payer: MEDICARE

## 2022-02-02 VITALS
WEIGHT: 141 LBS | BODY MASS INDEX: 22.66 KG/M2 | HEIGHT: 66 IN | HEART RATE: 65 BPM | SYSTOLIC BLOOD PRESSURE: 112 MMHG | DIASTOLIC BLOOD PRESSURE: 68 MMHG

## 2022-02-02 DIAGNOSIS — R00.0 TACHYCARDIA: ICD-10-CM

## 2022-02-02 LAB
AORTIC ROOT: 2.8 CM
APICAL FOUR CHAMBER EJECTION FRACTION: 53 %
ASCENDING AORTA: 3.1 CM (ref 1.89–2.82)
E WAVE DECELERATION TIME: 192 MS
FRACTIONAL SHORTENING: 33 % (ref 28–44)
INTERVENTRICULAR SEPTUM IN DIASTOLE (PARASTERNAL SHORT AXIS VIEW): 0.7 CM (ref 0.5–0.94)
LEFT ATRIUM AREA SYSTOLE SINGLE PLANE A4C: 12.5 CM2
LEFT ATRIUM SIZE: 3 CM
LEFT INTERNAL DIMENSION IN SYSTOLE: 3 CM (ref 2.1–4)
LEFT VENTRICULAR INTERNAL DIMENSION IN DIASTOLE: 4.5 CM (ref 3.93–5.85)
LEFT VENTRICULAR POSTERIOR WALL IN END DIASTOLE: 0.7 CM (ref 0.49–0.93)
LEFT VENTRICULAR STROKE VOLUME: 58 ML
MV E'TISSUE VEL-SEP: 11 CM/S
MV PEAK A VEL: 0.52 M/S
MV PEAK E VEL: 60 CM/S
MV STENOSIS PRESSURE HALF TIME: 0 MS
PA SYSTOLIC PRESSURE: 20 MMHG
RIGHT ATRIUM AREA SYSTOLE A4C: 9.1 CM2
RIGHT VENTRICLE ID DIMENSION: 2.7 CM
SL CV PED ECHO LEFT VENTRICLE DIASTOLIC VOLUME (MOD BIPLANE) 2D: 94 ML
SL CV PED ECHO LEFT VENTRICLE SYSTOLIC VOLUME (MOD BIPLANE) 2D: 36 ML
TR MAX PG: 15 MMHG
TRICUSPID VALVE PEAK REGURGITATION VELOCITY: 1.91 M/S
Z-SCORE OF ASCENDING AORTA: 3.15
Z-SCORE OF INTERVENTRICULAR SEPTUM IN END DIASTOLE: -0.2
Z-SCORE OF LEFT VENTRICULAR DIMENSION IN END SYSTOLE: -0.63
Z-SCORE OF LEFT VENTRICULAR POSTERIOR WALL IN END DIASTOLE: -0.09

## 2022-02-02 PROCEDURE — 93306 TTE W/DOPPLER COMPLETE: CPT

## 2022-02-02 PROCEDURE — 93306 TTE W/DOPPLER COMPLETE: CPT | Performed by: INTERNAL MEDICINE

## 2022-02-03 ENCOUNTER — TELEPHONE (OUTPATIENT)
Dept: FAMILY MEDICINE CLINIC | Facility: CLINIC | Age: 52
End: 2022-02-03

## 2022-02-03 NOTE — TELEPHONE ENCOUNTER
Pt states CXR and CT was ordered by her lung doctor  She was told she has a liver cyst and  to call her pcp  Pt wants to know who to follow up with  She does see GI  Please advise

## 2022-02-03 NOTE — TELEPHONE ENCOUNTER
Patient left a message in voice mail last night  She said last time she had a CT scan of her lungs, she was advised she had a small cyst on her liver  She is wondering who she should see for this ? Please advise [phone]

## 2022-02-14 ENCOUNTER — OFFICE VISIT (OUTPATIENT)
Dept: PULMONOLOGY | Facility: CLINIC | Age: 52
End: 2022-02-14
Payer: MEDICARE

## 2022-02-14 VITALS
OXYGEN SATURATION: 98 % | TEMPERATURE: 98.9 F | HEART RATE: 91 BPM | WEIGHT: 141.4 LBS | HEIGHT: 66 IN | DIASTOLIC BLOOD PRESSURE: 84 MMHG | SYSTOLIC BLOOD PRESSURE: 120 MMHG | BODY MASS INDEX: 22.73 KG/M2

## 2022-02-14 DIAGNOSIS — J45.50 SEVERE PERSISTENT ASTHMA WITHOUT COMPLICATION: ICD-10-CM

## 2022-02-14 DIAGNOSIS — U09.9 POST-ACUTE SEQUELAE OF COVID-19 (PASC): Primary | ICD-10-CM

## 2022-02-14 DIAGNOSIS — R06.02 SHORTNESS OF BREATH: ICD-10-CM

## 2022-02-14 PROCEDURE — 99214 OFFICE O/P EST MOD 30 MIN: CPT | Performed by: INTERNAL MEDICINE

## 2022-02-14 RX ORDER — CALCITRIOL 0.5 UG/1
CAPSULE, LIQUID FILLED ORAL
COMMUNITY
Start: 2022-01-18 | End: 2022-08-10

## 2022-02-14 RX ORDER — HYDROCODONE BITARTRATE AND ACETAMINOPHEN 5; 325 MG/1; MG/1
TABLET ORAL
COMMUNITY
Start: 2022-01-17 | End: 2022-08-10

## 2022-02-14 RX ORDER — TRAMADOL HYDROCHLORIDE 50 MG/1
TABLET ORAL
COMMUNITY
Start: 2022-02-08 | End: 2022-08-10

## 2022-02-14 RX ORDER — PREDNISONE 20 MG/1
TABLET ORAL
COMMUNITY
Start: 2022-02-09 | End: 2022-06-08 | Stop reason: ALTCHOICE

## 2022-02-14 RX ORDER — NARATRIPTAN 2.5 MG/1
TABLET ORAL
COMMUNITY
Start: 2022-02-09 | End: 2022-08-10

## 2022-02-14 NOTE — PROGRESS NOTES
Pulmonary Follow Up Note  Laurie Chow 54 y o  female MRN: [id number]  2/14/2022      HPI:    Patient feels better asthma exacerbating more frequently since April of 2021  This was around the time she obtained a 2nd dose over Lois Aguilar COVID-19 vaccination  She also experiences itchy and painful rash is since this time  No fevers or chills  No rashes at this time  Has had 2 exacerbations requiring oral corticosteroids last year  Meds:  Breo 200 daily  Albuterol as needed    ROS:  Constitutional: - Fatigue, - chills, - fever, - weight change  HEENT: - rhinorrhea, - sneezing, - sore throat  Respiratory: - cough, + intermittent shortness of breath, - wheezing  Cardiovascular: - chest pain,  -palpitations, - leg swelling  Gastrointestinal: - abdominal pain, - constipation, - diarrhea, - nausea, - vomiting  Endocrine: - cold intolerance, - heat intolerance  Genitourinary: - dysuria  Musculoskeletal: - arthralgias  Skin:- rash, - wound  Allergic/Immunologic: - allergies  Neurological: - dizziness, - numbness        Vitals: Blood pressure 120/84, pulse 91, temperature 98 9 °F (37 2 °C), height 5' 6" (1 676 m), weight 64 1 kg (141 lb 6 4 oz), SpO2 98 %, not currently breastfeeding , Body mass index is 22 82 kg/m²      Physical Exam:  GEN  NAD  NECK  supple, no JVD, no LAD  CV  +s1s2, no mrg, RRR  PULM  CTA BL, no wrr  ABD  soft, ntnd, + BS  EXT  1+ lower extremity pitting edema, no cyanosis, no clubbing  NEURO  Aox3, no focal weakness    Imaging and other studies:   I personally viewed and interpreted the following imaging studies:  CT chest 12/29/2021 shows grossly normal lung parenchyma pleura    Pulmonary function testing:   I personally interpreted the following PFT's 06/02/2021:  Normal spirometry  Normal lung capacity  Normal diffusing capacity  Normal flow volume    Assessment:  PASC  Asthma, severe persistent  Inappropriate tachycardia and palpitations    Plan:  · I suspect that the bulk of the patient's symptoms are related to prior infection with COVID-19 and subsequent PASC  · Continue supportive care  · Unclear etiology of patient's more frequent exacerbations of asthma since last year  I suspect this is related to previous infection with COVID-19  · Discontinue Breo 200 daily  · Start Trelegy 200 daily, samples provided  · Continue albuterol as needed  · PFTs and CT scan chest do not show any evidence of lung disease that would be the cause of pulmonary symptoms  · Counseled patient on the importance of obtaining booster shot against COVID-19  Return visit in 2 months  On next visit we will evaluate symptoms, compliance with Trelegy, success with obtaining COVID-19 booster    PRINCE [facility]

## 2022-02-16 ENCOUNTER — OFFICE VISIT (OUTPATIENT)
Dept: FAMILY MEDICINE CLINIC | Facility: CLINIC | Age: 52
End: 2022-02-16
Payer: MEDICARE

## 2022-02-16 VITALS
TEMPERATURE: 99.9 F | HEART RATE: 88 BPM | BODY MASS INDEX: 23.11 KG/M2 | DIASTOLIC BLOOD PRESSURE: 80 MMHG | SYSTOLIC BLOOD PRESSURE: 114 MMHG | RESPIRATION RATE: 16 BRPM | OXYGEN SATURATION: 99 % | WEIGHT: 143.8 LBS | HEIGHT: 66 IN

## 2022-02-16 DIAGNOSIS — G89.29 CHRONIC NONINTRACTABLE HEADACHE, UNSPECIFIED HEADACHE TYPE: ICD-10-CM

## 2022-02-16 DIAGNOSIS — F31.81 BIPOLAR II DISORDER (HCC): ICD-10-CM

## 2022-02-16 DIAGNOSIS — J96.91 RESPIRATORY FAILURE WITH HYPOXIA, UNSPECIFIED CHRONICITY (HCC): ICD-10-CM

## 2022-02-16 DIAGNOSIS — M79.10 MYALGIA: Primary | ICD-10-CM

## 2022-02-16 DIAGNOSIS — I73.9 IC (INTERMITTENT CLAUDICATION) (HCC): ICD-10-CM

## 2022-02-16 DIAGNOSIS — R50.9 FEVER, UNSPECIFIED FEVER CAUSE: ICD-10-CM

## 2022-02-16 DIAGNOSIS — R51.9 CHRONIC NONINTRACTABLE HEADACHE, UNSPECIFIED HEADACHE TYPE: ICD-10-CM

## 2022-02-16 DIAGNOSIS — C73 MALIGNANT NEOPLASM OF THYROID GLAND (HCC): ICD-10-CM

## 2022-02-16 PROCEDURE — 87636 SARSCOV2 & INF A&B AMP PRB: CPT | Performed by: FAMILY MEDICINE

## 2022-02-16 PROCEDURE — 99214 OFFICE O/P EST MOD 30 MIN: CPT | Performed by: FAMILY MEDICINE

## 2022-02-16 RX ORDER — FLUTICASONE FUROATE, UMECLIDINIUM BROMIDE AND VILANTEROL TRIFENATATE 100; 62.5; 25 UG/1; UG/1; UG/1
1 POWDER RESPIRATORY (INHALATION) DAILY
COMMUNITY
End: 2022-08-02

## 2022-02-16 RX ORDER — PREDNISONE 10 MG/1
TABLET ORAL
Qty: 32 TABLET | Refills: 0 | Status: SHIPPED | OUTPATIENT
Start: 2022-02-16 | End: 2022-05-25 | Stop reason: SDUPTHER

## 2022-02-16 NOTE — PROGRESS NOTES
Assessment/Plan:    Malignant neoplasm of thyroid gland ([place] [address] )  - stable  - continue taking Levoxyl as prescribed  - encourage continue follow-up with endocrinologist  - will continue to monitor and re-evaluate in follow-up  Will order blood work in follow-up    Respiratory failure with hypoxia ([place] [address] )  - stable  - continue same  - will continue monitor re-evaluate in follow-up    Bipolar II disorder ([place] [address] )  - stable  - continue same medication regimen  - will continue to monitor and re-evaluate in follow-up    IC (intermittent claudication) ([place] [address] )  - stable  - continue same medication regimen  - will continue to monitor and re-evaluate in follow-up    Myalgia  - start taking prednisone 10 mg for bilateral leg pain and headaches  - will continue to monitor and re-evaluate in follow-up  Will order blood work in follow-up    Chronic nonintractable headache  - start taking prednisone 10 mg for bilateral leg pain and headaches  - will continue to monitor and re-evaluate in follow-up  Will order blood work in follow-up    Fever, unspecified  - cold/flu testing  - will continue to monitor and re-evaluate follow-up         Problem List Items Addressed This Visit        Endocrine    Malignant neoplasm of thyroid gland ([place] [address] )     - stable  - continue taking Levoxyl as prescribed  - encourage continue follow-up with endocrinologist  - will continue to monitor and re-evaluate in follow-up    Will order blood work in follow-up         Relevant Medications    predniSONE 10 mg tablet       Respiratory    Respiratory failure with hypoxia ([place] [address] )     - stable  - continue same  - will continue monitor re-evaluate in follow-up            Other    Bipolar II disorder ([place] [address] )     - stable  - continue same medication regimen  - will continue to monitor and re-evaluate in follow-up         IC (intermittent claudication) ([place] [address] )     - stable  - continue same medication regimen  - will continue to monitor and re-evaluate in follow-up Myalgia - Primary     - start taking prednisone 10 mg for bilateral leg pain and headaches  - will continue to monitor and re-evaluate in follow-up  Will order blood work in follow-up         Relevant Medications    predniSONE 10 mg tablet    Chronic nonintractable headache     - start taking prednisone 10 mg for bilateral leg pain and headaches  - will continue to monitor and re-evaluate in follow-up  Will order blood work in follow-up         Relevant Medications    predniSONE 10 mg tablet      Other Visit Diagnoses     Fever, unspecified fever cause        Relevant Orders    Covid/Flu- Office Collect            Subjective:      Patient ID: Dona Argueta is a 54 y o  female  Jeffery Santos is a 42-year-old female with multiple medical problems who presents with bilateral leg heaviness/pain and headaches for 2 weeks  Patient describes a heavy feeling in her lower body since starting Levoxyl 100 mg 2 weeks ago following a thyroidectomy in January  Patient was initially prescribed levothyroxine as well as a calcium supplement however these was discontinued due to suspected adverse headaches and leg cramping  Patient was prescribed a steroid and naratriptan for headaches which provide relief, however, she has run out of her supply and is not able to get a refill just yet  Patient is also complaining of associated fatigue and mild weight gain since her procedure  She denies fever, chills, chest pain, palpitations, shortness of breath, difficulty breathing, abdominal pain, nausea, vomiting, diarrhea, dizziness, syncope, confusion, weakness, or trauma  The following portions of the patient's history were reviewed and updated as appropriate: allergies, current medications, past family history, past medical history, past social history, past surgical history and problem list     Review of Systems   Constitutional: Positive for fatigue  Negative for appetite change  HENT: Negative for congestion      Cardiovascular: Positive for leg swelling  Musculoskeletal: Positive for myalgias (Bilateral legs)  Neurological: Positive for headaches  Psychiatric/Behavioral: The patient is nervous/anxious  Objective:      /80 (BP Location: Left arm, Patient Position: Sitting, Cuff Size: Standard)   Pulse 88   Temp 99 9 °F (37 7 °C) (Tympanic)   Resp 16   Ht 5' 6" (1 676 m)   Wt 65 2 kg (143 lb 12 8 oz)   SpO2 99%   BMI 23 21 kg/m²          Physical Exam  Vitals and nursing note reviewed  Constitutional:       Appearance: Normal appearance  HENT:      Head: Normocephalic and atraumatic  Mouth/Throat:      Pharynx: Oropharynx is clear  Eyes:      Conjunctiva/sclera: Conjunctivae normal    Cardiovascular:      Rate and Rhythm: Normal rate and regular rhythm  Pulmonary:      Effort: Pulmonary effort is normal  No respiratory distress  Breath sounds: Normal breath sounds  Musculoskeletal:      Cervical back: Normal range of motion  Right upper leg: Normal  No swelling, edema or tenderness  Left upper leg: Normal  No swelling, edema or tenderness  Right lower leg: Normal  No swelling, tenderness or bony tenderness  No edema  Left lower leg: Normal  No swelling, tenderness or bony tenderness  No edema  Right ankle: No swelling or ecchymosis  No tenderness  Normal range of motion  Left ankle: No swelling, ecchymosis or lacerations  No tenderness  Right foot: Normal  Normal range of motion  No swelling or tenderness  Normal pulse  Left foot: Normal  Normal range of motion  No swelling or tenderness  Normal pulse  Skin:     Findings: No rash  Neurological:      Mental Status: She is alert  Mental status is at baseline     Psychiatric:         Mood and Affect: Mood normal

## 2022-02-16 NOTE — ASSESSMENT & PLAN NOTE
- start taking prednisone 10 mg for bilateral leg pain and headaches  - will continue to monitor and re-evaluate in follow-up    Will order blood work in follow-up

## 2022-02-16 NOTE — ASSESSMENT & PLAN NOTE
- stable  - continue same medication regimen  - will continue to monitor and re-evaluate in follow-up

## 2022-02-16 NOTE — ASSESSMENT & PLAN NOTE
- stable  - continue taking Levoxyl as prescribed  - encourage continue follow-up with endocrinologist  - will continue to monitor and re-evaluate in follow-up    Will order blood work in follow-up

## 2022-02-17 ENCOUNTER — TELEPHONE (OUTPATIENT)
Dept: FAMILY MEDICINE CLINIC | Facility: CLINIC | Age: 52
End: 2022-02-17

## 2022-02-17 LAB
FLUAV RNA RESP QL NAA+PROBE: POSITIVE
FLUBV RNA RESP QL NAA+PROBE: NEGATIVE
SARS-COV-2 RNA RESP QL NAA+PROBE: NEGATIVE

## 2022-02-17 NOTE — TELEPHONE ENCOUNTER
----- Message from Donnell Sheffield MD sent at 2/17/2022 12:08 PM EST -----  Her viral test came back positive for flu A  Symptomatic management with Tylenol ibuprofen and increase oral hydration

## 2022-02-17 NOTE — TELEPHONE ENCOUNTER
----- Message from Roxy Grider MD sent at 2/17/2022 12:08 PM EST -----  Her viral test came back positive for flu A  Symptomatic management with Tylenol ibuprofen and increase oral hydration

## 2022-02-22 ENCOUNTER — TELEPHONE (OUTPATIENT)
Dept: FAMILY MEDICINE CLINIC | Facility: CLINIC | Age: 52
End: 2022-02-22

## 2022-02-22 DIAGNOSIS — M79.604 LEG PAIN, BILATERAL: Primary | ICD-10-CM

## 2022-02-22 DIAGNOSIS — M79.605 LEG PAIN, BILATERAL: Primary | ICD-10-CM

## 2022-02-22 NOTE — TELEPHONE ENCOUNTER
The prednisone helped headache and head/eye pressure but the l;eg pain is still there  She did get diagnosed with the flu  She stated you suggested an ultrasound and blood work of leg to make sure there are no clots and she is very upset she cant enjoy the nice weather with walk  Would like an referral for u/s of legs put in and blood work order   She has a follow up for you Monday

## 2022-02-23 ENCOUNTER — HOSPITAL ENCOUNTER (OUTPATIENT)
Dept: ULTRASOUND IMAGING | Facility: CLINIC | Age: 52
Discharge: HOME | End: 2022-02-23
Payer: MEDICARE

## 2022-02-23 ENCOUNTER — HOSPITAL ENCOUNTER (OUTPATIENT)
Dept: NON INVASIVE DIAGNOSTICS | Facility: CLINIC | Age: 52
Discharge: HOME | End: 2022-02-23
Payer: MEDICARE

## 2022-02-23 DIAGNOSIS — M79.605 LEG PAIN, BILATERAL: ICD-10-CM

## 2022-02-23 DIAGNOSIS — M79.604 LEG PAIN, BILATERAL: ICD-10-CM

## 2022-02-23 DIAGNOSIS — R92.2 DENSE BREAST TISSUE: ICD-10-CM

## 2022-02-23 PROCEDURE — 76641 ULTRASOUND BREAST COMPLETE: CPT

## 2022-02-23 PROCEDURE — 93970 EXTREMITY STUDY: CPT

## 2022-02-23 PROCEDURE — 93970 EXTREMITY STUDY: CPT | Performed by: SURGERY

## 2022-02-28 ENCOUNTER — OFFICE VISIT (OUTPATIENT)
Dept: FAMILY MEDICINE CLINIC | Facility: CLINIC | Age: 52
End: 2022-02-28
Payer: MEDICARE

## 2022-02-28 ENCOUNTER — TELEPHONE (OUTPATIENT)
Dept: OBGYN CLINIC | Facility: CLINIC | Age: 52
End: 2022-02-28

## 2022-02-28 VITALS
DIASTOLIC BLOOD PRESSURE: 70 MMHG | TEMPERATURE: 98.5 F | BODY MASS INDEX: 22.72 KG/M2 | RESPIRATION RATE: 16 BRPM | HEART RATE: 95 BPM | SYSTOLIC BLOOD PRESSURE: 110 MMHG | HEIGHT: 66 IN | OXYGEN SATURATION: 99 % | WEIGHT: 141.38 LBS

## 2022-02-28 DIAGNOSIS — E55.9 VITAMIN D INSUFFICIENCY: ICD-10-CM

## 2022-02-28 DIAGNOSIS — M79.10 MYALGIA: ICD-10-CM

## 2022-02-28 DIAGNOSIS — E78.49 OTHER HYPERLIPIDEMIA: Primary | ICD-10-CM

## 2022-02-28 DIAGNOSIS — G43.909 MIGRAINE WITHOUT STATUS MIGRAINOSUS, NOT INTRACTABLE, UNSPECIFIED MIGRAINE TYPE: ICD-10-CM

## 2022-02-28 PROCEDURE — 99214 OFFICE O/P EST MOD 30 MIN: CPT | Performed by: FAMILY MEDICINE

## 2022-02-28 RX ORDER — ERGOCALCIFEROL 1.25 MG/1
50000 CAPSULE ORAL WEEKLY
Qty: 4 CAPSULE | Refills: 3 | Status: SHIPPED | OUTPATIENT
Start: 2022-02-28 | End: 2022-02-28

## 2022-02-28 RX ORDER — MEDROXYPROGESTERONE ACETATE 150 MG/ML
INJECTION, SUSPENSION INTRAMUSCULAR
Qty: 1 ML | Refills: 2 | Status: SHIPPED | OUTPATIENT
Start: 2022-02-28 | End: 2022-05-04 | Stop reason: SDUPTHER

## 2022-02-28 NOTE — ASSESSMENT & PLAN NOTE
- not controlled  Discussed about low-fat diet    - repeat blood work (CMP, lipid panel)  - will continue to monitor and re-evaluate in follow-up

## 2022-02-28 NOTE — ASSESSMENT & PLAN NOTE
- improving  - encouraged hydration, stretching, and exercise  - blood work (CMP)  - will continue to monitor re-evaluate follow-up

## 2022-02-28 NOTE — PROGRESS NOTES
Assessment/Plan:    Other hyperlipidemia  - not controlled  Discussed about low-fat diet  - repeat blood work (CMP, lipid panel)  - will continue to monitor and re-evaluate in follow-up    Myalgia  - improving  - encouraged hydration, stretching, and exercise  - blood work ([address])  - will continue to monitor re-evaluate follow-up    Vitamin D insufficiency  - stable  - blood work (vitamin-D)  - will continue to monitor re-evaluate follow-up         Problem List Items Addressed This Visit        Other    Other hyperlipidemia - Primary     - not controlled  Discussed about low-fat diet  - repeat blood work (CMP, lipid panel)  - will continue to monitor and re-evaluate in follow-up         Relevant Orders    Comprehensive metabolic panel    Lipid Panel with Direct LDL reflex    Myalgia     - improving  - encouraged hydration, stretching, and exercise  - blood work (CMP)  - will continue to monitor re-evaluate follow-up         Relevant Orders    Comprehensive metabolic panel    Vitamin D insufficiency     - stable  - blood work (vitamin-D)  - will continue to monitor re-evaluate follow-up         Relevant Orders    Vitamin D 25 hydroxy            Subjective:      Patient ID: Marii Long is a 54 y o  female  Ness Fair is a 29-year-old female with multiple medical problems who presents in follow-up  Patient was last seen on 2/16 with complaints of myalgias, headaches, and lower extremity aching/pain  Patient tested positive for influenza A following her visit here in the office  Patient is prescribed prednisone which provided much relief of her headaches  Patient reports to have improved significantly, however, she is still experiencing lower extremity sharp pain in her anterior lower legs bilaterally which has been persistent    Patient received an ultrasound of her lower extremities on 02/23 which was normal   Patient describes an aching in her lower extremities bilaterally at rest which worsens upon weight-bearing  Patient denies fever, chills, chest pain, shortness of breath, trauma, weakness, or paresthesias  The following portions of the patient's history were reviewed and updated as appropriate: allergies, current medications, past family history, past medical history, past social history, past surgical history and problem list     Review of Systems   Constitutional: Negative for fever  Respiratory: Negative for cough and shortness of breath  Gastrointestinal: Negative for abdominal pain  Musculoskeletal: Positive for arthralgias (Lower anterior legs bilaterally)  Skin: Negative for rash  Neurological: Negative for tremors  Psychiatric/Behavioral: Negative for behavioral problems  Objective:      /70 (BP Location: Left arm, Patient Position: Sitting, Cuff Size: Adult)   Pulse 95   Temp 98 5 °F (36 9 °C) (Tympanic)   Resp 16   Ht 5' 6" (1 676 m)   Wt 64 1 kg (141 lb 6 oz)   SpO2 99%   BMI 22 82 kg/m²          Physical Exam  Vitals and nursing note reviewed  Constitutional:       Appearance: She is well-developed  HENT:      Head: Normocephalic and atraumatic  Eyes:      Pupils: Pupils are equal, round, and reactive to light  Cardiovascular:      Rate and Rhythm: Normal rate and regular rhythm  Heart sounds: Normal heart sounds  Pulmonary:      Effort: Pulmonary effort is normal       Breath sounds: Normal breath sounds  Abdominal:      General: Bowel sounds are normal       Palpations: Abdomen is soft  Musculoskeletal:      Cervical back: Normal range of motion and neck supple  Right lower leg: Tenderness (Anterior) present  No swelling, deformity, lacerations or bony tenderness  No edema  Left lower leg: Tenderness (Anterior) present  No swelling, deformity, lacerations or bony tenderness  No edema  Legs:    Lymphadenopathy:      Cervical: No cervical adenopathy  Skin:     General: Skin is warm     Neurological:      Mental Status: She is alert and oriented to person, place, and time  Cranial Nerves: No cranial nerve deficit

## 2022-03-02 ENCOUNTER — OFFICE VISIT (OUTPATIENT)
Dept: OBGYN CLINIC | Facility: CLINIC | Age: 52
End: 2022-03-02
Payer: MEDICARE

## 2022-03-02 DIAGNOSIS — G43.909 MIGRAINE WITHOUT STATUS MIGRAINOSUS, NOT INTRACTABLE, UNSPECIFIED MIGRAINE TYPE: Primary | ICD-10-CM

## 2022-03-02 DIAGNOSIS — Z30.42 ENCOUNTER FOR DEPO-PROVERA CONTRACEPTION: ICD-10-CM

## 2022-03-02 PROCEDURE — 96372 THER/PROPH/DIAG INJ SC/IM: CPT

## 2022-03-03 ENCOUNTER — TELEPHONE (OUTPATIENT)
Dept: OBGYN CLINIC | Facility: CLINIC | Age: 52
End: 2022-03-03

## 2022-03-03 VITALS — WEIGHT: 140 LBS | DIASTOLIC BLOOD PRESSURE: 72 MMHG | BODY MASS INDEX: 22.6 KG/M2 | SYSTOLIC BLOOD PRESSURE: 118 MMHG

## 2022-03-03 RX ORDER — MEDROXYPROGESTERONE ACETATE 150 MG/ML
150 INJECTION, SUSPENSION INTRAMUSCULAR ONCE
Status: COMPLETED | OUTPATIENT
Start: 2022-03-03 | End: 2022-03-03

## 2022-03-03 RX ADMIN — MEDROXYPROGESTERONE ACETATE 150 MG: 150 INJECTION, SUSPENSION INTRAMUSCULAR at 15:06

## 2022-03-03 NOTE — TELEPHONE ENCOUNTER
Patrick Dior arrived on 03/02/2022 at 0900 in the [facility] office  Please sign off on her order  The ordering provider is Dr Jairon Munguia is in the office today  Thank you

## 2022-03-09 ENCOUNTER — HOSPITAL ENCOUNTER (OUTPATIENT)
Dept: MRI IMAGING | Facility: HOSPITAL | Age: 52
Discharge: HOME | End: 2022-03-09
Attending: FAMILY MEDICINE
Payer: MEDICARE

## 2022-03-09 DIAGNOSIS — K76.9 LIVER LESION: ICD-10-CM

## 2022-03-09 PROCEDURE — G1004 CDSM NDSC: HCPCS

## 2022-03-09 PROCEDURE — A9585 GADOBUTROL INJECTION: HCPCS | Performed by: FAMILY MEDICINE

## 2022-03-09 PROCEDURE — 74183 MRI ABD W/O CNTR FLWD CNTR: CPT

## 2022-03-09 RX ADMIN — GADOBUTROL 6.5 ML: 604.72 INJECTION INTRAVENOUS at 11:06

## 2022-03-11 ENCOUNTER — APPOINTMENT (OUTPATIENT)
Dept: LAB | Facility: IMAGING CENTER | Age: 52
End: 2022-03-11
Payer: MEDICARE

## 2022-03-11 DIAGNOSIS — E55.9 VITAMIN D INSUFFICIENCY: ICD-10-CM

## 2022-03-11 DIAGNOSIS — M79.10 MYALGIA: ICD-10-CM

## 2022-03-11 DIAGNOSIS — E78.49 OTHER HYPERLIPIDEMIA: ICD-10-CM

## 2022-03-11 LAB
25(OH)D3 SERPL-MCNC: 38.2 NG/ML (ref 30–100)
ALBUMIN SERPL BCP-MCNC: 3.8 G/DL (ref 3.5–5)
ALP SERPL-CCNC: 77 U/L (ref 46–116)
ALT SERPL W P-5'-P-CCNC: 16 U/L (ref 12–78)
ANION GAP SERPL CALCULATED.3IONS-SCNC: 6 MMOL/L (ref 4–13)
AST SERPL W P-5'-P-CCNC: 12 U/L (ref 5–45)
BILIRUB SERPL-MCNC: 0.58 MG/DL (ref 0.2–1)
BUN SERPL-MCNC: 9 MG/DL (ref 5–25)
CALCIUM SERPL-MCNC: 9.2 MG/DL (ref 8.3–10.1)
CHLORIDE SERPL-SCNC: 111 MMOL/L (ref 100–108)
CHOLEST SERPL-MCNC: 198 MG/DL
CO2 SERPL-SCNC: 24 MMOL/L (ref 21–32)
CREAT SERPL-MCNC: 0.91 MG/DL (ref 0.6–1.3)
GFR SERPL CREATININE-BSD FRML MDRD: 74 ML/MIN/1.73SQ M
GLUCOSE P FAST SERPL-MCNC: 91 MG/DL (ref 65–99)
HDLC SERPL-MCNC: 49 MG/DL
LDLC SERPL CALC-MCNC: 132 MG/DL (ref 0–100)
POTASSIUM SERPL-SCNC: 4.4 MMOL/L (ref 3.5–5.3)
PROT SERPL-MCNC: 7 G/DL (ref 6.4–8.2)
SODIUM SERPL-SCNC: 141 MMOL/L (ref 136–145)
TRIGL SERPL-MCNC: 86 MG/DL

## 2022-03-11 PROCEDURE — 80061 LIPID PANEL: CPT

## 2022-03-11 PROCEDURE — 82306 VITAMIN D 25 HYDROXY: CPT

## 2022-03-11 PROCEDURE — 80053 COMPREHEN METABOLIC PANEL: CPT

## 2022-03-11 PROCEDURE — 36415 COLL VENOUS BLD VENIPUNCTURE: CPT

## 2022-03-24 ENCOUNTER — TELEPHONE (OUTPATIENT)
Dept: FAMILY MEDICINE CLINIC | Facility: CLINIC | Age: 52
End: 2022-03-24

## 2022-03-24 NOTE — TELEPHONE ENCOUNTER
----- Message from Radha Brasher sent at 3/24/2022  7:47 AM EDT -----  Regarding: Question regarding Comprehensive Metabolic Panel  My cholesterol is high because my thyroid levels are way off  They just upped my medication for that starting today

## 2022-04-27 DIAGNOSIS — J45.50 SEVERE PERSISTENT ASTHMA WITHOUT COMPLICATION: Primary | ICD-10-CM

## 2022-04-27 RX ORDER — FLUTICASONE FUROATE, UMECLIDINIUM BROMIDE AND VILANTEROL TRIFENATATE 200; 62.5; 25 UG/1; UG/1; UG/1
1 POWDER RESPIRATORY (INHALATION) DAILY
Qty: 60 BLISTER | Refills: 5 | Status: SHIPPED | OUTPATIENT
Start: 2022-04-27 | End: 2022-08-02

## 2022-04-27 NOTE — TELEPHONE ENCOUNTER
Patient Sergio Hayden is requesting a refill for     Medication(s)  name Trelegy    Dose 200    Last office Visit 2/14/22 (got samples)  scheduled for 6/29/22    [facility]

## 2022-04-29 ENCOUNTER — TELEPHONE (OUTPATIENT)
Dept: DERMATOLOGY | Facility: CLINIC | Age: 52
End: 2022-04-29

## 2022-04-29 NOTE — TELEPHONE ENCOUNTER
Faxed Release of Health Information to  Dermatology    Scanned doc and confirmation fax went through

## 2022-05-04 DIAGNOSIS — G43.909 MIGRAINE WITHOUT STATUS MIGRAINOSUS, NOT INTRACTABLE, UNSPECIFIED MIGRAINE TYPE: ICD-10-CM

## 2022-05-05 RX ORDER — MEDROXYPROGESTERONE ACETATE 150 MG/ML
INJECTION, SUSPENSION INTRAMUSCULAR
Qty: 1 ML | Refills: 0 | Status: SHIPPED | OUTPATIENT
Start: 2022-05-05 | End: 2022-07-26

## 2022-05-11 ENCOUNTER — OFFICE VISIT (OUTPATIENT)
Dept: OBGYN CLINIC | Facility: CLINIC | Age: 52
End: 2022-05-11

## 2022-05-11 DIAGNOSIS — Z30.42 ENCOUNTER FOR DEPO-PROVERA CONTRACEPTION: Primary | ICD-10-CM

## 2022-05-13 ENCOUNTER — TELEPHONE (OUTPATIENT)
Dept: OBGYN CLINIC | Facility: CLINIC | Age: 52
End: 2022-05-13

## 2022-05-13 VITALS — BODY MASS INDEX: 23.08 KG/M2 | WEIGHT: 143 LBS | DIASTOLIC BLOOD PRESSURE: 70 MMHG | SYSTOLIC BLOOD PRESSURE: 116 MMHG

## 2022-05-13 NOTE — TELEPHONE ENCOUNTER
Axel Mccartney arrived on 05/11/2022 at 1330 in the \[facility]\"" office  Please sign off on her order  The ordering provider is Dr Army Parr is not in the office today  Thank you

## 2022-05-16 RX ORDER — MEDROXYPROGESTERONE ACETATE 150 MG/ML
150 INJECTION, SUSPENSION INTRAMUSCULAR ONCE
Status: COMPLETED | OUTPATIENT
Start: 2022-05-16 | End: 2022-05-17

## 2022-05-17 RX ADMIN — MEDROXYPROGESTERONE ACETATE 150 MG: 150 INJECTION, SUSPENSION INTRAMUSCULAR at 09:52

## 2022-05-22 ENCOUNTER — NURSE TRIAGE (OUTPATIENT)
Dept: OTHER | Facility: OTHER | Age: 52
End: 2022-05-22

## 2022-05-22 NOTE — TELEPHONE ENCOUNTER
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

## 2022-05-22 NOTE — TELEPHONE ENCOUNTER
Reason for Disposition   Eyelid is red and painful (or tender to touch)    Answer Assessment - Initial Assessment Questions  1  EYE DISCHARGE: "Is the discharge in one or both eyes?" "What color is it?" "How much is there?" "When did the discharge start?"       Both eyes mostly when she wakes up in the morning      2  REDNESS OF SCLERA: "Is the redness in one or both eyes?" "When did the redness start?"       Left eye red and swollen- starting in the right eye     3  EYELIDS: "Are the eyelids red or swollen?" If Yes, ask: "How much?"       Slightly swollen eyelids    4  VISION: "Is there any difficulty seeing clearly?"       No vision changes     5  PAIN: "Is there any pain? If Yes, ask: "How bad is it?" (Scale 1-10; or mild, moderate, severe)     - MILD (1-3): doesn't interfere with normal activities      - MODERATE (4-7): interferes with normal activities or awakens from sleep     - SEVERE (8-10): excruciating pain, unable to do any normal activities        5/10 pain when moving eyes     6  CONTACT LENS: "Do you wear contacts?"      Glasses     7  OTHER SYMPTOMS: "Do you have any other symptoms?" (e g , fever, runny nose, cough)      Runny nose and congestion    8   PREGNANCY: "Is there any chance you are pregnant?" "When was your last menstrual period?"      no    Protocols used: EYE - PUS OR DISCHARGE-ADULT-AH

## 2022-05-22 NOTE — TELEPHONE ENCOUNTER
Patient was seen at the [facility] on Friday for URI and was prescribed steroids  She was tested negative for flu and Covid  She is still having some URI symptoms and states that this morning she woke up with increased discharge from her eyes and they are red, swollen and irritated  Scheduled patient for an appointment in the morning and she states she will check with her boss to make sure it is okay that she miss work in the morning  Advised that she call back to cancel if she cannot make it tomorrow and then go into an UC today to be seen  Patient verbalized understanding

## 2022-05-23 ENCOUNTER — TELEPHONE (OUTPATIENT)
Dept: OTHER | Facility: OTHER | Age: 52
End: 2022-05-23

## 2022-05-25 ENCOUNTER — TELEPHONE (OUTPATIENT)
Dept: PULMONOLOGY | Facility: CLINIC | Age: 52
End: 2022-05-25

## 2022-05-25 ENCOUNTER — TELEPHONE (OUTPATIENT)
Dept: OTHER | Facility: HOSPITAL | Age: 52
End: 2022-05-25

## 2022-05-25 DIAGNOSIS — M79.10 MYALGIA: ICD-10-CM

## 2022-05-25 DIAGNOSIS — J40 BRONCHITIS: Primary | ICD-10-CM

## 2022-05-25 DIAGNOSIS — G89.29 CHRONIC NONINTRACTABLE HEADACHE, UNSPECIFIED HEADACHE TYPE: ICD-10-CM

## 2022-05-25 DIAGNOSIS — R51.9 CHRONIC NONINTRACTABLE HEADACHE, UNSPECIFIED HEADACHE TYPE: ICD-10-CM

## 2022-05-25 RX ORDER — CIPROFLOXACIN 750 MG/1
750 TABLET, FILM COATED ORAL EVERY 12 HOURS SCHEDULED
Qty: 14 TABLET | Refills: 0 | Status: SHIPPED | OUTPATIENT
Start: 2022-05-25 | End: 2022-06-01

## 2022-05-25 RX ORDER — PREDNISONE 10 MG/1
TABLET ORAL
Qty: 32 TABLET | Refills: 0 | Status: SHIPPED | OUTPATIENT
Start: 2022-05-25 | End: 2022-07-18 | Stop reason: ALTCHOICE

## 2022-05-25 RX ORDER — PROMETHAZINE HYDROCHLORIDE AND CODEINE PHOSPHATE 6.25; 1 MG/5ML; MG/5ML
5 SYRUP ORAL EVERY 4 HOURS PRN
Qty: 118 ML | Refills: 0 | Status: SHIPPED | OUTPATIENT
Start: 2022-05-25 | End: 2022-08-10

## 2022-05-25 NOTE — TELEPHONE ENCOUNTER
Carmen Beck would like a return call regarding     What is the reason for the call/chief complaint? COUGH    What additional symptoms are present or absent? SOB, yes   Are they on O2? No Pulse O2 restingN/A When walkingN/A   chest pain/tightness, yes  wheezing, no  Cough, yes  mucous production,no  What color is it N/A  fevers/chills, yes  weight gain, no  Swelling no  Pain yes, does it hurt when breathing or all the time? NO    When did they start/how long have they been going on? 05/15/2022    Constant or intermittent; if intermittent describe yes? What makes it better or worse? NO    Have you been exposed to anyone that is sick? No    Have you been tested for COVID recently? Yes COVID AND FLU NEGATIVE    Check current pulmonary medications NEBULIZER  frequency of use DAILY    Have they had any other treatment or testing for this problem elsewhere? HAD A F/U AT [facility]    Recent steroids? Yes, describe: JUST FINISHED PREDNISONE    Recent Antibiotics? No     Last office visit? 02/14/2022    Patient pharmacy?  [id number] - [place] - [address]    30 or 90 day

## 2022-05-25 NOTE — TELEPHONE ENCOUNTER
Previous message stated that she does completed antibiotics and prednisone:  Dot phrase says that she was not on antibiotics, can we please clarify, if she was can we please find out what antibiotic she was on and the duration of prednisone

## 2022-05-25 NOTE — TELEPHONE ENCOUNTER
Patient is calling she has been having issues since last Sunday  She is coughing, having difficulty breathing and a low grade fever  She was prescribed antibiotics and steroids that she finished that did not help  She is hoping there is something we can do for her   Please advise

## 2022-06-02 NOTE — TELEPHONE ENCOUNTER
Pt LM re: she called a little bit ago about not feeling well and the antibiotics she felt helped tremendously and the prednisone was helping as well  But now she feels like she's back to where she started  She went to her asthma specialist and he is running some b/w to check her immune system but she doesn't know what else she can do at this point    Please advise: [phone]

## 2022-06-03 ENCOUNTER — TELEPHONE (OUTPATIENT)
Dept: PULMONOLOGY | Facility: CLINIC | Age: 52
End: 2022-06-03

## 2022-06-03 DIAGNOSIS — J45.51 SEVERE PERSISTENT ASTHMA WITH ACUTE EXACERBATION: Primary | ICD-10-CM

## 2022-06-03 RX ORDER — BUDESONIDE 1 MG/2ML
1 INHALANT ORAL EVERY 12 HOURS
Qty: 120 ML | Refills: 0 | Status: SHIPPED | OUTPATIENT
Start: 2022-06-03 | End: 2022-06-04 | Stop reason: SDUPTHER

## 2022-06-03 NOTE — TELEPHONE ENCOUNTER
Carmen Giles [place] would like a return call regarding     What is the reason for the call/chief complaint? Heavy breathing/sob/cough  What additional symptoms are present or absent? Constantly exhausted and gasping for air at times    SOB, yes   Are they on O2? No Pulse O2 restingn/a When walkingn/a   chest pain/tightness, yes  wheezing, yes  Cough, yes  mucous production,no  What color is it no  fevers/chills, no  weight gain, no  Swelling no  Pain no, does it hurt when breathing or all the time? no    When did they start/how long have they been going on? May 15    Constant or intermittent; if intermittent describe yes? What makes it better or worse?no    Have you been exposed to anyone that is sick? No    Have you been tested for COVID recently? Negtive for covid flu approximately 2 weeks ago    Check current pulmonary medications inhalers/nebs  frequency of use daily    Have they had any other treatment or testing for this problem elsewhere? no    Recent steroids? Yes, describe: 10mg / finishing taper dose    Recent Antibiotics? Yes, describe: cipro finished a few days ago     Last office visit? 02/14/22    Patient pharmacy?  [id number]- [place], [place] - [address] [place] [address]    30 or 90 day supply

## 2022-06-03 NOTE — TELEPHONE ENCOUNTER
Shar Ritchie from [facility] in [place] called re: they done have the budesonide in stock and wont get any until sometime next week but Carmen needs it because she is out and having breathing difficulties  She is asking if we could send the script to [facility] in [place] b/c they have 4 boxes in stock    Please advise

## 2022-06-04 DIAGNOSIS — J45.51 SEVERE PERSISTENT ASTHMA WITH ACUTE EXACERBATION: ICD-10-CM

## 2022-06-04 RX ORDER — BUDESONIDE 1 MG/2ML
1 INHALANT ORAL EVERY 12 HOURS
Qty: 120 ML | Refills: 0 | Status: SHIPPED | OUTPATIENT
Start: 2022-06-04 | End: 2022-06-08 | Stop reason: ALTCHOICE

## 2022-06-04 NOTE — TELEPHONE ENCOUNTER
This is completed    In the future [facility] pharmacies can transfer prescriptions without us needed to resend prescription

## 2022-06-06 DIAGNOSIS — B00.9 HERPES: Primary | ICD-10-CM

## 2022-06-06 RX ORDER — VALACYCLOVIR HYDROCHLORIDE 500 MG/1
500 TABLET, FILM COATED ORAL 2 TIMES DAILY
Qty: 30 TABLET | Refills: 0 | Status: CANCELLED | OUTPATIENT
Start: 2022-06-06 | End: 2022-06-09

## 2022-06-06 RX ORDER — VALACYCLOVIR HYDROCHLORIDE 500 MG/1
500 TABLET, FILM COATED ORAL 2 TIMES DAILY
Qty: 30 TABLET | Refills: 0 | Status: SHIPPED | OUTPATIENT
Start: 2022-06-06 | End: 2022-06-15

## 2022-06-06 NOTE — PROGRESS NOTES
Pulmonary Follow Up Note   Matheus Reed 54 y o  female MRN: [id number]  6/8/2022    Assessment & Plan:    1  Shortness of breath, unclear etiology, history of Severe persistent asthma without acute exacerbation  2  History of COVID 19 02/2022  3  Anxiety  4  Hyperlipidemia     Overall, it does not seem that patient has strong support for diagnosis of asthma  She has no history of peripheral eosinophilia and has tested negative for allergens multiple times with her allergist   Although she feels short of breath at this time she has no wheezing and has failed to improve with inhalers and steroids for significant period of time   There may be more of a component of reflux or postnasal drip causing chronic cough  Recommend continuing Flonase and starting omeprazole for reflux, which she will trial for 4-6 weeks   Will check methacholine challenge to confirm or rule out diagnosis of asthma   Check [facility] allergy panel and IgE   Given inability to use Trelegy formulation at this time given ongoing cough triggered by powder, will switch to Symbicort 160 2 puffs b i d  for now  Patient provided with sample and demonstrated correct technique   Will follow-up for CT sinuses as ordered by allergist   At this time, no indication for further antibiotics or steroids   Will discuss results with patient and will follow up with her primary pulmonologist Dr Triston Palacio in about 6 weeks     Diagnoses and all orders for this visit:    Severe persistent asthma with acute exacerbation  -     [facility] Panel, Adult; Future  -     Methacholine challenge; Future    Shortness of breath  -     [facility] Panel, Adult; Future  -     Methacholine challenge; Future    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PASC)    Other hyperlipidemia    Gastroesophageal reflux disease without esophagitis  -     omeprazole (PriLOSEC) 40 MG capsule;  Take 1 capsule (40 mg total) by mouth daily    Severe persistent asthma without complication    Other orders  -     levalbuterol (XOPENEX) 1 25 mg/3 mL nebulizer solution; 1 25 MG EVERY 4 HRS FOR 30 DAYS  -     cloNIDine (CATAPRES) 0 1 mg tablet; Take 0 1 mg by mouth daily at bedtime  -     Levoxyl 125 MCG tablet; Take 125 mcg by mouth daily      Return in about 6 weeks (around 7/20/2022) for Next scheduled follow up  History of Present Illness      HPI:  Sylvain Pearl is a 54 y o  female with moderate persistent asthma, Anxiety, hyperlipidemia, migraines, who presents for sick visit  Patient follows with pulmonary Dr  Segundo Schneider  She was last seen in February  She recently called with respiratory symptoms and was prescribed ciprofloxacin and prednisone taper for apparent asthma exacerbation  In February, she was on Breo 200 and due to ongoing symptoms, this was changed to Trelegy 200 1 puff daily  Of note, patient had COVID-19 in February 2022  She states that she had both COVID vaccinations back in April 2021 and began to feel sick after this, not sure if she had COVID or was from the vaccination  She then had worsening asthma symptoms thereafter  She has been on multiple rounds of prednisone  She recently saw her allergist and was prescribed ciprofloxacin for sinus symptoms and was given a prednisone burst, followed by taper by her pulmonologist   She complains of dry cough, frequent episodes of shortness of breath and intermittent chest tightness  She does complain of some burning in her chest as well as postnasal drip  She states that she has flares from environmental allergens such as grass, trees, as well as fumes  Patient states that she has not been able to use her trelegy due to powder formulation given her ongoing cough  Patient had a CT chest in December 2021 which showed no acute pulmonary pathology  PFTs 06/02/2021 were completely normal     In terms of pulmonary history, she is a nonsmoker  Does have reflux and postnasal drip    Does have a history of asthma as above  Review of Systems   Constitutional: Negative for chills, fever and unexpected weight change  HENT: Negative for congestion, rhinorrhea, sneezing and sore throat  Respiratory: Positive for cough, chest tightness, shortness of breath and wheezing  Cardiovascular: Negative for chest pain, palpitations and leg swelling  Gastrointestinal: Negative for abdominal pain, constipation, diarrhea, nausea and vomiting  Genitourinary: Negative for dysuria  Musculoskeletal: Negative for arthralgias  Allergic/Immunologic: Negative for immunocompromised state  Neurological: Negative for dizziness and numbness         Historical Information   Past Medical History:   Diagnosis Date    Anemia     Anxiety     Asthma     illness induced    SARA I (cervical intraepithelial neoplasia I)     RESOLVED: [id number]    Depression     Endometriosis     Functional ovarian cysts age 16    History of agoraphobia age 15    Hypercholesterolemia     IBS (irritable bowel syndrome) age 15   Debora Priyank IC (interstitial cystitis)     Malignant neoplasm of thyroid gland ([facility] [address] ) 12/14/2021    Migraine     Motion sickness     Multiple thyroid nodules 11/02/2021    Last Assessment & Plan:  Formatting of this note might be different from the original  Shruti Cervantes is a 54 y o  female   We had an extensive discussion regarding thyroid nodules, the natural course of thyroid nodules, ultrasound features which can convey an increased risk for malignancy, the general indications for fine needle aspiration, the procedure itself and possible results from a fine needl    Pap smear abnormality of cervix with ASCUS favoring benign     RESOLVED: [id number]    Seasonal allergies     with post-nasal drip and recurrent throat infections    Thyroid cancer ([facility] [address] )     Urinary tract infection     Varicella      Past Surgical History:   Procedure Laterality Date    CHOLECYSTECTOMY LAPAROSCOPIC  08/2020    COLONOSCOPY  06/2018    Nonbleeding angiodysplastic lesion mid ascending colon, questionable ulcerative proctitis-biopsy was negative for acute or chronic colitis    COLONOSCOPY  06/09/2016    Normal    COLONOSCOPY  05/23/2013    Adenoma at splenic flexure    EGD  08/02/2018    EGD  08/03/2020    Normal   Biopsy stomach negative for H  pylori, biopsy of the duodenum negative for celiac    ENDOTRACHEAL INTUBATION EMERGENT  2012    HEMORRHOID SURGERY  07/2009    Dr Monica Torres HYSTEROSCOPY,DX,SEP [address] N/A 11/21/2018    Procedure: HYSTEROSCOPY;  Surgeon: Augustine Lea MD;  Location: [facility];  Service: Gynecology    MD HYSTEROSCOPY,W/ENDOMETRIAL ABLATION N/A 11/21/2018    Procedure: ABLATION ENDOMETRIAL Luis Antonio Kacy;  Surgeon: Augustine Lea MD;  Location: [facility];  Service: Gynecology   [facility] THYROID SURGERY  01/17/2022    Papillary cancer of thyroid nodules    TOOTH EXTRACTION      TUBAL LIGATION      ONSET: 36 DEC 2011     Family History   Problem Relation Age of Onset    Other Mother         HYSTERECTOMY FOR BENIGN DISEASE     Diabetes Mother     Lung cancer Father 79    Other Sister         HYSTERECTOMY FOR BENIGN DISEASE     Diabetes Maternal Grandmother     Other Maternal Grandmother         HYSTERECTOMY FOR BENIGN DISEASE     Colon cancer Paternal Grandmother 61    Throat cancer Paternal Uncle 61    No Known Problems Maternal Aunt     No Known Problems Paternal Aunt     No Known Problems Paternal Aunt        Meds/Allergies     Current Outpatient Medications:     albuterol (2 5 mg/3 mL) 0 083 % nebulizer solution, Take 1 vial (2 5 mg total) by nebulization 3 (three) times a day, Disp: 30 vial, Rfl: 2    albuterol (PROAIR HFA) 90 mcg/act inhaler, Inhale 2 puffs 4 (four) times a day every 4 to 6 hours as needed, Disp: 1 Inhaler, Rfl: 5    cloNIDine (CATAPRES) 0 1 mg tablet, Take 0 1 mg by mouth daily at bedtime, Disp: , Rfl:     dicyclomine (BENTYL) 10 mg capsule, TAKE 1 CAPSULE (10 MG TOTAL) BY MOUTH 3 (THREE) TIMES A DAY AS NEEDED (ABDOMINAL PAIN), Disp: 270 capsule, Rfl: 1    fluticasone (FLONASE) 50 mcg/act nasal spray, as needed  , Disp: , Rfl:     levalbuterol (XOPENEX) 1 25 mg/3 mL nebulizer solution, 1 25 MG EVERY 4 HRS FOR 30 DAYS, Disp: , Rfl:     Levoxyl 125 MCG tablet, Take 125 mcg by mouth daily, Disp: , Rfl:     LORazepam (ATIVAN) 1 mg tablet, TAKE 1 & 1/2 TABLETS DAILY AT BEDTIME AS NEEDED, Disp: , Rfl: 1    medroxyPROGESTERone (DEPO-PROVERA) 150 mg/mL injection, Please give in office every 10 weeks, Disp: 1 mL, Rfl: 0    Multiple Vitamin (MULTI VITAMIN DAILY PO), Take by mouth  , Disp: , Rfl:     naproxen (NAPROSYN) 500 mg tablet, TAKE 1 TABLET BY MOUTH 3 TIMES A DAY AS NEEDED MIGRAINE  LIMIT USE TO 3 DAYS PER WEEK, Disp: , Rfl:     omeprazole (PriLOSEC) 40 MG capsule, Take 1 capsule (40 mg total) by mouth daily, Disp: 30 capsule, Rfl: 2    ondansetron (ZOFRAN) 4 mg tablet, Take 1 tablet (4 mg total) by mouth every 8 (eight) hours as needed for nausea, Disp: 20 tablet, Rfl: 5    pentosan polysulfate (ELMIRON) 100 mg capsule, Take 100 mg by mouth 3 (three) times a day before meals  , Disp: , Rfl:     valACYclovir (VALTREX) 500 mg tablet, Take 1 tablet (500 mg total) by mouth 2 (two) times a day for 6 days, Disp: 30 tablet, Rfl: 0    calcitriol (ROCALTROL) 0 5 MCG capsule, , Disp: , Rfl:     colestipol (COLESTID) 1 g tablet, Take 1 tablet (1 g total) by mouth 2 (two) times a day, Disp: 180 tablet, Rfl: 3    fluconazole (DIFLUCAN) 150 mg tablet, TAKE 1 TABLET BY MOUTH AS ONE DOSE (Patient not taking: No sig reported), Disp: , Rfl:     fluticasone-umeclidinium-vilanterol (Trelegy Ellipta) 100-62 5-25 MCG/INH inhaler, Inhale 1 puff daily Rinse mouth after use  (Patient not taking: Reported on 6/8/2022), Disp: , Rfl:     fluticasone-umeclidinium-vilanterol (Trelegy Ellipta) 200-62 5-25 MCG/INH AEPB inhaler, Inhale 1 puff daily Rinse mouth after use   (Patient not taking: Reported on 6/8/2022), Disp: 60 blister, Rfl: 5    fluticasone-vilanterol (BREO ELLIPTA) 200-25 MCG/INH inhaler, Inhale 1 puff daily Rinse mouth after use  (Patient not taking: No sig reported), Disp: 1 each, Rfl: 5    HYDROcodone-acetaminophen (NORCO) 5-325 mg per tablet, , Disp: , Rfl:     levothyroxine 100 mcg tablet,   (Patient not taking: No sig reported), Disp: , Rfl:     Levoxyl 112 MCG tablet, Take 112 mcg by mouth daily (Patient not taking: Reported on 6/8/2022), Disp: , Rfl:     Meth-Hyo-M Bl-Na Phos-Ph Sal (Uribel) 118 MG CAPS, Take 1 each by mouth as needed  (Patient not taking: No sig reported), Disp: , Rfl:     naratriptan (AMERGE) 2 5 MG tablet, 1 TABLET BY MOUTH TWICE A DAY AS NEEDED FOR MIGRAINE (Patient not taking: No sig reported), Disp: , Rfl:     predniSONE 10 mg tablet, Take 4 tabs daily for 3 days,then 3 tabs daily for 3 days, then 2 tabs daily for 3 days, then 1 tab daily for 3 days, then 1/2 tab daily for 4 days and then stop  (Patient not taking: Reported on 6/8/2022), Disp: 32 tablet, Rfl: 0    promethazine-codeine (PHENERGAN WITH CODEINE) 6 25-10 mg/5 mL syrup, Take 5 mL by mouth every 4 (four) hours as needed for cough (Patient not taking: Reported on 6/8/2022), Disp: 118 mL, Rfl: 0    sodium chloride (OCEAN) 0 65 % nasal spray, 1 spray into each nostril as needed for congestion, Disp: 480 mL, Rfl: 0    traMADol (ULTRAM) 50 mg tablet, , Disp: , Rfl:     Current Facility-Administered Medications:     medroxyPROGESTERone (DEPO-PROVERA) IM injection 150 mg, 150 mg, Intramuscular, Once, Jonathan Arts, DO  Allergies   Allergen Reactions    Latex Itching and Hives     Irritation    Vancomycin Itching and Swelling     Patient had facial swelling, especially around her eyes and severe itching      Azithromycin Diarrhea, Nausea Only and Other (See Comments)    Clarithromycin Other (See Comments)    Clavulanic Acid Other (See Comments)    Doxycycline Other (See Comments)    Erythromycin Base Other (See Comments)    Lamotrigine Other (See Comments)     Can't recall side effects   Lithium Itching    Mirtazapine Other (See Comments)     (remeron)  (remeron)  (remeron)    Moxifloxacin Nausea Only     Excessive vomiting  Excessive vomiting    Sulfamethoxazole      Other reaction(s): Rash    Sulfamethoxazole-Trimethoprim Other (See Comments)    Trimethoprim      Other reaction(s): Rash    Indomethacin Rash    Penicillins Rash       Vitals: Blood pressure 118/66, pulse 97, temperature 99 °F (37 2 °C), temperature source Tympanic, resp  rate 18, height 5' 6" (1 676 m), weight 63 kg (139 lb), SpO2 99 %, not currently breastfeeding  Body mass index is 22 44 kg/m²  Oxygen Therapy  SpO2: 99 %  Oxygen Therapy: None (Room air)    Physical Exam  Constitutional:       General: She is not in acute distress  Appearance: She is well-developed  She is not diaphoretic  HENT:      Head: Normocephalic and atraumatic  Mouth/Throat:      Mouth: Mucous membranes are moist       Pharynx: Oropharynx is clear  No oropharyngeal exudate  Eyes:      General: No scleral icterus  Cardiovascular:      Rate and Rhythm: Normal rate and regular rhythm  Heart sounds: Normal heart sounds  No murmur heard  Pulmonary:      Effort: Pulmonary effort is normal  No respiratory distress  Breath sounds: Normal breath sounds  No wheezing  Comments: No wheezing with forced expiration  Abdominal:      General: Bowel sounds are normal  There is no distension  Palpations: Abdomen is soft  Tenderness: There is no abdominal tenderness  Musculoskeletal:      Right lower leg: No edema  Left lower leg: No edema  Neurological:      Mental Status: She is alert and oriented to person, place, and time  Labs: I have personally reviewed pertinent lab results    Lab Results   Component Value Date    WBC 7 27 01/26/2022    HGB 14 2 01/26/2022    HCT 44 1 01/26/2022    MCV 93 2022     2022     Lab Results   Component Value Date    CALCIUM 9 2 2022    K 4 4 2022    CO2 24 2022     (H) 2022    BUN 9 2022    CREATININE 0 91 2022     No results found for: IGE  Lab Results   Component Value Date    ALT 16 2022    AST 12 2022    ALKPHOS 77 2022         Imaging and other studies: I have personally reviewed pertinent reports  and I have personally reviewed pertinent films in PACS  CT chest 21  No acute pulmonary pathology    Pulmonary function testin/2/21  Results:  FEV1/FVC Ratio:  78%, FEV1 3 39 L/110%, FVC 4 32 L/111%        Lung volumes by body plethysmography:   Total Lung Capacity 6 05 L/109%, RV 1 84 L/95%      DLCO corrected for patients hemoglobin level:  84%      Interpretation:     · Spirometer showing no obstructive ventilatory defect   · Normal lung volumes  · Normal DLCO  · Normal airway resistance       EKG, Pathology, and Other Studies: I have personally reviewed pertinent reports     and I have personally reviewed pertinent films in PACS  Echo 2022  Normal EF, normal diastolic function, no sig vavular disease       Bethany West MD  Pulmonary & Critical Care Fellow, [address] [facility] no

## 2022-06-06 NOTE — TELEPHONE ENCOUNTER
She can repeat the BID for 3 days if the outbreak has not resolved  She can also continue it longer if necessary  The one a day dose is for frequent outbreaks  She needs the BID for now with an active outbreak

## 2022-06-08 ENCOUNTER — OFFICE VISIT (OUTPATIENT)
Dept: PULMONOLOGY | Facility: CLINIC | Age: 52
End: 2022-06-08
Payer: MEDICARE

## 2022-06-08 ENCOUNTER — HOSPITAL ENCOUNTER (OUTPATIENT)
Dept: RADIOLOGY | Facility: IMAGING CENTER | Age: 52
Discharge: HOME | End: 2022-06-08
Payer: MEDICARE

## 2022-06-08 ENCOUNTER — OFFICE VISIT (OUTPATIENT)
Dept: FAMILY MEDICINE CLINIC | Facility: CLINIC | Age: 52
End: 2022-06-08
Payer: MEDICARE

## 2022-06-08 ENCOUNTER — OFFICE VISIT (OUTPATIENT)
Dept: DERMATOLOGY | Age: 52
End: 2022-06-08
Payer: MEDICARE

## 2022-06-08 VITALS
TEMPERATURE: 99.9 F | RESPIRATION RATE: 14 BRPM | SYSTOLIC BLOOD PRESSURE: 136 MMHG | OXYGEN SATURATION: 99 % | WEIGHT: 142.8 LBS | DIASTOLIC BLOOD PRESSURE: 82 MMHG | HEIGHT: 66 IN | HEART RATE: 93 BPM | BODY MASS INDEX: 22.95 KG/M2

## 2022-06-08 VITALS
OXYGEN SATURATION: 99 % | BODY MASS INDEX: 22.34 KG/M2 | SYSTOLIC BLOOD PRESSURE: 118 MMHG | DIASTOLIC BLOOD PRESSURE: 66 MMHG | TEMPERATURE: 99 F | RESPIRATION RATE: 18 BRPM | WEIGHT: 139 LBS | HEART RATE: 97 BPM | HEIGHT: 66 IN

## 2022-06-08 VITALS — WEIGHT: 141 LBS | TEMPERATURE: 100.1 F | HEIGHT: 66 IN | BODY MASS INDEX: 22.66 KG/M2

## 2022-06-08 DIAGNOSIS — L43.9 LICHEN PLANUS: ICD-10-CM

## 2022-06-08 DIAGNOSIS — Z85.828 HISTORY OF BASAL CELL CANCER: ICD-10-CM

## 2022-06-08 DIAGNOSIS — Z13.220 LIPID SCREENING: ICD-10-CM

## 2022-06-08 DIAGNOSIS — J45.51 SEVERE PERSISTENT ASTHMA WITH ACUTE EXACERBATION: Primary | ICD-10-CM

## 2022-06-08 DIAGNOSIS — E78.49 OTHER HYPERLIPIDEMIA: ICD-10-CM

## 2022-06-08 DIAGNOSIS — R25.2 LEG CRAMPS: Primary | ICD-10-CM

## 2022-06-08 DIAGNOSIS — J45.50 SEVERE PERSISTENT ASTHMA WITHOUT COMPLICATION: ICD-10-CM

## 2022-06-08 DIAGNOSIS — R05.3 CHRONIC COUGH: ICD-10-CM

## 2022-06-08 DIAGNOSIS — R53.83 OTHER FATIGUE: ICD-10-CM

## 2022-06-08 DIAGNOSIS — U09.9 POST-ACUTE SEQUELAE OF COVID-19 (PASC): ICD-10-CM

## 2022-06-08 DIAGNOSIS — Z85.89 HISTORY OF SQUAMOUS CELL CARCINOMA: ICD-10-CM

## 2022-06-08 DIAGNOSIS — D18.01 CHERRY ANGIOMA: ICD-10-CM

## 2022-06-08 DIAGNOSIS — D22.9 NEVUS: ICD-10-CM

## 2022-06-08 DIAGNOSIS — R06.02 SHORTNESS OF BREATH: ICD-10-CM

## 2022-06-08 DIAGNOSIS — L50.9 HIVES: ICD-10-CM

## 2022-06-08 DIAGNOSIS — L71.9 ROSACEA: Primary | ICD-10-CM

## 2022-06-08 DIAGNOSIS — L82.0 SEBORRHEIC KERATOSIS, INFLAMED: ICD-10-CM

## 2022-06-08 DIAGNOSIS — L57.0 KERATOSIS, ACTINIC: ICD-10-CM

## 2022-06-08 DIAGNOSIS — K21.9 GASTROESOPHAGEAL REFLUX DISEASE WITHOUT ESOPHAGITIS: ICD-10-CM

## 2022-06-08 PROCEDURE — 99204 OFFICE O/P NEW MOD 45 MIN: CPT | Performed by: DERMATOLOGY

## 2022-06-08 PROCEDURE — 17110 DESTRUCTION B9 LES UP TO 14: CPT | Performed by: DERMATOLOGY

## 2022-06-08 PROCEDURE — 99214 OFFICE O/P EST MOD 30 MIN: CPT | Performed by: NURSE PRACTITIONER

## 2022-06-08 PROCEDURE — 99214 OFFICE O/P EST MOD 30 MIN: CPT | Performed by: INTERNAL MEDICINE

## 2022-06-08 PROCEDURE — 70486 CT MAXILLOFACIAL W/O DYE: CPT

## 2022-06-08 PROCEDURE — 17000 DESTRUCT PREMALG LESION: CPT | Performed by: DERMATOLOGY

## 2022-06-08 RX ORDER — LEVOTHYROXINE SODIUM 125 UG/1
125 TABLET ORAL DAILY
COMMUNITY
Start: 2022-05-18

## 2022-06-08 RX ORDER — OMEPRAZOLE 40 MG/1
40 CAPSULE, DELAYED RELEASE ORAL DAILY
Qty: 30 CAPSULE | Refills: 2 | Status: SHIPPED | OUTPATIENT
Start: 2022-06-08

## 2022-06-08 RX ORDER — MONTELUKAST SODIUM 10 MG/1
10 TABLET ORAL
Qty: 30 TABLET | Refills: 3 | Status: CANCELLED | OUTPATIENT
Start: 2022-06-08

## 2022-06-08 RX ORDER — LEVALBUTEROL INHALATION SOLUTION 1.25 MG/3ML
SOLUTION RESPIRATORY (INHALATION)
COMMUNITY
Start: 2022-05-18

## 2022-06-08 RX ORDER — CLONIDINE HYDROCHLORIDE 0.1 MG/1
0.1 TABLET ORAL
COMMUNITY
Start: 2022-05-16 | End: 2022-08-10

## 2022-06-08 NOTE — PATIENT INSTRUCTIONS
HISTORY OF BASAL CELL CARCINOMA    Assessment and Plan:  Based on a thorough discussion of this condition and the management approach to it (including a comprehensive discussion of the known risks, side effects and potential benefits of treatment), the patient (family) agrees to implement the following specific plan:  Recommended skin check every 6 months     How can basal cell carcinoma be prevented? The most important way to prevent BCC is to avoid sunburn  This is especially important in childhood and early life  Fair skinned individuals and those with a personal or family history of BCC should protect their skin from sun exposure daily, year-round and lifelong  Stay indoors or under the shade in the middle of the day   Wear covering clothing   Apply high protection factor SPF50+ broad-spectrum sunscreens generously to exposed skin if outdoors   Avoid indoor tanning (sun beds, solaria)  Oral nicotinamide (vitamin B3) in a dose of 500 mg twice daily may reduce the number and severity of BCCs  What is the outlook for basal cell carcinoma? Most BCCs are cured by treatment  Cure is most likely if treatment is undertaken when the lesion is small  About 50% of people with BCC develop a second one within 3 years of the first  They are also at increased risk of other skin cancers, especially melanoma  Regular self-skin examinations and long-term annual skin checks by an experienced health professional are recommended  HISTORY OF SQUAMOUS CELL CARCINOMA     Assessment and Plan:  Based on a thorough discussion of this condition and the management approach to it (including a comprehensive discussion of the known risks, side effects and potential benefits of treatment), the patient (family) agrees to implement the following specific plan:  Recommended 6 month skin check     How can SCC be prevented? The most important way to prevent SCC is to avoid sunburn  This is especially important in childhood and early life  Fair skinned individuals and those with a personal or family history of BCC should protect their skin from sun exposure daily, year-round and lifelong  Stay indoors or under the shade in the middle of the day   Wear covering clothing   Apply high protection factor SPF50+ broad-spectrum sunscreens generously to exposed skin if outdoors   Avoid indoor tanning (sun beds, solaria)      What is the outlook for SCC? Most SCC are cured by treatment  Cure is most likely if treatment is undertaken when the lesion is small  A small percent of SCC's can spread to lymph  nodes and long term monitoring is indicated  They are also at increased risk of other skin cancers, especially melanoma  Regular self-skin examinations and long-term annual skin checks by an experienced health professional are recommended  NEOPLASM OF UNCERTAIN BEHAVIOR OF SKIN    Assessment and Plan:  I have discussed with the patient that a sample of skin via a "skin biopsy would be potentially helpful to further make a specific diagnosis under the microscope  Based on a thorough discussion of this condition and the management approach to it (including a comprehensive discussion of the known risks, side effects and potential benefits of treatment), the patient (family) agrees to implement the following specific plan:    Procedure:  Skin Biopsy  After a thorough discussion of treatment options and risk/benefits/alternatives (including but not limited to local pain, scarring, dyspigmentation, blistering, possible superinfection, and inability to confirm a diagnosis via histopathology), verbal and written consent were obtained and portion of the rash was biopsied for tissue sample  See below for consent that was obtained from patient and subsequent Procedure Note      ACTINIC KERATOSIS    Assessment and Plan:  Based on a thorough discussion of this condition and the management approach to it (including a comprehensive discussion of the known risks, side effects and potential benefits of treatment), the patient (family) agrees to implement the following specific plan:    Recommended cryotherapy in office with signed consent    Actinic keratoses are very common on sites repeatedly exposed to the sun, especially the backs of the hands and the face, most often affecting the ears, nose, cheeks, upper lip, vermilion of the lower lip, temples, forehead and balding scalp  In severely chronically sun-damaged individuals, they may also be found on the upper trunk, upper and lower limbs, and dorsum of feet  We discussed the theoretical premalignant (pre-cancerous) nature and etiology of these growths  We discussed the prevailing notion that actinic keratoses are a reflection of abnormal skin cell development due to DNA damage by short wavelength UVB  They are more likely to appear if the immune function is poor, due to aging, recent sun exposure, predisposing disease or certain drugs  We discussed that the main concern is that actinic keratoses may predispose to squamous cell carcinoma  It is rare for a solitary actinic keratosis to evolve to squamous cell carcinoma (SCC), but the risk of SCC occurring at some stage in a patient with more than 10 actinic keratoses is thought to be about 10 to 15%  A tender, thickened, ulcerated or enlarging actinic keratosis is suspicious of SCC  Actinic keratoses may be prevented by strict sun protection  If already present, keratoses may improve with a very high sun protection factor (50+) broad-spectrum sunscreen applied at least daily to affected areas, year-round  We recommend that UPF-rated clothing and hats and sunglasses be worn whenever possible and that a sunscreen-moisturizer combination product such as Neutrogena Daily Defense be applied at least three times a day      We performed a thorough discussion of treatment options and specific risk/benefits/alternatives including but not limited to [facility] treatment with medications such as the following:    Topical field area medications such as 5-fluorouracil or Aldara (specifically, the trouble with long-term compliance, blistering and local skin reaction versus the convenience of at-home therapy and that field therapy gets what is not yet seen)  Cryotherapy (specifically, local pain, scarring, dyspigmentation, blistering, possible superinfection, and treats only what we see versus directed treatment today)  Photodynamic therapy (specifically, local pain, scarring, dyspigmentation, blistering, possible superinfection, need to schedule for a later date, and time spent in the office versus field therapy that gets what is not yet seen)  SEBORRHEIC KERATOSIS;INFLAMED    Assessment and Plan:  Based on a thorough discussion of this condition and the management approach to it (including a comprehensive discussion of the known risks, side effects and potential benefits of treatment), the patient (family) agrees to implement the following specific plan:  Recommended cryotherapy in office with signed consent     Seborrheic Keratosis  A seborrheic keratosis is a harmless warty spot that appears during adult life as a common sign of skin aging  Seborrheic keratoses can arise on any area of skin, covered or uncovered, with the usual exception of the palms and soles  They do not arise from mucous membranes  Seborrheic keratoses can have highly variable appearance  Seborrheic keratoses are extremely common  It has been estimated that over 90% of adults over the age of 61 years have one or more of them  They occur in males and females of all races, typically beginning to erupt in the 35s or 45s  They are uncommon under the age of 21 years  The precise cause of seborrhoeic keratoses is not known  Seborrhoeic keratoses are considered degenerative in nature  As time goes by, seborrheic keratoses tend to become more numerous   Some people inherit a tendency to develop a very large number of them; some people may have hundreds of them  The name "seborrheic keratosis" is misleading, because these lesions are not limited to a seborrhoeic distribution (scalp, mid-face, chest, upper back), nor are they formed from sebaceous glands, nor are they associated with sebum -- which is greasy  Seborrheic keratosis may also be called "SK," "Seb K," "basal cell papilloma," "senile wart," or "barnacle "      Researchers have noted:  Eruptive seborrhoeic keratoses can follow sunburn or dermatitis  Skin friction may be the reason they appear in body folds  Viral cause (e g , human papillomavirus) seems unlikely  Stable and clonal mutations or activation of FRFR3, PIK3CA, KWAME, AKT1 and EGFR genes are found in seborrhoeic keratoses  Seborrhoeic keratosis can arise from solar lentigo  FRFR3 mutations also arise in solar lentigines  These mutations are associated with increased age and location on the head and neck, suggesting a role of ultraviolet radiation in these lesions  Seborrheic keratoses do not harbour tumour suppressor gene mutations  Epidermal growth factor receptor inhibitors, which are used to treat some cancers, often result in an increase in verrucal (warty) keratoses  There is no easy way to remove multiple lesions on a single occasion  Unless a specific lesion is "inflamed" and is causing pain or stinging/burning or is bleeding, most insurance companies do not authorize treatment  ROSACEA    Assessment and Plan:  Based on a thorough discussion of this condition and the management approach to it (including a comprehensive discussion of the known risks, side effects and potential benefits of treatment), the patient (family) agrees to implement the following specific plan:  Continue metronidazole twice a day     Rosacea is a chronic rash affecting the mid-face including the nose, cheeks, chin, forehead, and eyelids   The incidence is usually greatest between the ages of 30-60 years and is more common in people with fair skin  Common characteristics include redness, telangiectasias, papules and pustules over affected areas  Rosacea may look similar to acne, but there is a lack of comedones  Occasionally the eyes may also be involved in ocular rosacea  In advanced disease, enlargement of the sebaceous glands in the nose, termed rhinophyma, may be present  Rosacea results in red spots (papules) and sometimes pustules over the face, but unlike acne there are no blackheads, whiteheads, or cystic nodules  Patients often experience increased facial flushing with prominent blood vessels (erythematotelangiectatic rosacea) and dry, sensitive skin  These symptoms are exacerbated by sun exposure, hot or spicy foods, topical steroids and oil-based facial products  In ocular rosacea, eyelids may be red and sore due to conjunctivitis, keratitis, and episcleritis  If rhinophyma develops due to enlargement of sebaceous glands, the patient may have an enlarged and irregularly shaped nose with prominent pores  In rosacea that is refractory to treatment, patients can develop persistent redness and swelling of the face due to lymphatic obstruction (Morbihan disease)  Distribution around the cheeks may be confused with the malar or butterfly rash of lupus  However, the rash of lupus spares the nasal creases and lacks papules and pustules  If signs of photosensitivity, oral ulcers, arthritis, and kidney dysfunction are present then consider referral to a rheumatologist      There are many potential causes of rosacea including genetic, environmental, vascular, and inflammatory factors   These include, but are not limited to:  Chronic exposure to ultraviolet radiation   Increased immune responses in the form of cathelicidins that promote vessel dilation and infiltration with white blood cells (neutrophils) into the dermis  Increased matrix metalloproteinases such as collagen and elastase that remodel normal tissue may contribute to inflammation of the skin making it thicker and harder  There is some evidence to suggest that increased numbers of demodex mites on patient skin may contribute to rosacea papules     General Treatment Approach   Avoid exacerbating factors such as heat, spicy foods, and alcohol   Use daily SPF30+ sunscreen and other methods of coverage for sun protection  Use water-based make-up   Avoid applying topical steroids to affected areas as they can cause perioral dermatitis and exacerbate rosacea     Topical Treatment Approach  Metronidazole cream or gel by itself or in combination with oral antibiotics for more severe cases  Azelaic acid cream or lotion is effective for mild inflammatory rosacea when applied twice daily to affected areas  Brimonidine gel and oxymetazoline hydrochloride cream can reduce facial redness temporarily   Ivermectin cream can treat papulopustular rosacea by controlling demodex mites and inflammation   Pimecrolimus cream or tacrolimus ointment twice a day for 2-3 months can help reduce inflammation    Oral Treatment Approach  Antibiotics such as doxycycline, minocycline, or erythromycin for 1-3 months  Clonidine and carvedilol can help reduce facial flushing and are generally well tolerated  Common side effects include low blood pressure, gastrointestinal upset, dry eyes, blurred vision and low heart rate  Isotretinoin at low doses can be effective for long term treatment when antibiotics fail  Side effects may make it unsuitable for some patients  NSAIDs such as diclofenac can help reduce discomfort and redness in the skin       Procedural/Surgical Treatment Approach   Vascular lasers or intense pulsed light treatment may be used to treat persistent telangiectasia and papulopustular rosacea  Plastic surgery and carbon dioxide lasers may be used to treat rhinophyma   HIVES    Assessment and Plan:  Based on a thorough discussion of this condition and the management approach to it (including a comprehensive discussion of the known risks, side effects and potential benefits of treatment), the patient (family) agrees to implement the following specific plan:  ALLERGA 24 hour twice a day       MELANOCYTIC NEVI ("Moles")      Assessment and Plan:  Based on a thorough discussion of this condition and the management approach to it (including a comprehensive discussion of the known risks, side effects and potential benefits of treatment), the patient (family) agrees to implement the following specific plan:  Reassured benign  Monitor for changes      Melanocytic Nevi  Melanocytic nevi ("moles") are caused by collections of the color producing skin cells, or melanocytes, in 1 area in the skin  They can range in color from pink to dark brown and be either raised or flat  Some moles are present at birth (I e , "congenital nevi"), while others come up later in life (i e , "acquired nevi")  Forestine Valente exposure also stimulates the body to make more moles, ie the more sun you get the more moles you'll grow  Clinically distinguishing a healthy mole from melanoma may be difficult  The "ABCDE's" of moles have been suggested as a means of helping to alert a person to a suspicious mole and the possible increased risk of melanoma  Asymmetry: Healthy moles tend to be symmetric, while melanomas are often asymmetric  Asymmetry means if you draw a line through the mole, the two halves do not match in color, size, shape, or surface texture Any mole that starts to demonstrate "asymmetry" should be examined promptly by a board certified dermatologist      Border: Healthy moles tend to have discrete, even borders  The border of a melanoma often blends into the normal skin and does not sharply delineate the mole from normal skin  Any mole that starts to demonstrate "uneven borders" should be examined promptly     Color: Healthy moles tend to be one color throughout    Melanomas tend to be made up of different colors ranging from dark black, blue, white, or red  Any mole that demonstrates a color change should be examined promptly    Diameter: Healthy moles tend to be smaller than 0 6 cm in size; an exception are "congenital nevi" that can be larger  Melanomas tend to grow and can often be greater than 0 6 cm (1/4 of an inch, or the size of a pencil eraser)  This is only a guideline, and many normal moles may be larger than 0 6 cm without being unhealthy  Any mole that starts to change in size (small to bigger or bigger to smaller) should be examined promptly    Evolving: Healthy moles tend to "stay the same "  Melanomas may often show signs of change or evolution such as a change in size, shape, color, or elevation  Any mole that starts to itch, bleed, crust, burn, hurt, or ulcerate or demonstrate a change or evolution should be examined promptly by a board certified dermatologist       What are atypical moles or dysplastic nevi? Dysplastic moles are moles that have some of the ABCDE  changes listed above but  are not cancerous  Sometimes a biopsy and microscopic examination are needed to determine the difference  They may indicate an increased risk of melanoma in that person, especially if there is a family history of melanoma  What is a Melanoma? The main concern when looking at a new or changing mole it to evaluate whether it may be a melanoma  The appearance of a "new mole" remains one of the most reliable methods for identifying a malignant melanoma  A melanoma is a type of skin cancer that can be deadly if it spreads throughout the body  The prognosis of a Melanoma depends on how deep it has penetrated in the skin  If caught early, they generally will not have had time to grow into the deeper layers of the skin and they cure rate is then very high  Once the melanoma grows deeper into the skin, the cure rate drops dramatically   Therefore, early detection and removal of a malignant melanoma results in a much better chance of complete cure  CHERRY ANGIOMAS    Assessment and Plan:  Based on a thorough discussion of this condition and the management approach to it (including a comprehensive discussion of the known risks, side effects and potential benefits of treatment), the patient (family) agrees to implement the following specific plan:  Reassured benign     Assessment and Plan:    Cherry angioma, also known as [employer] spots, are benign vascular skin lesions  A "cherry angioma" is a firm red, blue or purple papule, 0 1-1 cm in diameter  When thrombosed, they can appear black in colour until evaluated with a dermatoscope when the red or purple colour is more easily seen  Cherry angioma may develop on any part of the body but most often appear on the scalp, face, lips and trunk  An angioma is due to proliferating endothelial cells; these are the cells that line the inside of a blood vessel  Angiomas can arise in early life or later in life; the most common type of angioma is a cherry angioma  Cherry angiomas are very common in males and females of any age or race  They are more noticeable in white skin than in skin of colour  They markedly increase in number from about the age of 36  There may be a family history of similar lesions  Eruptive cherry angiomas have been rarely reported to be associated with internal malignancy  The cause of angiomas is unknown  Genetic analysis of cherry angiomas has shown that they frequently carry specific somatic missense mutations in the GNAQ and GNA11 (Q209H) genes, which are involved in other vascular and melanocytic proliferations  Cherry angioma is usually diagnosed clinically and no investigations are necessary for the majority of lesions  It has a characteristic red-clod or lobular pattern on dermatoscopy (called lacunar pattern using conventional pattern analysis)    When there is uncertainty about the diagnosis, a biopsy may be performed  The angioma is composed of venules in a thickened papillary dermis  Collagen bundles may be prominent between the lobules  Cherry angiomas are harmless, so they do not usually have to be treated  Occasionally, they are removed to exclude a malignant skin lesion such as a nodular melanoma or because they are irritated or bleeding (and a subsequent risk for infection)  To decrease friction over the lesions, we recommend Neutrogena Daily Defense SPF 50+ at least 3 times a day  LICHEN PLANUS      Assessment and Plan:  Based on a thorough discussion of this condition and the management approach to it (including a comprehensive discussion of the known risks, side effects and potential benefits of treatment), the patient (family) agrees to implement the following specific plan: Will try to visualize when acrylic are not on- can take photo     What is lichen planus? Lichen planus is a chronic inflammatory skin condition affecting the skin and mucosal surfaces  There are several clinical types of lichen planus that share similar features on histopathology  Cutaneous lichen planus  Mucosal lichen planus  Lichen planopilaris  Lichen planus of the nails  Lichen planus pigmentosus  Lichenoid drug eruption    Who gets lichen planus? Lichen planus affects about one in one hundred people worldwide, mostly affecting adults over the age of 36 years  About half those affected have oral lichen planus, which is more common in women than in men  About 23% have lichen planus of the nails  What causes lichen planus? Lichen planus is a T cell-mediated autoimmune disorder, in which inflammatory cells attack an unknown protein within the skin and mucosal keratinocytes    Contributing factors to lichen planus may include:  Genetic predisposition  Physical and emotional stress  Injury to the skin; lichen planus often appears where the skin has been scratched or after surgery -- this is called the isomorphic response (koebnerisation)  Localized skin disease such as herpes zoster--isotopic response  Systemic viral infection, such as hepatitis C (which might modify self-antigens on the surface of basal keratinocytes)  Contact allergy, such as to metal fillings in oral lichen planus (rare)  Drugs; gold, quinine, quinidine and others can cause a lichenoid rash    A lichenoid inflammation is also notable in [id number] disease, a complication of a bone marrow transplant  What are the clinical features of lichen planus? Lichen planus may cause a small number or many lesions on the skin and mucosal surfaces  Cutaneous lichen planus  The usual presentation of the disease is classical lichen planus  Symptoms can range from none (uncommon) to intense itch  Papules and polygonal plaques are shiny, flat-topped and firm on palpation  The plaques are crossed by fine white lines called Olesya striae  Hypertrophic lichen planus can be scaly  Bullous lichen planus is rare  Size ranges from pinpoint to larger than a centimeter  Distribution may be scattered, clustered, linear, annular or actinic (sun-exposed sites such as face, neck and backs of the hands)  Location can be anywhere, but most often front of the wrists, lower back, and ankles  Colour depends on the patient's skin type  New papules and plaques often have a purple or violet hue, except on palms and soles where they are yellowish brown  Plaques resolve after some months to leave greyish-brown post-inflammatory macules that can take a year or longer to fade  Oral lichen planus  The mouth is often the only affected area  Oral lichen planus often involves the inside of the cheeks and the sides of the tongue, but the gums and lips may also be involved   The most common patterns are:  Painless white streaks in a lacy or fern-like pattern  Painful and persistent erosions and ulcers (erosive lichen planus)  Diffuse redness and peeling of the gums (desquamative gingivitis)  Localized inflammation of the gums adjacent to amalgam fillings    Vulval lichen planus  Lichen planus may affect labia majora, labia minora and vaginal introitus  Presentation includes:  Painless white streaks in a lacy or fern-like pattern  Painful and persistent erosions and ulcers (erosive lichen planus )  Scarring, resulting in adhesions, resorption of labia minora and introital stenosis  Painful desquamative vaginitis, preventing intercourse and causing a mucky vaginal discharge  The eroded vagina may bleed easily on contact  Overlap with vulval lichen sclerosus, an inflammatory skin disorder that most commonly affects women over 48years of age  Penile lichen planus  Penile lichen planus usually presents with classical papules in a ring around the glans  White streaks and erosive lichen planus may occur but are less common  Other mucosal sites  Erosive lichen planus uncommonly affects the lacrimal glands, eyelids, external ear canal, oesophagus, larynx, bladder and anus  Lichen planopilaris  Lichen planopilaris presents as tiny red spiny follicular papules on the scalp or less often, elsewhere on the body  Rarely, blistering occurs in the lesions  Destruction of the hair follicles leads to permanently bald patches characterized by sparse lonely hairs  Frontal fibrosing alopecia is a form of lichen planopilaris that affects the anterior scalp, forehead and eyebrows  Pseudopelade of Brocq is probably a variant of lichen planus without inflammation or scaling  Areas of scarring without hair slowly appear, described as like footprints in the snow  Lichen planus pigmentosus  Lichen planus pigmentosus describes ill-defined oval, greyish brown marks on the face and neck or trunk and limbs without an inflammatory phase  It is a form of acquired dermal macular hyperpigmentation  It can be provoked by sun exposure, but it can also arise in sun-protected sites such as the armpits   It has diffuse, reticulate and diffuse patterns  Lichen planus pigmentosus is similar to erythema dyschromicum perstans and may be the same disease  Lichen planus pigmentosus may rarely affect the lips, resulting in a patchy dark pigmentation on upper and lower lips  Lichenoid drug eruption  Lichenoid drug eruption refers to a lichen planus-like rash caused by medications  Asymptomatic or itchy; pink, brown or purple; flat, slightly scaly patches most often arise on the trunk  The oral mucosa (oral lichenoid reaction) and other sites are also sometimes affected  Many drugs can rarely cause lichenoid eruptions  The most common are:  Gold  Hydroxychloroquine  Captopril    What are the complications of lichen planus? Hypertrophic lichen planus may resemble squamous cell carcinoma  However, rarely, longstanding erosive lichen planus can result in true squamous cell carcinoma, most often in the mouth (oral cancer) or on the vulva (vulval cancer) or penis (penile cancer)  This should be suspected if there is an enlarging nodule or an ulcer with thickened edges in these sites  Cancer is more common in smokers, those with a history of cancer in mucosal sites, and in those who carry sexually acquired and oncogenic human papillomavirus  Cancer from other forms of lichen planus is rare  How is lichen planus diagnosed? In most cases, lichen planus is diagnosed by observing its clinical features  A biopsy is often recommended to confirm or make the diagnosis and to look for cancer  The histopathological signs are of a lichenoid tissue reaction affecting the epidermis  Typical features include:  Irregularly thickened epidermis  Degenerative skin cells  Liquefaction degeneration of the basal layer of the epidermis  Band of inflammatory cells just beneath the epidermis  Melanin (pigment) beneath the epidermis    Direct immunofluorescent staining may reveal deposits of immunoglobulins at the base of the epidermis      Patch tests may be recommended for patients with oral lichen planus affecting the gums and who have amalgam fillings, to assess for contact allergy to thiomersal (a mercurial compound)  What is the treatment for lichen planus? Treatment is not always necessary  Local treatments for symptomatic cutaneous or mucosal disease are:  Potent topical steroids  Topical calcineurin inhibitors, tacrolimus ointment and pimecrolimus cream  Topical retinoids  Intralesional steroid injections    Systemic treatment for widespread lichen planus or severe local disease often includes a 1 to 3-month course of oral prednisone, while commencing another agent from the following list:  Acitretin  Hydroxychloroquine  Methotrexate  Azathioprine  Mycophenolate mofetil  Phototherapy    In cases of oral lichen planus affecting the gums with contact allergy to mercury, the lichen planus may resolve on replacing the fillings with composite material  If the lichen planus is not due to mercury allergy, removing amalgam fillings is very unlikely to result in a cure  Anecdotal success is reported from long courses of oral antibiotics and oral antifungal agents  Lichen planopilaris is reported to improve with pioglitazone  What is the outlook for lichen planus? Cutaneous lichen planus tends to clear within a couple of years in most people, but mucosal lichen planus is more likely to persist for a decade or longer  Spontaneous recovery is unpredictable, and lichen planus may recur at a later date  Scarring is permanent, including balding of the scalp

## 2022-06-08 NOTE — PROGRESS NOTES
[facility] Note     Patient Name: Noemi Garcia  Encounter Date: 6/8/2022     Have you been cared for by a [facility] Dermatologist in the last 3 years and, if so, which one? No    · Have you traveled outside of the [facility] in the past 3 months? No     May we call your Preferred Phone number to discuss your specific medical information? Yes     May we leave a detailed message that includes your specific medical information? Yes      Today's Chief Concerns:   Concern #1:  Full skin check    Concern #2:  Area on scalp and forehead   Concern #3:  nails breaking     Past Medical History:  Have you personally ever had or currently have any of the following? · Skin cancer (such as Melanoma, Basal Cell Carcinoma, Squamous Cell Carcinoma? (If Yes, please provide more detail)- YES, BCC and SCC  · Eczema: No  · Psoriasis: No  · HIV/AIDS: No  · Hepatitis B or C: No  · Tuberculosis: No  · Systemic Immunosuppression such as Diabetes, Biologic or Immunotherapy, Chemotherapy, Organ Transplantation, Bone Marrow Transplantation (If YES, please provide more detail): No  · Radiation Treatment (If YES, please provide more detail): No  · Any other major medical conditions/concerns? 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 Hiking and walking    Family history:    Have any of your "first degree relatives" (parent, brother, sister, or child) had any of the following       · Skin cancer such as Melanoma or Merkel Cell Carcinoma or Pancreatic Cancer? No  · Eczema, Asthma, Hay Fever or Seasonal Allergies: No  · Psoriasis or Psoriatic Arthritis: No  · Do any other medical conditions seem to run in your family? If Yes, what condition and which relatives?   YES, cancer;  paternal grandmother  Colon and father - lung     Current Medications (update all dermatological medications before printing patient's AVS):    Current Outpatient Medications:     albuterol (2 5 mg/3 mL) 0 083 % nebulizer solution, Take 1 vial (2 5 mg total) by nebulization 3 (three) times a day, Disp: 30 vial, Rfl: 2    albuterol (PROAIR HFA) 90 mcg/act inhaler, Inhale 2 puffs 4 (four) times a day every 4 to 6 hours as needed, Disp: 1 Inhaler, Rfl: 5    cloNIDine (CATAPRES) 0 1 mg tablet, Take 0 1 mg by mouth daily at bedtime, Disp: , Rfl:     dicyclomine (BENTYL) 10 mg capsule, TAKE 1 CAPSULE (10 MG TOTAL) BY MOUTH 3 (THREE) TIMES A DAY AS NEEDED (ABDOMINAL PAIN), Disp: 270 capsule, Rfl: 1    fluticasone (FLONASE) 50 mcg/act nasal spray, as needed  , Disp: , Rfl:     fluticasone-umeclidinium-vilanterol (Trelegy Ellipta) 100-62 5-25 MCG/INH inhaler, Inhale 1 puff daily Rinse mouth after use , Disp: , Rfl:     levalbuterol (XOPENEX) 1 25 mg/3 mL nebulizer solution, 1 25 MG EVERY 4 HRS FOR 30 DAYS, Disp: , Rfl:     Levoxyl 125 MCG tablet, Take 125 mcg by mouth daily, Disp: , Rfl:     LORazepam (ATIVAN) 1 mg tablet, TAKE 1 & 1/2 TABLETS DAILY AT BEDTIME AS NEEDED, Disp: , Rfl: 1    medroxyPROGESTERone (DEPO-PROVERA) 150 mg/mL injection, Please give in office every 10 weeks, Disp: 1 mL, Rfl: 0    Multiple Vitamin (MULTI VITAMIN DAILY PO), Take by mouth  , Disp: , Rfl:     naproxen (NAPROSYN) 500 mg tablet, TAKE 1 TABLET BY MOUTH 3 TIMES A DAY AS NEEDED MIGRAINE  LIMIT USE TO 3 DAYS PER WEEK, Disp: , Rfl:     omeprazole (PriLOSEC) 40 MG capsule, Take 1 capsule (40 mg total) by mouth daily, Disp: 30 capsule, Rfl: 2    ondansetron (ZOFRAN) 4 mg tablet, Take 1 tablet (4 mg total) by mouth every 8 (eight) hours as needed for nausea, Disp: 20 tablet, Rfl: 5    pentosan polysulfate (ELMIRON) 100 mg capsule, Take 100 mg by mouth 3 (three) times a day before meals  , Disp: , Rfl:     valACYclovir (VALTREX) 500 mg tablet, Take 1 tablet (500 mg total) by mouth 2 (two) times a day for 6 days, Disp: 30 tablet, Rfl: 0   calcitriol (ROCALTROL) 0 5 MCG capsule, , Disp: , Rfl:     colestipol (COLESTID) 1 g tablet, Take 1 tablet (1 g total) by mouth 2 (two) times a day, Disp: 180 tablet, Rfl: 3    fluconazole (DIFLUCAN) 150 mg tablet, TAKE 1 TABLET BY MOUTH AS ONE DOSE (Patient not taking: No sig reported), Disp: , Rfl:     fluticasone-umeclidinium-vilanterol (Trelegy Ellipta) 200-62 5-25 MCG/INH AEPB inhaler, Inhale 1 puff daily Rinse mouth after use  (Patient not taking: No sig reported), Disp: 60 blister, Rfl: 5    fluticasone-vilanterol (BREO ELLIPTA) 200-25 MCG/INH inhaler, Inhale 1 puff daily Rinse mouth after use  (Patient not taking: No sig reported), Disp: 1 each, Rfl: 5    HYDROcodone-acetaminophen (NORCO) 5-325 mg per tablet, , Disp: , Rfl:     levothyroxine 100 mcg tablet,   (Patient not taking: No sig reported), Disp: , Rfl:     Levoxyl 112 MCG tablet, Take 112 mcg by mouth daily (Patient not taking: No sig reported), Disp: , Rfl:     Meth-Hyo-M Bl-Na Phos-Ph Sal (Uribel) 118 MG CAPS, Take 1 each by mouth as needed  (Patient not taking: No sig reported), Disp: , Rfl:     naratriptan (AMERGE) 2 5 MG tablet, 1 TABLET BY MOUTH TWICE A DAY AS NEEDED FOR MIGRAINE (Patient not taking: No sig reported), Disp: , Rfl:     predniSONE 10 mg tablet, Take 4 tabs daily for 3 days,then 3 tabs daily for 3 days, then 2 tabs daily for 3 days, then 1 tab daily for 3 days, then 1/2 tab daily for 4 days and then stop   (Patient not taking: No sig reported), Disp: 32 tablet, Rfl: 0    promethazine-codeine (PHENERGAN WITH CODEINE) 6 25-10 mg/5 mL syrup, Take 5 mL by mouth every 4 (four) hours as needed for cough (Patient not taking: No sig reported), Disp: 118 mL, Rfl: 0    sodium chloride (OCEAN) 0 65 % nasal spray, 1 spray into each nostril as needed for congestion, Disp: 480 mL, Rfl: 0    traMADol (ULTRAM) 50 mg tablet, , Disp: , Rfl:     Current Facility-Administered Medications:     medroxyPROGESTERone (DEPO-PROVERA) IM injection 150 mg, 150 mg, Intramuscular, Once, Ova Alistair, DO      Review of Systems/System Alerts:  Have you recently had or currently have any of the following? If YES, what are you doing for the problem? · Fever, chills or unintended weight loss: No  · Sudden loss or change in your vision: No  · Nausea, vomiting or blood in your stool: No  · Painful or swollen joints: No  · Wheezing or cough: YES, recently had thyroid removed and on steroids  Followed by family dr  · Changing mole or non-healing wound: No  · Nosebleeds: No  · Excessive sweating: No  · Easy or prolonged bleeding? No  · Over the last 2 weeks, how often have you been bothered by the following problems? · Taking little interest or pleasure in doing things: 3 - Nearly every day  Followed by therapist   · Feeling down, depressed, or hopeless: 3 - Nearly every day  Followed by therapist   · Rapid heartbeat with epinephrine:  No  · FEMALES ONLY:    · Are you pregnant or planning to become pregnant? No  · Are you currently or planning to be nursing or breast feeding? No  · Any known allergies? YES, see below   Allergies   Allergen Reactions    Latex Itching and Hives     Irritation    Vancomycin Itching and Swelling     Patient had facial swelling, especially around her eyes and severe itching   Azithromycin Diarrhea, Nausea Only and Other (See Comments)    Clarithromycin Other (See Comments)    Clavulanic Acid Other (See Comments)    Doxycycline Other (See Comments)    Erythromycin Base Other (See Comments)    Lamotrigine Other (See Comments)     Can't recall side effects       Lithium Itching    Mirtazapine Other (See Comments)     (remeron)  (remeron)  (remeron)    Moxifloxacin Nausea Only     Excessive vomiting  Excessive vomiting    Sulfamethoxazole      Other reaction(s): Rash    Sulfamethoxazole-Trimethoprim Other (See Comments)    Trimethoprim      Other reaction(s): Rash    Indomethacin Rash    Penicillins Rash PHYSICAL EXAM:       Was a chaperone (Derm Clinical Assistant) present throughout the entire Physical Exam? Yes     Did the Dermatology Team specifically  the patient on the importance of a Full Skin Exam to be sure that nothing is missed clinically?  Yes}  o Did the patient request or accept a Full Skin Exam?  Yes  o Did the patient specifically refuse to have the areas "under-the-bra" examined by the Dermatologist? Betty Reach  o Did the patient specifically refuse to have the areas "under-the-underwear" examined by the Dermatologist? YES      CONSTITUTIONAL (Height, Weight, and Temperature must be recorded):   Vitals:    06/08/22 1315   Temp: 100 1 °F (37 8 °C)   TempSrc: Temporal   Weight: 64 kg (141 lb)   Height: 5' 6" (1 676 m)        PSYCH: Normal mood and affect  EYES: Normal conjunctiva  ENT: Normal lips and oral mucosa  CARDIOVASCULAR: No edema  RESPIRATORY: Normal respirations  HEME/LYMPH/IMMUNO:  No regional lymphadenopathy except as noted below in ASSESSMENT AND PLAN BY DIAGNOSIS    SKIN:  FULL ORGAN SYSTEM EXAM  Hair, Scalp, Ears, Face Normal except as noted below in Assessment   Neck, Cervical Chain Nodes Normal except as noted below in Assessment   Right Arm/Hand/Fingers Normal except as noted below in Assessment   Left Arm/Hand/Fingers Normal except as noted below in Assessment   Chest/Breasts/Axillae Viewed areas Normal except as noted below in Assessment   Abdomen, Umbilicus Normal except as noted below in Assessment   Back/Spine Normal except as noted below in Assessment   Groin/Genitalia/Buttocks NOT EXAMINED   Right Leg, Foot, Toes Normal except as noted below in Assessment   Left Leg, Foot, Toes Normal except as noted below in Assessment        ASSESSMENT AND PLAN BY DIAGNOSIS:    History of Present Condition:     Duration:  How long has this been an issue for you?    o  scalp - 6 months  o Forehead - months   o Nails - January 2022   Location Affected:  Where on the body is this affecting you? o  scalp, forehead and nails    Quality:  Is there any bleeding, pain, itch, burning/irritation, or redness associated with the skin lesion? o  denies    Severity:  Describe any bleeding, pain, itch, burning/irritation, or redness on a scale of 1 to 10 (with 10 being the worst)  o  n/a   Timing:  Does this condition seem to be there pretty constantly or do you notice it more at specific times throughout the day? o  constant    Context:  Have you ever noticed that this condition seems to be associated with specific activities you do?    o  denies    Modifying Factors:    o Anything that seems to make the condition worse?    -  denies   o What have you tried to do to make the condition better?    -   Cryotherapy for scalp  - Nails - using acrylics    Associated Signs and Symptoms:  Does this skin lesion seem to be associated with any of the following:  o  SL AMB DERM SIGNS AND SYMPTOMS: Itching and Scratching     HISTORY OF BASAL CELL CARCINOMA    Physical Exam:   Anatomic Location Affected:  Forehead    Morphological Description of scar:  Well healed scar    Suspected Recurrence: No   Pertinent Positives:   Pertinent Negatives: Additional History of Present Condition:  History of basal cell carcinoma with no sign of recurrence    Assessment and Plan:  Based on a thorough discussion of this condition and the management approach to it (including a comprehensive discussion of the known risks, side effects and potential benefits of treatment), the patient (family) agrees to implement the following specific plan:   Recommended skin check every 6 months     How can basal cell carcinoma be prevented? The most important way to prevent BCC is to avoid sunburn  This is especially important in childhood and early life  Fair skinned individuals and those with a personal or family history of BCC should protect their skin from sun exposure daily, year-round and lifelong     Stay indoors or under the shade in the middle of the day    Wear covering clothing    Apply high protection factor SPF50+ broad-spectrum sunscreens generously to exposed skin if outdoors    Avoid indoor tanning (sun beds, solaria)   Oral nicotinamide (vitamin B3) in a dose of 500 mg twice daily may reduce the number and severity of BCCs  What is the outlook for basal cell carcinoma? Most BCCs are cured by treatment  Cure is most likely if treatment is undertaken when the lesion is small  About 50% of people with BCC develop a second one within 3 years of the first  They are also at increased risk of other skin cancers, especially melanoma  Regular self-skin examinations and long-term annual skin checks by an experienced health professional are recommended  HISTORY OF SQUAMOUS CELL CARCINOMA     Physical Exam:   Anatomic Location Affected:  Left [facility]    Morphological Description of Scar:  Well healed scar    Suspected Recurrence: no   Additional History of Present Condition:       Assessment and Plan:  Based on a thorough discussion of this condition and the management approach to it (including a comprehensive discussion of the known risks, side effects and potential benefits of treatment), the patient (family) agrees to implement the following specific plan:   Recommended 6 month skin check     How can SCC be prevented? The most important way to prevent SCC is to avoid sunburn  This is especially important in childhood and early life  Fair skinned individuals and those with a personal or family history of BCC should protect their skin from sun exposure daily, year-round and lifelong   Stay indoors or under the shade in the middle of the day    Wear covering clothing    Apply high protection factor SPF50+ broad-spectrum sunscreens generously to exposed skin if outdoors    Avoid indoor tanning (sun beds, solaria)      What is the outlook for SCC? Most SCC are cured by treatment   Cure is most likely if treatment is undertaken when the lesion is small  A small percent of SCC's can spread to lymph  nodes and long term monitoring is indicated  They are also at increased risk of other skin cancers, especially melanoma  Regular self-skin examinations and long-term annual skin checks by an experienced health professional are recommended  NEOPLASM OF UNCERTAIN BEHAVIOR OF SKIN    Physical Exam:   (Anatomic Location); (Size and Morphological Description); (Differential Diagnosis):  o Specimen A: scalp; skin; tangential biopsy; 54year old female with erythematous scaly patch; dd: lichen plano pilaris    Pertinent Positives:   Pertinent Negatives: Additional History of Present Condition:  Patient states area crusty    Assessment and Plan:   I have discussed with the patient that a sample of skin via a "skin biopsy would be potentially helpful to further make a specific diagnosis under the microscope   Based on a thorough discussion of this condition and the management approach to it (including a comprehensive discussion of the known risks, side effects and potential benefits of treatment), the patient (family) agrees to implement the following specific plan:    o Procedure:  Skin Biopsy  Schedule biopsy  After a thorough discussion of treatment options and risk/benefits/alternatives (including but not limited to local pain, scarring, dyspigmentation, blistering, possible superinfection, and inability to confirm a diagnosis via histopathology), verbal and written consent were obtained and portion of the rash was biopsied for tissue sample  See below for consent that was obtained from patient and subsequent Procedure Note      ACTINIC KERATOSIS    Physical Exam:   Anatomic Location Affected:  Bridge of nose   Morphological Description:  Scaly pink papules    Additional History of Present Condition:  Present on exam     Assessment and Plan:  Based on a thorough discussion of this condition and the management approach to it (including a comprehensive discussion of the known risks, side effects and potential benefits of treatment), the patient (family) agrees to implement the following specific plan:     Recommended cryotherapy in office with signed consent    Actinic keratoses are very common on sites repeatedly exposed to the sun, especially the backs of the hands and the face, most often affecting the ears, nose, cheeks, upper lip, vermilion of the lower lip, temples, forehead and balding scalp  In severely chronically sun-damaged individuals, they may also be found on the upper trunk, upper and lower limbs, and dorsum of feet  We discussed the theoretical premalignant (pre-cancerous) nature and etiology of these growths  We discussed the prevailing notion that actinic keratoses are a reflection of abnormal skin cell development due to DNA damage by short wavelength UVB  They are more likely to appear if the immune function is poor, due to aging, recent sun exposure, predisposing disease or certain drugs  We discussed that the main concern is that actinic keratoses may predispose to squamous cell carcinoma  It is rare for a solitary actinic keratosis to evolve to squamous cell carcinoma (SCC), but the risk of SCC occurring at some stage in a patient with more than 10 actinic keratoses is thought to be about 10 to 15%  A tender, thickened, ulcerated or enlarging actinic keratosis is suspicious of SCC  Actinic keratoses may be prevented by strict sun protection  If already present, keratoses may improve with a very high sun protection factor (50+) broad-spectrum sunscreen applied at least daily to affected areas, year-round  We recommend that UPF-rated clothing and hats and sunglasses be worn whenever possible and that a sunscreen-moisturizer combination product such as Neutrogena Daily Defense be applied at least three times a day      We performed a thorough discussion of treatment options and specific risk/benefits/alternatives including but not limited to medical field treatment with medications such as the following:     Topical field area medications such as 5-fluorouracil or Aldara (specifically, the trouble with long-term compliance, blistering and local skin reaction versus the convenience of at-home therapy and that field therapy gets what is not yet seen)   Cryotherapy (specifically, local pain, scarring, dyspigmentation, blistering, possible superinfection, and treats only what we see versus directed treatment today)   Photodynamic therapy (specifically, local pain, scarring, dyspigmentation, blistering, possible superinfection, need to schedule for a later date, and time spent in the office versus field therapy that gets what is not yet seen)  PROCEDURE:  DESTRUCTION OF PRE-MALIGNANT LESIONS  After a thorough discussion of treatment options and risk/benefits/alternatives (including but not limited to local pain, scarring, dyspigmentation, blistering, and possible superinfection), verbal and written consent were obtained and the aforementioned lesions were treated on with cryotherapy using liquid nitrogen x 1 cycle for 5-10 seconds   TOTAL NUMBER of 1 pre-malignant lesions were treated today on the ANATOMIC LOCATION: bridge of nose  The patient tolerated the procedure well, and after-care instructions were provided  SEBORRHEIC KERATOSIS;INFLAMED    Physical Exam:   Anatomic Location Affected:  Frontal scalp   Morphological Description:  Flat and raised, waxy, smooth to warty textured, yellow to brownish-grey to dark brown to blackish, discrete, "stuck-on" appearing papules   Pertinent Positives:   Pertinent Negatives: Additional History of Present Condition:  Patient reports new bumps on the skin  Denies itch, burn, pain, bleeding or ulceration  Present constantly; nothing seems to make it worse or better  No prior treatment        Assessment and Plan:  Based on a thorough discussion of this condition and the management approach to it (including a comprehensive discussion of the known risks, side effects and potential benefits of treatment), the patient (family) agrees to implement the following specific plan:   Recommended cryotherapy in office with signed consent     Seborrheic Keratosis  A seborrheic keratosis is a harmless warty spot that appears during adult life as a common sign of skin aging  Seborrheic keratoses can arise on any area of skin, covered or uncovered, with the usual exception of the palms and soles  They do not arise from mucous membranes  Seborrheic keratoses can have highly variable appearance  Seborrheic keratoses are extremely common  It has been estimated that over 90% of adults over the age of 61 years have one or more of them  They occur in males and females of all races, typically beginning to erupt in the 35s or 45s  They are uncommon under the age of 21 years  The precise cause of seborrhoeic keratoses is not known  Seborrhoeic keratoses are considered degenerative in nature  As time goes by, seborrheic keratoses tend to become more numerous  Some people inherit a tendency to develop a very large number of them; some people may have hundreds of them  The name "seborrheic keratosis" is misleading, because these lesions are not limited to a seborrhoeic distribution (scalp, mid-face, chest, upper back), nor are they formed from sebaceous glands, nor are they associated with sebum -- which is greasy    Seborrheic keratosis may also be called "SK," "Seb K," "basal cell papilloma," "senile wart," or "barnacle "      Researchers have noted:   Eruptive seborrhoeic keratoses can follow sunburn or dermatitis   Skin friction may be the reason they appear in body folds   Viral cause (e g , human papillomavirus) seems unlikely   Stable and clonal mutations or activation of FRFR3, PIK3CA, KWAME, AKT1 and EGFR genes are found in seborrhoeic keratoses   Seborrhoeic keratosis can arise from solar lentigo   FRFR3 mutations also arise in solar lentigines  These mutations are associated with increased age and location on the head and neck, suggesting a role of ultraviolet radiation in these lesions   Seborrheic keratoses do not harbour tumour suppressor gene mutations   Epidermal growth factor receptor inhibitors, which are used to treat some cancers, often result in an increase in verrucal (warty) keratoses  There is no easy way to remove multiple lesions on a single occasion  Unless a specific lesion is "inflamed" and is causing pain or stinging/burning or is bleeding, most insurance companies do not authorize treatment  PROCEDURE:  DESTRUCTION OF PRE-MALIGNANT LESIONS  After a thorough discussion of treatment options and risk/benefits/alternatives (including but not limited to local pain, scarring, dyspigmentation, blistering, and possible superinfection), verbal and written consent were obtained and the aforementioned lesions were treated on with cryotherapy using liquid nitrogen x 1 cycle for 5-10 seconds   TOTAL NUMBER of 1 pre-malignant lesions were treated today on the ANATOMIC LOCATION: frontal scalp  The patient tolerated the procedure well, and after-care instructions were provided  ROSACEA    Physical Exam:   Anatomic Location Affected:  face   Morphological Description:  erythema   Pertinent Positives:   Pertinent Negatives: Additional History of Present Condition:  Present on exam     Assessment and Plan:  Based on a thorough discussion of this condition and the management approach to it (including a comprehensive discussion of the known risks, side effects and potential benefits of treatment), the patient (family) agrees to implement the following specific plan:   Apply metronidazole twice a day     Rosacea is a chronic rash affecting the mid-face including the nose, cheeks, chin, forehead, and eyelids  The incidence is usually greatest between the ages of 30-60 years and is more common in people with fair skin  Common characteristics include redness, telangiectasias, papules and pustules over affected areas  Rosacea may look similar to acne, but there is a lack of comedones  Occasionally the eyes may also be involved in ocular rosacea  In advanced disease, enlargement of the sebaceous glands in the nose, termed rhinophyma, may be present  Rosacea results in red spots (papules) and sometimes pustules over the face, but unlike acne there are no blackheads, whiteheads, or cystic nodules  Patients often experience increased facial flushing with prominent blood vessels (erythematotelangiectatic rosacea) and dry, sensitive skin  These symptoms are exacerbated by sun exposure, hot or spicy foods, topical steroids and oil-based facial products  In ocular rosacea, eyelids may be red and sore due to conjunctivitis, keratitis, and episcleritis  If rhinophyma develops due to enlargement of sebaceous glands, the patient may have an enlarged and irregularly shaped nose with prominent pores  In rosacea that is refractory to treatment, patients can develop persistent redness and swelling of the face due to lymphatic obstruction (Morbihan disease)  Distribution around the cheeks may be confused with the malar or butterfly rash of lupus  However, the rash of lupus spares the nasal creases and lacks papules and pustules  If signs of photosensitivity, oral ulcers, arthritis, and kidney dysfunction are present then consider referral to a rheumatologist      There are many potential causes of rosacea including genetic, environmental, vascular, and inflammatory factors   These include, but are not limited to:   Chronic exposure to ultraviolet radiation    Increased immune responses in the form of cathelicidins that promote vessel dilation and infiltration with white blood cells (neutrophils) into the dermis   Increased matrix metalloproteinases such as collagen and elastase that remodel normal tissue may contribute to inflammation of the skin making it thicker and harder   There is some evidence to suggest that increased numbers of demodex mites on patient skin may contribute to rosacea papules     General Treatment Approach    Avoid exacerbating factors such as heat, spicy foods, and alcohol    Use daily SPF30+ sunscreen and other methods of coverage for sun protection   Use water-based make-up    Avoid applying topical steroids to affected areas as they can cause perioral dermatitis and exacerbate rosacea     Topical Treatment Approach   Metronidazole cream or gel by itself or in combination with oral antibiotics for more severe cases   Azelaic acid cream or lotion is effective for mild inflammatory rosacea when applied twice daily to affected areas   Brimonidine gel and oxymetazoline hydrochloride cream can reduce facial redness temporarily    Ivermectin cream can treat papulopustular rosacea by controlling demodex mites and inflammation    Pimecrolimus cream or tacrolimus ointment twice a day for 2-3 months can help reduce inflammation    Oral Treatment Approach   Antibiotics such as doxycycline, minocycline, or erythromycin for 1-3 months   Clonidine and carvedilol can help reduce facial flushing and are generally well tolerated  Common side effects include low blood pressure, gastrointestinal upset, dry eyes, blurred vision and low heart rate   Isotretinoin at low doses can be effective for long term treatment when antibiotics fail  Side effects may make it unsuitable for some patients   NSAIDs such as diclofenac can help reduce discomfort and redness in the skin       Procedural/Surgical Treatment Approach    Vascular lasers or intense pulsed light treatment may be used to treat persistent telangiectasia and papulopustular rosacea   Plastic surgery and carbon dioxide lasers may be used to treat rhinophyma HIVES  Physical Exam:   Anatomic Location Affected:  Trunk     Morphological Description:  Blanchable pink macules   Pertinent Positives:   Pertinent Negatives: Additional History of Present Condition:  Patient states itchy all over     Assessment and Plan:  Based on a thorough discussion of this condition and the management approach to it (including a comprehensive discussion of the known risks, side effects and potential benefits of treatment), the patient (family) agrees to implement the following specific plan:   ALLEGRA 24 hour twice a day       MELANOCYTIC NEVI ("Moles")    Physical Exam:   Anatomic Location Affected:   Mostly on sun-exposed areas of the trunk and extremities    Morphological Description:  Scattered, 1-4mm round to ovoid, symmetrical-appearing, even bordered, skin colored to dark brown macules/papules, mostly in sun-exposed areas   Pertinent Positives:   Pertinent Negatives: Additional History of Present Condition:  Some mildly atypical     Assessment and Plan:  Based on a thorough discussion of this condition and the management approach to it (including a comprehensive discussion of the known risks, side effects and potential benefits of treatment), the patient (family) agrees to implement the following specific plan:   Reassured benign   Monitor for changes      Melanocytic Nevi  Melanocytic nevi ("moles") are caused by collections of the color producing skin cells, or melanocytes, in 1 area in the skin  They can range in color from pink to dark brown and be either raised or flat  Some moles are present at birth (I e , "congenital nevi"), while others come up later in life (i e , "acquired nevi")  Jerilee Limb exposure also stimulates the body to make more moles, ie the more sun you get the more moles you'll grow  Clinically distinguishing a healthy mole from melanoma may be difficult   The "ABCDE's" of moles have been suggested as a means of helping to alert a person to a suspicious mole and the possible increased risk of melanoma  Asymmetry: Healthy moles tend to be symmetric, while melanomas are often asymmetric  Asymmetry means if you draw a line through the mole, the two halves do not match in color, size, shape, or surface texture Any mole that starts to demonstrate "asymmetry" should be examined promptly by a board certified dermatologist      Border: Healthy moles tend to have discrete, even borders  The border of a melanoma often blends into the normal skin and does not sharply delineate the mole from normal skin  Any mole that starts to demonstrate "uneven borders" should be examined promptly     Color: Healthy moles tend to be one color throughout  Melanomas tend to be made up of different colors ranging from dark black, blue, white, or red  Any mole that demonstrates a color change should be examined promptly    Diameter: Healthy moles tend to be smaller than 0 6 cm in size; an exception are "congenital nevi" that can be larger  Melanomas tend to grow and can often be greater than 0 6 cm (1/4 of an inch, or the size of a pencil eraser)  This is only a guideline, and many normal moles may be larger than 0 6 cm without being unhealthy  Any mole that starts to change in size (small to bigger or bigger to smaller) should be examined promptly    Evolving: Healthy moles tend to "stay the same "  Melanomas may often show signs of change or evolution such as a change in size, shape, color, or elevation  Any mole that starts to itch, bleed, crust, burn, hurt, or ulcerate or demonstrate a change or evolution should be examined promptly by a board certified dermatologist       What are atypical moles or dysplastic nevi? Dysplastic moles are moles that have some of the ABCDE  changes listed above but  are not cancerous  Sometimes a biopsy and microscopic examination are needed to determine the difference   They may indicate an increased risk of melanoma in that person, especially if there is a family history of melanoma  What is a Melanoma? The main concern when looking at a new or changing mole it to evaluate whether it may be a melanoma  The appearance of a "new mole" remains one of the most reliable methods for identifying a malignant melanoma  A melanoma is a type of skin cancer that can be deadly if it spreads throughout the body  The prognosis of a Melanoma depends on how deep it has penetrated in the skin  If caught early, they generally will not have had time to grow into the deeper layers of the skin and they cure rate is then very high  Once the melanoma grows deeper into the skin, the cure rate drops dramatically  Therefore, early detection and removal of a malignant melanoma results in a much better chance of complete cure  RODRIGUEZ ANGIOMAS    Physical Exam:   Anatomic Location Affected:  Scalp and trunk    Morphological Description:  Scattered cherry red, 1-4 mm papules   Pertinent Positives:   Pertinent Negatives: Additional History of Present Condition:  Present on exam     Assessment and Plan:  Based on a thorough discussion of this condition and the management approach to it (including a comprehensive discussion of the known risks, side effects and potential benefits of treatment), the patient (family) agrees to implement the following specific plan:   Reassured benign     Assessment and Plan:    Cherry angioma, also known as [employer] spots, are benign vascular skin lesions  A "cherry angioma" is a firm red, blue or purple papule, 0 1-1 cm in diameter  When thrombosed, they can appear black in colour until evaluated with a dermatoscope when the red or purple colour is more easily seen  Cherry angioma may develop on any part of the body but most often appear on the scalp, face, lips and trunk  An angioma is due to proliferating endothelial cells; these are the cells that line the inside of a blood vessel      Angiomas can arise in early life or later in life; the most common type of angioma is a cherry angioma  Cherry angiomas are very common in males and females of any age or race  They are more noticeable in white skin than in skin of colour  They markedly increase in number from about the age of 36  There may be a family history of similar lesions  Eruptive cherry angiomas have been rarely reported to be associated with internal malignancy  The cause of angiomas is unknown  Genetic analysis of cherry angiomas has shown that they frequently carry specific somatic missense mutations in the GNAQ and GNA11 (Q209H) genes, which are involved in other vascular and melanocytic proliferations  Cherry angioma is usually diagnosed clinically and no investigations are necessary for the majority of lesions  It has a characteristic red-clod or lobular pattern on dermatoscopy (called lacunar pattern using conventional pattern analysis)  When there is uncertainty about the diagnosis, a biopsy may be performed  The angioma is composed of venules in a thickened papillary dermis  Collagen bundles may be prominent between the lobules  Cherry angiomas are harmless, so they do not usually have to be treated  Occasionally, they are removed to exclude a malignant skin lesion such as a nodular melanoma or because they are irritated or bleeding (and a subsequent risk for infection)  To decrease friction over the lesions, we recommend Neutrogena Daily Defense SPF 50+ at least 3 times a day  LICHEN PLANUS    Physical Exam:   Anatomic Location Affected:  Nails    Morphological Description:  Cannot visualize    Pertinent Positives:   Pertinent Negatives:     Additional History of Present Condition:  Ferritin level normal one year ago     Assessment and Plan:  Based on a thorough discussion of this condition and the management approach to it (including a comprehensive discussion of the known risks, side effects and potential benefits of treatment), the patient (family) agrees to implement the following specific plan:   Will try to visualize when acrylic are not on-     What is lichen planus? Lichen planus is a chronic inflammatory skin condition affecting the skin and mucosal surfaces  There are several clinical types of lichen planus that share similar features on histopathology   Cutaneous lichen planus   Mucosal lichen planus   Lichen planopilaris   Lichen planus of the nails   Lichen planus pigmentosus   Lichenoid drug eruption    Who gets lichen planus? Lichen planus affects about one in one hundred people worldwide, mostly affecting adults over the age of 36 years  About half those affected have oral lichen planus, which is more common in women than in men  About 47% have lichen planus of the nails  What causes lichen planus? Lichen planus is a T cell-mediated autoimmune disorder, in which inflammatory cells attack an unknown protein within the skin and mucosal keratinocytes  Contributing factors to lichen planus may include:   Genetic predisposition   Physical and emotional stress   Injury to the skin; lichen planus often appears where the skin has been scratched or after surgery -- this is called the isomorphic response (koebnerisation)   Localized skin disease such as herpes zoster--isotopic response   Systemic viral infection, such as hepatitis C (which might modify self-antigens on the surface of basal keratinocytes)   Contact allergy, such as to metal fillings in oral lichen planus (rare)   Drugs; gold, quinine, quinidine and others can cause a lichenoid rash    A lichenoid inflammation is also notable in [id number] disease, a complication of a bone marrow transplant  What are the clinical features of lichen planus? Lichen planus may cause a small number or many lesions on the skin and mucosal surfaces  Cutaneous lichen planus  The usual presentation of the disease is classical lichen planus   Symptoms can range from none (uncommon) to intense itch   Papules and polygonal plaques are shiny, flat-topped and firm on palpation   The plaques are crossed by fine white lines called Olesya striae   Hypertrophic lichen planus can be scaly   Bullous lichen planus is rare   Size ranges from pinpoint to larger than a centimeter   Distribution may be scattered, clustered, linear, annular or actinic (sun-exposed sites such as face, neck and backs of the hands)   Location can be anywhere, but most often front of the wrists, lower back, and ankles   Colour depends on the patient's skin type  New papules and plaques often have a purple or violet hue, except on palms and soles where they are yellowish brown   Plaques resolve after some months to leave greyish-brown post-inflammatory macules that can take a year or longer to fade  Oral lichen planus  The mouth is often the only affected area  Oral lichen planus often involves the inside of the cheeks and the sides of the tongue, but the gums and lips may also be involved  The most common patterns are:   Painless white streaks in a lacy or fern-like pattern   Painful and persistent erosions and ulcers (erosive lichen planus)   Diffuse redness and peeling of the gums (desquamative gingivitis)   Localized inflammation of the gums adjacent to amalgam fillings    Vulval lichen planus  Lichen planus may affect labia majora, labia minora and vaginal introitus  Presentation includes:   Painless white streaks in a lacy or fern-like pattern   Painful and persistent erosions and ulcers (erosive lichen planus )   Scarring, resulting in adhesions, resorption of labia minora and introital stenosis   Painful desquamative vaginitis, preventing intercourse and causing a mucky vaginal discharge  The eroded vagina may bleed easily on contact   Overlap with vulval lichen sclerosus, an inflammatory skin disorder that most commonly affects women over 48years of age      Penile lichen planus   Penile lichen planus usually presents with classical papules in a ring around the glans  White streaks and erosive lichen planus may occur but are less common  Other mucosal sites   Erosive lichen planus uncommonly affects the lacrimal glands, eyelids, external ear canal, oesophagus, larynx, bladder and anus  Lichen planopilaris   Lichen planopilaris presents as tiny red spiny follicular papules on the scalp or less often, elsewhere on the body  Rarely, blistering occurs in the lesions  Destruction of the hair follicles leads to permanently bald patches characterized by sparse lonely hairs   Frontal fibrosing alopecia is a form of lichen planopilaris that affects the anterior scalp, forehead and eyebrows   Pseudopelade of Brocq is probably a variant of lichen planus without inflammation or scaling  Areas of scarring without hair slowly appear, described as like footprints in the snow  Lichen planus pigmentosus   Lichen planus pigmentosus describes ill-defined oval, greyish brown marks on the face and neck or trunk and limbs without an inflammatory phase  It is a form of acquired dermal macular hyperpigmentation  It can be provoked by sun exposure, but it can also arise in sun-protected sites such as the armpits  It has diffuse, reticulate and diffuse patterns  Lichen planus pigmentosus is similar to erythema dyschromicum perstans and may be the same disease   Lichen planus pigmentosus may rarely affect the lips, resulting in a patchy dark pigmentation on upper and lower lips  Lichenoid drug eruption   Lichenoid drug eruption refers to a lichen planus-like rash caused by medications  Asymptomatic or itchy; pink, brown or purple; flat, slightly scaly patches most often arise on the trunk  The oral mucosa (oral lichenoid reaction) and other sites are also sometimes affected  Many drugs can rarely cause lichenoid eruptions   The most common are:  Socorro Alcazar   Hydroxychloroquine   Captopril    What are the complications of lichen planus? Hypertrophic lichen planus may resemble squamous cell carcinoma  However, rarely, longstanding erosive lichen planus can result in true squamous cell carcinoma, most often in the mouth (oral cancer) or on the vulva (vulval cancer) or penis (penile cancer)  This should be suspected if there is an enlarging nodule or an ulcer with thickened edges in these sites  Cancer is more common in smokers, those with a history of cancer in mucosal sites, and in those who carry sexually acquired and oncogenic human papillomavirus  Cancer from other forms of lichen planus is rare  How is lichen planus diagnosed? In most cases, lichen planus is diagnosed by observing its clinical features  A biopsy is often recommended to confirm or make the diagnosis and to look for cancer  The histopathological signs are of a lichenoid tissue reaction affecting the epidermis  Typical features include:   Irregularly thickened epidermis   Degenerative skin cells   Liquefaction degeneration of the basal layer of the epidermis   Band of inflammatory cells just beneath the epidermis   Melanin (pigment) beneath the epidermis    Direct immunofluorescent staining may reveal deposits of immunoglobulins at the base of the epidermis  Patch tests may be recommended for patients with oral lichen planus affecting the gums and who have amalgam fillings, to assess for contact allergy to thiomersal (a mercurial compound)  What is the treatment for lichen planus? Treatment is not always necessary   Local treatments for symptomatic cutaneous or mucosal disease are:   Potent topical steroids   Topical calcineurin inhibitors, tacrolimus ointment and pimecrolimus cream   Topical retinoids   Intralesional steroid injections    Systemic treatment for widespread lichen planus or severe local disease often includes a 1 to 3-month course of oral prednisone, while commencing another agent from the following list:   Acitretin   Hydroxychloroquine   Methotrexate   Azathioprine   Mycophenolate mofetil   Phototherapy    In cases of oral lichen planus affecting the gums with contact allergy to mercury, the lichen planus may resolve on replacing the fillings with composite material  If the lichen planus is not due to mercury allergy, removing amalgam fillings is very unlikely to result in a cure  Anecdotal success is reported from long courses of oral antibiotics and oral antifungal agents  Lichen planopilaris is reported to improve with pioglitazone  What is the outlook for lichen planus? Cutaneous lichen planus tends to clear within a couple of years in most people, but mucosal lichen planus is more likely to persist for a decade or longer  Spontaneous recovery is unpredictable, and lichen planus may recur at a later date  Scarring is permanent, including balding of the scalp        Scribe Attestation    I,:  Viv Pope am acting as a scribe while in the presence of the attending physician :       I,:  Harmony Del Real MD personally performed the services described in this documentation    as scribed in my presence :

## 2022-06-08 NOTE — ASSESSMENT & PLAN NOTE
- Stable  - Continue prescribed inhalers and routine follow up with Pulmonary    - Will continue to monitor

## 2022-06-09 NOTE — TELEPHONE ENCOUNTER
Her blood work is scanned into her chart yesterday at 1 pm to view magnesium results, listed under "External Order Panel " Please advise

## 2022-06-09 NOTE — TELEPHONE ENCOUNTER
Patient is calling, she went and saw her PCP Doctor as well for her not feeling well  Her PCP ordered her some blood work  He magnesium came back a lot higher than it should be  She was told to let her pulmonary Doctor know and see if that could be a cause of some of her symptoms   Please advise

## 2022-06-10 ENCOUNTER — TELEPHONE (OUTPATIENT)
Dept: FAMILY MEDICINE CLINIC | Facility: CLINIC | Age: 52
End: 2022-06-10

## 2022-06-10 NOTE — TELEPHONE ENCOUNTER
----- Message from [id number] sent at 6/9/2022  1:51 PM EDT -----  Thyroid labs are stable  They are actually slightly decreased from her last set of labs  No need to change dosage on levothyroxine

## 2022-06-10 NOTE — TELEPHONE ENCOUNTER
Listed in her "labs" tab, on 6/8 there is a scanned in report  For "MG,TSH,T4F"  Magnesium level was at a 2 6, out of a 1 4-2 2 range

## 2022-06-13 ENCOUNTER — PATIENT MESSAGE (OUTPATIENT)
Dept: DERMATOLOGY | Age: 52
End: 2022-06-13

## 2022-06-13 ENCOUNTER — TELEPHONE (OUTPATIENT)
Dept: DERMATOLOGY | Facility: CLINIC | Age: 52
End: 2022-06-13

## 2022-06-13 NOTE — TELEPHONE ENCOUNTER
Pt has an appt for a biopsy on 7/6 but wanted to know if there is anything she can use on her scalp in the meantime b/c it's Irritated and itchy

## 2022-06-13 NOTE — TELEPHONE ENCOUNTER
No, I'll send her a my chart message an explain that we don't want her to use anything that might affect the biopsy results

## 2022-06-13 NOTE — TELEPHONE ENCOUNTER
Thank you Dr Ernesto Curry   I did call pt to let her know of the my chart message and gave her the info as well

## 2022-06-15 ENCOUNTER — TELEPHONE (OUTPATIENT)
Dept: FAMILY MEDICINE CLINIC | Facility: CLINIC | Age: 52
End: 2022-06-15

## 2022-06-15 DIAGNOSIS — R25.2 LEG CRAMPS: Primary | ICD-10-CM

## 2022-06-15 DIAGNOSIS — B00.9 HERPES: Primary | ICD-10-CM

## 2022-06-15 RX ORDER — ACYCLOVIR 400 MG/1
400 TABLET ORAL 2 TIMES DAILY
Qty: 60 TABLET | Refills: 3 | Status: SHIPPED | OUTPATIENT
Start: 2022-06-15 | End: 2022-06-21 | Stop reason: SINTOL

## 2022-06-15 RX ORDER — GABAPENTIN 100 MG/1
100 CAPSULE ORAL 2 TIMES DAILY
Qty: 60 CAPSULE | Refills: 1 | Status: SHIPPED | OUTPATIENT
Start: 2022-06-15 | End: 2022-08-10

## 2022-06-15 NOTE — TELEPHONE ENCOUNTER
Patient said she was recently seen for her leg and arm cramps  She said her Magnesium  Level was normal and is wondering what else she can do about this  Has a hard time walking  Did have an US of leg to rule out DVT  Has pain even when she tries to stand on her toes      Asking for a call

## 2022-06-21 DIAGNOSIS — B00.9 HERPES: Primary | ICD-10-CM

## 2022-06-21 RX ORDER — VALACYCLOVIR HYDROCHLORIDE 500 MG/1
TABLET, FILM COATED ORAL
Qty: 30 TABLET | Refills: 0 | Status: SHIPPED | OUTPATIENT
Start: 2022-06-21 | End: 2022-08-10

## 2022-06-21 NOTE — TELEPHONE ENCOUNTER
Dr Marian Garcia patient called c/o another herpes outbreak  Previously switched from Valtrex to Acyclovir but Acyclovir made her sick  Per Dr Marian Garcia advice she wants to go back to Valtrex BID x 3 days, then once daily  Patient needs refill today  Escript forwarded  Advised call back for appointment with Dr Onesimo Merchant [facility]  if no improvement

## 2022-06-22 ENCOUNTER — TELEPHONE (OUTPATIENT)
Dept: OBGYN CLINIC | Facility: CLINIC | Age: 52
End: 2022-06-22

## 2022-06-23 ENCOUNTER — OFFICE VISIT (OUTPATIENT)
Dept: OBGYN CLINIC | Facility: CLINIC | Age: 52
End: 2022-06-23
Payer: MEDICARE

## 2022-06-23 VITALS — WEIGHT: 141 LBS | SYSTOLIC BLOOD PRESSURE: 122 MMHG | BODY MASS INDEX: 22.76 KG/M2 | DIASTOLIC BLOOD PRESSURE: 80 MMHG

## 2022-06-23 DIAGNOSIS — B96.89 BV (BACTERIAL VAGINOSIS): ICD-10-CM

## 2022-06-23 DIAGNOSIS — Z85.850 HISTORY OF THYROID CANCER: ICD-10-CM

## 2022-06-23 DIAGNOSIS — Z86.19 HISTORY OF HERPES GENITALIS: Primary | ICD-10-CM

## 2022-06-23 DIAGNOSIS — Z98.51 STATUS POST TUBAL LIGATION: ICD-10-CM

## 2022-06-23 DIAGNOSIS — E89.0 STATUS POST TOTAL THYROIDECTOMY: ICD-10-CM

## 2022-06-23 DIAGNOSIS — N76.0 BV (BACTERIAL VAGINOSIS): ICD-10-CM

## 2022-06-23 PROCEDURE — 99214 OFFICE O/P EST MOD 30 MIN: CPT | Performed by: OBSTETRICS & GYNECOLOGY

## 2022-06-23 RX ORDER — ACYCLOVIR 50 MG/G
OINTMENT TOPICAL
Qty: 15 G | Refills: 0 | Status: SHIPPED | OUTPATIENT
Start: 2022-06-23 | End: 2022-07-01 | Stop reason: SDUPTHER

## 2022-06-23 RX ORDER — CLINDAMYCIN HYDROCHLORIDE 300 MG/1
300 CAPSULE ORAL 2 TIMES DAILY
Qty: 14 CAPSULE | Refills: 0 | Status: SHIPPED | OUTPATIENT
Start: 2022-06-23 | End: 2022-06-30

## 2022-06-23 NOTE — PROGRESS NOTES
Assessment/Plan:    Diagnoses and all orders for this visit:    History of herpes genitalis  -     acyclovir (ZOVIRAX) 5 % ointment; Apply topically every 3 (three) hours  -     HSV TYPE 1,2 DNA PCR; Future    BV (bacterial vaginosis)  -     clindamycin (CLEOCIN) 300 MG capsule; Take 1 capsule (300 mg total) by mouth 2 (two) times a day for 7 days    Status post tubal ligation    Status post total thyroidectomy    History of thyroid cancer         History of bipolar II    Subjective:  Pelvic pain     Patient ID: Macy Kaiser is a 50 y o  female  HPI   53-year-old female with a history of genital herpes has used valacyclovir on and off over the past several years for acute outbreaks  Recently she saw she had an out break however does not seem to be responding in a normal fashion  Has been taking the valacyclovir for that more than a week and still has painful lesion on her buttocks and the perianal area she does have some external hemorrhoids present questionable anal fissures as well  Medications ease to be causing her to have loose stools and diarrhea as well  Recommend screening antibodies for genital herpes  Stop Valacyclovir presently, trial of acyclovir ointment and treat bacterial vaginosis which was identified  Take a break from valacyclovir to see if symptoms improve with acyclovir ointment alone  Review of Systems   Respiratory: Negative  Cardiovascular: Negative  Genitourinary: Positive for genital sores and vaginal discharge  Neurological: Negative  Psychiatric/Behavioral: Negative  All other systems reviewed and are negative  Objective:  No acute distress  /80 (BP Location: Left arm, Patient Position: Sitting, Cuff Size: Adult)   Wt 64 kg (141 lb)   BMI 22 76 kg/m²      Physical Exam  Vitals reviewed  Constitutional:       Appearance: Normal appearance  She is normal weight  HENT:      Head: Normocephalic     Eyes:      Pupils: Pupils are equal, round, and reactive to light  Pulmonary:      Effort: Pulmonary effort is normal    Abdominal:      General: Abdomen is flat  Palpations: Abdomen is soft  Genitourinary:     General: Normal vulva  Comments: Normal external genitalia  Normal size uterus  Normal cervix  No CMT  No pelvic masses  Yellow vaginal discharge, clue cells present on wet mount consistent with bacterial vaginosis  Musculoskeletal:         General: Normal range of motion  Cervical back: Normal range of motion  Skin:     General: Skin is warm and dry  Neurological:      Mental Status: She is alert and oriented to person, place, and time  Psychiatric:         Mood and Affect: Mood normal          Behavior: Behavior normal          Thought Content: Thought content normal          Judgment: Judgment normal         Please note    In addition to the time spent discussing the findings and results of today's visit and exam, I spent approximately 30 minutes of face-to-face time with the patient, greater than 50% of which was spent in counseling and coordination of care for this patient  10 minutes allotted to chart review and past medical history  15 minutes allotted to physical exam and wet mount evaluation  5 minutes allotted to follow-up plans and prescriptions

## 2022-06-28 ENCOUNTER — APPOINTMENT (OUTPATIENT)
Dept: LAB | Age: 52
End: 2022-06-28
Payer: MEDICARE

## 2022-06-28 DIAGNOSIS — Z86.19 HISTORY OF HERPES GENITALIS: ICD-10-CM

## 2022-06-28 PROCEDURE — 87529 HSV DNA AMP PROBE: CPT

## 2022-07-01 ENCOUNTER — TELEPHONE (OUTPATIENT)
Dept: OBGYN CLINIC | Facility: CLINIC | Age: 52
End: 2022-07-01

## 2022-07-01 ENCOUNTER — OFFICE VISIT (OUTPATIENT)
Dept: OBGYN CLINIC | Facility: CLINIC | Age: 52
End: 2022-07-01
Payer: MEDICARE

## 2022-07-01 VITALS
HEIGHT: 66 IN | BODY MASS INDEX: 22.4 KG/M2 | DIASTOLIC BLOOD PRESSURE: 70 MMHG | WEIGHT: 139.4 LBS | SYSTOLIC BLOOD PRESSURE: 120 MMHG

## 2022-07-01 DIAGNOSIS — Z86.19 HISTORY OF HERPES GENITALIS: ICD-10-CM

## 2022-07-01 DIAGNOSIS — R10.2 VULVAR PAIN: Primary | ICD-10-CM

## 2022-07-01 PROCEDURE — 99213 OFFICE O/P EST LOW 20 MIN: CPT | Performed by: OBSTETRICS & GYNECOLOGY

## 2022-07-01 RX ORDER — ACYCLOVIR 50 MG/G
OINTMENT TOPICAL 3 TIMES DAILY
Qty: 15 G | Refills: 1 | Status: SHIPPED | OUTPATIENT
Start: 2022-07-01 | End: 2022-08-10

## 2022-07-01 RX ORDER — LIDOCAINE 50 MG/G
OINTMENT TOPICAL 3 TIMES DAILY PRN
Qty: 35.44 G | Refills: 0 | Status: SHIPPED | OUTPATIENT
Start: 2022-07-01 | End: 2022-08-10

## 2022-07-01 NOTE — PROGRESS NOTES
Assessment/Plan:       Vulvar pain- her exam is normal today  She states that the area looks much better but it seems to recur and then resolve  She cannot tell what makes it better  I did give her a prescription for lidocaine ointment as this will at least help with the pain in the area when it becomes worse  She feels the acyclovir ointment helps also although sometimes causes burning  There are no obvious herpetic lesions today  She is going to continue using Valtrex 500 mg daily for suppression  We are awaiting HSV antibodies  This will be helpful in telling if she had a recent outbreak  There are no diagnoses linked to this encounter  Subjective:     Patient ID: Dona Argueta is a 50 y o  female  Patient presents for follow-up  She has been seen with vulvar pain and what seem to be an HSV outbreak  It was not responding to Valtrex  She even briefly tried acyclovir but did not tolerate this  In May, she had and sudden onset of a large purple area on the vulva, this resolved and 1 day and then returned and she started Valtrex  It did not seem to resolve the area permanently  She is having pain and burning, no itching  She was seen here last week and treated for BV  This did improve her symptoms somewhat  This area on her right side seems to be intermittent  Incidentally, her thyroid was removed in January and is still not quite controlled although is better than it initially was  She is quite frustrated by this pain as she states it is extremely uncomfortable  Review of Systems   Constitutional: Negative  Genitourinary:        Vulvar pain         Objective:     Physical Exam  Genitourinary:     General: Normal vulva  Comments:   No obvious lesions  She does indicate an area along her right side with a very faint discoloration    There are no ulcers or any sign of a herpetic lesion

## 2022-07-04 LAB
HSV1 DNA SPEC QL NAA+PROBE: NEGATIVE
HSV2 DNA SPEC QL NAA+PROBE: NEGATIVE

## 2022-07-05 NOTE — PROGRESS NOTES
Coronavirus/COVID19 Daily Note     Today's date: 2019  Patient name: Luna Barthel  : 1970  MRN: [id number]  Referring provider: DON Amaro  Dx:   Encounter Diagnosis     ICD-10-CM    1  Right lumbar radiculopathy M54 [id number]          Subjective: Patient reports she has overall been feeling much better, denies pain prior to session today  Objective: See treatment diary below, flow chart scanned into [employer] at D/C  Precautions: asthma, migraines, IC, anxiety, depression    Assessment: Pt with good tolerance to progression of program including combination of TA brace + iso hip add + bridge, added resistance with clamshells and addition of reverse clamshells reporting mild fatigue without onset of back pain  She continues with moderate deficits in left hip ER PROM and iliopsoas flexibility, IR ROM is now WNL  Pt will benefit from continued skilled PT intervention in order to address her remaining limitations and to restore maximal function  Plan: Continue per plan of care

## 2022-07-06 ENCOUNTER — TELEPHONE (OUTPATIENT)
Dept: FAMILY MEDICINE CLINIC | Facility: CLINIC | Age: 52
End: 2022-07-06

## 2022-07-06 ENCOUNTER — PROCEDURE VISIT (OUTPATIENT)
Dept: DERMATOLOGY | Age: 52
End: 2022-07-06
Payer: MEDICARE

## 2022-07-06 VITALS — TEMPERATURE: 100 F | BODY MASS INDEX: 22.73 KG/M2 | WEIGHT: 141.4 LBS | HEIGHT: 66 IN

## 2022-07-06 DIAGNOSIS — L25.9 CONTACT DERMATITIS, UNSPECIFIED CONTACT DERMATITIS TYPE, UNSPECIFIED TRIGGER: ICD-10-CM

## 2022-07-06 DIAGNOSIS — L71.9 ROSACEA: ICD-10-CM

## 2022-07-06 DIAGNOSIS — D48.9 NEOPLASM OF UNCERTAIN BEHAVIOR: Primary | ICD-10-CM

## 2022-07-06 DIAGNOSIS — L82.0 SEBORRHEIC KERATOSIS, INFLAMED: ICD-10-CM

## 2022-07-06 PROCEDURE — 88312 SPECIAL STAINS GROUP 1: CPT | Performed by: STUDENT IN AN ORGANIZED HEALTH CARE EDUCATION/TRAINING PROGRAM

## 2022-07-06 PROCEDURE — 99213 OFFICE O/P EST LOW 20 MIN: CPT | Performed by: DERMATOLOGY

## 2022-07-06 PROCEDURE — 11104 PUNCH BX SKIN SINGLE LESION: CPT | Performed by: DERMATOLOGY

## 2022-07-06 PROCEDURE — 17110 DESTRUCTION B9 LES UP TO 14: CPT | Performed by: DERMATOLOGY

## 2022-07-06 PROCEDURE — 88305 TISSUE EXAM BY PATHOLOGIST: CPT | Performed by: STUDENT IN AN ORGANIZED HEALTH CARE EDUCATION/TRAINING PROGRAM

## 2022-07-06 RX ORDER — TRIAMCINOLONE ACETONIDE 1 MG/G
CREAM TOPICAL
Qty: 30 G | Refills: 1 | Status: SHIPPED | OUTPATIENT
Start: 2022-07-06 | End: 2022-09-07 | Stop reason: ALTCHOICE

## 2022-07-06 NOTE — PATIENT INSTRUCTIONS
POST-PROCEDURAL CARE (WHAT YOU WILL NEED TO DO "AFTER THE BIOPSY" TO OPTIMIZE HEALING)    Keep the area clean and dry  Try NOT to remove the bandage or get it wet for the first 24 hours  Gently clean the area and apply surgical ointment (such as Vaseline petrolatum ointment, which is available "over the counter" and not a prescription) to the biopsy site for up to 2 weeks straight  This acts to protect the wound from the outside world  Sutures are not usually placed in this procedure  If any sutures were placed, return for suture removal as instructed (generally 1 week for the face, 2 weeks for the body)  Take Acetaminophen (Tylenol) for discomfort, if no contraindications  Ibuprofen or aspirin could make bleeding worse  Call our office immediately for signs of infection: fever, chills, increased redness, warmth, tenderness, discomfort/pain, or pus or foul smell coming from the wound  WHAT TO DO IF THERE IS ANY BLEEDING? If a small amount of bleeding is noticed, place a clean cloth over the area and apply firm pressure for ten minutes  Check the wound after 10 minutes of direct pressure  If bleeding persists, try one more time for an additional 10 minutes of direct pressure on the area  If the bleeding becomes heavier or does not stop after the second attempt, or if you have any other questions about this procedure, then please call your [facility] Dermatologist by calling [phone] (SKIN)  I hereby acknowledge that I have reviewed and verified the site with my Dermatologist and have requested and authorized my Dermatologist to proceed with the procedure    CONTACT DERMATITIS    Assessment and Plan:  Diagnosis:  Contact dermatitis  Based on a thorough discussion of this condition and the management approach to it (including a comprehensive discussion of the known risks, side effects and potential benefits of treatment), the patient (family) agrees to implement the following specific plan:   * Start Triamcinolone cream 0 1% apply topically on perianal area twice a day

## 2022-07-06 NOTE — TELEPHONE ENCOUNTER
Call from [facility] to let us  Know patient cancelled her colonoscopy scheduled for Friday

## 2022-07-06 NOTE — PROGRESS NOTES
PROCEDURE NOTE:  PUNCH BIOPSY    Performing Physician: Dr Perales    Anatomic Location; Clinical Description with size (cm); Pre-Op Diagnosis:   ?  Specimen A: scalp; skin; tangential biopsy; 54year old female with erythematous scaly patch; dd: lichen plano pilaris          Anesthesia: 1% xylocaine with epi       Topical anesthesia: None       Indications: To indicate diagnosis and management plan  Procedure Details     Patient informed of the risks (including bleeding,scaring and infection) and benefits of the procedure explained  Verbal and written informed consent obtained  The area was prepped and draped in the usual fashion  Anesthesia was obtained with 1% lidocaine with epinephrine  The skin was then stretched perpendicular to the skin tension lines and a punch biopsy to an appropriate sampling depth was obtained with a 4 mm punch with a forceps and iris scissors  Hemostasis was obtained with 4-0 Vicryl x 2 sutures  Complications:  None      Specimen has been sent for review by Dermatopathology  Plan:  1  Instructed to keep the wound dry and covered for 24-48h and clean thereafter  2  Warning signs of infection were reviewed  3  Recommended that the patient use acetaminophen as needed for pain  4  Sutures if any should be removed in 10-14 days      Standard post-procedure care has been explained and has been included in written form within the patient's copy of Informed Consent  CONTACT DERMATITIS    Physical Exam:   Anatomic Location Affected:  perianal    Morphological Description:  Erythematous patch   Pertinent Positives:   Pertinent Negatives: Additional History of Present Condition:  Patient states have a perianal lesion for 2 months accompanied by pain and burning , was given topical acyclovir because can't take oral   HSV 1 and 2 antibodies were negative  Does use baby wipes for hygiene         Assessment and Plan:  Diagnosis:  Contact dermatitis  Based on a thorough discussion of this condition and the management approach to it (including a comprehensive discussion of the known risks, side effects and potential benefits of treatment), the patient (family) agrees to implement the following specific plan:   * Start Triamcinolone cream 0 1% apply topically on perianal area twice a day   Stop using wipes   Avoid anything with fragrance       What is contact dermatitis? Contact dermatitis is a type of eczema that results from something coming in contact with the skin  There are 2 types:  irritant and allergic  The majority of cases are from irritation  Usually that is from contact with strong soaps, repeated exposure to water, contact with cleaning agents or food, or friction  The minority are an actual allergy  In these cases something is coming in contact with the skin and causing an allergic reaction, similar to what happens with poison ivy  This usually occurs unexpectedly after using something for many years  Even very tiny amounts of the substance on the skin can cause a reaction  Some common causes are fragrances, preservatives and metals  Sometimes this can occur when an allergic substance is eaten, as well, but most often it is from direct contact with the skin  To determine the cause of allergy a patch test is often done  Some general rules to follow for both types of contact dermatitis are:   Wear gloves when using strong cleansers or before prolonged contact with water (like washing dishes)   Use gentle cleansers and avoid strong soaps   Apply moisturizer to entire body after bathing    Avoid products with fragrance    Most often contact dermatitis is treated with topical medicines like topical steroids, eucrisa, pimecrolimus or tacrolimus     Some times oral steroids, ie prednisone, methylprednisone and prednisolone, are needed  In chronic cases, treatment options include:  light therapy, methotrexate, cyclosporin        ROSACEA    Physical Exam:   Anatomic Location Affected: Face    Morphological Description:  Erythema nose and cheeks   Pertinent Positives:   Pertinent Negatives: Additional History of Present Condition:  Patient states twice a day formulation doesn't work as well  Wants to go back to the once a day one  Assessment and Plan:  Based on a thorough discussion of this condition and the management approach to it (including a comprehensive discussion of the known risks, side effects and potential benefits of treatment), the patient (family) agrees to implement the following specific plan:   Start Metronidazole 1% cream on affected areas of face once a day     Rosacea is a chronic rash affecting the mid-face including the nose, cheeks, chin, forehead, and eyelids  The incidence is usually greatest between the ages of 30-60 years and is more common in people with fair skin  Common characteristics include redness, telangiectasias, papules and pustules over affected areas  Rosacea may look similar to acne, but there is a lack of comedones  Occasionally the eyes may also be involved in ocular rosacea  In advanced disease, enlargement of the sebaceous glands in the nose, termed rhinophyma, may be present  Rosacea results in red spots (papules) and sometimes pustules over the face, but unlike acne there are no blackheads, whiteheads, or cystic nodules  Patients often experience increased facial flushing with prominent blood vessels (erythematotelangiectatic rosacea) and dry, sensitive skin  These symptoms are exacerbated by sun exposure, hot or spicy foods, topical steroids and oil-based facial products  In ocular rosacea, eyelids may be red and sore due to conjunctivitis, keratitis, and episcleritis  If rhinophyma develops due to enlargement of sebaceous glands, the patient may have an enlarged and irregularly shaped nose with prominent pores   In rosacea that is refractory to treatment, patients can develop persistent redness and swelling of the face due to lymphatic obstruction (Morbihan disease)  Distribution around the cheeks may be confused with the malar or butterfly rash of lupus  However, the rash of lupus spares the nasal creases and lacks papules and pustules  If signs of photosensitivity, oral ulcers, arthritis, and kidney dysfunction are present then consider referral to a rheumatologist      There are many potential causes of rosacea including genetic, environmental, vascular, and inflammatory factors   These include, but are not limited to:   Chronic exposure to ultraviolet radiation    Increased immune responses in the form of cathelicidins that promote vessel dilation and infiltration with white blood cells (neutrophils) into the dermis   Increased matrix metalloproteinases such as collagen and elastase that remodel normal tissue may contribute to inflammation of the skin making it thicker and harder   There is some evidence to suggest that increased numbers of demodex mites on patient skin may contribute to rosacea papules     General Treatment Approach    Avoid exacerbating factors such as heat, spicy foods, and alcohol    Use daily SPF30+ sunscreen and other methods of coverage for sun protection   Use water-based make-up    Avoid applying topical steroids to affected areas as they can cause perioral dermatitis and exacerbate rosacea     Topical Treatment Approach   Metronidazole cream or gel by itself or in combination with oral antibiotics for more severe cases   Azelaic acid cream or lotion is effective for mild inflammatory rosacea when applied twice daily to affected areas   Brimonidine gel and oxymetazoline hydrochloride cream can reduce facial redness temporarily    Ivermectin cream can treat papulopustular rosacea by controlling demodex mites and inflammation    Pimecrolimus cream or tacrolimus ointment twice a day for 2-3 months can help reduce inflammation    Oral Treatment Approach   Antibiotics such as doxycycline, minocycline, or erythromycin for 1-3 months   Clonidine and carvedilol can help reduce facial flushing and are generally well tolerated  Common side effects include low blood pressure, gastrointestinal upset, dry eyes, blurred vision and low heart rate   Isotretinoin at low doses can be effective for long term treatment when antibiotics fail  Side effects may make it unsuitable for some patients   NSAIDs such as diclofenac can help reduce discomfort and redness in the skin  Procedural/Surgical Treatment Approach    Vascular lasers or intense pulsed light treatment may be used to treat persistent telangiectasia and papulopustular rosacea   Plastic surgery and carbon dioxide lasers may be used to treat rhinophyma         SEBORRHEIC KERATOSIS; NON-INFLAMED    Physical Exam:   Anatomic Location Affected:  Upper right forehead    Morphological Description:  Flat and raised, waxy, smooth to warty textured, yellow to brownish-grey to dark brown to blackish, discrete, "stuck-on" appearing papules   Pertinent Positives:   Pertinent Negatives: Additional History of Present Condition:  Patient reports new bumps on the skin  Denies itch, burn, pain, bleeding or ulceration  Present constantly; nothing seems to make it worse or better  No prior treatment  Assessment and Plan:  Based on a thorough discussion of this condition and the management approach to it (including a comprehensive discussion of the known risks, side effects and potential benefits of treatment), the patient (family) agrees to implement the following specific plan:  · Recommended cryotherapy in office with signed consent    Seborrheic Keratosis  A seborrheic keratosis is a harmless warty spot that appears during adult life as a common sign of skin aging  Seborrheic keratoses can arise on any area of skin, covered or uncovered, with the usual exception of the palms and soles   They do not arise from mucous membranes  Seborrheic keratoses can have highly variable appearance  Seborrheic keratoses are extremely common  It has been estimated that over 90% of adults over the age of 61 years have one or more of them  They occur in males and females of all races, typically beginning to erupt in the 35s or 45s  They are uncommon under the age of 21 years  The precise cause of seborrhoeic keratoses is not known  Seborrhoeic keratoses are considered degenerative in nature  As time goes by, seborrheic keratoses tend to become more numerous  Some people inherit a tendency to develop a very large number of them; some people may have hundreds of them  The name "seborrheic keratosis" is misleading, because these lesions are not limited to a seborrhoeic distribution (scalp, mid-face, chest, upper back), nor are they formed from sebaceous glands, nor are they associated with sebum -- which is greasy  Seborrheic keratosis may also be called "SK," "Seb K," "basal cell papilloma," "senile wart," or "barnacle "      Researchers have noted:   Eruptive seborrhoeic keratoses can follow sunburn or dermatitis   Skin friction may be the reason they appear in body folds   Viral cause (e g , human papillomavirus) seems unlikely   Stable and clonal mutations or activation of FRFR3, PIK3CA, KWAME, AKT1 and EGFR genes are found in seborrhoeic keratoses   Seborrhoeic keratosis can arise from solar lentigo   FRFR3 mutations also arise in solar lentigines  These mutations are associated with increased age and location on the head and neck, suggesting a role of ultraviolet radiation in these lesions   Seborrheic keratoses do not harbour tumour suppressor gene mutations   Epidermal growth factor receptor inhibitors, which are used to treat some cancers, often result in an increase in verrucal (warty) keratoses  There is no easy way to remove multiple lesions on a single occasion    Unless a specific lesion is "inflamed" and is causing pain or stinging/burning or is bleeding, most insurance companies do not authorize treatment  PROCEDURE:  DESTRUCTION OF BENIGN LESIONS  After a thorough discussion of treatment options and risk/benefits/alternatives (including but not limited to local pain, scarring, dyspigmentation, blistering, and possible superinfection), verbal and written consent were obtained and the aforementioned lesions were treated on with cryotherapy using liquid nitrogen x 1 cycle for 5-10 seconds   TOTAL NUMBER of 1 BENIGN lesions were treated today on the ANATOMIC LOCATION: right upper forehead   The patient tolerated the procedure well, and after-care instructions were provided          Scribe Attestation    I,:  Chelle Lal am acting as a scribe while in the presence of the attending physician :       I,:  Conor Tuhrman MD personally performed the services described in this documentation    as scribed in my presence :

## 2022-07-07 ENCOUNTER — TELEPHONE (OUTPATIENT)
Dept: DERMATOLOGY | Age: 52
End: 2022-07-07

## 2022-07-15 ENCOUNTER — TELEPHONE (OUTPATIENT)
Dept: DERMATOLOGY | Age: 52
End: 2022-07-15

## 2022-07-15 NOTE — TELEPHONE ENCOUNTER
Left 2nd voice mail      Patient advised to call to Her insurance to verify her pharmacy benefits for  Noritate 1 % cream

## 2022-07-18 ENCOUNTER — OFFICE VISIT (OUTPATIENT)
Dept: DERMATOLOGY | Age: 52
End: 2022-07-18

## 2022-07-18 ENCOUNTER — LAB (OUTPATIENT)
Dept: LAB | Facility: CLINIC | Age: 52
End: 2022-07-18
Payer: MEDICARE

## 2022-07-18 ENCOUNTER — OFFICE VISIT (OUTPATIENT)
Dept: DERMATOLOGY | Age: 52
End: 2022-07-18
Payer: MEDICARE

## 2022-07-18 VITALS — HEIGHT: 66 IN | BODY MASS INDEX: 22.82 KG/M2

## 2022-07-18 VITALS — HEIGHT: 66 IN | BODY MASS INDEX: 22.66 KG/M2 | WEIGHT: 141 LBS

## 2022-07-18 DIAGNOSIS — L29.9 PRURITUS: Primary | ICD-10-CM

## 2022-07-18 DIAGNOSIS — J45.51 SEVERE PERSISTENT ASTHMA WITH ACUTE EXACERBATION: ICD-10-CM

## 2022-07-18 DIAGNOSIS — L29.9 PRURITUS: ICD-10-CM

## 2022-07-18 DIAGNOSIS — R25.2 LEG CRAMPS: ICD-10-CM

## 2022-07-18 DIAGNOSIS — G43.909 MIGRAINE WITHOUT STATUS MIGRAINOSUS, NOT INTRACTABLE, UNSPECIFIED MIGRAINE TYPE: ICD-10-CM

## 2022-07-18 DIAGNOSIS — R53.83 OTHER FATIGUE: ICD-10-CM

## 2022-07-18 DIAGNOSIS — L21.9 SEBORRHEIC DERMATITIS: ICD-10-CM

## 2022-07-18 DIAGNOSIS — R06.02 SHORTNESS OF BREATH: ICD-10-CM

## 2022-07-18 DIAGNOSIS — D48.9 NEOPLASM OF UNCERTAIN BEHAVIOR: Primary | ICD-10-CM

## 2022-07-18 LAB
BASOPHILS # BLD AUTO: 0.03 THOUSANDS/ΜL (ref 0–0.1)
BASOPHILS NFR BLD AUTO: 1 % (ref 0–1)
EOSINOPHIL # BLD AUTO: 0.1 THOUSAND/ΜL (ref 0–0.61)
EOSINOPHIL NFR BLD AUTO: 2 % (ref 0–6)
ERYTHROCYTE [DISTWIDTH] IN BLOOD BY AUTOMATED COUNT: 13.6 % (ref 11.6–15.1)
HCT VFR BLD AUTO: 42.7 % (ref 34.8–46.1)
HGB BLD-MCNC: 14 G/DL (ref 11.5–15.4)
IMM GRANULOCYTES # BLD AUTO: 0.02 THOUSAND/UL (ref 0–0.2)
IMM GRANULOCYTES NFR BLD AUTO: 0 % (ref 0–2)
LYMPHOCYTES # BLD AUTO: 1.73 THOUSANDS/ΜL (ref 0.6–4.47)
LYMPHOCYTES NFR BLD AUTO: 29 % (ref 14–44)
MAGNESIUM SERPL-MCNC: 2.4 MG/DL (ref 1.6–2.6)
MCH RBC QN AUTO: 30.3 PG (ref 26.8–34.3)
MCHC RBC AUTO-ENTMCNC: 32.8 G/DL (ref 31.4–37.4)
MCV RBC AUTO: 92 FL (ref 82–98)
MONOCYTES # BLD AUTO: 0.42 THOUSAND/ΜL (ref 0.17–1.22)
MONOCYTES NFR BLD AUTO: 7 % (ref 4–12)
NEUTROPHILS # BLD AUTO: 3.71 THOUSANDS/ΜL (ref 1.85–7.62)
NEUTS SEG NFR BLD AUTO: 61 % (ref 43–75)
NRBC BLD AUTO-RTO: 0 /100 WBCS
PLATELET # BLD AUTO: 259 THOUSANDS/UL (ref 149–390)
PMV BLD AUTO: 10.5 FL (ref 8.9–12.7)
RBC # BLD AUTO: 4.62 MILLION/UL (ref 3.81–5.12)
TSH SERPL DL<=0.05 MIU/L-ACNC: 1.29 UIU/ML (ref 0.45–4.5)
WBC # BLD AUTO: 6.01 THOUSAND/UL (ref 4.31–10.16)

## 2022-07-18 PROCEDURE — 84443 ASSAY THYROID STIM HORMONE: CPT

## 2022-07-18 PROCEDURE — 83735 ASSAY OF MAGNESIUM: CPT

## 2022-07-18 PROCEDURE — 36415 COLL VENOUS BLD VENIPUNCTURE: CPT

## 2022-07-18 PROCEDURE — 86162 COMPLEMENT TOTAL (CH50): CPT

## 2022-07-18 PROCEDURE — 82785 ASSAY OF IGE: CPT

## 2022-07-18 PROCEDURE — 86038 ANTINUCLEAR ANTIBODIES: CPT

## 2022-07-18 PROCEDURE — RECHECK: Performed by: DERMATOLOGY

## 2022-07-18 PROCEDURE — 85025 COMPLETE CBC W/AUTO DIFF WBC: CPT

## 2022-07-18 PROCEDURE — 86003 ALLG SPEC IGE CRUDE XTRC EA: CPT

## 2022-07-18 PROCEDURE — 99213 OFFICE O/P EST LOW 20 MIN: CPT | Performed by: DERMATOLOGY

## 2022-07-18 RX ORDER — KETOCONAZOLE 20 MG/ML
SHAMPOO TOPICAL
Qty: 120 ML | Refills: 3 | Status: SHIPPED | OUTPATIENT
Start: 2022-07-18 | End: 2022-08-03

## 2022-07-18 RX ORDER — PREDNISONE 20 MG/1
TABLET ORAL
Qty: 25 TABLET | Refills: 0 | Status: SHIPPED | OUTPATIENT
Start: 2022-07-18 | End: 2022-08-08

## 2022-07-18 RX ORDER — CETIRIZINE HYDROCHLORIDE 10 MG/1
10 TABLET, CHEWABLE ORAL DAILY
Qty: 30 TABLET | Refills: 2 | Status: SHIPPED | OUTPATIENT
Start: 2022-07-18 | End: 2022-08-17

## 2022-07-18 NOTE — PROGRESS NOTES
Suture removal    Date/Time: 7/18/2022 10:25 AM  Performed by: El Quezada  Authorized by: Tutu Dominguez MD   Universal Protocol:  Consent: Verbal consent obtained  Consent given by: patient  Timeout called at: 7/18/2022 10:25 AM   Patient understanding: patient states understanding of the procedure being performed  Patient consent: the patient's understanding of the procedure matches consent given  Procedure consent: procedure consent matches procedure scheduled  Relevant documents: relevant documents present and verified  Patient identity confirmed: verbally with patient        Patient location:  Clinic  Location:     Location:  [facility] location:  Scalp  Procedure details: Tools used:  Suture removal kit    Wound appearance:  No sign(s) of infection, good wound healing and clean    Number of sutures removed:  1  Post-procedure details:     Patient tolerance of procedure: Tolerated well, no immediate complications  Comments:      Area a little crusted   Advised to apply Vaseline daily to the area for at least 1 week       Scribe Attestation    I,:  El Quezada am acting as a scribe while in the presence of the attending physician :       I,:  Tutu Dominguez MD personally performed the services described in this documentation    as scribed in my presence :

## 2022-07-18 NOTE — PATIENT INSTRUCTIONS
SEBORRHEIC DERMATITIS    Assessment and Plan:  Based on a thorough discussion of this condition and the management approach to it (including a comprehensive discussion of the known risks, side effects and potential benefits of treatment), the patient (family) agrees to implement the following specific plan:  Start Ketoconazole shampoo 2% Daily for 2 weeks straight and then on "Mondays, Wednesdays and Fridays":  Lather into scalp leave on for 5 minutes and then rinse off completely         Seborrheic Dermatitis   Seborrheic dermatitis is a common, chronic or relapsing form of eczema/dermatitis that mainly affects the sebaceous, gland-rich regions of the scalp, face, and trunk  There are infantile and adult forms of seborrhoeic dermatitis  It is sometimes associated with psoriasis and, in that clinical scenario, may be referred to as "sebo-psoriasis "  Seborrheic dermatitis is also known as "seborrheic eczema "  Dandruff (also called "pityriasis capitis") is an uninflamed form of seborrhoeic dermatitis  Dandruff presents as bran-like scaly patches scattered within hair-bearing areas of the scalp  In an infant, this condition may be referred to as "cradle cap "  The cause of seborrheic dermatitis is not completely understood  It is associated with proliferation of various species of the skin commensal Malassezia, in its yeast (non-pathogenic) form  Its metabolites (such as the fatty acids oleic acid, malssezin, and indole-3-carbaldehyde) may cause an inflammatory reaction  Differences in skin barrier lipid content and function may account for individual presentations      PRURITIS/ URTICARIA     Assessment and Plan:  Based on a thorough discussion of this condition and the management approach to it (including a comprehensive discussion of the known risks, side effects and potential benefits of treatment), the patient (family) agrees to implement the following specific plan:  Start Zyrtec (cetirizine) 10 mg by mouth once a day for itching   Start Prednisone 40mg for 7 days, 20 mg for 7 days and 10 mg for 7 days by mouth for 21 days  Labs ordered for complement total and MARCOS  Advised to obtain labs first prior to starting the prednisone

## 2022-07-18 NOTE — PROGRESS NOTES
[facility] Follow Up Note    Patient Name: Marii Long  Encounter Date: 07/18/2022    Today's Chief Concerns:  Rohanevelyn Alexandere Concern #1:  Rash follow up   Current Medications:    Current Outpatient Medications:     albuterol (2 5 mg/3 mL) 0 083 % nebulizer solution, Take 1 vial (2 5 mg total) by nebulization 3 (three) times a day, Disp: 30 vial, Rfl: 2    albuterol (PROAIR HFA) 90 mcg/act inhaler, Inhale 2 puffs 4 (four) times a day every 4 to 6 hours as needed, Disp: 1 Inhaler, Rfl: 5    dicyclomine (BENTYL) 10 mg capsule, TAKE 1 CAPSULE (10 MG TOTAL) BY MOUTH 3 (THREE) TIMES A DAY AS NEEDED (ABDOMINAL PAIN), Disp: 270 capsule, Rfl: 1    fluticasone (FLONASE) 50 mcg/act nasal spray, as needed  , Disp: , Rfl:     fluticasone-umeclidinium-vilanterol (Trelegy Ellipta) 100-62 5-25 MCG/INH inhaler, Inhale 1 puff daily Rinse mouth after use , Disp: , Rfl:     fluticasone-umeclidinium-vilanterol (Trelegy Ellipta) 200-62 5-25 MCG/INH AEPB inhaler, Inhale 1 puff daily Rinse mouth after use , Disp: 60 blister, Rfl: 5    fluticasone-vilanterol (BREO ELLIPTA) 200-25 MCG/INH inhaler, Inhale 1 puff daily Rinse mouth after use , Disp: 1 each, Rfl: 5    HYDROcodone-acetaminophen (NORCO) 5-325 mg per tablet, , Disp: , Rfl:     levalbuterol (XOPENEX) 1 25 mg/3 mL nebulizer solution, 1 25 MG EVERY 4 HRS FOR 30 DAYS, Disp: , Rfl:     Levoxyl 125 MCG tablet, Take 125 mcg by mouth daily, Disp: , Rfl:     LORazepam (ATIVAN) 1 mg tablet, TAKE 1 & 1/2 TABLETS DAILY AT BEDTIME AS NEEDED, Disp: , Rfl: 1    medroxyPROGESTERone (DEPO-PROVERA) 150 mg/mL injection, Please give in office every 10 weeks, Disp: 1 mL, Rfl: 0    Meth-Hyo-M Bl-Na Phos-Ph Sal (Uribel) 118 MG CAPS, Take 1 each by mouth as needed, Disp: , Rfl:     Multiple Vitamin (MULTI VITAMIN DAILY PO), Take by mouth  , Disp: , Rfl:     naproxen (NAPROSYN) 500 mg tablet, TAKE 1 TABLET BY MOUTH 3 TIMES A DAY AS NEEDED MIGRAINE  LIMIT USE TO 3 DAYS PER WEEK, Disp: , Rfl:     naratriptan (AMERGE) 2 5 MG tablet, 1 TABLET BY MOUTH TWICE A DAY AS NEEDED FOR MIGRAINE, Disp: , Rfl:     omeprazole (PriLOSEC) 40 MG capsule, Take 1 capsule (40 mg total) by mouth daily, Disp: 30 capsule, Rfl: 2    ondansetron (ZOFRAN) 4 mg tablet, Take 1 tablet (4 mg total) by mouth every 8 (eight) hours as needed for nausea, Disp: 20 tablet, Rfl: 5    pentosan polysulfate (ELMIRON) 100 mg capsule, Take 100 mg by mouth 3 (three) times a day before meals  , Disp: , Rfl:     traMADol (ULTRAM) 50 mg tablet, , Disp: , Rfl:     triamcinolone (KENALOG) 0 1 % cream, Apply topically on affected area twice a day for 2 weeks for contact dermatitis, Disp: 30 g, Rfl: 1    acyclovir (ZOVIRAX) 5 % ointment, Apply topically 3 (three) times a day, Disp: 15 g, Rfl: 1    calcitriol (ROCALTROL) 0 5 MCG capsule, , Disp: , Rfl:     cloNIDine (CATAPRES) 0 1 mg tablet, Take 0 1 mg by mouth daily at bedtime, Disp: , Rfl:     colestipol (COLESTID) 1 g tablet, Take 1 tablet (1 g total) by mouth 2 (two) times a day, Disp: 180 tablet, Rfl: 3    fluconazole (DIFLUCAN) 150 mg tablet, TAKE 1 TABLET BY MOUTH AS ONE DOSE (Patient not taking: Reported on 7/18/2022), Disp: , Rfl:     gabapentin (Neurontin) 100 mg capsule, Take 1 capsule (100 mg total) by mouth 2 (two) times a day, Disp: 60 capsule, Rfl: 1    levothyroxine 100 mcg tablet,  , Disp: , Rfl:     Levoxyl 112 MCG tablet, Take 112 mcg by mouth daily (Patient not taking: No sig reported), Disp: , Rfl:     lidocaine (XYLOCAINE) 5 % ointment, Apply topically 3 (three) times a day as needed for mild pain, Disp: 35 44 g, Rfl: 0    Noritate 1 % cream, APPLY TO AFFECTED AREA TOPICALLY EVERY DAY (Patient not taking: Reported on 7/18/2022), Disp: 60 g, Rfl: 1    predniSONE 10 mg tablet, Take 4 tabs daily for 3 days,then 3 tabs daily for 3 days, then 2 tabs daily for 3 days, then 1 tab daily for 3 days, then 1/2 tab daily for 4 days and then stop , Disp: 32 tablet, Rfl: 0    promethazine-codeine (PHENERGAN WITH CODEINE) 6 25-10 mg/5 mL syrup, Take 5 mL by mouth every 4 (four) hours as needed for cough, Disp: 118 mL, Rfl: 0    sodium chloride (OCEAN) 0 65 % nasal spray, 1 spray into each nostril as needed for congestion, Disp: 480 mL, Rfl: 0    valACYclovir (VALTREX) 500 mg tablet, Take 1 tablet po two times daily x 3 days  Then once daily  , Disp: 30 tablet, Rfl: 0    Current Facility-Administered Medications:     medroxyPROGESTERone (DEPO-PROVERA) IM injection 150 mg, 150 mg, Intramuscular, Once, Yasmeen Larson,     CONSTITUTIONAL:   Vitals:    07/18/22 1045   Height: 5' 6" (1 676 m)             Specific Alerts:    Have you been seen by a [facility] D[facility]atologist in the last 3 years? YES    Are you pregnant or planning to become pregnant? No    Are you currently or planning to be nursing or breast feeding? No    Allergies   Allergen Reactions    Latex Itching and Hives     Irritation    Vancomycin Itching and Swelling     Patient had facial swelling, especially around her eyes and severe itching   Azithromycin Diarrhea, Nausea Only and Other (See Comments)    Clarithromycin Other (See Comments)    Clavulanic Acid Other (See Comments)    Doxycycline Other (See Comments)    Erythromycin Base Other (See Comments)    Lamotrigine Other (See Comments)     Can't recall side effects   Lithium Itching    Mirtazapine Other (See Comments)     (remeron)  (remeron)  (remeron)    Moxifloxacin Nausea Only     Excessive vomiting  Excessive vomiting    Sulfamethoxazole      Other reaction(s): Rash    Sulfamethoxazole-Trimethoprim Other (See Comments)    Trimethoprim      Other reaction(s): Rash    Indomethacin Rash    Penicillins Rash       May we call your Preferred Phone number to discuss your specific medical information? YES    May we leave a detailed message that includes your specific medical information?  YES    Have you traveled outside of the [place] in the past 3 months? No    Do you currently have a pacemaker or defibrillator? No    Do you have any artificial heart valves, joints, plates, screws, rods, stents, pins, etc? No   - If Yes, were any placed within the last 2 years? Do you require any medications prior to a surgical procedure? No      Are you taking any medications that cause you to bleed more easily ("blood thinners") No    Have you ever experienced a rapid heartbeat with epinephrine? No    Have you ever been treated with "gold" (gold sodium thiomalate) therapy? No    [facility] can help with wrinkles, "laugh lines," facial volume loss, "double chin," "love handles," age spots, and more  Are you interested in learning today about some of the skin enhancement procedures that we offer? (If Yes, please provide more detail) No    Review of Systems:  Have you recently had or currently have any of the following?     · Fever or chills: No  · Night Sweats: No  · Headaches: No  · Weight Gain: No  · Weight Loss: No  · Blurry Vision: No  · Nausea: YES  · Vomiting: No  · Diarrhea: YES  · Blood in Stool: No  · Abdominal Pain: No  · Itchy Skin: YES  · Painful Joints: No  · Swollen Joints: No  · Muscle Pain: No  · Irregular Mole: No  · Sun Burn: No  · Dry Skin: No  · Skin Color Changes: No  · Scar or Keloid: No  · Cold Sores/Fever Blisters: No  · Bacterial Infections/MRSA: No  · Anxiety: No  · Depression: No  · Suicidal or Homicidal Thoughts: No      PSYCH: Normal mood and affect  EYES: Normal conjunctiva  ENT: Normal lips and oral mucosa  CARDIOVASCULAR: No edema  RESPIRATORY: Normal respirations  HEME/LYMPH/IMMUNO:  No regional lymphadenopathy except as noted below in ASSESSMENT AND PLAN BY DIAGNOSIS    FULL ORGAN SYSTEM SKIN EXAM (SKIN)  Hair, Scalp, Ears, Face Normal except as noted below in Assessment   Neck, Cervical Chain Nodes Normal except as noted below in Assessment   Right Arm/Hand/Fingers Normal except as noted below in Assessment   Left Arm/Hand/Fingers Normal except as noted below in Assessment   Chest/Breasts/Axillae Viewed areas Normal except as noted below in Assessment   Abdomen, Umbilicus Normal except as noted below in Assessment   Back/Spine Normal except as noted below in Assessment   Groin/Genitalia/Buttocks Viewed areas Normal except as noted below in Assessment   Right Leg, Foot, Toes Normal except as noted below in Assessment   Left Leg, Foot, Toes Normal except as noted below in Assessment     PRURITIS/ URTICARIA   Physical Exam:   Anatomic Location Affected:  Trunk and extremities    Morphological Description:  Currently clear    Pertinent Positives:   Pertinent Negatives: Additional History of Present Condition:  Still very itchy  Switched detergent to free and clear  Thyroidectomy done 1/2022  She has been applying cerave itch cream  Allegra in the morning and benadryl at night  She states the itching has been on and off since her second covid vaccine  She states she has been seeing an allergist for her asthma and test was normal  Started after had severe reaction to COVID vaccine where she had to go on oxygen    Assessment and Plan:  Based on a thorough discussion of this condition and the management approach to it (including a comprehensive discussion of the known risks, side effects and potential benefits of treatment), the patient (family) agrees to implement the following specific plan:   Start Zyrtec (cetirizine) 10 mg by mouth once a day for itching    Start Prednisone 40mg for 7 days, 20 mg for 7 days and 10 mg for 7 days by mouth for 21 days   Labs ordered for complement total and MARCOS   Advised to obtain labs first prior to starting the prednisone       SEBORRHEIC DERMATITIS    Physical Exam:   Anatomic Location Affected:  Scalp    Morphological Description:  Not examined   Pertinent Positives:   Pertinent Negatives:     Additional History of Present Condition:  Biopsy results consistent with seborrhea    Assessment and Plan:  Based on a thorough discussion of this condition and the management approach to it (including a comprehensive discussion of the known risks, side effects and potential benefits of treatment), the patient (family) agrees to implement the following specific plan:   Start Ketoconazole shampoo 2% Daily for 2 weeks straight and then on "Mondays, Wednesdays and Fridays":  Lather into scalp leave on for 5 minutes and then rinse off completely         Seborrheic Dermatitis   Seborrheic dermatitis is a common, chronic or relapsing form of eczema/dermatitis that mainly affects the sebaceous, gland-rich regions of the scalp, face, and trunk  There are infantile and adult forms of seborrhoeic dermatitis  It is sometimes associated with psoriasis and, in that clinical scenario, may be referred to as "sebo-psoriasis "  Seborrheic dermatitis is also known as "seborrheic eczema "  Dandruff (also called "pityriasis capitis") is an uninflamed form of seborrhoeic dermatitis  Dandruff presents as bran-like scaly patches scattered within hair-bearing areas of the scalp  In an infant, this condition may be referred to as "cradle cap "  The cause of seborrheic dermatitis is not completely understood  It is associated with proliferation of various species of the skin commensal Malassezia, in its yeast (non-pathogenic) form  Its metabolites (such as the fatty acids oleic acid, malssezin, and indole-3-carbaldehyde) may cause an inflammatory reaction  Differences in skin barrier lipid content and function may account for individual presentations  Infantile Seborrheic Dermatitis  Infantile seborrheic dermatitis affects babies under the age of 1 months and usually resolves by 1012 months of age  Infantile seborrheic dermatitis causes "cradle cap" (diffuse, greasy scaling on scalp)  The rash may spread to affect armpit and groin folds (a type of "napkin dermatitis")    There may be associated salmon-pink colored patches that may flake or peel  The rash in this case is usually not especially itchy, so the baby often appears undisturbed by the rash, even when more generalized  Adult Seborrheic Dermatitis  Adult seborrheic dermatitis tends to begin in late adolescence; prevalence is greatest in young adults and in the elderly  It is more common in males than in females  The following factors are sometimes associated with severe adult seborrheic dermatitis:   Oily skin   Familial tendency to seborrhoeic dermatitis or a family history of psoriasis   Immunosuppression: organ transplant recipient, human immunodeficiency virus (HIV) infection and patients with lymphoma   Neurological and psychiatric diseases: Parkinson disease, tardive dyskinesia, depression, epilepsy, facial nerve palsy, spinal cord injury and congenital disorders such as Down syndrome   Treatment for psoriasis with psoralen and ultraviolet A (PUVA) therapy   Lack of sleep   Stressful events  In adults, seborrheic dermatitis may typically affect the scalp, face (creases around the nose, behind ears, within eyebrows) and upper trunk  Typical clinical features include:   Winter flares, improving in summer following sun exposure   Minimal itch most of the time   Combination oily and dry mid-facial skin   Ill-defined localized scaly patches or diffuse scale in the scalp   Blepharitis; scaly red eyelid margins   Flushing-pink, thin, scaly, and ill-defined plaques in skin folds on both sides of the face   Petal or ring-shaped flaky patches on hair-line and on anterior chest   Rash in armpits, under the breasts, in the groin folds and genital creases   Superficial folliculitis (inflamed hair follicles) on cheeks and upper trunk    Seborrheic dermatitis is diagnosed by its clinical appearance and behavior  Skin biopsy may be helpful but is rarely necessary to make this diagnosis        Scribe Attestation    I,:  Lashae Cobb am acting as a scribe while in the presence of the attending physician :       I,:  Lg Hanson MD personally performed the services described in this documentation    as scribed in my presence :

## 2022-07-19 ENCOUNTER — TELEPHONE (OUTPATIENT)
Dept: FAMILY MEDICINE CLINIC | Facility: CLINIC | Age: 52
End: 2022-07-19

## 2022-07-19 LAB
A ALTERNATA IGE QN: <0.1 KUA/I
A FUMIGATUS IGE QN: <0.1 KUA/I
BERMUDA GRASS IGE QN: <0.1 KUA/I
BOXELDER IGE QN: <0.1 KUA/I
C HERBARUM IGE QN: <0.1 KUA/I
CAT DANDER IGE QN: <0.1 KUA/I
CH50 SERPL-ACNC: >60 U/ML
CMN PIGWEED IGE QN: <0.1 KUA/I
COMMON RAGWEED IGE QN: <0.1 KUA/I
COTTONWOOD IGE QN: <0.1 KUA/I
D FARINAE IGE QN: <0.1 KUA/I
D PTERONYSS IGE QN: <0.1 KUA/I
DOG DANDER IGE QN: <0.1 KUA/I
LONDON PLANE IGE QN: <0.1 KUA/I
MOUSE URINE PROT IGE QN: <0.1 KUA/I
MT JUNIPER IGE QN: <0.1 KUA/I
MUGWORT IGE QN: <0.1 KUA/I
P NOTATUM IGE QN: <0.1 KUA/I
ROACH IGE QN: <0.1 KUA/I
SHEEP SORREL IGE QN: <0.1 KUA/I
SILVER BIRCH IGE QN: <0.1 KUA/I
TIMOTHY IGE QN: <0.1 KUA/I
TOTAL IGE SMQN RAST: 2.28 KU/L (ref 0–113)
WALNUT IGE QN: <0.1 KUA/I
WHITE ASH IGE QN: <0.1 KUA/I
WHITE ELM IGE QN: <0.1 KUA/I
WHITE MULBERRY IGE QN: <0.1 KUA/I
WHITE OAK IGE QN: <0.1 KUA/I

## 2022-07-19 NOTE — TELEPHONE ENCOUNTER
----- Message from Shaun Lee, 10 Marco Gardner sent at 7/19/2022 10:01 AM EDT -----  CBC, TSH, and magnesium were all normal

## 2022-07-20 ENCOUNTER — OFFICE VISIT (OUTPATIENT)
Dept: OBGYN CLINIC | Facility: CLINIC | Age: 52
End: 2022-07-20
Payer: MEDICARE

## 2022-07-20 ENCOUNTER — TELEPHONE (OUTPATIENT)
Dept: OBGYN CLINIC | Facility: CLINIC | Age: 52
End: 2022-07-20

## 2022-07-20 VITALS
WEIGHT: 140.4 LBS | HEIGHT: 66 IN | DIASTOLIC BLOOD PRESSURE: 70 MMHG | BODY MASS INDEX: 22.56 KG/M2 | SYSTOLIC BLOOD PRESSURE: 120 MMHG

## 2022-07-20 DIAGNOSIS — B96.89 BV (BACTERIAL VAGINOSIS): Primary | ICD-10-CM

## 2022-07-20 DIAGNOSIS — N89.8 VAGINAL DISCHARGE: ICD-10-CM

## 2022-07-20 DIAGNOSIS — N76.0 BV (BACTERIAL VAGINOSIS): Primary | ICD-10-CM

## 2022-07-20 DIAGNOSIS — G43.909 MIGRAINE WITHOUT STATUS MIGRAINOSUS, NOT INTRACTABLE, UNSPECIFIED MIGRAINE TYPE: ICD-10-CM

## 2022-07-20 LAB — RYE IGE QN: NEGATIVE

## 2022-07-20 PROCEDURE — 96372 THER/PROPH/DIAG INJ SC/IM: CPT | Performed by: OBSTETRICS & GYNECOLOGY

## 2022-07-20 PROCEDURE — 99213 OFFICE O/P EST LOW 20 MIN: CPT | Performed by: OBSTETRICS & GYNECOLOGY

## 2022-07-20 RX ORDER — CLOTRIMAZOLE AND BETAMETHASONE DIPROPIONATE 10; .64 MG/G; MG/G
CREAM TOPICAL 2 TIMES DAILY
Qty: 30 G | Refills: 0 | Status: SHIPPED | OUTPATIENT
Start: 2022-07-20 | End: 2022-09-07 | Stop reason: ALTCHOICE

## 2022-07-20 RX ORDER — METRONIDAZOLE 7.5 MG/G
1 GEL VAGINAL
Qty: 5 G | Refills: 0 | Status: SHIPPED | OUTPATIENT
Start: 2022-07-20 | End: 2022-07-25

## 2022-07-20 RX ORDER — MEDROXYPROGESTERONE ACETATE 150 MG/ML
150 INJECTION, SUSPENSION INTRAMUSCULAR ONCE
Status: COMPLETED | OUTPATIENT
Start: 2022-07-20 | End: 2022-07-20

## 2022-07-20 RX ADMIN — MEDROXYPROGESTERONE ACETATE 150 MG: 150 INJECTION, SUSPENSION INTRAMUSCULAR at 11:47

## 2022-07-20 NOTE — PROGRESS NOTES
Carmen Kerr presents for a scheduled medroxyprogesterone 150mg injection  This was administered in the R deltoid without any difficulty  Pt has the following complaints/concerns N/A   A notice was sent to Dr Ayaan Vicente to sign off on order  Pt will schedule next injection for 10 weeks

## 2022-07-20 NOTE — RESULT ENCOUNTER NOTE
MARCOS, total complement normal  On course of prednisone for chronic urticaria, consider montelukast if continues

## 2022-07-20 NOTE — PROGRESS NOTES
Assessment/Plan:    Diagnoses and all orders for this visit:    BV (bacterial vaginosis)  -     metroNIDAZOLE (METROGEL) 0 75 % vaginal gel; Insert 1 application into the vagina daily at bedtime for 5 days  -     clotrimazole-betamethasone (LOTRISONE) 1-0 05 % cream; Apply topically 2 (two) times a day    Vaginal discharge    Migraine without status migrainosus, not intractable, unspecified migraine type  -     medroxyPROGESTERone acetate (DEPO-PROVERA SYRINGE) IM injection 150 mg        Subjective:  Recurrent vaginal discharge     Patient ID: Tej Basilio is a 50 y o  female  HPI   46-year-old female, history of genital herpes managed with valacyclovir  Recently she had an outbreak which has since resolved since treatment  Recently treated for bacterial vaginosis with clindamycin patient states she gave her loose stools and diarrhea  She continues to complain of vaginal discharge with irritation  Review of Systems  unchanged from previous visit    Objective:  No acute distress  /70   Ht 5' 6" (1 676 m)   Wt 63 7 kg (140 lb 6 4 oz)   BMI 22 66 kg/m²      Physical Exam  Vitals reviewed  Exam conducted with a chaperone present  Constitutional:       Appearance: Normal appearance  She is normal weight  Eyes:      Pupils: Pupils are equal, round, and reactive to light  Pulmonary:      Effort: Pulmonary effort is normal    Abdominal:      General: Abdomen is flat  Palpations: Abdomen is soft  Genitourinary:     General: Normal vulva  Comments: Normal external genitalia  Normal size uterus  Normal cervix  No CMT  No pelvic masses  Wet mount positive for clue cells consistent with recurrent bacterial vaginosis  Yellow white vaginal discharge  Musculoskeletal:         General: Normal range of motion  Cervical back: Normal range of motion  Skin:     General: Skin is warm and dry  Neurological:      Mental Status: She is alert and oriented to person, place, and time  Psychiatric:         Mood and Affect: Mood normal          Behavior: Behavior normal          Thought Content: Thought content normal          Judgment: Judgment normal         Please note    In addition to the time spent discussing the findings and results of today's visit and exam, I spent approximately 20 minutes of face-to-face time with the patient, greater than 50% of which was spent in counseling and coordination of care for this patient

## 2022-07-24 DIAGNOSIS — G43.909 MIGRAINE WITHOUT STATUS MIGRAINOSUS, NOT INTRACTABLE, UNSPECIFIED MIGRAINE TYPE: ICD-10-CM

## 2022-07-26 RX ORDER — MEDROXYPROGESTERONE ACETATE 150 MG/ML
INJECTION, SUSPENSION INTRAMUSCULAR
Qty: 1 ML | Refills: 0 | Status: SHIPPED | OUTPATIENT
Start: 2022-07-26

## 2022-08-02 ENCOUNTER — TELEMEDICINE (OUTPATIENT)
Dept: PULMONOLOGY | Facility: HOSPITAL | Age: 52
End: 2022-08-02
Payer: MEDICARE

## 2022-08-02 VITALS — HEIGHT: 66 IN | WEIGHT: 142 LBS | BODY MASS INDEX: 22.82 KG/M2

## 2022-08-02 DIAGNOSIS — K21.9 GASTROESOPHAGEAL REFLUX DISEASE WITHOUT ESOPHAGITIS: ICD-10-CM

## 2022-08-02 DIAGNOSIS — U09.9 POST-ACUTE SEQUELAE OF COVID-19 (PASC): ICD-10-CM

## 2022-08-02 DIAGNOSIS — J30.2 SEASONAL ALLERGIES: ICD-10-CM

## 2022-08-02 DIAGNOSIS — J40 BRONCHITIS: Primary | ICD-10-CM

## 2022-08-02 DIAGNOSIS — L21.9 SEBORRHEIC DERMATITIS: ICD-10-CM

## 2022-08-02 PROCEDURE — 99441 PR PHYS/QHP TELEPHONE EVALUATION 5-10 MIN: CPT | Performed by: PHYSICIAN ASSISTANT

## 2022-08-02 RX ORDER — PREDNISONE 20 MG/1
40 TABLET ORAL DAILY
Qty: 10 TABLET | Refills: 0 | Status: SHIPPED | OUTPATIENT
Start: 2022-08-02 | End: 2022-08-07

## 2022-08-02 RX ORDER — BUDESONIDE AND FORMOTEROL FUMARATE DIHYDRATE 160; 4.5 UG/1; UG/1
2 AEROSOL RESPIRATORY (INHALATION) 2 TIMES DAILY
Qty: 10.2 G | Refills: 3 | Status: SHIPPED | OUTPATIENT
Start: 2022-08-02

## 2022-08-02 NOTE — PROGRESS NOTES
Virtual Regular Visit    Verification of patient location:    Patient is located in the following state in which I hold an active license [place]      Assessment/Plan:    Problem List Items Addressed This Visit        Respiratory    Severe persistent asthma with acute exacerbation - Primary    Relevant Medications    budesonide-formoterol (Symbicort) 160-4 5 mcg/act inhaler    predniSONE 20 mg tablet       Other    Seasonal allergies    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[facility])      Other Visit Diagnoses     Gastroesophageal reflux disease without esophagitis               · My evaluation of patient was limited today as we were only able to connect via telephone however it does appear that she is suffering from bronchitis  Prednisone burst prescribed as she says this is helped with her symptoms in the past   Will follow up on chest x-ray 1st prior to prescribing any antibiotics as I have a low suspicion for bacterial infection  · Resume Symbicort  · Start using the nebulizer at least 2-3 times daily while ill  After this episode, albuterol can be used just on a p r n  Basis  · Would continue omeprazole  · Follow-up on methacholine challenge  · [facility] allergy panel and CT sinuses negative  · Follow with primary pulmonologist, Dr Breana Knott, at next visit           Reason for visit is   Chief Complaint   Patient presents with    Virtual Regular Visit        Encounter provider Fernando Boss    Provider located at [address]  [facility] [address]      Recent Visits  No visits were found meeting these conditions    Showing recent visits within past 7 days and meeting all other requirements  Today's Visits  Date Type Provider Dept   08/02/22 Telemedicine Fernando Boss [facility]   Showing today's visits and meeting all other requirements  Future Appointments  No visits were found meeting these conditions  Showing future appointments within next 150 days and meeting all other requirements       The patient was identified by name and date of birth  Carmen Bellamy was informed that this is a telemedicine visit and that the visit is being conducted through Telephone  My office door was closed  No one else was in the room  She acknowledged consent and understanding of privacy and security of the video platform  The patient has agreed to participate and understands they can discontinue the visit at any time  Patient is aware this is a billable service  Subjective  HPI  [place] Butch Makeda Bellamy is a 50 y o  female with past medical history including asthma, allergies, anxiety, hyperlipidemia, migraines presenting for follow-up  Patient incidentally notes for the past week, she has had increased shortness of breath and cough  Prior to that, she had actually been feeling better ever since starting herself on omeprazole  She was seen at urgent care last week & they did not prescribe any steroids or antibiotics  She was told she was likely developing a viral infection  Her symptoms are mostly better but she is still getting very easily winded, more than usual      Other history:   She states that she had both COVID vaccinations back in April 2021 and began to feel sick after this, not sure if she had COVID or was from the vaccination  She then had worsening asthma symptoms thereafter  She has been on multiple rounds of prednisone  She states that she has flares from environmental allergens such as grass, trees, as well as fumes  Patient had a CT chest in December 2021 which showed no acute pulmonary pathology  PFTs 06/02/2021 were completely normal      In terms of pulmonary history, she is a nonsmoker  Does have reflux and postnasal drip  Does have a history of asthma as above      Past Medical History:   Diagnosis Date    Anemia     Anxiety     Asthma     illness induced    SARA I (cervical intraepithelial neoplasia I)     RESOLVED: [id number]    Depression     Endometriosis     Functional ovarian cysts age 16    History of agoraphobia age 15    Hypercholesterolemia     IBS (irritable bowel syndrome) age 16    IC (interstitial cystitis)     Malignant neoplasm of thyroid gland ([address] ) 12/14/2021    Migraine     Motion sickness     Multiple thyroid nodules 11/02/2021    Last Assessment & Plan:  Formatting of this note might be different from the original  Kristen Melgar is a 54 y o  female   We had an extensive discussion regarding thyroid nodules, the natural course of thyroid nodules, ultrasound features which can convey an increased risk for malignancy, the general indications for fine needle aspiration, the procedure itself and possible results from a fine needl    Pap smear abnormality of cervix with ASCUS favoring benign     RESOLVED: [id number]    Seasonal allergies     with post-nasal drip and recurrent throat infections    Thyroid cancer ([address] )     Urinary tract infection     Varicella        Past Surgical History:   Procedure Laterality Date    CHOLECYSTECTOMY LAPAROSCOPIC  08/2020    COLONOSCOPY  06/2018    Nonbleeding angiodysplastic lesion mid ascending colon, questionable ulcerative proctitis-biopsy was negative for acute or chronic colitis    COLONOSCOPY  06/09/2016    Normal    COLONOSCOPY  05/23/2013    Adenoma at splenic flexure    EGD  08/02/2018    EGD  08/03/2020    Normal   Biopsy stomach negative for H  pylori, biopsy of the duodenum negative for celiac    ENDOTRACHEAL INTUBATION EMERGENT  2012    HEMORRHOID SURGERY  07/2009    Dr Anam Napier HYSTEROSCOPY,DX,SEP [address] N/A 11/21/2018    Procedure: HYSTEROSCOPY;  Surgeon: Gill Poon MD;  Location: [facility];  Service: Gynecology    PA HYSTEROSCOPY,W/ENDOMETRIAL ABLATION N/A 11/21/2018    Procedure: Kushal Kramer;  Surgeon: Gill Poon MD;  Location: [facility];  Service: Gynecology    SMALL INTESTINE SURGERY      THYROID SURGERY  01/17/2022    Papillary cancer of thyroid nodules    TOOTH EXTRACTION      TUBAL LIGATION      ONSET: 29 DEC 2011       Current Outpatient Medications   Medication Sig Dispense Refill    albuterol (2 5 mg/3 mL) 0 083 % nebulizer solution Take 1 vial (2 5 mg total) by nebulization 3 (three) times a day 30 vial 2    albuterol (PROAIR HFA) 90 mcg/act inhaler Inhale 2 puffs 4 (four) times a day every 4 to 6 hours as needed 1 Inhaler 5    budesonide-formoterol (Symbicort) 160-4 5 mcg/act inhaler Inhale 2 puffs 2 (two) times a day Rinse mouth after use   10 2 g 3    clotrimazole-betamethasone (LOTRISONE) 1-0 05 % cream Apply topically 2 (two) times a day 30 g 0    dicyclomine (BENTYL) 10 mg capsule TAKE 1 CAPSULE (10 MG TOTAL) BY MOUTH 3 (THREE) TIMES A DAY AS NEEDED (ABDOMINAL PAIN) 270 capsule 1    fluticasone (FLONASE) 50 mcg/act nasal spray as needed        ketoconazole (NIZORAL) 2 % shampoo Use to wash hair up to once daily 120 mL 3    Levoxyl 125 MCG tablet Take 125 mcg by mouth daily      LORazepam (ATIVAN) 1 mg tablet TAKE 1 & 1/2 TABLETS DAILY AT BEDTIME AS NEEDED  1    medroxyPROGESTERone (DEPO-PROVERA) 150 mg/mL injection PLEASE GIVE IN OFFICE EVERY 10 WEEKS 1 mL 0    Meth-Hyo-M Bl-Na Phos-Ph Sal (Uribel) 118 MG CAPS Take 1 each by mouth as needed      Multiple Vitamin (MULTI VITAMIN DAILY PO) Take by mouth        naproxen (NAPROSYN) 500 mg tablet TAKE 1 TABLET BY MOUTH 3 TIMES A DAY AS NEEDED MIGRAINE  LIMIT USE TO 3 DAYS PER WEEK      pentosan polysulfate (ELMIRON) 100 mg capsule Take 100 mg by mouth 3 (three) times a day before meals        predniSONE 20 mg tablet Take 2 tablets (40 mg total) by mouth daily for 5 days 10 tablet 0    triamcinolone (KENALOG) 0 1 % cream Apply topically on affected area twice a day for 2 weeks for contact dermatitis 30 g 1    acyclovir (ZOVIRAX) 5 % ointment Apply topically 3 (three) times a day 15 g 1    calcitriol (ROCALTROL) 0 5 MCG capsule       cetirizine (ZyrTEC) 10 MG chewable tablet Chew 1 tablet (10 mg total) daily (Patient not taking: No sig reported) 30 tablet 2    cloNIDine (CATAPRES) 0 1 mg tablet Take 0 1 mg by mouth daily at bedtime      colestipol (COLESTID) 1 g tablet Take 1 tablet (1 g total) by mouth 2 (two) times a day 180 tablet 3    fluconazole (DIFLUCAN) 150 mg tablet TAKE 1 TABLET BY MOUTH AS ONE DOSE (Patient not taking: No sig reported)      gabapentin (Neurontin) 100 mg capsule Take 1 capsule (100 mg total) by mouth 2 (two) times a day 60 capsule 1    HYDROcodone-acetaminophen (NORCO) 5-325 mg per tablet       levalbuterol (XOPENEX) 1 25 mg/3 mL nebulizer solution 1 25 MG EVERY 4 HRS FOR 30 DAYS (Patient not taking: Reported on 8/2/2022)      levothyroxine 100 mcg tablet        Levoxyl 112 MCG tablet Take 112 mcg by mouth daily (Patient not taking: No sig reported)      lidocaine (XYLOCAINE) 5 % ointment Apply topically 3 (three) times a day as needed for mild pain 35 44 g 0    naratriptan (AMERGE) 2 5 MG tablet 1 TABLET BY MOUTH TWICE A DAY AS NEEDED FOR MIGRAINE      Noritate 1 % cream APPLY TO AFFECTED AREA TOPICALLY EVERY DAY (Patient not taking: No sig reported) 60 g 1    omeprazole (PriLOSEC) 40 MG capsule Take 1 capsule (40 mg total) by mouth daily (Patient not taking: No sig reported) 30 capsule 2    ondansetron (ZOFRAN) 4 mg tablet Take 1 tablet (4 mg total) by mouth every 8 (eight) hours as needed for nausea (Patient not taking: Reported on 8/2/2022) 20 tablet 5    predniSONE 20 mg tablet Take 2 tablets (40 mg total) by mouth daily for 7 days, THEN 1 tablet (20 mg total) daily for 7 days, THEN 0 5 tablets (10 mg total) daily for 7 days   (Patient not taking: Reported on 8/2/2022) 25 tablet 0    promethazine-codeine (PHENERGAN WITH CODEINE) 6 25-10 mg/5 mL syrup Take 5 mL by mouth every 4 (four) hours as needed for cough 118 mL 0    sodium chloride (OCEAN) 0 65 % nasal spray 1 spray into each nostril as needed for congestion 480 mL 0    traMADol (ULTRAM) 50 mg tablet       valACYclovir (VALTREX) 500 mg tablet Take 1 tablet po two times daily x 3 days  Then once daily  30 tablet 0     Current Facility-Administered Medications   Medication Dose Route Frequency Provider Last Rate Last Admin    medroxyPROGESTERone (DEPO-PROVERA) IM injection 150 mg  150 mg Intramuscular Once Kishore Limbo, DO            Allergies   Allergen Reactions    Latex Itching and Hives     Irritation    Vancomycin Itching and Swelling     Patient had facial swelling, especially around her eyes and severe itching   Azithromycin Diarrhea, Nausea Only and Other (See Comments)    Clarithromycin Other (See Comments)    Clavulanic Acid Other (See Comments)    Doxycycline Other (See Comments)    Erythromycin Base Other (See Comments)    Lamotrigine Other (See Comments)     Can't recall side effects   Lithium Itching    Mirtazapine Other (See Comments)     (remeron)  (remeron)  (remeron)    Moxifloxacin Nausea Only     Excessive vomiting  Excessive vomiting    Sulfamethoxazole      Other reaction(s): Rash    Sulfamethoxazole-Trimethoprim Other (See Comments)    Trimethoprim      Other reaction(s): Rash    Indomethacin Rash    Penicillins Rash       Review of Systems   Respiratory: Positive for cough, chest tightness and shortness of breath  All other systems reviewed and are negative  Video Exam    Vitals:    08/02/22 1339   Weight: 64 4 kg (142 lb)   Height: 5' 6" (1 676 m)       Physical Exam     It was my intent to perform this visit via video technology but the patient was not able to do a video connection so the visit was completed via audio telephone only      Patient is speaking in full sentences  Mood and behavior are appropriate to context, appropriately conversant  No signs of distress appreciable  No audible respiratory symptoms appreciable such as wheezing, coughing, or congestion    I have spent 10 minutes with Patient  today in which greater than 50% of this time was spent in counseling/coordination of care regarding Risks and benefits of tx options and Impressions  VIRTUAL VISIT DISCLAIMER      Carmen Horne verbally agrees to participate in [employer]  Pt is aware that [employer] could be limited without vital signs or the ability to perform a full hands-on physical exam  Carmen Horne understands she or the provider may request at any time to terminate the video visit and request the patient to seek care or treatment in person

## 2022-08-03 RX ORDER — KETOCONAZOLE 20 MG/ML
SHAMPOO TOPICAL
Qty: 120 ML | Refills: 3 | Status: SHIPPED | OUTPATIENT
Start: 2022-08-03 | End: 2022-09-07 | Stop reason: ALTCHOICE

## 2022-08-10 ENCOUNTER — APPOINTMENT (OUTPATIENT)
Dept: LAB | Age: 52
End: 2022-08-10
Payer: MEDICARE

## 2022-08-10 ENCOUNTER — OFFICE VISIT (OUTPATIENT)
Dept: FAMILY MEDICINE CLINIC | Facility: CLINIC | Age: 52
End: 2022-08-10
Payer: MEDICARE

## 2022-08-10 ENCOUNTER — APPOINTMENT (OUTPATIENT)
Dept: RADIOLOGY | Age: 52
End: 2022-08-10
Payer: MEDICARE

## 2022-08-10 ENCOUNTER — TELEPHONE (OUTPATIENT)
Dept: FAMILY MEDICINE CLINIC | Facility: CLINIC | Age: 52
End: 2022-08-10

## 2022-08-10 VITALS
HEART RATE: 86 BPM | DIASTOLIC BLOOD PRESSURE: 80 MMHG | SYSTOLIC BLOOD PRESSURE: 122 MMHG | RESPIRATION RATE: 16 BRPM | HEIGHT: 66 IN | TEMPERATURE: 100.4 F | BODY MASS INDEX: 22.69 KG/M2 | WEIGHT: 141.2 LBS | OXYGEN SATURATION: 98 %

## 2022-08-10 DIAGNOSIS — R20.2 TINGLING OF FACE: ICD-10-CM

## 2022-08-10 DIAGNOSIS — C73 MALIGNANT NEOPLASM OF THYROID GLAND (HCC): ICD-10-CM

## 2022-08-10 DIAGNOSIS — J45.50 SEVERE PERSISTENT ASTHMA WITHOUT COMPLICATION: ICD-10-CM

## 2022-08-10 DIAGNOSIS — M79.604 RIGHT LEG PAIN: Primary | ICD-10-CM

## 2022-08-10 DIAGNOSIS — M79.604 RIGHT LEG PAIN: ICD-10-CM

## 2022-08-10 DIAGNOSIS — Z13.220 LIPID SCREENING: ICD-10-CM

## 2022-08-10 DIAGNOSIS — Z00.00 HEALTHCARE MAINTENANCE: ICD-10-CM

## 2022-08-10 LAB
ALBUMIN SERPL BCP-MCNC: 3.7 G/DL (ref 3.5–5)
ALP SERPL-CCNC: 61 U/L (ref 46–116)
ALT SERPL W P-5'-P-CCNC: 14 U/L (ref 12–78)
ANION GAP SERPL CALCULATED.3IONS-SCNC: 7 MMOL/L (ref 4–13)
AST SERPL W P-5'-P-CCNC: 9 U/L (ref 5–45)
BILIRUB SERPL-MCNC: 0.43 MG/DL (ref 0.2–1)
BUN SERPL-MCNC: 8 MG/DL (ref 5–25)
CALCIUM SERPL-MCNC: 8.8 MG/DL (ref 8.3–10.1)
CHLORIDE SERPL-SCNC: 110 MMOL/L (ref 96–108)
CHOLEST SERPL-MCNC: 172 MG/DL
CO2 SERPL-SCNC: 24 MMOL/L (ref 21–32)
CREAT SERPL-MCNC: 0.77 MG/DL (ref 0.6–1.3)
GFR SERPL CREATININE-BSD FRML MDRD: 90 ML/MIN/1.73SQ M
GLUCOSE SERPL-MCNC: 94 MG/DL (ref 65–140)
HDLC SERPL-MCNC: 44 MG/DL
LDLC SERPL CALC-MCNC: 77 MG/DL (ref 0–100)
MAGNESIUM SERPL-MCNC: 2.3 MG/DL (ref 1.6–2.6)
POTASSIUM SERPL-SCNC: 3.2 MMOL/L (ref 3.5–5.3)
PROT SERPL-MCNC: 6.7 G/DL (ref 6.4–8.4)
SODIUM SERPL-SCNC: 141 MMOL/L (ref 135–147)
TRIGL SERPL-MCNC: 255 MG/DL

## 2022-08-10 PROCEDURE — 99214 OFFICE O/P EST MOD 30 MIN: CPT | Performed by: NURSE PRACTITIONER

## 2022-08-10 PROCEDURE — 73630 X-RAY EXAM OF FOOT: CPT

## 2022-08-10 PROCEDURE — 73590 X-RAY EXAM OF LOWER LEG: CPT

## 2022-08-10 PROCEDURE — 80061 LIPID PANEL: CPT

## 2022-08-10 PROCEDURE — 83735 ASSAY OF MAGNESIUM: CPT

## 2022-08-10 PROCEDURE — 80053 COMPREHEN METABOLIC PANEL: CPT

## 2022-08-10 PROCEDURE — 36415 COLL VENOUS BLD VENIPUNCTURE: CPT

## 2022-08-10 PROCEDURE — G0439 PPPS, SUBSEQ VISIT: HCPCS | Performed by: NURSE PRACTITIONER

## 2022-08-10 RX ORDER — THYROID,PORK 15 MG
TABLET ORAL
COMMUNITY
Start: 2022-08-08

## 2022-08-10 RX ORDER — THYROID 60 MG
TABLET ORAL
COMMUNITY
Start: 2022-08-08

## 2022-08-10 NOTE — ASSESSMENT & PLAN NOTE
- s/p total thyroidectomy in January    - On Levoxyl 125 mcg daily  Most recent TSH and free T4 normal  Continue same dose  - Will continue to monitor

## 2022-08-10 NOTE — PROGRESS NOTES
Assessment and Plan:     Problem List Items Addressed This Visit        Endocrine    Malignant neoplasm of thyroid gland [facility])     - s/p total thyroidectomy in January    - On Levoxyl 125 mcg daily  Most recent TSH and free T4 normal  Continue same dose  - Will continue to monitor  Relevant Medications    [place] Thyroid 15 MG tablet    [place] Thyroid 60 MG tablet       Respiratory    Severe persistent asthma without complication     - Stable  - Continue Symbicort daily and albuterol as needed  - Will continue to monitor  Other    Right leg pain - Primary     - Pain moreso in right foot  Will send for x-ray  - Referral placed to Podiatry  - Will continue to monitor  Relevant Orders    Ambulatory Referral to Podiatry    XR foot 3+ vw right    XR tibia fibula 2 vw right    Tingling of face     - Patient is currently not tolerating the gabapentin  - Will check labs and continue to follow up  Relevant Orders    Comprehensive metabolic panel    Magnesium    Healthcare maintenance     - Reviewed immunizations and screenings  - She is up to date with dental and vision exams   - Her mammogram is up to date  - Her recent labs are all stable  Preventive health issues were discussed with patient, and age appropriate screening tests were ordered as noted in patient's After Visit Summary  Personalized health advice and appropriate referrals for health education or preventive services given if needed, as noted in patient's After Visit Summary  History of Present Illness:     Patient presents for a Medicare Wellness Visit    Patient presents today with complaints of right leg pain  She states that it feels like there is rubber bands around her leg  She has pain from the top of her foot traveling up to her knee  Pain in worse with dorsiflexion  Reports pain with pushing the gas peddle in her car   She states she had to sit down and rest while shopping because of the pain  She denies any injury or trauma  She denies any redness or swelling of the leg  She has gabapentin and naproxen that she can use for pain but she is not taking them because they "make her feel off " She feels a tingling sensation all over her body  Her dermatologist put her on a 21 day course of steroids  She had finished them a few days ago  She reports tingling around her mouth  She is wondering if her calcium or magnesium is low  Patient Care Team:  True Tian MD as PCP - General (Family Medicine)  Alphonza Lennox, MD as PCP - [address] [place] [address] (RTE)  Berry Eddy DO     Review of Systems:     Review of Systems   Constitutional: Negative for fatigue and fever  HENT: Negative for trouble swallowing  Eyes: Negative for visual disturbance  Respiratory: Negative for cough and shortness of breath  Cardiovascular: Negative for chest pain and palpitations  Gastrointestinal: Negative for abdominal pain and blood in stool  Endocrine: Negative for cold intolerance and heat intolerance  Genitourinary: Negative for difficulty urinating and dysuria  Musculoskeletal: Negative for gait problem  Right leg pain     Skin: Negative for rash  Neurological: Negative for dizziness, syncope and headaches  Generalized tingling sensation     Hematological: Negative for adenopathy  Psychiatric/Behavioral: Negative for behavioral problems          Problem List:     Patient Active Problem List   Diagnosis    Menorrhagia with irregular cycle    Bipolar II disorder (HCC)    Chronic migraine without aura without status migrainosus, not intractable    Cystitis    Constipation    Dense breasts    Depression    Fatigue    Interstitial cystitis    Irritable bowel syndrome    Other hyperlipidemia    Intractable chronic migraine without aura and without status migrainosus    OCD (obsessive compulsive disorder)    Urinary tract infection    Stress incontinence    Chronic tonsillitis    Tonsillar calculus    Urinary frequency    Insomnia    Neck pain    Right lumbar radiculopathy    Dizziness    Viral URI with cough    Shortness of breath    Nasal sinus congestion    Seasonal allergies    Medicare annual wellness visit, subsequent    Bronchitis    Cough    IC (intermittent claudication) (HCC)    Respiratory failure with hypoxia (HCC)    Altered taste    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[facility])    Severe persistent asthma with acute exacerbation    Hx of colonoscopy    Low blood sugar    Tachycardia    Malignant neoplasm of thyroid gland (HCC)    Multiple thyroid nodules    Severe persistent asthma without complication    Myalgia    Chronic nonintractable headache    Fever, unspecified    MDD (major depressive disorder), recurrent severe, without psychosis (HCC)    Vitamin D insufficiency    Leg cramps    Right leg pain    Tingling of face    Healthcare maintenance      Past Medical and Surgical History:     Past Medical History:   Diagnosis Date    Anemia     Anxiety     Asthma     illness induced    SARA I (cervical intraepithelial neoplasia I)     RESOLVED: [id number]    Depression     Endometriosis     Functional ovarian cysts age 15    History of agoraphobia age 15    Hypercholesterolemia     IBS (irritable bowel syndrome) age 15   [facility] IC (interstitial cystitis)     Malignant neoplasm of thyroid gland ([facility] [address] ) 12/14/2021    Migraine     Motion sickness     Multiple thyroid nodules 11/02/2021    Last Assessment & Plan:  Formatting of this note might be different from the original  Kristen Melgar is a 54 y o  female   We had an extensive discussion regarding thyroid nodules, the natural course of thyroid nodules, ultrasound features which can convey an increased risk for malignancy, the general indications for fine needle aspiration, the procedure itself and possible results from a fine needl    Pap smear abnormality of cervix with ASCUS favoring benign     RESOLVED: [id number]    Seasonal allergies     with post-nasal drip and recurrent throat infections    Thyroid cancer ([facility] [address] )     Urinary tract infection     Varicella      Past Surgical History:   Procedure Laterality Date    CHOLECYSTECTOMY LAPAROSCOPIC  08/2020    COLONOSCOPY  06/2018    Nonbleeding angiodysplastic lesion mid ascending colon, questionable ulcerative proctitis-biopsy was negative for acute or chronic colitis    COLONOSCOPY  06/09/2016    Normal    COLONOSCOPY  05/23/2013    Adenoma at splenic flexure    EGD  08/02/2018    EGD  08/03/2020    Normal   Biopsy stomach negative for H  pylori, biopsy of the duodenum negative for celiac    ENDOTRACHEAL INTUBATION EMERGENT  2012    HEMORRHOID SURGERY  07/2009    Dr Swanson Fuel HYSTEROSCOPY,DX,SEP [address] N/A 11/21/2018    Procedure: HYSTEROSCOPY;  Surgeon: Jada Funk MD;  Location: [facility];  Service: Gynecology    UT HYSTEROSCOPY,W/ENDOMETRIAL ABLATION N/A 11/21/2018    Procedure: ABLATION ENDOMETRIAL Rosy Messing;  Surgeon: Jada Funk MD;  Location[facility];  Service: Gynecology   [facility] THYROID SURGERY  01/17/2022    Papillary cancer of thyroid nodules    TOOTH EXTRACTION      TUBAL LIGATION      ONSET: 36 DEC 2011      Family History:     Family History   Problem Relation Age of Onset    Other Mother         HYSTERECTOMY FOR BENIGN DISEASE     Diabetes Mother     Lung cancer Father 79    Other Sister         HYSTERECTOMY FOR BENIGN DISEASE     Diabetes Maternal Grandmother     Other Maternal Grandmother         HYSTERECTOMY FOR BENIGN DISEASE     Colon cancer Paternal Grandmother 61    Throat cancer Paternal Uncle 61    No Known Problems Maternal Aunt     No Known Problems Paternal Aunt     No Known Problems Paternal Aunt       Social History:     Social History     Socioeconomic History    Marital status:      Spouse name: None    Number of children: 2    Years of education: None    Highest education level: None   Occupational History    Occupation:    Tobacco Use    Smoking status: Never Smoker    Smokeless tobacco: Never Used   Vaping Use    Vaping Use: Never used   Substance and Sexual Activity    Alcohol use: No    Drug use: No    Sexual activity: Yes     Partners: Male   Other Topics Concern    None   Social History Narrative    Marital history- single as per all scripts    No caffeine use    Anab[employer]ism affiliation [employer]    Uses safety equipment- seatbelts      Social Determinants of Health     Financial Resource Strain: Not on file   Food Insecurity: Not on file   Transportation Needs: Not on file   Physical Activity: Not on file   Stress: Not on file   Social Connections: Not on file   Intimate Partner Violence: Not on file   Housing Stability: Not on file      Medications and Allergies:     Current Outpatient Medications   Medication Sig Dispense Refill    albuterol (2 5 mg/3 mL) 0 083 % nebulizer solution Take 1 vial (2 5 mg total) by nebulization 3 (three) times a day 30 vial 2    albuterol (PROAIR HFA) 90 mcg/act inhaler Inhale 2 puffs 4 (four) times a day every 4 to 6 hours as needed 1 Inhaler 5    McDonald Thyroid 15 MG tablet       McDonald Thyroid 60 MG tablet       budesonide-formoterol (Symbicort) 160-4 5 mcg/act inhaler Inhale 2 puffs 2 (two) times a day Rinse mouth after use   10 2 g 3    clotrimazole-betamethasone (LOTRISONE) 1-0 05 % cream Apply topically 2 (two) times a day 30 g 0    dicyclomine (BENTYL) 10 mg capsule TAKE 1 CAPSULE (10 MG TOTAL) BY MOUTH 3 (THREE) TIMES A DAY AS NEEDED (ABDOMINAL PAIN) 270 capsule 1    fluticasone (FLONASE) 50 mcg/act nasal spray as needed        ketoconazole (NIZORAL) 2 % shampoo USE TO WASH HAIR UP TO ONCE DAILY 120 mL 3    LORazepam (ATIVAN) 1 mg tablet TAKE 1 & 1/2 TABLETS DAILY AT BEDTIME AS NEEDED  1    medroxyPROGESTERone (DEPO-PROVERA) 150 mg/mL injection PLEASE GIVE IN OFFICE EVERY 10 WEEKS 1 mL 0    Meth-Hyo-M Bl-Na Phos-Ph Sal (Uribel) 118 MG CAPS Take 1 each by mouth as needed      Multiple Vitamin (MULTI VITAMIN DAILY PO) Take by mouth        naproxen (NAPROSYN) 500 mg tablet TAKE 1 TABLET BY MOUTH 3 TIMES A DAY AS NEEDED MIGRAINE  LIMIT USE TO 3 DAYS PER WEEK      ondansetron (ZOFRAN) 4 mg tablet Take 1 tablet (4 mg total) by mouth every 8 (eight) hours as needed for nausea 20 tablet 5    pentosan polysulfate (ELMIRON) 100 mg capsule Take 100 mg by mouth 3 (three) times a day before meals        triamcinolone (KENALOG) 0 1 % cream Apply topically on affected area twice a day for 2 weeks for contact dermatitis 30 g 1    cetirizine (ZyrTEC) 10 MG chewable tablet Chew 1 tablet (10 mg total) daily (Patient not taking: No sig reported) 30 tablet 2    colestipol (COLESTID) 1 g tablet Take 1 tablet (1 g total) by mouth 2 (two) times a day 180 tablet 3    levalbuterol (XOPENEX) 1 25 mg/3 mL nebulizer solution 1 25 MG EVERY 4 HRS FOR 30 DAYS (Patient not taking: Reported on 8/2/2022)      Levoxyl 125 MCG tablet Take 125 mcg by mouth daily (Patient not taking: Reported on 8/10/2022)      Noritate 1 % cream APPLY TO AFFECTED AREA TOPICALLY EVERY DAY (Patient not taking: No sig reported) 60 g 1    omeprazole (PriLOSEC) 40 MG capsule Take 1 capsule (40 mg total) by mouth daily (Patient not taking: No sig reported) 30 capsule 2    sodium chloride (OCEAN) 0 65 % nasal spray 1 spray into each nostril as needed for congestion 480 mL 0     Current Facility-Administered Medications   Medication Dose Route Frequency Provider Last Rate Last Admin    medroxyPROGESTERone (DEPO-PROVERA) IM injection 150 mg  150 mg Intramuscular Once Lucinda Benavides, DO         Allergies   Allergen Reactions    Latex Itching and Hives     Irritation    Vancomycin Itching and Swelling     Patient had facial swelling, especially around her eyes and severe itching      Azithromycin Diarrhea, Nausea Only and Other (See Comments)    Clarithromycin Other (See Comments)    Clavulanic Acid Other (See Comments)    Doxycycline Other (See Comments)    Erythromycin Base Other (See Comments)    Lamotrigine Other (See Comments)     Can't recall side effects   Lithium Itching    Mirtazapine Other (See Comments)     (remeron)  (remeron)  (remeron)    Moxifloxacin Nausea Only     Excessive vomiting  Excessive vomiting    Sulfamethoxazole      Other reaction(s): Rash    Sulfamethoxazole-Trimethoprim Other (See Comments)    Trimethoprim      Other reaction(s): Rash    Indomethacin Rash    Penicillins Rash      Immunizations:     Immunization History   Administered Date(s) Administered    COVID-19 MODERNA VACC 0 5 ML IM 03/17/2021, 04/14/2021    H1N1, All Formulations 11/20/2009    INFLUENZA 12/06/2013, 10/25/2017, 10/25/2017, 10/10/2018, 10/11/2020, 09/22/2021    Influenza Injectable, MDCK, Preservative Free, Quadrivalent, 0 5 mL 10/21/2019, 10/11/2020    Influenza Quadrivalent Preservative Free 3 years and older IM 10/11/2020    Influenza Split 12/05/2013, 09/22/2014    Influenza, injectable, quadrivalent, preservative free 0 5 mL 10/11/2020    Influenza, seasonal, injectable 10/05/2010    Td (adult), adsorbed 03/15/2012    Tdap 03/15/2012      Health Maintenance:         Topic Date Due    Hepatitis C Screening  Never done    HIV Screening  Never done    Colorectal Cancer Screening  Never done    Breast Cancer Screening: Mammogram  01/26/2023    Cervical Cancer Screening  07/06/2023         Topic Date Due    Pneumococcal Vaccine: Pediatrics (0 to 5 Years) and At-Risk Patients (6 to 59 Years) (1 - PCV) Never done    COVID-19 Vaccine (3 - Booster for Moderna series) 09/14/2021    Influenza Vaccine (1) 09/01/2022      Medicare Screening Tests and Risk Assessments:     Carmen is here for her Subsequent Wellness visit   Last Medicare Wellness visit information reviewed, patient interviewed, no change since last AWV      Health Risk Assessment:   Patient rates overall health as poor  Patient feels that their physical health rating is much worse  Patient is satisfied with their life  Eyesight was rated as slightly worse  Hearing was rated as same  Patient feels that their emotional and mental health rating is slightly worse  Patients states they are never, rarely angry  Patient states they are always unusually tired/fatigued  Pain experienced in the last 7 days has been a lot  Patient's pain rating has been 10/10  Patient states that she has experienced weight loss or gain in last 6 months  Fall Risk Screening: In the past year, patient has experienced: no history of falling in past year      Urinary Incontinence Screening:   Patient has not leaked urine accidently in the last six months  Home Safety:  Patient does not have trouble with stairs inside or outside of their home  Patient has working smoke alarms and has working carbon monoxide detector  Home safety hazards include: none  Nutrition:   Current diet is Regular  Medications:   Patient is currently taking over-the-counter supplements  OTC medications include: see medication list  Patient is able to manage medications  Activities of Daily Living (ADLs)/Instrumental Activities of Daily Living (IADLs):   Walk and transfer into and out of bed and chair?: Yes  Dress and groom yourself?: Yes    Bathe or shower yourself?: Yes    Feed yourself? Yes  Do your laundry/housekeeping?: Yes  Manage your money, pay your bills and track your expenses?: Yes  Make your own meals?: Yes    Do your own shopping?: Yes    Previous Hospitalizations:   Any hospitalizations or ED visits within the last 12 months?: No      Advance Care Planning:   Living will: Yes    Durable POA for healthcare:  Yes    Advanced directive: Yes      Cognitive Screening:   Provider or family/friend/caregiver concerned regarding cognition?: No    PREVENTIVE SCREENINGS      Cardiovascular Screening:    General: Screening Not Indicated and History Lipid Disorder      Diabetes Screening:     General: Screening Current      Colorectal Cancer Screening:     General: Patient Declines and Risks and Benefits Discussed      Breast Cancer Screening:     General: Screening Current      Cervical Cancer Screening:    General: History Cervical Cancer      Osteoporosis Screening:    General: Patient Declines      Abdominal Aortic Aneurysm (AAA) Screening:        General: Screening Not Indicated      Lung Cancer Screening:     General: Screening Not Indicated      Hepatitis C Screening:    General: Screening Not Indicated    Screening, Brief Intervention, and Referral to Treatment (SBIRT)    Screening  Typical number of drinks in a day: 0  Typical number of drinks in a week: 0  Interpretation: Low risk drinking behavior  Other Counseling Topics:   Calcium and vitamin D intake and regular weightbearing exercise  No exam data present     Physical Exam:     /80 (BP Location: Left arm, Patient Position: Sitting, Cuff Size: Standard)   Pulse 86   Temp 100 4 °F (38 °C) (Tympanic)   Resp 16   Ht 5' 6" (1 676 m)   Wt 64 kg (141 lb 3 2 oz)   SpO2 98%   BMI 22 79 kg/m²     Physical Exam  Vitals and nursing note reviewed  Constitutional:       General: She is not in acute distress  Appearance: Normal appearance  She is well-developed  She is not ill-appearing  HENT:      Head: Normocephalic and atraumatic  Right Ear: Tympanic membrane, ear canal and external ear normal       Left Ear: Tympanic membrane, ear canal and external ear normal       Nose: No congestion  Eyes:      Extraocular Movements: Extraocular movements intact  Conjunctiva/sclera: Conjunctivae normal       Pupils: Pupils are equal, round, and reactive to light  Cardiovascular:      Rate and Rhythm: Normal rate and regular rhythm  Heart sounds: Normal heart sounds  No murmur heard    Pulmonary:      Effort: Pulmonary effort is normal       Breath sounds: Normal breath sounds  Abdominal:      General: Bowel sounds are normal       Palpations: Abdomen is soft  Tenderness: There is no abdominal tenderness  Musculoskeletal:         General: Normal range of motion  Cervical back: Normal range of motion  Right lower leg: Tenderness present  No swelling or deformity  No edema  Left lower leg: No edema  Right foot: Tenderness present  No swelling  Legs:    Lymphadenopathy:      Cervical: No cervical adenopathy  Skin:     General: Skin is warm and dry  Neurological:      Mental Status: She is alert and oriented to person, place, and time  Cranial Nerves: No cranial nerve deficit     Psychiatric:         Mood and Affect: Mood normal          Behavior: Behavior normal           Johnston Carry, CRNP

## 2022-08-10 NOTE — TELEPHONE ENCOUNTER
Pt last seen 6/8/22 please see below message  Did you want to order testing or have her come in for a visit?    Please advise

## 2022-08-10 NOTE — ASSESSMENT & PLAN NOTE
- Pain moreso in right foot  Will send for x-ray  - Referral placed to Podiatry  - Will continue to monitor

## 2022-08-10 NOTE — ASSESSMENT & PLAN NOTE
- Reviewed immunizations and screenings  - She is up to date with dental and vision exams   - Her mammogram is up to date  - Her recent labs are all stable

## 2022-08-10 NOTE — TELEPHONE ENCOUNTER
Patient called and said she was seen several times for pain in her leg and foot, has tingling in her hands, has been to the ER, walk in centers and now has a diaz time walking at times  Right leg from knee to foot  Also has had some tingling around her mouth    Is asking if she should get some labs again, checking calcium and Magnesium or an US of her leg        Asking for advice and a call

## 2022-08-12 ENCOUNTER — TELEPHONE (OUTPATIENT)
Dept: FAMILY MEDICINE CLINIC | Facility: CLINIC | Age: 52
End: 2022-08-12

## 2022-08-12 NOTE — TELEPHONE ENCOUNTER
----- Message from [address] sent at 8/11/2022  8:46 AM EDT -----  Labs show normal magnesium  Potassium is a little low  Increase potassium rich foods in diet such as bananas, potatoes, spinach, etc    Her triglycerides were elevated  I recommend low fat diet and regular exercise

## 2022-08-16 ENCOUNTER — TELEPHONE (OUTPATIENT)
Dept: FAMILY MEDICINE CLINIC | Facility: CLINIC | Age: 52
End: 2022-08-16

## 2022-08-16 NOTE — TELEPHONE ENCOUNTER
----- [facility] at 8/16/2022  7:44 AM EDT -----  Xrays were normal

## 2022-08-24 ENCOUNTER — HOSPITAL ENCOUNTER (OUTPATIENT)
Dept: PULMONOLOGY | Facility: HOSPITAL | Age: 52
Discharge: HOME | End: 2022-08-24
Attending: INTERNAL MEDICINE
Payer: MEDICARE

## 2022-08-24 DIAGNOSIS — J45.51 SEVERE PERSISTENT ASTHMA WITH ACUTE EXACERBATION: ICD-10-CM

## 2022-08-24 DIAGNOSIS — R06.02 SHORTNESS OF BREATH: ICD-10-CM

## 2022-08-24 PROCEDURE — 94760 N-INVAS EAR/PLS OXIMETRY 1: CPT

## 2022-08-24 PROCEDURE — 94070 EVALUATION OF WHEEZING: CPT | Performed by: INTERNAL MEDICINE

## 2022-08-24 PROCEDURE — 94726 PLETHYSMOGRAPHY LUNG VOLUMES: CPT | Performed by: INTERNAL MEDICINE

## 2022-08-24 PROCEDURE — 94070 EVALUATION OF WHEEZING: CPT

## 2022-08-24 RX ORDER — ALBUTEROL SULFATE 2.5 MG/3ML
2.5 SOLUTION RESPIRATORY (INHALATION) ONCE AS NEEDED
Status: COMPLETED | OUTPATIENT
Start: 2022-08-24 | End: 2022-08-24

## 2022-08-24 RX ADMIN — ALBUTEROL SULFATE 2.5 MG: 2.5 SOLUTION RESPIRATORY (INHALATION) at 14:25

## 2022-08-28 DIAGNOSIS — B96.89 BV (BACTERIAL VAGINOSIS): Primary | ICD-10-CM

## 2022-08-28 DIAGNOSIS — N76.0 BV (BACTERIAL VAGINOSIS): Primary | ICD-10-CM

## 2022-08-28 RX ORDER — CLINDAMYCIN PHOSPHATE 20 MG/G
1 CREAM VAGINAL
Qty: 40 G | Refills: 0 | Status: SHIPPED | OUTPATIENT
Start: 2022-08-28 | End: 2022-11-17 | Stop reason: SINTOL

## 2022-09-07 ENCOUNTER — TELEMEDICINE (OUTPATIENT)
Dept: DERMATOLOGY | Age: 52
End: 2022-09-07
Payer: MEDICARE

## 2022-09-07 DIAGNOSIS — L21.9 SEBORRHEIC DERMATITIS: ICD-10-CM

## 2022-09-07 DIAGNOSIS — L29.9 PRURITUS: ICD-10-CM

## 2022-09-07 DIAGNOSIS — L21.9 SEBORRHEIC DERMATITIS: Primary | ICD-10-CM

## 2022-09-07 PROCEDURE — 99441 PR PHYS/QHP TELEPHONE EVALUATION 5-10 MIN: CPT | Performed by: DERMATOLOGY

## 2022-09-07 RX ORDER — CICLOPIROX 1 G/100ML
1 SHAMPOO TOPICAL EVERY OTHER DAY
Qty: 120 ML | Refills: 1 | Status: SHIPPED | OUTPATIENT
Start: 2022-09-07 | End: 2022-09-22 | Stop reason: SDUPTHER

## 2022-09-07 RX ORDER — MOMETASONE FUROATE 1 MG/ML
SOLUTION TOPICAL DAILY
Qty: 60 ML | Refills: 2 | Status: SHIPPED | OUTPATIENT
Start: 2022-09-07

## 2022-09-07 RX ORDER — CICLOPIROX 1 G/100ML
1 SHAMPOO TOPICAL EVERY OTHER DAY
Qty: 120 ML | Refills: 1 | OUTPATIENT
Start: 2022-09-07

## 2022-09-07 NOTE — PATIENT INSTRUCTIONS
SEBORRHEIC DERMATITIS    Assessment and Plan:  Based on a thorough discussion of this condition and the management approach to it (including a comprehensive discussion of the known risks, side effects and potential benefits of treatment), the patient (family) agrees to implement the following specific plan:  Stop ketoconazole 2% shampoo  Start Ciclopirox 0 77% shampoo alppy topically to the scalp every other day  Apply topically to the scalp mometasone 0 1% lotion daily  Seborrheic Dermatitis   Seborrheic dermatitis is a common, chronic or relapsing form of eczema/dermatitis that mainly affects the sebaceous, gland-rich regions of the scalp, face, and trunk  There are infantile and adult forms of seborrhoeic dermatitis  It is sometimes associated with psoriasis and, in that clinical scenario, may be referred to as "sebo-psoriasis "  Seborrheic dermatitis is also known as "seborrheic eczema "  Dandruff (also called "pityriasis capitis") is an uninflamed form of seborrhoeic dermatitis  Dandruff presents as bran-like scaly patches scattered within hair-bearing areas of the scalp  In an infant, this condition may be referred to as "cradle cap "  The cause of seborrheic dermatitis is not completely understood  It is associated with proliferation of various species of the skin commensal Malassezia, in its yeast (non-pathogenic) form  Its metabolites (such as the fatty acids oleic acid, malssezin, and indole-3-carbaldehyde) may cause an inflammatory reaction  Differences in skin barrier lipid content and function may account for individual presentations  Infantile Seborrheic Dermatitis  Infantile seborrheic dermatitis affects babies under the age of 1 months and usually resolves by 1012 months of age  Infantile seborrheic dermatitis causes "cradle cap" (diffuse, greasy scaling on scalp)  The rash may spread to affect armpit and groin folds (a type of "napkin dermatitis")    There may be associated salmon-pink colored patches that may flake or peel  The rash in this case is usually not especially itchy, so the baby often appears undisturbed by the rash, even when more generalized  Adult Seborrheic Dermatitis  Adult seborrheic dermatitis tends to begin in late adolescence; prevalence is greatest in young adults and in the elderly  It is more common in males than in females  The following factors are sometimes associated with severe adult seborrheic dermatitis:  Oily skin  Familial tendency to seborrhoeic dermatitis or a family history of psoriasis  Immunosuppression: organ transplant recipient, human immunodeficiency virus (HIV) infection and patients with lymphoma  Neurological and psychiatric diseases: Parkinson disease, tardive dyskinesia, depression, epilepsy, facial nerve palsy, spinal cord injury and congenital disorders such as Down syndrome  Treatment for psoriasis with psoralen and ultraviolet A (PUVA) therapy  Lack of sleep  Stressful events  In adults, seborrheic dermatitis may typically affect the scalp, face (creases around the nose, behind ears, within eyebrows) and upper trunk  Typical clinical features include: Winter flares, improving in summer following sun exposure  Minimal itch most of the time  Combination oily and dry mid-facial skin  Ill-defined localized scaly patches or diffuse scale in the scalp  Blepharitis; scaly red eyelid margins  [place]-pink, thin, scaly, and ill-defined plaques in skin folds on both sides of the face  Petal or ring-shaped flaky patches on hair-line and on anterior chest  Rash in armpits, under the breasts, in the groin folds and genital creases  Superficial folliculitis (inflamed hair follicles) on cheeks and upper trunk    Seborrheic dermatitis is diagnosed by its clinical appearance and behavior  Skin biopsy may be helpful but is rarely necessary to make this diagnosis      URTICARIA ("ACUTE")    Assessment and Plan:  Based on a thorough discussion of this condition and the management approach to it (including a comprehensive discussion of the known risks, side effects and potential benefits of treatment), the patient (family) agrees to implement the following specific plan:  Continue with Allegra as needed  What is urticaria? Urticaria is characterised by weals (hives) or angioedema (swellings, in 10%) or both (in 40%)  There are several types of urticaria  The name urticaria is derived from the common [place] stinging nettle 'Urtica dioica'  A weal (or wheal) is a superficial skin-coloured or pale skin swelling, usually surrounded by erythema (redness) that lasts anything from a few minutes to 24 hours  Usually very itchy, it may have a burning sensation  Angioedema is deeper swelling within the skin or mucous membranes and can be skin-coloured or red  It resolves within 72 hours  Angioedema may be itchy or painful but is often asymptomatic  What is acute urticaria? Acute urticaria is urticaria, with or without angioedema, that is present for less than 6 weeks  It is often gone within hours to days  Who gets acute urticaria? One in five children or adults has an episode of acute urticaria during their lifetime  It is more common in atopic individuals  It affects all races and both sexes  What are the clinical features of acute urticaria? Urticarial weals can be a few millimeters or several centimeters in diameter, colored white or red, with or without a red flare  Each weal may last a few minutes or several hours and may change shape  Weals may be round, or form rings, a map-like pattern, targetoid lesions, or giant patches  Acute urticaria can affect any site of the body and tends to be distributed widely  Angioedema is more often localised  It commonly affects the face (especially eyelids and perioral sites), hands, feet and genitalia  It may involve tongue, uvula, soft palate, larynx      Serum sickness due to blood transfusion and serum sickness-like reactions due to certain drugs cause acute urticaria leaving bruises, fever, swollen lymph glands, joint pain and swelling  What causes acute urticaria? Weals are due to release of chemical mediators from tissue mast cells and circulating basophils  These chemical mediators include histamine, platelet-activating factor and cytokines  The mediators activate sensory nerves and cause dilation of blood vessels and leakage of fluid into surrounding tissues  Bradykinin release causes angioedema  Several hypotheses have been proposed to explain urticaria  The immune, arachidonic acid and coagulation systems are involved, and genetic mutations are under investigation  Serum sickness and serum sickness-like reactions are due to immune complex deposition in affected tissues  Acute urticaria can be induced by the following factors but the cause is not always identified  Acute viral infection -- an upper respiratory infection, viral hepatitis, infectious mononucleosis, mycoplasma   Acute bacterial infection -- a dental abscess, sinusitis   Food allergy (IgE mediated) -- usually milk, egg, peanut, shellfish   Drug allergy (IgE mediated) -- often an antibiotic   Drug pseudoallergy -- aspirin, nonselective nonsteroidal anti-inflammatory drugs, opiates, radiocontrast media; these cause urticaria without immune activation   Vaccination   Bee or wasp stings     Widespread reaction following localized contact urticaria -- rubber latex  Severe allergic urticaria may lead to anaphylactic shock (bronchospasm, collapse)  A single episode or recurrent episodes of angioedema without urticaria can be due to an angiotensin-converting enzyme (ACE) inhibitor drug  How is acute urticaria diagnosed? Acute urticaria is diagnosed in people with a short history of weals that last less than 24 hours, with or without angioedema  A thorough physical examination should be undertaken to look for underlying causes      Skin prick tests and radioallergosorbent tests (RAST) or CAP fluoroimmunoassay may be requested if a drug or food allergy is suspected in acute urticaria  Biopsy of urticaria can be non-specific and difficult to interpret  The pathology shows oedema in the dermis and dilated blood vessels, with a variable mixed inflammatory infiltrate  Vessel-wall damage indicates urticarial vasculitis  What is the treatment for acute urticaria? The main treatment for acute urticaria in adults and in children is with an oral second-generation antihistamine chosen from the list below  If the standard dose (eg 10 mg for cetirizine) is not effective, the dose can be increased fourfold (eg 40 mg cetirizine daily)  They are best taken continuously rather than on demand  They are stopped when the acute urticaria has settled down  There is not thought to be any benefit from adding a second antihistamine  Cetirizine   Loratadine   Fexofenadine   Desloratadine   Levocetirizine   Rupatadine   Bilastine  Terfenadine and astemizole should not be used as they are cardiotoxic in combination with ketoconazole or erythromycin  They are no longer available in [place]  Although systemic treatment is best avoided during pregnancy and breastfeeding, there have been no reports that second-generation antihistamines cause birth defects  If treatment is required, loratadine and cetirizine are currently preferred  Conventional first-generation antihistamines such as promethazine or chlorpheniramine are no longer recommended for urticaria  Avoidance of trigger factors  In addition to antihistamines, the cause of urticaria should be eliminated if known (eg drug or food allergy)  Avoidance of relevant type 1 (IgE-mediated) allergens clears urticaria within 48 hours  In addition to antihistamines, the triggers for urticaria should be avoided where possible   For example:  Avoid aspirin, opiates and nonsteroidal anti-inflammatory drugs (paracetamol is generally safe)  Avoid known allergies that have been confirmed by positive specific IgE/skin prick tests if these have clinical relevance for urticaria  Cool the affected area with a fan, cold flannel, ice pack or soothing moisturising lotion  Treatment of refractory acute urticaria  If non-sedating antihistamines are not effective, a 4 to 5-day course of oral prednisone (prednisolone) may be warranted in severe acute urticaria, particularly if there is angioedema  Systemic steroids do not speed up the resolution of symptoms  Intramuscular injection of adrenaline (epinephrine) is reserved for life-threatening anaphylaxis or swelling of the throat  CONTACT DERMATITIS    Assessment and Plan:  Diagnosis:  Contact dermatitis  Based on a thorough discussion of this condition and the management approach to it (including a comprehensive discussion of the known risks, side effects and potential benefits of treatment), the patient (family) agrees to implement the following specific plan:    continue medication OBGYN prescribed  Please give more time for healing  What is contact dermatitis? Contact dermatitis is a type of eczema that results from something coming in contact with the skin  There are 2 types:  irritant and allergic  The majority of cases are from irritation  Usually that is from contact with strong soaps, repeated exposure to water, contact with cleaning agents or food, or friction  The minority are an actual allergy  In these cases something is coming in contact with the skin and causing an allergic reaction, similar to what happens with poison ivy  This usually occurs unexpectedly after using something for many years  Even very tiny amounts of the substance on the skin can cause a reaction  Some common causes are fragrances, preservatives and metals  Sometimes this can occur when an allergic substance is eaten, as well, but most often it is from direct contact with the skin    To determine the cause of allergy a patch test is often done  Some general rules to follow for both types of contact dermatitis are:   Wear gloves when using strong cleansers or before prolonged contact with water (like washing dishes)   Use gentle cleansers and avoid strong soaps   Apply moisturizer to entire body after bathing    Avoid products with fragrance    Most often contact dermatitis is treated with topical medicines like topical steroids, eucrisa, pimecrolimus or tacrolimus     Some times oral steroids, ie prednisone, methylprednisone and prednisolone, are needed  In chronic cases, treatment options include:  light therapy, methotrexate, cyclosporin  PRURITUS    Physical Exam:  Anatomic Location Affected: All over  Morphological Description:  Not visualized  Pertinent Positives:  Pertinent Negatives: Additional History of Present Condition:  Change thyroid medication better but not all gone    Assessment and Plan:  Based on a thorough discussion of this condition and the management approach to it (including a comprehensive discussion of the known risks, side effects and potential benefits of treatment), the patient (family) agrees to implement the following specific plan:  Recommend Chest Xray and Ultrasound of the abdomen to rule out malignancy  What is pruritus? Pruritus is the medical term for itch  Itch is an unpleasant sensation on the skin that provokes the desire to rub or scratch the area to obtain relief  Itch can cause discomfort and frustration; in severe cases it can lead to disturbed sleep, anxiety and depression  Constant scratching to obtain relief can damage the skin (excoriation, lichenification) and reduce its effectiveness as a major protective barrier  Pruritus is often a symptom of an underlying disease process such as a skin problem, a systemic disease, or abnormal nerve impulses  What are skin signs of pruritus?    There are no specific skin signs associated with pruritus, apart from scratch marks (excoriations) and signs of the underlying condition  Persistent scratching over a period of time may lead to:  Lichenification (thickened skin, lichen simplex)   Prurigo papules and nodules  Who gets pruritus? The epidemiology of pruritus depends on its underlying cause or causes  However, in general, the incidence of chronic pruritus increases with age, it is more common in women, and in those of  background  Mechanisms underlying pruritus  Itch, like pain, can originate anywhere along the neural itch pathway, from the central nervous system (brain and spinal cord) to the peripheral nervous system and the skin  Mechanisms underlying pruritus are complex  The itch signal is transmitted mainly through small, itch-selective C-fibers in the skin in addition to histamine-triggered and non-histaminergic neurons  These connect with secondary neurons which cross the opposite side of the spinothalamic tract and ascend to parts of the brain involved in sensation, emotion, reward and memory  These areas overlap with those activated by pain  Patients with chronic pruritus usually have both peripheral and central hypersensitization (heightened reaction) which means they tend to overreact to noxious stimuli which normally inhibit itch (such as heat and scratching) and also misinterpret non-noxious stimuli as an itch (eg, light touch)    The way scratching stops itching has been explained by an interaction with pain pathways within the dorsal horn of the spinal cord  Localized pruritus  Localized pruritus is pruritus that is confined to a certain part of the body  It can occur in association with a primary rash (eg dermatitis) or may occur because of hypersensitive nerves in the skin (neuropathic pruritus)  Neuropathic pruritus is due to compression or degeneration of nerves in the skin, on route to the spine or in the spine itself   Neuropathic itch is sometimes associated with reduced or absent sweating in the affected area of skin  Typical causes of localized itchy rashes  Scalp: seborrhoeic dermatitis, head lice   Back: [place] disease   Hands: pompholyx, irritant and/or allergic contact dermatitis   Genitals: vulvovaginal Candida albicans infection, lichen sclerosus   Legs: venous eczema   Feet: tinea pedis    Neuropathic causes of localized pruritus without primary rash  Face: trigeminal trophic syndrome   Hand: cheiralgia paraesthetica   Arm: brachioradial pruritus   Back: notalgia paraesthetica/topics/brachioradial-pruritus/   Genital: pruritus vulvae, pruritus ani   Dermatomal: herpes zoster (shingles) during recovery phase  Scratching a localised itch may lead to lichen simplex, prurigo or prurigo nodularis  Systemic causes of pruritus  Sytemic diseases may cause generalised pruritus  This is sometimes called metabolic itch  There is nothing wrong with the skin itself, at least until it's been scratched  Metabolic disorders include chronic renal failure (dialysis) and liver disease (with or without cholestasis)  Uraemic pruritus arises in patients undergoing dialysis is due to a combination of xerosis (dry skin), secondary hyperparathyroidism, peripheral neuropathy (nerve changes) and inflammation  Secondary hyperparathyroidism which also occurs in dialysis patients leads to microprecipitation (deposition) of calcium and magnesium salts in the skin, triggering mast cell degeneration, releasing serotonin and histamine  Once chronic pruritus has occurred, there may be secondary changes in the nerves in the skin and central nervous system which heighten the sensation of itch  Hepatogenic pruritus is more common in intrahepatic than extrahepatic cholestasis  Examples of intrahepatic cholestasis is associated with chronic viral hepatitis, primary biliary cirrhosis, pregnancy-related cholestasis   Extra-hepatic cholestasis is associated with pressure on the bile ducts, eg from pancreatic tumours or pseudocysts  Cholestasis is thought to release toxic substances from the liver, which stimulates neural itch fibres in the skin  Characteristically, cholestatic pruritus is most severe at night; it tends to affect the hands, feet and areas where clothes are rubbing on the skin  Haematological disorders include iron deficiency anaemia and polycythaemia vera  Generalized pruritus along with glossitis (tongue inflammation) and angular cheilitis (inflammation of mouth corners) are seen in iron deficiency anaemia  In polycythaemia vera, itch is usually precipitated by contact with water (aquagenic pruritus), eg after a shower  This is thought to be mediated by the effect of platelets, serotonin and prostaglandins  Endocrine disorders include thyroid disease and diabetes mellitus  In Graves' disease (thyrotoxicosis), increased blood flow, skin temperature and decreased itch threshold mediated by the increase in thyroid hormones, lead to the itch  Pruritus associated with myxoedema and hypothyroidism is rare, and if present, is more likely the result of xerosis (dry skin)  In diabetes mellitus, localized itch tends to occur in the perianal/genital region usually due to Candida albicans or dermatophyte infections  It is unclear if metabolic abnormalities such as renal impairment, autonomic failure or diabetic neuropathy contribute to this  Paraneoplastic itch is associated with lymphoma, especially Hodgkin lymphoma, leukemia or a solid organ tumor (eg lung, colon, brain)  In Hodgkin lymphoma, pruritus is thought to be caused by histamine release, which may be related to eosinophilia  Infections causing itch include human immunodeficiency virus infection (HIV) and hepatitis C virus  Patients with HIV commonly complain of itch   This may be associated with skin infections/infestations, dry skin, drug reactions, hyperoesinophilia (increased eosinophil levels) and cutaneous T cell lymphoma  There is a possible correlation between intractable pruritus and increased HIV viral load  In chronic hepatitis C infection, the mechanisms responsible for itch remain unclear  In the absence of cholestasis, pruritus may be related to antiviral therapy; it has been noted to occur in patients treated with combination therapy (interferon prosper and ribavirin)  Pruritic skin diseases  Pruritus is often a symptom of many skin diseases  Some of these are included in the following list   Allergic contact dermatitis   Dry skin   Urticaria   Psoriasis   Atopic dermatitis   Folliculitis   Dermatitis herpetiformis   Lichen simplex   Lichen planus   Bullous pemphigoid   Lice   Scabies   Miliaria   Sunburn   Pityriasis rosea   Mycosis fungoides    Exposure-related pruritus  Pruritus may arise as a result of exposure to certain external factors  Allergens or irritants   Cold, which can cause 'winter itch'   A physical urticaria, such as dermographism   Aquagenic pruritus (itch on exposure to water)   Insects and infestations, eg scabies   Medications (topical or systemic) eg opioids, aspirin    Hormonal reasons for pruritus  About 2% of pregnant women have pruritus without any obvious dermatological cause  In some cases the itch is due to cholestasis (pooling of bile in the gall bladder and liver)  It usually occurs in the 3rd trimester and is relieved after giving birth  Generalized itch is also a common symptom of menopause  How is pruritus diagnosed? The first steps of evaluation of an itchy patient are medical history and examination  A thorough history can identify constitutional symptoms that may point towards an underlying systemic disease  Drug triggers such as opioids may be identified, especially if the commencement of the drug relates to the itch      A careful examination can identify dermatological causes for the itch (eg scabies, lichen simplex, pemphigoid) or evidence of chronic skin changes related to the itch  In dermatological causes of pruritus, primary skin lesions will usually suggest the diagnosis  Patients without primary skin lesions and little evidence of chronic scratching should be investigated for systemic, neuropathic and psychogenic causes  The panel of investigations could include:  Full/complete blood count   Creatinine and renal function tests   Liver function tests   Thyroid function tests   Erythrocyte sedimentation rate   Chest radiography   HIV serology    What treatment is available for itch? The management of pruritus relies on establishing the cause and then either removing or treating the cause to prevent further itching  In many cases, tests are necessary to determine the cause; while these are in progress, treatment to provide symptomatic relief of pruritus may be given  Topical treatments  In addition to specific therapy for any underlying skin or internal disease, topical treatment may include:  Wet dressings or tepid shower to cool the skin   Calamine lotion (contains phenol, which cools the skin): avoid on dry skin and limit use to a few days   Menthol/camphor lotion: gives a chilling sensation   Local anesthetics, such as pramoxine (also called pramocaine), applied to small itchy spots such as insect bites   Regular use of emollients, especially if skin is dry   Mild topical corticosteroids for short periods   Topical calcineurin inhibitors are also used to reduce itch associated with inflammatory skin conditions   Topical doxepin, a tricyclic antidepressant and antihistamine, is an antipruritic used in eczema  Other measures that can be useful in preventing pruritus include avoiding precipitating factors such as rough clothing or fabrics, overheating, and vasodilators if they provoke itching (eg, caffeine, alcohol, spices)  Fingernails should be kept short and clean  If the urge to scratch is irresistible then rub the area with your palm    Topical antihistamines should not be used for chronic itch, as they may sensitize the skin and result in allergic contact dermatitis  Systemic therapy  If pruritus is severe and sleep is disturbed, then treatment with oral medication may be necessary  Some drugs may help to relieve the itch whilst others are given solely for their sedative effects  Antihistamines are most useful in urticaria, in which histamine is released  Use for other pruritic conditions is not supported by randomized control trials  Sedating antihistamines may be used for their sedative effects  Doxepin and amitriptyline are tricyclic antidepressants have antipruritic action and act on the central and peripheral nervous systems  Tetracyclic antidepressants such as mirtazepine and selective serotonin reuptake inhibitors (paroxetine, sertraline, fluoxetine) may also help some patients with severe itch including when it is caused by cholestasis, T-cell lymphoma, malignancy or a neuropathic condition  Anti-epileptic drugs such as sodium valproate, gabapentin and pregabalin may also be of benefit to some patients, e g , those with itch associated with renal failure or neuropathic itch  The mechanism of action is uncertain  Opioid antagonists such as butorphanol intranasal spray, naltrexone tablets, and naloxone have been effective in patients suffering from intractable pruritus in association with liver disease, atopic eczema and chronic urticaria  Nalfurafine, which is a kappa-opioid agonist has also been studied and shown to reduce itch associated with chronic renal impairment, however it is not widely available  Aspirin is sometimes effective if pruritus is mediated by kinins or prostaglandins and is noted to be effective in patients with pruritus due to polycythaemia vera  Note: aspirin may cause or aggravate itch in some patients     Thalidomide has been successful in treating nodular prurigo and chronic pruritus of various kinds but is rarely used because of serious adverse effects and expense  Rifampicin is effective for patients with pruritus associated with cholestasis (some forms of liver disease)  Isolated case reports in severe itch associated with malignancy have reported success with the NKR1 antagonist, aprepitant (normally used short-term for postoperative or chemotherapy-induced nausea)  This is under investigation for neuropathic itch and nodular prurigo  Phototherapy  Broadband ultraviolet B or narrow-band UVB phototherapy alone, or in conjunction with UVA, has been shown to be helpful for pruritus associated with chronic kidney disease, psoriasis, atopic eczema and cutaneous T-cell lymphoma  Behavioral therapy  Behavioral therapy may be used in conjunction with pharmacotherapy to modify behaviours such as coping mechanisms and stress reduction, which help interrupt the itch-scratch cycle  One randomized controlled trial showed short-term benefits in reduction in itch frequency and scratching as well as improvement in coping mechanisms  What is the outcome for pruritus? The management of chronic severe itch is difficult and often requires the use of combination therapy over a long period of time  Identification and treatment of underlying conditions causing pruritus may help in this process  The symptom may quickly disappear or persist for long periods of time

## 2022-09-07 NOTE — PROGRESS NOTES
[facility] Follow Up Note    Patient Name: Ananth Smith  Encounter Date: 9/7/22    Today's Chief Concerns:  Kirsten Ma Concern #1:  Pruritis f/u   Concern #2:  seborrhea   Concern #3:   Hives   Concern #4: Contact dermatitis    Current Medications:    Current Outpatient Medications:     albuterol (2 5 mg/3 mL) 0 083 % nebulizer solution, Take 1 vial (2 5 mg total) by nebulization 3 (three) times a day, Disp: 30 vial, Rfl: 2    albuterol (PROAIR HFA) 90 mcg/act inhaler, Inhale 2 puffs 4 (four) times a day every 4 to 6 hours as needed, Disp: 1 Inhaler, Rfl: 5    Ripton Thyroid 15 MG tablet, , Disp: , Rfl:     Ripton Thyroid 60 MG tablet, , Disp: , Rfl:     budesonide-formoterol (Symbicort) 160-4 5 mcg/act inhaler, Inhale 2 puffs 2 (two) times a day Rinse mouth after use , Disp: 10 2 g, Rfl: 3    cetirizine (ZyrTEC) 10 MG chewable tablet, Chew 1 tablet (10 mg total) daily (Patient not taking: No sig reported), Disp: 30 tablet, Rfl: 2    clindamycin (CLEOCIN) 2 % vaginal cream, Insert 1 applicator into the vagina daily at bedtime X 5 nights, Disp: 40 g, Rfl: 0    clotrimazole-betamethasone (LOTRISONE) 1-0 05 % cream, Apply topically 2 (two) times a day, Disp: 30 g, Rfl: 0    colestipol (COLESTID) 1 g tablet, Take 1 tablet (1 g total) by mouth 2 (two) times a day, Disp: 180 tablet, Rfl: 3    dicyclomine (BENTYL) 10 mg capsule, TAKE 1 CAPSULE (10 MG TOTAL) BY MOUTH 3 (THREE) TIMES A DAY AS NEEDED (ABDOMINAL PAIN), Disp: 270 capsule, Rfl: 1    fluticasone (FLONASE) 50 mcg/act nasal spray, as needed  , Disp: , Rfl:     ketoconazole (NIZORAL) 2 % shampoo, USE TO WASH HAIR UP TO ONCE DAILY, Disp: 120 mL, Rfl: 3    levalbuterol (XOPENEX) 1 25 mg/3 mL nebulizer solution, 1 25 MG EVERY 4 HRS FOR 30 DAYS (Patient not taking: Reported on 8/2/2022), Disp: , Rfl:     Levoxyl 125 MCG tablet, Take 125 mcg by mouth daily (Patient not taking: Reported on 8/10/2022), Disp: , Rfl:     LORazepam (ATIVAN) 1 mg tablet, TAKE 1 & 1/2 TABLETS DAILY AT BEDTIME AS NEEDED, Disp: , Rfl: 1    medroxyPROGESTERone (DEPO-PROVERA) 150 mg/mL injection, PLEASE GIVE IN OFFICE EVERY 10 WEEKS, Disp: 1 mL, Rfl: 0    Meth-Hyo-M Bl-Na Phos-Ph Sal (Uribel) 118 MG CAPS, Take 1 each by mouth as needed, Disp: , Rfl:     Multiple Vitamin (MULTI VITAMIN DAILY PO), Take by mouth  , Disp: , Rfl:     naproxen (NAPROSYN) 500 mg tablet, TAKE 1 TABLET BY MOUTH 3 TIMES A DAY AS NEEDED MIGRAINE  LIMIT USE TO 3 DAYS PER WEEK, Disp: , Rfl:     Noritate 1 % cream, APPLY TO AFFECTED AREA TOPICALLY EVERY DAY (Patient not taking: No sig reported), Disp: 60 g, Rfl: 1    omeprazole (PriLOSEC) 40 MG capsule, Take 1 capsule (40 mg total) by mouth daily (Patient not taking: No sig reported), Disp: 30 capsule, Rfl: 2    ondansetron (ZOFRAN) 4 mg tablet, Take 1 tablet (4 mg total) by mouth every 8 (eight) hours as needed for nausea, Disp: 20 tablet, Rfl: 5    pentosan polysulfate (ELMIRON) 100 mg capsule, Take 100 mg by mouth 3 (three) times a day before meals  , Disp: , Rfl:     sodium chloride (OCEAN) 0 65 % nasal spray, 1 spray into each nostril as needed for congestion, Disp: 480 mL, Rfl: 0    triamcinolone (KENALOG) 0 1 % cream, Apply topically on affected area twice a day for 2 weeks for contact dermatitis, Disp: 30 g, Rfl: 1    Current Facility-Administered Medications:     medroxyPROGESTERone (DEPO-PROVERA) IM injection 150 mg, 150 mg, Intramuscular, Once, Wilhelmenia Para, DO    CONSTITUTIONAL:   There were no vitals filed for this visit  Specific Alerts:    Have you been seen by a [facility]ologist in the last 3 years? YES    Are you pregnant or planning to become pregnant? No    Are you currently or planning to be nursing or breast feeding? No    Allergies   Allergen Reactions    Latex Itching and Hives     Irritation    Vancomycin Itching and Swelling     Patient had facial swelling, especially around her eyes and severe itching   Azithromycin Diarrhea, Nausea Only and Other (See Comments)    Clarithromycin Other (See Comments)    Clavulanic Acid Other (See Comments)    Doxycycline Other (See Comments)    Erythromycin Base Other (See Comments)    Lamotrigine Other (See Comments)     Can't recall side effects   Lithium Itching    Mirtazapine Other (See Comments)     (remeron)  (remeron)  (remeron)    Moxifloxacin Nausea Only     Excessive vomiting  Excessive vomiting    Sulfamethoxazole      Other reaction(s): Rash    Sulfamethoxazole-Trimethoprim Other (See Comments)    Trimethoprim      Other reaction(s): Rash    Indomethacin Rash    Penicillins Rash       May we call your Preferred Phone number to discuss your specific medical information? YES    May we leave a detailed message that includes your specific medical information? YES    Have you traveled outside of the [facility] in the past 3 months? No    Do you currently have a pacemaker or defibrillator? No    Do you have any artificial heart valves, joints, plates, screws, rods, stents, pins, etc? No   - If Yes, were any placed within the last 2 years? Do you require any medications prior to a surgical procedure? No   - If Yes, for which procedure? - If Yes, what medications to you require? Are you taking any medications that cause you to bleed more easily ("blood thinners") No    Have you ever experienced a rapid heartbeat with epinephrine? No    Have you ever been treated with "gold" (gold sodium thiomalate) therapy? No    [facility] can help with wrinkles, "laugh lines," facial volume loss, "double chin," "love handles," age spots, and more  Are you interested in learning today about some of the skin enhancement procedures that we offer? (If Yes, please provide more detail) No    Review of Systems:  Have you recently had or currently have any of the following?     · Fever or chills: No  · Night Sweats: No  · Headaches: YES  · Weight Gain: No  · Weight Loss: No  · Blurry Vision: No  · Nausea: No  · Vomiting: No  · Diarrhea: No  · Blood in Stool: No  · Abdominal Pain: No  · Itchy Skin: YES  · Painful Joints: No  · Swollen Joints: No  · Muscle Pain: No  · Irregular Mole: No  · Sun Burn: No  · Dry Skin: No  · Skin Color Changes: No  · Scar or Keloid: No  · Cold Sores/Fever Blisters: No  · Bacterial Infections/MRSA: No  · Anxiety: No  · Depression: No  · Suicidal or Homicidal Thoughts: No      PSYCH: Normal mood and affect  EYES: Normal conjunctiva  ENT: Normal lips and oral mucosa  CARDIOVASCULAR: No edema  RESPIRATORY: Normal respirations  HEME/LYMPH/IMMUNO:  No regional lymphadenopathy except as noted below in ASSESSMENT AND PLAN BY DIAGNOSIS    FULL ORGAN SYSTEM SKIN EXAM (SKIN)   Hair, Scalp, Ears, Face Normal except as noted below in Assessment   Neck, Cervical Chain Nodes Normal except as noted below in Assessment   Right Arm/Hand/Fingers Normal except as noted below in Assessment   Left Arm/Hand/Fingers Normal except as noted below in Assessment   Chest/Breasts/Axillae Viewed areas Normal except as noted below in Assessment   Abdomen, Umbilicus Normal except as noted below in Assessment   Back/Spine Normal except as noted below in Assessment   Groin/Genitalia/Buttocks Viewed areas Normal except as noted below in Assessment   Right Leg, Foot, Toes Normal except as noted below in Assessment   Left Leg, Foot, Toes Normal except as noted below in Assessment       SEBORRHEIC DERMATITIS    Physical Exam:   Anatomic Location Affected:  scalp   Morphological Description:  Could not be visualized   Pertinent Positives:   Pertinent Negatives:     Additional History of Present Condition:  Hair hurts to move shampoo helped a little    Assessment and Plan:  Based on a thorough discussion of this condition and the management approach to it (including a comprehensive discussion of the known risks, side effects and potential benefits of treatment), the patient (family) agrees to implement the following specific plan:   Stop ketoconazole 2% shampoo   Start Ciclopirox 0 77% shampoo alppy topically to the scalp every other day   Apply topically to the scalp mometasone 0 1% lotion daily  Seborrheic Dermatitis   Seborrheic dermatitis is a common, chronic or relapsing form of eczema/dermatitis that mainly affects the sebaceous, gland-rich regions of the scalp, face, and trunk  There are infantile and adult forms of seborrhoeic dermatitis  It is sometimes associated with psoriasis and, in that clinical scenario, may be referred to as "sebo-psoriasis "  Seborrheic dermatitis is also known as "seborrheic eczema "  Dandruff (also called "pityriasis capitis") is an uninflamed form of seborrhoeic dermatitis  Dandruff presents as bran-like scaly patches scattered within hair-bearing areas of the scalp  In an infant, this condition may be referred to as "cradle cap "  The cause of seborrheic dermatitis is not completely understood  It is associated with proliferation of various species of the skin commensal Malassezia, in its yeast (non-pathogenic) form  Its metabolites (such as the fatty acids oleic acid, malssezin, and indole-3-carbaldehyde) may cause an inflammatory reaction  Differences in skin barrier lipid content and function may account for individual presentations  Infantile Seborrheic Dermatitis  Infantile seborrheic dermatitis affects babies under the age of 1 months and usually resolves by 1012 months of age  Infantile seborrheic dermatitis causes "cradle cap" (diffuse, greasy scaling on scalp)  The rash may spread to affect armpit and groin folds (a type of "napkin dermatitis")  There may be associated salmon-pink colored patches that may flake or peel  The rash in this case is usually not especially itchy, so the baby often appears undisturbed by the rash, even when more generalized      Adult Seborrheic Dermatitis  Adult seborrheic dermatitis tends to begin in late adolescence; prevalence is greatest in young adults and in the elderly  It is more common in males than in females  The following factors are sometimes associated with severe adult seborrheic dermatitis:   Oily skin   Familial tendency to seborrhoeic dermatitis or a family history of psoriasis   Immunosuppression: organ transplant recipient, human immunodeficiency virus (HIV) infection and patients with lymphoma   Neurological and psychiatric diseases: Parkinson disease, tardive dyskinesia, depression, epilepsy, facial nerve palsy, spinal cord injury and congenital disorders such as Down syndrome   Treatment for psoriasis with psoralen and ultraviolet A (PUVA) therapy   Lack of sleep   Stressful events  In adults, seborrheic dermatitis may typically affect the scalp, face (creases around the nose, behind ears, within eyebrows) and upper trunk  Typical clinical features include:   Winter flares, improving in summer following sun exposure   Minimal itch most of the time   Combination oily and dry mid-facial skin   Ill-defined localized scaly patches or diffuse scale in the scalp   Blepharitis; scaly red eyelid margins   Bly-pink, thin, scaly, and ill-defined plaques in skin folds on both sides of the face   Petal or ring-shaped flaky patches on hair-line and on anterior chest   Rash in armpits, under the breasts, in the groin folds and genital creases   Superficial folliculitis (inflamed hair follicles) on cheeks and upper trunk    Seborrheic dermatitis is diagnosed by its clinical appearance and behavior  Skin biopsy may be helpful but is rarely necessary to make this diagnosis  URTICARIA ("ACUTE")    Physical Exam:   Anatomic Location Affected: All over   Morphological Description:  Could not be visualized   Pertinent Positives:   Pertinent Negatives:     Additional History of Present Condition:  Not present today    Assessment and Plan:  Based on a thorough discussion of this condition and the management approach to it (including a comprehensive discussion of the known risks, side effects and potential benefits of treatment), the patient (family) agrees to implement the following specific plan:   Continue with Allegra as needed  What is urticaria? Urticaria is characterised by weals (hives) or angioedema (swellings, in 10%) or both (in 40%)  There are several types of urticaria  The name urticaria is derived from the common [place] stinging nettle 'Urtica dioica'  A weal (or wheal) is a superficial skin-coloured or pale skin swelling, usually surrounded by erythema (redness) that lasts anything from a few minutes to 24 hours  Usually very itchy, it may have a burning sensation  Angioedema is deeper swelling within the skin or mucous membranes and can be skin-coloured or red  It resolves within 72 hours  Angioedema may be itchy or painful but is often asymptomatic  What is acute urticaria? Acute urticaria is urticaria, with or without angioedema, that is present for less than 6 weeks  It is often gone within hours to days  Who gets acute urticaria? One in five children or adults has an episode of acute urticaria during their lifetime  It is more common in atopic individuals  It affects all races and both sexes  What are the clinical features of acute urticaria? Urticarial weals can be a few millimeters or several centimeters in diameter, colored white or red, with or without a red flare  Each weal may last a few minutes or several hours and may change shape  Weals may be round, or form rings, a map-like pattern, targetoid lesions, or giant patches  Acute urticaria can affect any site of the body and tends to be distributed widely  Angioedema is more often localised  It commonly affects the face (especially eyelids and perioral sites), hands, feet and genitalia  It may involve tongue, uvula, soft palate, larynx      Serum sickness due to blood transfusion and serum sickness-like reactions due to certain drugs cause acute urticaria leaving bruises, fever, swollen lymph glands, joint pain and swelling  What causes acute urticaria? Weals are due to release of chemical mediators from tissue mast cells and circulating basophils  These chemical mediators include histamine, platelet-activating factor and cytokines  The mediators activate sensory nerves and cause dilation of blood vessels and leakage of fluid into surrounding tissues  Bradykinin release causes angioedema  Several hypotheses have been proposed to explain urticaria  The immune, arachidonic acid and coagulation systems are involved, and genetic mutations are under investigation  Serum sickness and serum sickness-like reactions are due to immune complex deposition in affected tissues  Acute urticaria can be induced by the following factors but the cause is not always identified   Acute viral infection -- an upper respiratory infection, viral hepatitis, infectious mononucleosis, mycoplasma    Acute bacterial infection -- a dental abscess, sinusitis    Food allergy (IgE mediated) -- usually milk, egg, peanut, shellfish    Drug allergy (IgE mediated) -- often an antibiotic    Drug pseudoallergy -- aspirin, nonselective nonsteroidal anti-inflammatory drugs, opiates, radiocontrast media; these cause urticaria without immune activation    Vaccination    Bee or wasp stings     Widespread reaction following localized contact urticaria -- rubber latex  Severe allergic urticaria may lead to anaphylactic shock (bronchospasm, collapse)  A single episode or recurrent episodes of angioedema without urticaria can be due to an angiotensin-converting enzyme (ACE) inhibitor drug  How is acute urticaria diagnosed? Acute urticaria is diagnosed in people with a short history of weals that last less than 24 hours, with or without angioedema   A thorough physical examination should be undertaken to look for underlying causes  Skin prick tests and radioallergosorbent tests (RAST) or CAP fluoroimmunoassay may be requested if a drug or food allergy is suspected in acute urticaria  Biopsy of urticaria can be non-specific and difficult to interpret  The pathology shows oedema in the dermis and dilated blood vessels, with a variable mixed inflammatory infiltrate  Vessel-wall damage indicates urticarial vasculitis  What is the treatment for acute urticaria? The main treatment for acute urticaria in adults and in children is with an oral second-generation antihistamine chosen from the list below  If the standard dose (eg 10 mg for cetirizine) is not effective, the dose can be increased fourfold (eg 40 mg cetirizine daily)  They are best taken continuously rather than on demand  They are stopped when the acute urticaria has settled down  There is not thought to be any benefit from adding a second antihistamine   Cetirizine    Loratadine    Fexofenadine    Desloratadine    Levocetirizine    Rupatadine    Bilastine  Terfenadine and astemizole should not be used as they are cardiotoxic in combination with ketoconazole or erythromycin  They are no longer available in [place]  Although systemic treatment is best avoided during pregnancy and breastfeeding, there have been no reports that second-generation antihistamines cause birth defects  If treatment is required, loratadine and cetirizine are currently preferred  Conventional first-generation antihistamines such as promethazine or chlorpheniramine are no longer recommended for urticaria  Avoidance of trigger factors  In addition to antihistamines, the cause of urticaria should be eliminated if known (eg drug or food allergy)  Avoidance of relevant type 1 (IgE-mediated) allergens clears urticaria within 48 hours  In addition to antihistamines, the triggers for urticaria should be avoided where possible   For example:   Avoid aspirin, opiates and nonsteroidal anti-inflammatory drugs (paracetamol is generally safe)   Avoid known allergies that have been confirmed by positive specific IgE/skin prick tests if these have clinical relevance for urticaria   Cool the affected area with a fan, cold flannel, ice pack or soothing moisturising lotion     Treatment of refractory acute urticaria  If non-sedating antihistamines are not effective, a 4 to 5-day course of oral prednisone (prednisolone) may be warranted in severe acute urticaria, particularly if there is angioedema  Systemic steroids do not speed up the resolution of symptoms  Intramuscular injection of adrenaline (epinephrine) is reserved for life-threatening anaphylaxis or swelling of the throat  CONTACT DERMATITIS    Physical Exam:   Anatomic Location Affected:  buttocks   Morphological Description:  Not visualized   Pertinent Positives:   Pertinent Negatives: Additional History of Present Condition:  OBGYN gave medication helping but area is still sore    Assessment and Plan:  Diagnosis:  Contact dermatitis  Based on a thorough discussion of this condition and the management approach to it (including a comprehensive discussion of the known risks, side effects and potential benefits of treatment), the patient (family) agrees to implement the following specific plan:    continue medication OBGYN prescribed  Please give more time for healing  What is contact dermatitis? Contact dermatitis is a type of eczema that results from something coming in contact with the skin  There are 2 types:  irritant and allergic  The majority of cases are from irritation  Usually that is from contact with strong soaps, repeated exposure to water, contact with cleaning agents or food, or friction  The minority are an actual allergy  In these cases something is coming in contact with the skin and causing an allergic reaction, similar to what happens with poison ivy    This usually occurs unexpectedly after using something for many years  Even very tiny amounts of the substance on the skin can cause a reaction  Some common causes are fragrances, preservatives and metals  Sometimes this can occur when an allergic substance is eaten, as well, but most often it is from direct contact with the skin  To determine the cause of allergy a patch test is often done  Some general rules to follow for both types of contact dermatitis are:   Wear gloves when using strong cleansers or before prolonged contact with water (like washing dishes)   Use gentle cleansers and avoid strong soaps   Apply moisturizer to entire body after bathing    Avoid products with fragrance    Most often contact dermatitis is treated with topical medicines like topical steroids, eucrisa, pimecrolimus or tacrolimus     Some times oral steroids, ie prednisone, methylprednisone and prednisolone, are needed  In chronic cases, treatment options include:  light therapy, methotrexate, cyclosporin  PRURITUS    Physical Exam:   Anatomic Location Affected: All over   Morphological Description:  Not visualized   Pertinent Positives:   Pertinent Negatives: Additional History of Present Condition:  Change thyroid medication better but not all gone, previous CBC wnl and CMP with minor electrolyte abnormalities    Assessment and Plan:  Based on a thorough discussion of this condition and the management approach to it (including a comprehensive discussion of the known risks, side effects and potential benefits of treatment), the patient (family) agrees to implement the following specific plan:   Recommend SPEP, Chest Xray and Ultrasound of the abdomen to rule out malignancy as cause  What is pruritus? Pruritus is the medical term for itch  Itch is an unpleasant sensation on the skin that provokes the desire to rub or scratch the area to obtain relief   Itch can cause discomfort and frustration; in severe cases it can lead to disturbed sleep, anxiety and depression  Constant scratching to obtain relief can damage the skin (excoriation, lichenification) and reduce its effectiveness as a major protective barrier  Pruritus is often a symptom of an underlying disease process such as a skin problem, a systemic disease, or abnormal nerve impulses  What are skin signs of pruritus? There are no specific skin signs associated with pruritus, apart from scratch marks (excoriations) and signs of the underlying condition  Persistent scratching over a period of time may lead to:   Lichenification (thickened skin, lichen simplex)    Prurigo papules and nodules  Who gets pruritus? The epidemiology of pruritus depends on its underlying cause or causes  However, in general, the incidence of chronic pruritus increases with age, it is more common in women, and in those of  background  Mechanisms underlying pruritus  Itch, like pain, can originate anywhere along the neural itch pathway, from the central nervous system (brain and spinal cord) to the peripheral nervous system and the skin  Mechanisms underlying pruritus are complex   The itch signal is transmitted mainly through small, itch-selective C-fibers in the skin in addition to histamine-triggered and non-histaminergic neurons   These connect with secondary neurons which cross the opposite side of the spinothalamic tract and ascend to parts of the brain involved in sensation, emotion, reward and memory  These areas overlap with those activated by pain      Patients with chronic pruritus usually have both peripheral and central hypersensitization (heightened reaction) which means they tend to overreact to noxious stimuli which normally inhibit itch (such as heat and scratching) and also misinterpret non-noxious stimuli as an itch (eg, light touch)    The way scratching stops itching has been explained by an interaction with pain pathways within the dorsal horn of the spinal cord     Localized pruritus  Localized pruritus is pruritus that is confined to a certain part of the body  It can occur in association with a primary rash (eg dermatitis) or may occur because of hypersensitive nerves in the skin (neuropathic pruritus)  Neuropathic pruritus is due to compression or degeneration of nerves in the skin, on route to the spine or in the spine itself  Neuropathic itch is sometimes associated with reduced or absent sweating in the affected area of skin  Typical causes of localized itchy rashes   Scalp: seborrhoeic dermatitis, head lice    Back: Arcadio disease    Hands: pompholyx, irritant and/or allergic contact dermatitis    Genitals: vulvovaginal Candida albicans infection, lichen sclerosus    Legs: venous eczema    Feet: tinea pedis    Neuropathic causes of localized pruritus without primary rash   Face: trigeminal trophic syndrome    Hand: cheiralgia paraesthetica    Arm: brachioradial pruritus    Back: notalgia paraesthetica/topics/brachioradial-pruritus/    Genital: pruritus vulvae, pruritus ani    Dermatomal: herpes zoster (shingles) during recovery phase  Scratching a localised itch may lead to lichen simplex, prurigo or prurigo nodularis  Systemic causes of pruritus  Sytemic diseases may cause generalised pruritus  This is sometimes called metabolic itch  There is nothing wrong with the skin itself, at least until it's been scratched  Metabolic disorders include chronic renal failure (dialysis) and liver disease (with or without cholestasis)  Uraemic pruritus arises in patients undergoing dialysis is due to a combination of xerosis (dry skin), secondary hyperparathyroidism, peripheral neuropathy (nerve changes) and inflammation   Secondary hyperparathyroidism which also occurs in dialysis patients leads to microprecipitation (deposition) of calcium and magnesium salts in the skin, triggering mast cell degeneration, releasing serotonin and histamine      Once chronic pruritus has occurred, there may be secondary changes in the nerves in the skin and central nervous system which heighten the sensation of itch  Hepatogenic pruritus is more common in intrahepatic than extrahepatic cholestasis  Examples of intrahepatic cholestasis is associated with chronic viral hepatitis, primary biliary cirrhosis, pregnancy-related cholestasis  Extra-hepatic cholestasis is associated with pressure on the bile ducts, eg from pancreatic tumours or pseudocysts   Cholestasis is thought to release toxic substances from the liver, which stimulates neural itch fibres in the skin   Characteristically, cholestatic pruritus is most severe at night; it tends to affect the hands, feet and areas where clothes are rubbing on the skin  Haematological disorders include iron deficiency anaemia and polycythaemia vera   Generalized pruritus along with glossitis (tongue inflammation) and angular cheilitis (inflammation of mouth corners) are seen in iron deficiency anaemia   In polycythaemia vera, itch is usually precipitated by contact with water (aquagenic pruritus), eg after a shower  This is thought to be mediated by the effect of platelets, serotonin and prostaglandins  Endocrine disorders include thyroid disease and diabetes mellitus   In Graves' disease (thyrotoxicosis), increased blood flow, skin temperature and decreased itch threshold mediated by the increase in thyroid hormones, lead to the itch  Pruritus associated with myxoedema and hypothyroidism is rare, and if present, is more likely the result of xerosis (dry skin)   In diabetes mellitus, localized itch tends to occur in the perianal/genital region usually due to Candida albicans or dermatophyte infections  It is unclear if metabolic abnormalities such as renal impairment, autonomic failure or diabetic neuropathy contribute to this      Paraneoplastic itch is associated with lymphoma, especially Hodgkin lymphoma, leukemia or a solid organ tumor (eg lung, colon, brain)   In Hodgkin lymphoma, pruritus is thought to be caused by histamine release, which may be related to eosinophilia  Infections causing itch include human immunodeficiency virus infection (HIV) and hepatitis C virus   Patients with HIV commonly complain of itch  This may be associated with skin infections/infestations, dry skin, drug reactions, hyperoesinophilia (increased eosinophil levels) and cutaneous T cell lymphoma  There is a possible correlation between intractable pruritus and increased HIV viral load   In chronic hepatitis C infection, the mechanisms responsible for itch remain unclear  In the absence of cholestasis, pruritus may be related to antiviral therapy; it has been noted to occur in patients treated with combination therapy (interferon prosper and ribavirin)  Pruritic skin diseases  Pruritus is often a symptom of many skin diseases  Some of these are included in the following list    Allergic contact dermatitis    Dry skin    Urticaria    Psoriasis    Atopic dermatitis    Folliculitis    Dermatitis herpetiformis    Lichen simplex    Lichen planus    Bullous pemphigoid    Lice    Scabies    Miliaria    Sunburn    Pityriasis rosea    Mycosis fungoides    Exposure-related pruritus  Pruritus may arise as a result of exposure to certain external factors   Allergens or irritants    Cold, which can cause 'winter itch'    A physical urticaria, such as dermographism    Aquagenic pruritus (itch on exposure to water)    Insects and infestations, eg scabies    Medications (topical or systemic) eg opioids, aspirin    Hormonal reasons for pruritus  About 2% of pregnant women have pruritus without any obvious dermatological cause  In some cases the itch is due to cholestasis (pooling of bile in the gall bladder and liver)  It usually occurs in the 3rd trimester and is relieved after giving birth    Generalized itch is also a common symptom of menopause  How is pruritus diagnosed? The first steps of evaluation of an itchy patient are medical history and examination  A thorough history can identify constitutional symptoms that may point towards an underlying systemic disease  Drug triggers such as opioids may be identified, especially if the commencement of the drug relates to the itch  A careful examination can identify dermatological causes for the itch (eg scabies, lichen simplex, pemphigoid) or evidence of chronic skin changes related to the itch  In dermatological causes of pruritus, primary skin lesions will usually suggest the diagnosis  Patients without primary skin lesions and little evidence of chronic scratching should be investigated for systemic, neuropathic and psychogenic causes  The panel of investigations could include:   Full/complete blood count    Creatinine and renal function tests    Liver function tests    Thyroid function tests    Erythrocyte sedimentation rate    Chest radiography    HIV serology    What treatment is available for itch? The management of pruritus relies on establishing the cause and then either removing or treating the cause to prevent further itching  In many cases, tests are necessary to determine the cause; while these are in progress, treatment to provide symptomatic relief of pruritus may be given      Topical treatments  In addition to specific therapy for any underlying skin or internal disease, topical treatment may include:   Wet dressings or tepid shower to cool the skin    Calamine lotion (contains phenol, which cools the skin): avoid on dry skin and limit use to a few days    Menthol/camphor lotion: gives a chilling sensation    Local anesthetics, such as pramoxine (also called pramocaine), applied to small itchy spots such as insect bites    Regular use of emollients, especially if skin is dry    Mild topical corticosteroids for short periods    Topical calcineurin inhibitors are also used to reduce itch associated with inflammatory skin conditions    Topical doxepin, a tricyclic antidepressant and antihistamine, is an antipruritic used in eczema  Other measures that can be useful in preventing pruritus include avoiding precipitating factors such as rough clothing or fabrics, overheating, and vasodilators if they provoke itching (eg, caffeine, alcohol, spices)  Fingernails should be kept short and clean  If the urge to scratch is irresistible then rub the area with your palm  Topical antihistamines should not be used for chronic itch, as they may sensitize the skin and result in allergic contact dermatitis  Systemic therapy  If pruritus is severe and sleep is disturbed, then treatment with oral medication may be necessary  Some drugs may help to relieve the itch whilst others are given solely for their sedative effects   Antihistamines are most useful in urticaria, in which histamine is released  Use for other pruritic conditions is not supported by randomized control trials  Sedating antihistamines may be used for their sedative effects   Doxepin and amitriptyline are tricyclic antidepressants have antipruritic action and act on the central and peripheral nervous systems   Tetracyclic antidepressants such as mirtazepine and selective serotonin reuptake inhibitors (paroxetine, sertraline, fluoxetine) may also help some patients with severe itch including when it is caused by cholestasis, T-cell lymphoma, malignancy or a neuropathic condition   Anti-epileptic drugs such as sodium valproate, gabapentin and pregabalin may also be of benefit to some patients, e g , those with itch associated with renal failure or neuropathic itch  The mechanism of action is uncertain      Opioid antagonists such as butorphanol intranasal spray, naltrexone tablets, and naloxone have been effective in patients suffering from intractable pruritus in association with liver disease, atopic eczema and chronic urticaria  Nalfurafine, which is a kappa-opioid agonist has also been studied and shown to reduce itch associated with chronic renal impairment, however it is not widely available   Aspirin is sometimes effective if pruritus is mediated by kinins or prostaglandins and is noted to be effective in patients with pruritus due to polycythaemia vera  Note: aspirin may cause or aggravate itch in some patients   Thalidomide has been successful in treating nodular prurigo and chronic pruritus of various kinds but is rarely used because of serious adverse effects and expense   Rifampicin is effective for patients with pruritus associated with cholestasis (some forms of liver disease)  Isolated case reports in severe itch associated with malignancy have reported success with the NKR1 antagonist, aprepitant (normally used short-term for postoperative or chemotherapy-induced nausea)  This is under investigation for neuropathic itch and nodular prurigo  Phototherapy  Broadband ultraviolet B or narrow-band UVB phototherapy alone, or in conjunction with UVA, has been shown to be helpful for pruritus associated with chronic kidney disease, psoriasis, atopic eczema and cutaneous T-cell lymphoma  Behavioral therapy  Behavioral therapy may be used in conjunction with pharmacotherapy to modify behaviours such as coping mechanisms and stress reduction, which help interrupt the itch-scratch cycle  One randomized controlled trial showed short-term benefits in reduction in itch frequency and scratching as well as improvement in coping mechanisms  What is the outcome for pruritus? The management of chronic severe itch is difficult and often requires the use of combination therapy over a long period of time  Identification and treatment of underlying conditions causing pruritus may help in this process   The symptom may quickly disappear or persist for long periods of time     Virtual Brief Visit    Patient is located in the following state in which I hold an active license [place]      Assessment/Plan:    Problem List Items Addressed This Visit    None     Visit Diagnoses     Seborrheic dermatitis    -  Primary    Pruritus              Recent Visits  No visits were found meeting these conditions  Showing recent visits within past 7 days and meeting all other requirements  Today's Visits  Date Type Provider Dept   09/07/22 Telemedicine Klarissa Dixon MD [facility] [place]   Showing today's visits and meeting all other requirements  Future Appointments  No visits were found meeting these conditions    Showing future appointments within next 150 days and meeting all other requirements         I spent 10 minutes directly with the patient during this visit

## 2022-09-07 NOTE — TELEPHONE ENCOUNTER
Please let patient know that the shampoo I recommended is not covered by her insurance    She should continue with what I gave her before and just add the mometasone

## 2022-09-08 NOTE — TELEPHONE ENCOUNTER
Chest xray and us abdomen to rule out malignancy does not require a prior authorization  Patient has medicare as primary

## 2022-09-09 ENCOUNTER — OFFICE VISIT (OUTPATIENT)
Dept: PULMONOLOGY | Facility: CLINIC | Age: 52
End: 2022-09-09
Payer: MEDICARE

## 2022-09-09 VITALS
OXYGEN SATURATION: 98 % | HEART RATE: 74 BPM | BODY MASS INDEX: 22.5 KG/M2 | WEIGHT: 140 LBS | TEMPERATURE: 97.8 F | HEIGHT: 66 IN | SYSTOLIC BLOOD PRESSURE: 108 MMHG | RESPIRATION RATE: 16 BRPM | DIASTOLIC BLOOD PRESSURE: 68 MMHG

## 2022-09-09 DIAGNOSIS — U09.9 POST-ACUTE SEQUELAE OF COVID-19 (PASC): ICD-10-CM

## 2022-09-09 DIAGNOSIS — K21.9 GASTROESOPHAGEAL REFLUX DISEASE, UNSPECIFIED WHETHER ESOPHAGITIS PRESENT: Primary | ICD-10-CM

## 2022-09-09 DIAGNOSIS — R05.3 CHRONIC COUGH: ICD-10-CM

## 2022-09-09 PROCEDURE — 99214 OFFICE O/P EST MOD 30 MIN: CPT | Performed by: INTERNAL MEDICINE

## 2022-09-09 RX ORDER — OMEPRAZOLE 20 MG/1
20 CAPSULE, DELAYED RELEASE ORAL DAILY
Qty: 30 CAPSULE | Refills: 1 | Status: SHIPPED | OUTPATIENT
Start: 2022-09-09 | End: 2022-10-03

## 2022-09-09 NOTE — PROGRESS NOTES
Pulmonary Follow Up Note  George Brian 50 y o  female MRN: [id number]  9/9/2022      HPI:    Patient states that she has had an improvement in symptoms since she has added omeprazole daily  She intermittently takes Flonase but is not aware of any association with improvement in cough  She did have a exacerbation about a month ago that has since resolved  Since last visit, she has had a negative methacholine challenge test and [facility] allergy panel is benign  She has no peripheral eosinophilia  Meds:  Trelegy 100 daily    ROS:  Constitutional:  +intermittent Fatigue, - chills, - fever, - weight change  HEENT: - rhinorrhea, - sneezing, - sore throat  Respiratory: - cough, - shortness of breath, - wheezing  Cardiovascular: - chest pain,  -palpitations, - leg swelling  Gastrointestinal: - abdominal pain, - constipation, - diarrhea, - nausea, - vomiting  Endocrine: - cold intolerance, - heat intolerance  Genitourinary: - dysuria  Musculoskeletal: - arthralgias  Skin:- rash, - wound  Allergic/Immunologic: - allergies  Neurological: - dizziness, - numbness      Vitals: Blood pressure 108/68, pulse 74, temperature 97 8 °F (36 6 °C), temperature source Tympanic, resp  rate 16, height 5' 6" (1 676 m), weight 63 5 kg (140 lb), SpO2 98 %, not currently breastfeeding , Body mass index is 22 6 kg/m²      Physical Exam:  GEN  NAD  HEENT  ncat, non icteric, MM moist  NECK  supple, no JVD, no LAD  CV  +s1s2, no mrg, RRR  PULM  CTA BL, no wrr  ABD  soft, ntnd, + BS  EXT  no edema, no cyanosis, no clubbing  NEURO  Aox3, no focal weakness    Imaging and other studies:   I personally viewed and interpreted the following imaging studies:   Chest x-ray 05/22/2022 shows no gross intraparenchymal or pleural abnormalities    Pulmonary function testing:   Negative methacholine challenge chest from 08/24/2022    Assessment:  Chronic cough  [facility]    Plan:  · Symptoms are unlikely to be related to asthma given negative methacholine challenge test     · [facility] allergy panel is also benign   · Patient states that she has had a significant improvement with addition of omeprazole 20 mg p o  daily  · Discontinue Trelegy 100 daily   · Ordered omeprazole 20 mg p o  daily  · Supportive care for [facility]    Return visit if requested by primary care physician    [facility]

## 2022-09-22 DIAGNOSIS — L21.9 SEBORRHEIC DERMATITIS: ICD-10-CM

## 2022-09-22 RX ORDER — CICLOPIROX 1 G/100ML
1 SHAMPOO TOPICAL EVERY OTHER DAY
Qty: 120 ML | Refills: 1 | Status: SHIPPED | OUTPATIENT
Start: 2022-09-22 | End: 2022-09-23

## 2022-09-23 DIAGNOSIS — L21.9 SEBORRHEIC DERMATITIS: ICD-10-CM

## 2022-09-23 RX ORDER — CICLOPIROX 1 G/100ML
1 SHAMPOO TOPICAL EVERY OTHER DAY
Qty: 120 ML | Refills: 1 | Status: SHIPPED | OUTPATIENT
Start: 2022-09-23

## 2022-09-28 ENCOUNTER — TELEPHONE (OUTPATIENT)
Dept: GYNECOLOGY | Facility: CLINIC | Age: 52
End: 2022-09-28

## 2022-09-28 ENCOUNTER — APPOINTMENT (OUTPATIENT)
Dept: RADIOLOGY | Age: 52
End: 2022-09-28
Payer: MEDICARE

## 2022-09-28 DIAGNOSIS — J40 BRONCHITIS: ICD-10-CM

## 2022-09-28 PROCEDURE — 71046 X-RAY EXAM CHEST 2 VIEWS: CPT

## 2022-09-30 ENCOUNTER — TELEPHONE (OUTPATIENT)
Dept: FAMILY MEDICINE CLINIC | Facility: CLINIC | Age: 52
End: 2022-09-30

## 2022-09-30 ENCOUNTER — APPOINTMENT (OUTPATIENT)
Dept: LAB | Facility: IMAGING CENTER | Age: 52
End: 2022-09-30
Payer: MEDICARE

## 2022-09-30 ENCOUNTER — HOSPITAL ENCOUNTER (OUTPATIENT)
Dept: RADIOLOGY | Facility: IMAGING CENTER | Age: 52
End: 2022-09-30
Payer: MEDICARE

## 2022-09-30 DIAGNOSIS — L29.9 PRURITUS: ICD-10-CM

## 2022-09-30 DIAGNOSIS — R53.83 OTHER FATIGUE: ICD-10-CM

## 2022-09-30 LAB
ALBUMIN SERPL BCP-MCNC: 3.8 G/DL (ref 3.5–5)
ALP SERPL-CCNC: 70 U/L (ref 46–116)
ALT SERPL W P-5'-P-CCNC: 19 U/L (ref 12–78)
ANION GAP SERPL CALCULATED.3IONS-SCNC: 5 MMOL/L (ref 4–13)
AST SERPL W P-5'-P-CCNC: 8 U/L (ref 5–45)
BILIRUB SERPL-MCNC: 0.51 MG/DL (ref 0.2–1)
BUN SERPL-MCNC: 8 MG/DL (ref 5–25)
CALCIUM SERPL-MCNC: 8.7 MG/DL (ref 8.3–10.1)
CHLORIDE SERPL-SCNC: 109 MMOL/L (ref 96–108)
CO2 SERPL-SCNC: 26 MMOL/L (ref 21–32)
CREAT SERPL-MCNC: 0.88 MG/DL (ref 0.6–1.3)
GFR SERPL CREATININE-BSD FRML MDRD: 76 ML/MIN/1.73SQ M
GLUCOSE P FAST SERPL-MCNC: 92 MG/DL (ref 65–99)
POTASSIUM SERPL-SCNC: 4.2 MMOL/L (ref 3.5–5.3)
PROT SERPL-MCNC: 7.4 G/DL (ref 6.4–8.4)
SODIUM SERPL-SCNC: 140 MMOL/L (ref 135–147)

## 2022-09-30 PROCEDURE — 36415 COLL VENOUS BLD VENIPUNCTURE: CPT

## 2022-09-30 PROCEDURE — 76700 US EXAM ABDOM COMPLETE: CPT

## 2022-09-30 PROCEDURE — 80053 COMPREHEN METABOLIC PANEL: CPT

## 2022-09-30 PROCEDURE — 84165 PROTEIN E-PHORESIS SERUM: CPT

## 2022-09-30 NOTE — TELEPHONE ENCOUNTER
----- [id number] 9[facility]30/2022  1:08 PM EDT -----  [facility] stable

## 2022-10-01 DIAGNOSIS — K21.9 GASTROESOPHAGEAL REFLUX DISEASE, UNSPECIFIED WHETHER ESOPHAGITIS PRESENT: ICD-10-CM

## 2022-10-03 LAB
ALBUMIN SERPL ELPH-MCNC: 4.26 G/DL (ref 3.5–5)
ALBUMIN SERPL ELPH-MCNC: 61.8 % (ref 52–65)
ALPHA1 GLOB SERPL ELPH-MCNC: 0.29 G/DL (ref 0.1–0.4)
ALPHA1 GLOB SERPL ELPH-MCNC: 4.2 % (ref 2.5–5)
ALPHA2 GLOB SERPL ELPH-MCNC: 0.64 G/DL (ref 0.4–1.2)
ALPHA2 GLOB SERPL ELPH-MCNC: 9.3 % (ref 7–13)
BETA GLOB ABNORMAL SERPL ELPH-MCNC: 0.44 G/DL (ref 0.4–0.8)
BETA1 GLOB SERPL ELPH-MCNC: 6.4 % (ref 5–13)
BETA2 GLOB SERPL ELPH-MCNC: 5.8 % (ref 2–8)
BETA2+GAMMA GLOB SERPL ELPH-MCNC: 0.4 G/DL (ref 0.2–0.5)
GAMMA GLOB ABNORMAL SERPL ELPH-MCNC: 0.86 G/DL (ref 0.5–1.6)
GAMMA GLOB SERPL ELPH-MCNC: 12.5 % (ref 12–22)
IGG/ALB SER: 1.62 {RATIO} (ref 1.1–1.8)
PROT PATTERN SERPL ELPH-IMP: NORMAL
PROT SERPL-MCNC: 6.9 G/DL (ref 6.4–8.2)

## 2022-10-03 PROCEDURE — 84165 PROTEIN E-PHORESIS SERUM: CPT | Performed by: SPECIALIST

## 2022-10-03 RX ORDER — OMEPRAZOLE 20 MG/1
CAPSULE, DELAYED RELEASE ORAL
Qty: 90 CAPSULE | Refills: 1 | Status: SHIPPED | OUTPATIENT
Start: 2022-10-03

## 2022-10-05 ENCOUNTER — HOSPITAL ENCOUNTER (OUTPATIENT)
Dept: RADIOLOGY | Facility: IMAGING CENTER | Age: 52
Discharge: HOME | End: 2022-10-05
Payer: MEDICARE

## 2022-10-05 DIAGNOSIS — R14.0 BLOATING: ICD-10-CM

## 2022-10-05 DIAGNOSIS — R10.84 GENERALIZED ABDOMINAL PAIN: ICD-10-CM

## 2022-10-05 DIAGNOSIS — R14.0 ABDOMINAL DISTENTION: ICD-10-CM

## 2022-10-05 DIAGNOSIS — K59.04 CHRONIC IDIOPATHIC CONSTIPATION: ICD-10-CM

## 2022-10-05 PROCEDURE — 74177 CT ABD & PELVIS W/CONTRAST: CPT

## 2022-10-05 PROCEDURE — G1004 CDSM NDSC: HCPCS

## 2022-10-05 RX ADMIN — IOHEXOL 100 ML: 350 INJECTION, SOLUTION INTRAVENOUS at 10:26

## 2022-10-07 ENCOUNTER — TELEPHONE (OUTPATIENT)
Dept: PULMONOLOGY | Facility: CLINIC | Age: 52
End: 2022-10-07

## 2022-10-07 DIAGNOSIS — J96.91 RESPIRATORY FAILURE WITH HYPOXIA, UNSPECIFIED CHRONICITY (HCC): Primary | ICD-10-CM

## 2022-10-07 NOTE — TELEPHONE ENCOUNTER
Radiology Office called and stated that they need a order put in for a Chest Xray for patient to have done before or after [facility] Lung Scan

## 2022-10-11 PROBLEM — R50.9 FEVER, UNSPECIFIED: Status: RESOLVED | Noted: 2022-02-16 | Resolved: 2022-10-11

## 2022-10-11 PROBLEM — Z00.00 HEALTHCARE MAINTENANCE: Status: RESOLVED | Noted: 2022-08-10 | Resolved: 2022-10-11

## 2022-10-12 PROBLEM — J06.9 VIRAL URI WITH COUGH: Status: RESOLVED | Noted: 2019-10-31 | Resolved: 2022-10-12

## 2022-10-12 PROBLEM — Z00.00 MEDICARE ANNUAL WELLNESS VISIT, SUBSEQUENT: Status: RESOLVED | Noted: 2020-05-28 | Resolved: 2022-10-12

## 2022-11-09 ENCOUNTER — HOSPITAL ENCOUNTER (OUTPATIENT)
Dept: RADIOLOGY | Facility: HOSPITAL | Age: 52
Discharge: HOME | End: 2022-11-09

## 2022-11-09 ENCOUNTER — TELEPHONE (OUTPATIENT)
Dept: PULMONOLOGY | Facility: CLINIC | Age: 52
End: 2022-11-09

## 2022-11-09 DIAGNOSIS — L21.9 SEBORRHEIC DERMATITIS: ICD-10-CM

## 2022-11-09 DIAGNOSIS — R06.02 SHORTNESS OF BREATH: ICD-10-CM

## 2022-11-09 DIAGNOSIS — J96.91 RESPIRATORY FAILURE WITH HYPOXIA, UNSPECIFIED CHRONICITY (HCC): ICD-10-CM

## 2022-11-09 RX ORDER — CICLOPIROX 1 G/100ML
1 SHAMPOO TOPICAL EVERY OTHER DAY
Qty: 120 ML | Refills: 1 | Status: SHIPPED | OUTPATIENT
Start: 2022-11-09 | End: 2022-11-09 | Stop reason: SDUPTHER

## 2022-11-09 RX ORDER — CICLOPIROX 1 G/100ML
1 SHAMPOO TOPICAL EVERY OTHER DAY
Qty: 120 ML | Refills: 1 | Status: SHIPPED | OUTPATIENT
Start: 2022-11-09

## 2022-11-12 ENCOUNTER — TELEPHONE (OUTPATIENT)
Dept: PULMONOLOGY | Facility: CLINIC | Age: 52
End: 2022-11-12

## 2022-11-17 DIAGNOSIS — N76.0 BV (BACTERIAL VAGINOSIS): Primary | ICD-10-CM

## 2022-11-17 DIAGNOSIS — B96.89 BV (BACTERIAL VAGINOSIS): Primary | ICD-10-CM

## 2022-11-17 RX ORDER — METRONIDAZOLE 7.5 MG/G
1 GEL VAGINAL
Qty: 5 G | Refills: 0 | Status: SHIPPED | OUTPATIENT
Start: 2022-11-17 | End: 2022-11-22

## 2022-11-18 ENCOUNTER — APPOINTMENT (OUTPATIENT)
Dept: RADIOLOGY | Age: 52
End: 2022-11-18

## 2022-11-18 ENCOUNTER — APPOINTMENT (OUTPATIENT)
Dept: LAB | Age: 52
End: 2022-11-18

## 2022-11-18 ENCOUNTER — OFFICE VISIT (OUTPATIENT)
Dept: FAMILY MEDICINE CLINIC | Facility: CLINIC | Age: 52
End: 2022-11-18

## 2022-11-18 VITALS
RESPIRATION RATE: 16 BRPM | DIASTOLIC BLOOD PRESSURE: 84 MMHG | BODY MASS INDEX: 22.76 KG/M2 | TEMPERATURE: 99.6 F | WEIGHT: 141.6 LBS | SYSTOLIC BLOOD PRESSURE: 132 MMHG | HEART RATE: 95 BPM | HEIGHT: 66 IN | OXYGEN SATURATION: 98 %

## 2022-11-18 DIAGNOSIS — R50.9 FEVER, UNSPECIFIED FEVER CAUSE: Primary | ICD-10-CM

## 2022-11-18 DIAGNOSIS — R50.9 FEVER, UNSPECIFIED FEVER CAUSE: ICD-10-CM

## 2022-11-18 DIAGNOSIS — R10.13 EPIGASTRIC PAIN: ICD-10-CM

## 2022-11-18 DIAGNOSIS — E89.0 POST-SURGICAL HYPOTHYROIDISM: ICD-10-CM

## 2022-11-18 LAB — CRP SERPL QL: <3 MG/L

## 2022-11-18 RX ORDER — FAMOTIDINE 20 MG/1
20 TABLET, FILM COATED ORAL 2 TIMES DAILY
Qty: 60 TABLET | Refills: 0 | Status: SHIPPED | OUTPATIENT
Start: 2022-11-18

## 2022-11-18 RX ORDER — PREDNISONE 20 MG/1
TABLET ORAL
COMMUNITY
Start: 2022-11-13 | End: 2022-11-22

## 2022-11-18 RX ORDER — CIPROFLOXACIN 500 MG/1
500 TABLET, FILM COATED ORAL EVERY 12 HOURS SCHEDULED
Qty: 14 TABLET | Refills: 0 | Status: SHIPPED | OUTPATIENT
Start: 2022-11-18 | End: 2022-11-25

## 2022-11-18 RX ORDER — LEVOTHYROXINE SODIUM 150 UG/1
1 CAPSULE ORAL DAILY
COMMUNITY
Start: 2022-11-09

## 2022-11-18 RX ORDER — METHYLPREDNISOLONE 4 MG/1
TABLET ORAL
Qty: 21 EACH | Refills: 0 | Status: SHIPPED | OUTPATIENT
Start: 2022-11-18 | End: 2022-11-22

## 2022-11-18 NOTE — ASSESSMENT & PLAN NOTE
She was given prescription for famotidine  Was discussed about possible side effects  Will continue to monitor  She is to continue on Zofran for nauseous feeling

## 2022-11-18 NOTE — PROGRESS NOTES
Name: Aleida Curry      : 1970      MRN: [id number]  Encounter Provider: Gaudencio Kimbrough MD  Encounter Date: 2022   Encounter department: [address] [place] [address]  Fever, unspecified fever cause  Assessment & Plan:  I am going to sent her for chest x-ray and check a blood work for CRP  I am going to send culture for flu and COVID  She was given prescription for Cipro and prednisone  Orders:  -     methylPREDNISolone 4 MG tablet therapy pack; Use as directed on package  -     ciprofloxacin (CIPRO) 500 mg tablet; Take 1 tablet (500 mg total) by mouth every 12 (twelve) hours for 7 days  -     XR chest pa & lateral; Future; Expected date: 2022  -     Covid/Flu- Office Collect  -     C-reactive protein; Future    2  Epigastric pain  Assessment & Plan:  She was given prescription for famotidine  Was discussed about possible side effects  Will continue to monitor  She is to continue on Zofran for nauseous feeling  Orders:  -     famotidine (PEPCID) 20 mg tablet; Take 1 tablet (20 mg total) by mouth 2 (two) times a day    3  Post-surgical hypothyroidism  Assessment & Plan:  Her TSH is very low  Her thyroid dose was adjusted recently  Continue follow-up with endocrinologist              Subjective     She is here today with complaint of having low-grade fever for almost a week now  She also complained of feeling fatigue and tired with body ache  Also upper respiratory symptoms including nasal congestion and postnasal drip  She was seen in the urgent care a week ago and had negative COVID test   She is also on levothyroxine for her hypothyroidism and her recent blood work showed her TSH very low  Review of Systems   Constitutional: Negative for activity change, chills and fever  HENT: Positive for congestion, postnasal drip, rhinorrhea and sinus pressure  Negative for ear pain and sore throat  Eyes: Negative for pain and visual disturbance  Respiratory: Negative for apnea, cough and shortness of breath  Cardiovascular: Negative for chest pain and palpitations  Gastrointestinal: Negative for abdominal pain, diarrhea and vomiting  Genitourinary: Negative for dysuria and hematuria  Musculoskeletal: Negative for arthralgias and back pain  Skin: Negative for color change and rash  Neurological: Negative for dizziness, seizures and syncope  All other systems reviewed and are negative        Past Medical History:   Diagnosis Date   • Anemia    • Anxiety    • Asthma     illness induced   • Cancer ([facility] )     thyroid ca (removed Jan 2022)   • SARA I (cervical intraepithelial neoplasia I)     RESOLVED: [id number]   • Depression    • Endometriosis    • Functional ovarian cysts age 15   • History of agoraphobia age 15   • Hypercholesterolemia    • IBS (irritable bowel syndrome) age 15   • IC (interstitial cystitis)    • Malignant neoplasm of thyroid gland ([place] [address] ) 12/14/2021   • Migraine    • Motion sickness    • Multiple thyroid nodules 11/02/2021    Last Assessment & Plan:  Formatting of this note might be different from the original  Kate Gallegos is a 54 y o  female   We had an extensive discussion regarding thyroid nodules, the natural course of thyroid nodules, ultrasound features which can convey an increased risk for malignancy, the general indications for fine needle aspiration, the procedure itself and possible results from a fine needl   • Pap smear abnormality of cervix with ASCUS favoring benign     RESOLVED: [id number]   • Seasonal allergies     with post-nasal drip and recurrent throat infections   • Thyroid cancer ([facility] )    • Urinary tract infection    • Varicella      Past Surgical History:   Procedure Laterality Date   • CHOLECYSTECTOMY LAPAROSCOPIC  08/2020   • COLONOSCOPY  06/2018    Nonbleeding angiodysplastic lesion mid ascending colon, questionable ulcerative proctitis-biopsy was negative for acute or chronic colitis   • COLONOSCOPY 06/09/2016    Normal   • COLONOSCOPY  05/23/2013    Adenoma at splenic flexure   • EGD  08/02/2018   • EGD  08/03/2020    Normal   Biopsy stomach negative for H  pylori, biopsy of the duodenum negative for celiac   • ENDOTRACHEAL INTUBATION EMERGENT  2012   • HEMORRHOID SURGERY  07/2009    Dr Juarez Pro   • LAPAROSCOPY     • DE HYSTEROSCOPY,DX,SEP [address] N/A 11/21/2018    Procedure: HYSTEROSCOPY;  Surgeon: Rebeca Mayes MD;  Location: [facility];  Service: Gynecology   • DE HYSTEROSCOPY,W/ENDOMETRIAL ABLATION N/A 11/21/2018    Procedure: Brianda Crow;  Surgeon: Rebeca Mayes MD;  Location: [facility];  Service: Gynecology   • SMALL INTESTINE SURGERY     • THYROID SURGERY  01/17/2022    Papillary cancer of thyroid nodules   • TOOTH EXTRACTION     • TUBAL LIGATION      ONSET: 36 DEC 2011     Family History   Problem Relation Age of Onset   • Other Mother         HYSTERECTOMY FOR BENIGN DISEASE    • Diabetes Mother    • Lung cancer Father 79   • Other Sister         HYSTERECTOMY FOR BENIGN DISEASE    • Diabetes Maternal Grandmother    • Other Maternal Grandmother         HYSTERECTOMY FOR BENIGN DISEASE    • Colon cancer Paternal Grandmother 61   • Throat cancer Paternal Uncle 61   • No Known Problems Maternal Aunt    • No Known Problems Paternal Aunt    • No Known Problems Paternal Aunt      Social History     Socioeconomic History   • Marital status:      Spouse name: None   • Number of children: 2   • Years of education: None   • Highest education level: None   Occupational History   • Occupation: unemployed   Tobacco Use   • Smoking status: Never   • Smokeless tobacco: Never   Vaping Use   • Vaping Use: Never used   Substance and Sexual Activity   • Alcohol use: No   • Drug use: No   • Sexual activity: Yes     Partners: Male   Other Topics Concern   • None   Social History Narrative    Marital history- single as per all scripts    No caffeine use    Orthodox affiliation [employer]    Uses safety equipment- seatbelts      Social Determinants of Health     Financial Resource Strain: Not on file   Food Insecurity: Not on file   Transportation Needs: Not on file   Physical Activity: Not on file   Stress: Not on file   Social Connections: Not on file   Intimate Partner Violence: Not on file   Housing Stability: Not on file     Current Outpatient Medications on File Prior to Visit   Medication Sig   • albuterol (2 5 mg/3 mL) 0 083 % nebulizer solution Take 1 vial (2 5 mg total) by nebulization 3 (three) times a day   • albuterol (PROAIR HFA) 90 mcg/act inhaler Inhale 2 puffs 4 (four) times a day every 4 to 6 hours as needed   • Ciclopirox 1 % shampoo Apply 1 application topically every other day To the scalp for 5 minutes, then rinse thoroughly     • colestipol (COLESTID) 1 g tablet TAKE 1 TABLET (1 G TOTAL) BY MOUTH 2 (TWO) TIMES A DAY   • dicyclomine (BENTYL) 10 mg capsule TAKE 1 CAPSULE (10 MG TOTAL) BY MOUTH 3 (THREE) TIMES A DAY AS NEEDED (ABDOMINAL PAIN)   • fluticasone (FLONASE) 50 mcg/act nasal spray as needed     • levalbuterol (XOPENEX) 1 25 mg/3 mL nebulizer solution    • LORazepam (ATIVAN) 1 mg tablet TAKE 1 & 1/2 TABLETS DAILY AT BEDTIME AS NEEDED   • medroxyPROGESTERone (DEPO-PROVERA) 150 mg/mL injection PLEASE GIVE IN OFFICE EVERY 10 WEEKS   • Meth-Hyo-M Bl-Na Phos-Ph Sal (Uribel) 118 MG CAPS Take 1 each by mouth as needed   • metroNIDAZOLE (METROGEL) 0 75 % vaginal gel Insert 1 application into the vagina daily at bedtime for 5 days   • Multiple Vitamin (MULTI VITAMIN DAILY PO) Take by mouth     • naproxen (NAPROSYN) 500 mg tablet TAKE 1 TABLET BY MOUTH 3 TIMES A DAY AS NEEDED MIGRAINE  LIMIT USE TO 3 DAYS PER WEEK   • omeprazole (PriLOSEC) 20 mg delayed release capsule TAKE 1 CAPSULE BY MOUTH EVERY DAY   • ondansetron (ZOFRAN) 4 mg tablet Take 1 tablet (4 mg total) by mouth every 8 (eight) hours as needed for nausea   • pentosan polysulfate (ELMIRON) 100 mg capsule Take 100 mg by mouth 3 (three) times a day before meals     • predniSONE 20 mg tablet TAKE 2 TABLETS BY MOUTH EVERY DAY FOR 5 DAYS   • Tirosint 150 MCG CAPS Take 1 capsule by mouth daily   • [place] Thyroid 15 MG tablet  (Patient not taking: Reported on 10/28/2022)   • [place] Thyroid 60 MG tablet  (Patient not taking: Reported on 10/28/2022)   • [place] Thyroid 90 MG tablet Take 90 mg by mouth daily (Patient not taking: Reported on 11/18/2022)   • budesonide-formoterol (Symbicort) 160-4 5 mcg/act inhaler Inhale 2 puffs 2 (two) times a day Rinse mouth after use  (Patient not taking: Reported on 10/28/2022)   • cetirizine (ZyrTEC) 10 MG chewable tablet Chew 1 tablet (10 mg total) daily (Patient not taking: No sig reported)   • Levoxyl 125 MCG tablet Take 125 mcg by mouth daily (Patient not taking: Reported on 11/18/2022)   • mometasone (ELOCON) 0 1 % lotion Apply topically daily To the scalp  (Patient not taking: Reported on 10/28/2022)   • Noritate 1 % cream APPLY TO AFFECTED AREA TOPICALLY EVERY DAY (Patient not taking: Reported on 10/28/2022)   • sodium chloride (OCEAN) 0 65 % nasal spray 1 spray into each nostril as needed for congestion     Allergies   Allergen Reactions   • Latex Itching and Hives     Irritation   • Vancomycin Itching and Swelling     Patient had facial swelling, especially around her eyes and severe itching  • Azithromycin Diarrhea, Nausea Only and Other (See Comments)   • Clarithromycin Other (See Comments)   • Clavulanic Acid Other (See Comments)   • Doxycycline Other (See Comments)   • Erythromycin Base Other (See Comments)   • Lamotrigine Other (See Comments)     Can't recall side effects      • Lithium Itching   • Mirtazapine Other (See Comments)     (remeron)  (remeron)  (remeron)   • Moxifloxacin Nausea Only     Excessive vomiting  Excessive vomiting   • Sulfamethoxazole      Other reaction(s): Rash   • Sulfamethoxazole-Trimethoprim Other (See Comments)   • Trimethoprim      Other reaction(s): Rash   • Indomethacin Rash   • Penicillins Rash     Immunization History   Administered Date(s) Administered   • COVID-19 MODERNA VACC 0 5 ML IM 03/17/2021, 04/14/2021   • H1N1, All Formulations 11/20/2009   • INFLUENZA 12/06/2013, 10/25/2017, 10/25/2017, 10/10/2018, 10/11/2020, 09/22/2021, 10/19/2022   • Influenza Injectable, MDCK, Preservative Free, Quadrivalent, 0 5 mL 10/21/2019, 10/11/2020   • Influenza Quadrivalent Preservative Free 3 years and older IM 10/11/2020   • Influenza Split 12/05/2013, 09/22/2014   • Influenza, injectable, quadrivalent, preservative free 0 5 mL 10/11/2020   • Influenza, seasonal, injectable 10/05/2010   • Td (adult), adsorbed 03/15/2012   • Tdap 03/15/2012       Objective     /84 (BP Location: Left arm, Patient Position: Sitting, Cuff Size: Standard)   Pulse 95   Temp 99 6 °F (37 6 °C) (Axillary)   Resp 16   Ht 5' 6" (1 676 m)   Wt 64 2 kg (141 lb 9 6 oz)   SpO2 98%   BMI 22 85 kg/m²     Physical Exam  Vitals and nursing note reviewed  Constitutional:       Appearance: She is well-developed and well-nourished  HENT:      Head: Normocephalic and atraumatic  Right Ear: A middle ear effusion is present  Left Ear: A middle ear effusion is present  Mouth/Throat:      Pharynx: Posterior oropharyngeal erythema present  Eyes:      Extraocular Movements: EOM normal       Pupils: Pupils are equal, round, and reactive to light  Cardiovascular:      Rate and Rhythm: Normal rate and regular rhythm  Heart sounds: Normal heart sounds  Pulmonary:      Effort: Pulmonary effort is normal       Breath sounds: Normal breath sounds  Abdominal:      General: Bowel sounds are normal       Palpations: Abdomen is soft  Tenderness: There is abdominal tenderness  Musculoskeletal:         General: No edema  Normal range of motion  Cervical back: Normal range of motion and neck supple  Lymphadenopathy:      Cervical: No cervical adenopathy     Skin:     General: Skin is warm and dry  Neurological:      Mental Status: She is alert and oriented to person, place, and time  Cranial Nerves: No cranial nerve deficit     Psychiatric:         Mood and Affect: Mood and affect normal        Santi Rahman MD

## 2022-11-18 NOTE — ASSESSMENT & PLAN NOTE
I am going to sent her for chest x-ray and check a blood work for CRP  I am going to send culture for flu and COVID  She was given prescription for Cipro and prednisone

## 2022-11-18 NOTE — ASSESSMENT & PLAN NOTE
Her TSH is very low  Her thyroid dose was adjusted recently    Continue follow-up with endocrinologist

## 2022-11-19 LAB
FLUAV RNA RESP QL NAA+PROBE: NEGATIVE
FLUBV RNA RESP QL NAA+PROBE: NEGATIVE
SARS-COV-2 RNA RESP QL NAA+PROBE: NEGATIVE

## 2022-11-22 ENCOUNTER — TELEPHONE (OUTPATIENT)
Dept: PULMONOLOGY | Facility: CLINIC | Age: 52
End: 2022-11-22

## 2022-11-22 ENCOUNTER — APPOINTMENT (OUTPATIENT)
Dept: LAB | Age: 52
End: 2022-11-22

## 2022-11-22 ENCOUNTER — TELEMEDICINE (OUTPATIENT)
Dept: PULMONOLOGY | Facility: CLINIC | Age: 52
End: 2022-11-22

## 2022-11-22 VITALS — HEIGHT: 66 IN | BODY MASS INDEX: 22.82 KG/M2 | WEIGHT: 142 LBS

## 2022-11-22 DIAGNOSIS — U09.9 POST-ACUTE SEQUELAE OF COVID-19 (PASC): ICD-10-CM

## 2022-11-22 DIAGNOSIS — R50.9 FEVER, UNSPECIFIED FEVER CAUSE: Primary | ICD-10-CM

## 2022-11-22 DIAGNOSIS — J40 BRONCHITIS: ICD-10-CM

## 2022-11-22 LAB
BASOPHILS # BLD AUTO: 0.02 THOUSANDS/ÂΜL (ref 0–0.1)
BASOPHILS NFR BLD AUTO: 0 % (ref 0–1)
EOSINOPHIL # BLD AUTO: 0 THOUSAND/ÂΜL (ref 0–0.61)
EOSINOPHIL NFR BLD AUTO: 0 % (ref 0–6)
ERYTHROCYTE [DISTWIDTH] IN BLOOD BY AUTOMATED COUNT: 13.7 % (ref 11.6–15.1)
HCT VFR BLD AUTO: 40.2 % (ref 34.8–46.1)
HGB BLD-MCNC: 13.2 G/DL (ref 11.5–15.4)
IMM GRANULOCYTES # BLD AUTO: 0.1 THOUSAND/UL (ref 0–0.2)
IMM GRANULOCYTES NFR BLD AUTO: 1 % (ref 0–2)
LYMPHOCYTES # BLD AUTO: 0.62 THOUSANDS/ÂΜL (ref 0.6–4.47)
LYMPHOCYTES NFR BLD AUTO: 5 % (ref 14–44)
MCH RBC QN AUTO: 30.3 PG (ref 26.8–34.3)
MCHC RBC AUTO-ENTMCNC: 32.8 G/DL (ref 31.4–37.4)
MCV RBC AUTO: 92 FL (ref 82–98)
MONOCYTES # BLD AUTO: 0.54 THOUSAND/ÂΜL (ref 0.17–1.22)
MONOCYTES NFR BLD AUTO: 5 % (ref 4–12)
NEUTROPHILS # BLD AUTO: 10.79 THOUSANDS/ÂΜL (ref 1.85–7.62)
NEUTS SEG NFR BLD AUTO: 89 % (ref 43–75)
NRBC BLD AUTO-RTO: 0 /100 WBCS
PLATELET # BLD AUTO: 323 THOUSANDS/UL (ref 149–390)
PMV BLD AUTO: 9.2 FL (ref 8.9–12.7)
PROCALCITONIN SERPL-MCNC: <0.05 NG/ML
RBC # BLD AUTO: 4.36 MILLION/UL (ref 3.81–5.12)
WBC # BLD AUTO: 12.07 THOUSAND/UL (ref 4.31–10.16)

## 2022-11-22 RX ORDER — PREDNISONE 10 MG/1
TABLET ORAL
Qty: 30 TABLET | Refills: 0 | Status: SHIPPED | OUTPATIENT
Start: 2022-11-22 | End: 2022-12-04

## 2022-11-22 RX ORDER — BUDESONIDE AND FORMOTEROL FUMARATE DIHYDRATE 160; 4.5 UG/1; UG/1
2 AEROSOL RESPIRATORY (INHALATION) 2 TIMES DAILY
Qty: 10.2 G | Refills: 3 | Status: SHIPPED | OUTPATIENT
Start: 2022-11-22

## 2022-11-22 NOTE — TELEPHONE ENCOUNTER
Patient left  stating she is sick again and was told to call the office  Please advise   [phone]

## 2022-11-22 NOTE — PROGRESS NOTES
Pulmonary Virtual Outpatient Follow Up Note   Brett Ewing 50 y o  female MRN: [id number]  11/23/2022      After connecting through the [employer] platform  She agrees to proceed  , the patient was identified by name and date of birth  Carmen ASHLEY Tiptona was informed that this is a telemedicine visit and that the visit is being conducted through the [employer] platform  She agrees to proceed     My office door was closed  No one else was in the room  She acknowledged consent and understanding of privacy and security of the video platform  The patient has agreed to participate and understands they can discontinue the visit at any time  Assessment/Plan:    1  Fever, unspecified fever cause  Assessment & Plan:  Constellation of symptoms suggest viral syndrome  She has a sick contact from her son  She has ongoing fevers, upper respiratory symptoms, dry cough  Prescribed a 12 day steroid taper starting at 40 milligrams prednisone decreasing by 10 milligrams every 3 days  She has mildly elevated leukocytosis with negative procalcitonin  She recently was prescribed a course of ciprofloxacin 500 milligrams every 12 hours by her primary care physician  Do not recommend any additional antibiotics at this time from a pulmonary standpoint  Recent chest x-ray was reviewed and does not show any lobar consolidation or other significant pulmonary pathology    Advised her to get tested for RSV  She was previously on Symbicort  We can try this in the acute setting to see if it decreases her cough  She would a negative methacholine challenge in the past and is unlikely to have asthma      2  Bronchitis  -     Respiratory Syncytial Virus (RSV),([facility]); Future  -     Procalcitonin; Future  -     CBC and differential; Future  -     predniSONE 10 mg tablet;  Take 4 tablets (40 mg total) by mouth daily for 3 days, THEN 3 tablets (30 mg total) daily for 3 days, THEN 2 tablets (20 mg total) daily for 3 days, THEN 1 tablet (10 mg total) daily for 3 days  -     budesonide-formoterol (Symbicort) 160-4 5 mcg/act inhaler; Inhale 2 puffs 2 (two) times a day Rinse mouth after use  3  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[facility])  Assessment & Plan:  Methacholine challenge and allergy panel have been negative in the past   Supportive care recommended          Health Maintenance  Immunization History   Administered Date(s) Administered   • COVID-19 MODERNA VACC 0 5 ML IM 03/17/2021, 04/14/2021   • H1N1, All Formulations 11/20/2009   • INFLUENZA 12/06/2013, 10/25/2017, 10/25/2017, 10/10/2018, 10/11/2020, 09/22/2021, 10/19/2022   • Influenza Injectable, MDCK, Preservative Free, Quadrivalent, 0 5 mL 10/21/2019, 10/11/2020   • Influenza Quadrivalent Preservative Free 3 years and older IM 10/11/2020   • Influenza Split 12/05/2013, 09/22/2014   • Influenza, injectable, quadrivalent, preservative free 0 5 mL 10/11/2020   • Influenza, seasonal, injectable 10/05/2010   • Td (adult), adsorbed 03/15/2012   • Tdap 03/15/2012        Return in about 6 weeks (around 1/3/2023)  History of Present Illness   HPI:  Sylvain Stair [facility] is a 50 y o  female with a history of chronic dyspnea, post acute sequelae of COVID-19, cough  She would a negative methacholine challenge in the past and was last evaluated by Dr Segundo Schneider in our office on 09/09/2022  At that time due to negative allergy panel and methacholine challenge test her symptoms were likely not due to asthma  Her symptoms had improved with addition PPI  Trelegy was discontinued on that visit  She now is performing a virtual consult due to recent upper respiratory and lower respiratory symptoms and fever  She had 100 6 fever 4 days ago  She went to her family physician  She completed 40mg prednisone qday  She She was placed on a medrol dose pack (she has not taken the medrol dose pack) and ciprofloxaxin 500mg BID for 7 days  She has a dry cough  +Nausea  No vomiting   Loose stools have cleared up  +chills overnight  No urinary symptoms  No abnormal bleeding/bruising  Nasal congestion - using Flonase - taking allegra  She is taking tylenol/advil for fevers    COVID/flu were negative     Son was sick with similar symptoms but he got better  She is not short of breath  She is fatigued  Review of Systems   Constitutional: Positive for chills, fatigue and fever  HENT: Positive for congestion and postnasal drip  Negative for ear pain and sore throat  Eyes: Negative for pain and visual disturbance  Respiratory: Positive for cough and chest tightness  Negative for shortness of breath  Cardiovascular: Negative for chest pain and palpitations  Gastrointestinal: Negative for abdominal pain and vomiting  Genitourinary: Negative for dysuria and hematuria  Musculoskeletal: Negative for arthralgias and back pain  Skin: Negative for color change and rash  Neurological: Negative for seizures and syncope  All other systems reviewed and are negative        Historical Information   Past Medical History:   Diagnosis Date   • Anemia    • Anxiety    • Asthma     illness induced   • Cancer ([address] )     thyroid ca (removed Jan 2022)   • SARA I (cervical intraepithelial neoplasia I)     RESOLVED: [id number]   • Depression    • Endometriosis    • Functional ovarian cysts age 15   • History of agoraphobia age 15   • Hypercholesterolemia    • IBS (irritable bowel syndrome) age 15   • IC (interstitial cystitis)    • Malignant neoplasm of thyroid gland ([facility] [address] ) 12/14/2021   • Migraine    • Motion sickness    • Multiple thyroid nodules 11/02/2021    Last Assessment & Plan:  Formatting of this note might be different from the original  Graylon Raquel is a 54 y o  female   We had an extensive discussion regarding thyroid nodules, the natural course of thyroid nodules, ultrasound features which can convey an increased risk for malignancy, the general indications for fine needle aspiration, the procedure itself and possible results from a fine needl   • Pap smear abnormality of cervix with ASCUS favoring benign     RESOLVED: [id number]   • Seasonal allergies     with post-nasal drip and recurrent throat infections   • Thyroid cancer ([facility] [address] )    • Urinary tract infection    • Varicella      Past Surgical History:   Procedure Laterality Date   • CHOLECYSTECTOMY  2019   • CHOLECYSTECTOMY LAPAROSCOPIC  08/2020   • COLONOSCOPY  06/2018    Nonbleeding angiodysplastic lesion mid ascending colon, questionable ulcerative proctitis-biopsy was negative for acute or chronic colitis   • COLONOSCOPY  06/09/2016    Normal   • COLONOSCOPY  05/23/2013    Adenoma at splenic flexure   • EGD  08/02/2018   • EGD  08/03/2020    Normal   Biopsy stomach negative for H  pylori, biopsy of the duodenum negative for celiac   • ENDOTRACHEAL INTUBATION EMERGENT  2012   • HEMORRHOID SURGERY  07/2009    Dr Herbert Remy     • KS HYSTEROSCOPY,DX,SEP [address] N/A 11/21/2018    Procedure: HYSTEROSCOPY;  Surgeon: Manohar Vaughn MD;  Location: [facility];  Service: Gynecology   • KS HYSTEROSCOPY,W/ENDOMETRIAL ABLATION N/A 11/21/2018    Procedure: [place];  Surgeon: Manohar Vaughn MD;  Location: [facility];  Service: Gynecology   • SMALL INTESTINE SURGERY     • THYROID SURGERY  01/17/2022    Papillary cancer of thyroid nodules   • TOOTH EXTRACTION     • TUBAL LIGATION      ONSET: 36 DEC 2011     Family History   Problem Relation Age of Onset   • Other Mother         HYSTERECTOMY FOR BENIGN DISEASE    • Diabetes Mother    • Lung cancer Father    • Other Sister         HYSTERECTOMY FOR BENIGN DISEASE    • Diabetes Maternal Grandmother    • Other Maternal Grandmother         HYSTERECTOMY FOR BENIGN DISEASE    • Colon cancer Paternal Grandmother 61   • Throat cancer Paternal Uncle 61   • No Known Problems Maternal Aunt    • No Known Problems Paternal Aunt    • No Known Problems Paternal Aunt    • Diabetes Maternal Grandfather Meds/Allergies     Current Outpatient Medications:   •  albuterol (2 5 mg/3 mL) 0 083 % nebulizer solution, Take 1 vial (2 5 mg total) by nebulization 3 (three) times a day, Disp: 30 vial, Rfl: 2  •  albuterol (PROAIR HFA) 90 mcg/act inhaler, Inhale 2 puffs 4 (four) times a day every 4 to 6 hours as needed, Disp: 1 Inhaler, Rfl: 5  •  budesonide-formoterol (Symbicort) 160-4 5 mcg/act inhaler, Inhale 2 puffs 2 (two) times a day Rinse mouth after use , Disp: 10 2 g, Rfl: 3  •  Ciclopirox 1 % shampoo, Apply 1 application topically every other day To the scalp for 5 minutes, then rinse thoroughly , Disp: 120 mL, Rfl: 1  •  ciprofloxacin (CIPRO) 500 mg tablet, Take 1 tablet (500 mg total) by mouth every 12 (twelve) hours for 7 days, Disp: 14 tablet, Rfl: 0  •  colestipol (COLESTID) 1 g tablet, TAKE 1 TABLET (1 G TOTAL) BY MOUTH 2 (TWO) TIMES A DAY, Disp: 180 tablet, Rfl: 3  •  dicyclomine (BENTYL) 10 mg capsule, TAKE 1 CAPSULE (10 MG TOTAL) BY MOUTH 3 (THREE) TIMES A DAY AS NEEDED (ABDOMINAL PAIN), Disp: 270 capsule, Rfl: 1  •  famotidine (PEPCID) 20 mg tablet, Take 1 tablet (20 mg total) by mouth 2 (two) times a day, Disp: 60 tablet, Rfl: 0  •  fluticasone (FLONASE) 50 mcg/act nasal spray, as needed  , Disp: , Rfl:   •  levalbuterol (XOPENEX) 1 25 mg/3 mL nebulizer solution, , Disp: , Rfl:   •  LORazepam (ATIVAN) 1 mg tablet, TAKE 1 & 1/2 TABLETS DAILY AT BEDTIME AS NEEDED, Disp: , Rfl: 1  •  medroxyPROGESTERone (DEPO-PROVERA) 150 mg/mL injection, PLEASE GIVE IN OFFICE EVERY 10 WEEKS, Disp: 1 mL, Rfl: 0  •  Meth-Hyo-M Bl-Na Phos-Ph Sal (Uribel) 118 MG CAPS, Take 1 each by mouth as needed, Disp: , Rfl:   •  Multiple Vitamin (MULTI VITAMIN DAILY PO), Take by mouth  , Disp: , Rfl:   •  naproxen (NAPROSYN) 500 mg tablet, TAKE 1 TABLET BY MOUTH 3 TIMES A DAY AS NEEDED MIGRAINE  LIMIT USE TO 3 DAYS PER WEEK, Disp: , Rfl:   •  omeprazole (PriLOSEC) 20 mg delayed release capsule, TAKE 1 CAPSULE BY MOUTH EVERY DAY, Disp: 90 capsule, Rfl: 1  •  ondansetron (ZOFRAN) 4 mg tablet, Take 1 tablet (4 mg total) by mouth every 8 (eight) hours as needed for nausea, Disp: 20 tablet, Rfl: 5  •  pentosan polysulfate (ELMIRON) 100 mg capsule, Take 100 mg by mouth 3 (three) times a day before meals  , Disp: , Rfl:   •  predniSONE 10 mg tablet, Take 4 tablets (40 mg total) by mouth daily for 3 days, THEN 3 tablets (30 mg total) daily for 3 days, THEN 2 tablets (20 mg total) daily for 3 days, THEN 1 tablet (10 mg total) daily for 3 days  , Disp: 30 tablet, Rfl: 0  •  Tirosint 150 MCG CAPS, Take 1 capsule by mouth daily, Disp: , Rfl:   •  [place] Thyroid 15 MG tablet, , Disp: , Rfl:   •  [place] Thyroid 60 MG tablet, , Disp: , Rfl:   •  [place] Thyroid 90 MG tablet, Take 90 mg by mouth daily (Patient not taking: Reported on 11/18/2022), Disp: , Rfl:   •  cetirizine (ZyrTEC) 10 MG chewable tablet, Chew 1 tablet (10 mg total) daily (Patient not taking: No sig reported), Disp: 30 tablet, Rfl: 2  •  Levoxyl 125 MCG tablet, Take 125 mcg by mouth daily (Patient not taking: Reported on 11/18/2022), Disp: , Rfl:   •  mometasone (ELOCON) 0 1 % lotion, Apply topically daily To the scalp  (Patient not taking: Reported on 10/28/2022), Disp: 60 mL, Rfl: 2  •  Noritate 1 % cream, APPLY TO AFFECTED AREA TOPICALLY EVERY DAY (Patient not taking: Reported on 10/28/2022), Disp: 60 g, Rfl: 1  •  sodium chloride (OCEAN) 0 65 % nasal spray, 1 spray into each nostril as needed for congestion, Disp: 480 mL, Rfl: 0    Current Facility-Administered Medications:   •  medroxyPROGESTERone (DEPO-PROVERA) IM injection 150 mg, 150 mg, Intramuscular, Once, Joni Mcburney, DO  Allergies   Allergen Reactions   • Latex Itching and Hives     Irritation   • Vancomycin Itching and Swelling     Patient had facial swelling, especially around her eyes and severe itching     • Azithromycin Diarrhea, Nausea Only and Other (See Comments)   • Clarithromycin Other (See Comments)   • Clavulanic Acid Other (See Comments)   • Doxycycline Other (See Comments)   • Erythromycin Base Other (See Comments)   • Lamotrigine Other (See Comments)     Can't recall side effects  • Lithium Itching   • Mirtazapine Other (See Comments)     (remeron)  (remeron)  (remeron)   • Moxifloxacin Nausea Only     Excessive vomiting  Excessive vomiting   • Sulfamethoxazole      Other reaction(s): Rash   • Sulfamethoxazole-Trimethoprim Other (See Comments)   • Trimethoprim      Other reaction(s): Rash   • Indomethacin Rash   • Penicillins Rash       Vitals: Height 5' 6" (1 676 m), weight 64 4 kg (142 lb), not currently breastfeeding  Body mass index is 22 92 kg/m²  Physical Exam not performed due to virtual visit    Labs: I have personally reviewed pertinent lab results  ABG: No results found for: PHART, FUM1JOA, PO2ART, BHY5JFF, D0FRCKCH, BEART, SOURCE,   BNP: No results found for: BNP,   CBC:  Lab Results   Component Value Date    WBC 12 07 (H) 11/22/2022    HGB 13 2 11/22/2022    HCT 40 2 11/22/2022    MCV 92 11/22/2022     11/22/2022    EOSPCT 0 11/22/2022    EOSABS 0 00 11/22/2022    NEUTOPHILPCT 89 (H) 11/22/2022    LYMPHOPCT 5 (L) 11/22/2022   ,   CMP:   Lab Results   Component Value Date    SODIUM 140 09/30/2022    K 4 2 09/30/2022     (H) 09/30/2022    CO2 26 09/30/2022    BUN 8 09/30/2022    CREATININE 0 88 09/30/2022    CALCIUM 8 7 09/30/2022    AST 8 09/30/2022    ALT 19 09/30/2022    ALKPHOS 70 09/30/2022    EGFR 76 09/30/2022   ,   PT/INR: No results found for: PT, INR,   Troponin: No results found for: TROPONINI      Imaging and other studies:  Chest x-ray 11/18/2022 shows no lobar consolidation, pleural effusion, pulmonary edema, pneumothorax    CT chest 12/29/2021 - mild pleural parenchymal scarring at the apices, no significant parenchymal disease    Pulmonary function testing:   Pulmonary Functions Testing Results:  Methacholine challenge 8/24/2022  Results:  FVC: 4 05 L       109 % predicted  FEV1: 3 15 L     106 % predicted  FEV1/FVC Ratio: 77 89 %      After administration of bronchodilator   FVC: 4 07 L, 0 % change  FEV1: 3 29 L, +4 % change     Methacholine challenge at 16mg/ml:  FEV1: 3 12 L, 0% change     Lung volumes by body plethysmography:   Total Lung Capacity:   6 20 L,  115 % predicted   Residual volume: 2 18L,   115 % predicted          Transthoracic Echo:  5/27/2021 -   LEFT VENTRICLE:  Systolic function was normal  Ejection fraction was estimated to be 60 %  There were no regional wall motion abnormalities  ATRIAL SEPTUM:  There was no left-to-right shunt and no right-to-left shunt  A bubble study was performed  There was no left-to-right shunt and no right-to-left shunt, with provocative maneuvers to increase right atrial pressure            Jose Riggins MD  Pulmonary, Critical Care and Sleep Medicine  [address] [place] [address] [facility]

## 2022-11-22 NOTE — TELEPHONE ENCOUNTER
Patient called again, she wanted to try and speak with someone before 9am because she has to help her friend with her baby for most of the day  She will do her best to  the phone, if not please leave a voicemail so she can return the call  She felt like someone was sitting on her chest, has had a continuous fever for almost a week now  She went to a urgent care and was tested for flu and covid, both resulting negative  She was prescribed antibiotics and steroids which started to make her feel better  Now she still has a fever, her old cough is coming back and causing shortness of breath  She wasn't sure if we had any openings this afternoon for her to be seen or possibly tomorrow morning   Please advise

## 2022-11-23 ENCOUNTER — TELEPHONE (OUTPATIENT)
Dept: OTHER | Facility: OTHER | Age: 52
End: 2022-11-23

## 2022-11-23 PROBLEM — J45.50 SEVERE PERSISTENT ASTHMA WITHOUT COMPLICATION: Status: RESOLVED | Noted: 2022-02-14 | Resolved: 2022-11-23

## 2022-11-23 PROBLEM — J45.51 SEVERE PERSISTENT ASTHMA WITH ACUTE EXACERBATION: Status: RESOLVED | Noted: 2021-07-16 | Resolved: 2022-11-23

## 2022-11-23 NOTE — TELEPHONE ENCOUNTER
We can order covid/flu/ rsv, we cannot just swab for RSV since pt is over 11years old (also states in process instructions under the rsv order)  Pt states urgent care and [facility] will not swab her  I spoke to Dr Daisy Tracy okay to swab pt here as a nurse visit  I called pt and she states she cannot make it here today, I suggested she go to the hospital to get swabbed   Pt understands and agrees

## 2022-11-23 NOTE — TELEPHONE ENCOUNTER
Pt's Pulm provider suggested she gets tested for RSV  Pt would like to know if the office will place her order and swab her today  Please call pt back

## 2022-11-23 NOTE — TELEPHONE ENCOUNTER
Patient is calling, she is not sure where to get the RSV test done at as most of the labs she contacted do not do this  She completed the other two lab works that were ordered and are resulted  She is asking if you can look at those results and see if she still needs to get the RSV done, and if she does where can she get this completed at?  Please advise

## 2022-11-23 NOTE — ASSESSMENT & PLAN NOTE
Methacholine challenge and allergy panel have been negative in the past   Supportive care recommended

## 2022-11-23 NOTE — TELEPHONE ENCOUNTER
Pt called back she will be going to the [facility] urgent care today and get the REV test done there   No need for a callback

## 2022-11-23 NOTE — ASSESSMENT & PLAN NOTE
Constellation of symptoms suggest viral syndrome  She has a sick contact from her son  She has ongoing fevers, upper respiratory symptoms, dry cough  Prescribed a 12 day steroid taper starting at 40 milligrams prednisone decreasing by 10 milligrams every 3 days  She has mildly elevated leukocytosis with negative procalcitonin  She recently was prescribed a course of ciprofloxacin 500 milligrams every 12 hours by her primary care physician  Do not recommend any additional antibiotics at this time from a pulmonary standpoint  Recent chest x-ray was reviewed and does not show any lobar consolidation or other significant pulmonary pathology    Advised her to get tested for RSV  She was previously on Symbicort  We can try this in the acute setting to see if it decreases her cough    She would a negative methacholine challenge in the past and is unlikely to have asthma

## 2022-11-24 ENCOUNTER — NURSE TRIAGE (OUTPATIENT)
Dept: OTHER | Facility: OTHER | Age: 52
End: 2022-11-24

## 2022-11-24 NOTE — TELEPHONE ENCOUNTER
Regarding: flu/asthma medication questions  ----- Message from [employer] sent at 11/24/2022  4:31 PM EST -----  " I just tested positive for the flu but I also have asthma so I wanted to know if there is anything that I can take "

## 2022-11-24 NOTE — TELEPHONE ENCOUNTER
Reason for Disposition  • [1] Influenza (diagnosed by HCP) AND [4] no complications    Answer Assessment - Initial Assessment Questions  1  DIAGNOSIS CONFIRMATION: "When was the influenza diagnosed?" "By whom?" "Did you get a test for it?"      Diagnosed at doctors office and given an antibiotic for pneumonia  2  MEDICINES: "Were you prescribed any medications for the influenza?"  (e g , zanamivir [Relenza], oseltamavir [Tamiflu])  No just levaquin for pneumonia  3  ONSET of SYMPTOMS: "When did your symptoms start?"      "A few days ago"  4  SYMPTOMS: "What symptoms are you most concerned about?" (e g , runny nose, stuffy nose, sore throat, cough, breathing difficulty, fever)      Cough and chills  5  COUGH: "How bad is the cough?"      moderate  6  FEVER: "Do you have a fever?" If Yes, ask: "What is your temperature, how was it measured, and when did it start?"     102  7  RESPIRATORY DISTRESS: "Are you having any trouble breathing?" If yes, ask: "Describe your breathing "       no  8  FLU VACCINE: "Did you receive a flu shot this year?" (e g , seasonal influenza, H1N1)      unsure   9  PREGNANCY: "Is there any chance you are pregnant?" "When was your last menstrual period?"      no  10  HIGH RISK for COMPLICATIONS: "Do you have any heart or lung problems?  Do you have a weakened immune system?" (e g , CHF, COPD, asthma, HIV positive, chemotherapy, renal failure, diabetes mellitus, sickle cell anemia)        No    Protocols used: INFLUENZA FOLLOW-UP CALL-ADULT-AH

## 2022-11-30 DIAGNOSIS — G43.909 MIGRAINE WITHOUT STATUS MIGRAINOSUS, NOT INTRACTABLE, UNSPECIFIED MIGRAINE TYPE: ICD-10-CM

## 2022-11-30 RX ORDER — MEDROXYPROGESTERONE ACETATE 150 MG/ML
INJECTION, SUSPENSION INTRAMUSCULAR
Qty: 1 ML | Refills: 0 | Status: SHIPPED | OUTPATIENT
Start: 2022-11-30 | End: 2022-12-05

## 2022-11-30 NOTE — TELEPHONE ENCOUNTER
*  Rotator cuff tendonitis.     *  I would recommend basic shoulder rotator cuff exercises to help return shoulder function better.     *  Referral to physical therapy may be helpful for more hands on therapy and instructions on rehabilitation exercises for the shoulder.     *  Look up rotator cuff rehabilitation exercises on [employer].  Many of them use resistance bands (large rubber bands)     --http://orthoinfo.org/PDFs/Rehab_Shoulder_5.pdf    *  Take over the counter Motrin/Ibuprofen/Advil or Aleve to help relieve pain and inflammation (Unless you have side effects from Motrin like medications or have been told not to take aspirin or Motrin kind of medication).  follow the directions on the bottle.  Be sure to take with a small meal to prevent stomach upset.      --Motrin 600 mg ( 3 x 200 mg tablets) three times per day every day for 5-7 days, then 2-3 timers per day as needed (take with food to avoid stomach side effects)     OR     --Aleve 2 tablets twice per day for 5-7 days every day, then twice per day as needed     *  If you do NOT improve (or get worse), then let us know so we can refer you to orthopedic clinic.       Can you assist with speaking to patient in regards to Dr Aguilar Tracy recommendations  As to it would be best from an AP or provider

## 2022-11-30 NOTE — TELEPHONE ENCOUNTER
Patient calling stating she is still not feeling better, she started tapering the prednisone down and she thinks that's why she's feeling worse  She also stated she is schedule Friday to get bladder botox and gets light anesthetic for it and wants to know if she should be rescheduling that   Please advise

## 2022-11-30 NOTE — TELEPHONE ENCOUNTER
I don't think that her cough or SOB is driven by asthma or reactive airways disease  I would have her reach out to her PCP as I don't think she has a chronic intrinsic pulmonary disease      RM

## 2022-12-02 DIAGNOSIS — G43.909 MIGRAINE WITHOUT STATUS MIGRAINOSUS, NOT INTRACTABLE, UNSPECIFIED MIGRAINE TYPE: ICD-10-CM

## 2022-12-05 ENCOUNTER — TELEPHONE (OUTPATIENT)
Dept: FAMILY MEDICINE CLINIC | Facility: CLINIC | Age: 52
End: 2022-12-05

## 2022-12-05 ENCOUNTER — TELEPHONE (OUTPATIENT)
Dept: GYNECOLOGY | Facility: CLINIC | Age: 52
End: 2022-12-05

## 2022-12-05 ENCOUNTER — OFFICE VISIT (OUTPATIENT)
Dept: GYNECOLOGY | Facility: CLINIC | Age: 52
End: 2022-12-05

## 2022-12-05 VITALS — DIASTOLIC BLOOD PRESSURE: 72 MMHG | BODY MASS INDEX: 22.92 KG/M2 | WEIGHT: 142 LBS | SYSTOLIC BLOOD PRESSURE: 118 MMHG

## 2022-12-05 DIAGNOSIS — Z30.42 ENCOUNTER FOR DEPO-PROVERA CONTRACEPTION: Primary | ICD-10-CM

## 2022-12-05 RX ORDER — MEDROXYPROGESTERONE ACETATE 150 MG/ML
150 INJECTION, SUSPENSION INTRAMUSCULAR ONCE
Status: COMPLETED | OUTPATIENT
Start: 2022-12-05 | End: 2022-12-05

## 2022-12-05 RX ORDER — MEDROXYPROGESTERONE ACETATE 150 MG/ML
INJECTION, SUSPENSION INTRAMUSCULAR
Qty: 3 ML | Refills: 1 | Status: SHIPPED | OUTPATIENT
Start: 2022-12-05

## 2022-12-05 RX ADMIN — MEDROXYPROGESTERONE ACETATE 150 MG: 150 INJECTION, SUSPENSION INTRAMUSCULAR at 09:00

## 2022-12-05 NOTE — TELEPHONE ENCOUNTER
Sean Gresham arrived on 12/5/2022 at 0900 in the [facility] office  Please sign off on her order  The ordering provider is Dr Irena Sood who is in the office today  Thank you

## 2022-12-05 NOTE — TELEPHONE ENCOUNTER
Taking allegra and no relief, flonase is not helping, tried mucinex which made her feel worse, took allegra d with no help  Also has a mucinex DM medication as well  Fever is jumping from 100 6 to 99  Her nose is running and wont stop  Wants to know what over the counter meds she can take to help   Please address in her portal

## 2022-12-05 NOTE — TELEPHONE ENCOUNTER
Patient left a message and said she had flu and finished her antibiotics but continues to cough with SOB  Has tried mucinex ER, Allegra D without much improvement  Is using Flonase  She has so much mucus and constant coughing and is asking what she can do?

## 2022-12-10 DIAGNOSIS — R10.13 EPIGASTRIC PAIN: ICD-10-CM

## 2022-12-12 ENCOUNTER — TELEPHONE (OUTPATIENT)
Dept: FAMILY MEDICINE CLINIC | Facility: CLINIC | Age: 52
End: 2022-12-12

## 2022-12-12 RX ORDER — FAMOTIDINE 20 MG/1
TABLET, FILM COATED ORAL
Qty: 180 TABLET | Refills: 1 | Status: SHIPPED | OUTPATIENT
Start: 2022-12-12

## 2022-12-12 NOTE — TELEPHONE ENCOUNTER
----- Message from Radha Morillo sent at 12/11/2022  9:48 PM EST -----  Regarding: medical question  Contact: [phone]  I just wanted to let you know that I ended up back in walk in center Sunday night because I’m still coughing,out of breath,stuffy and have temp usually around 100 3  I now tested positive for covid, still waiting on flu/rsv results  Is there anything else I can do?  put me another course of steroids      Thanks,  [employer]

## 2022-12-12 NOTE — TELEPHONE ENCOUNTER
I do not see her walk-in center progress note  Please obtain her record in the meanwhile if she is symptomatic I am okay with sending prescription for prednisone 50 mg 1 daily for 5 days

## 2022-12-12 NOTE — TELEPHONE ENCOUNTER
Pt aware and she is taking the prednisone which was given to her at the walk in center   She will try some over the counter cough syrup and if she continues to have problems with her breathing she will call her pulmonologist

## 2022-12-15 ENCOUNTER — APPOINTMENT (OUTPATIENT)
Dept: LAB | Age: 52
End: 2022-12-15

## 2022-12-15 ENCOUNTER — TELEPHONE (OUTPATIENT)
Dept: FAMILY MEDICINE CLINIC | Facility: CLINIC | Age: 52
End: 2022-12-15

## 2022-12-15 DIAGNOSIS — E89.0 POST-SURGICAL HYPOTHYROIDISM: ICD-10-CM

## 2022-12-15 DIAGNOSIS — E89.0 POST-SURGICAL HYPOTHYROIDISM: Primary | ICD-10-CM

## 2022-12-15 LAB — TSH SERPL DL<=0.05 MIU/L-ACNC: 1.37 UIU/ML (ref 0.45–4.5)

## 2022-12-15 NOTE — TELEPHONE ENCOUNTER
----- Message from Radha Subramanian sent at 12/15/2022 11:54 AM EST -----  Regarding: Steroids  Contact: [phone]  I am on my third round of steroids  It’s a taper dose  40 mg for 3 days, 30 mg for 3 days and so on  (I just took day 2 of the 30 mg)Being this is my third round of them I’m thinking it dropped my thyroid levels way down in the fatigue and muscle ache is getting unbearable  All I’m doing is laying on the couch the entire day and sleeping  Is it OK to please stop these? I’m still dealing with 100 6 fever and a cough but I think the steroids are making me worse  I need to try and get my thyroid levels back up but I didn’t know if I can just stop these      Carmen

## 2022-12-15 NOTE — TELEPHONE ENCOUNTER
Per [facility], pt should have her thyroid labs checked prior to d/c steroid  Lab order placed and message sent to pt

## 2022-12-16 ENCOUNTER — TELEPHONE (OUTPATIENT)
Dept: FAMILY MEDICINE CLINIC | Facility: CLINIC | Age: 52
End: 2022-12-16

## 2022-12-16 DIAGNOSIS — R05.1 ACUTE COUGH: Primary | ICD-10-CM

## 2022-12-16 RX ORDER — BENZONATATE 200 MG/1
200 CAPSULE ORAL 3 TIMES DAILY PRN
Qty: 20 CAPSULE | Refills: 0 | Status: SHIPPED | OUTPATIENT
Start: 2022-12-16 | End: 2023-01-20

## 2022-12-16 NOTE — TELEPHONE ENCOUNTER
----- Message from Radha Silver sent at 12/16/2022 12:56 PM EST -----  Regarding: cough  Contact: [phone]  I had an allergic reaction last time I took that

## 2022-12-16 NOTE — TELEPHONE ENCOUNTER
----- Message from Radha La sent at 12/15/2022  9:06 PM EST -----  Regarding: medication  Contact: [phone]  My levels came back okay  Since Erika Suazo been on the thyroid medications, it causes muscle achiness which gets worse after long use of steroids  I’m torn what to do because I’ve had this nasty cough over a month now but I’m also very achy which is causing very bad fatigue  Is there anything else I can do for this cough/congestion? I can’t take Sudafed       Carmen

## 2023-01-17 PROBLEM — R50.9 FEVER: Status: RESOLVED | Noted: 2022-11-18 | Resolved: 2023-01-17

## 2023-01-18 RX ORDER — SODIUM CHLORIDE 9 MG/ML
125 INJECTION, SOLUTION INTRAVENOUS CONTINUOUS
Status: CANCELLED | OUTPATIENT
Start: 2023-01-18

## 2023-01-20 ENCOUNTER — ANESTHESIA (OUTPATIENT)
Dept: GASTROENTEROLOGY | Facility: HOSPITAL | Age: 53
End: 2023-01-20

## 2023-01-20 ENCOUNTER — ANESTHESIA EVENT (OUTPATIENT)
Dept: GASTROENTEROLOGY | Facility: HOSPITAL | Age: 53
End: 2023-01-20

## 2023-01-20 ENCOUNTER — HOSPITAL ENCOUNTER (OUTPATIENT)
Dept: GASTROENTEROLOGY | Facility: HOSPITAL | Age: 53
Setting detail: OUTPATIENT SURGERY
End: 2023-01-20
Attending: INTERNAL MEDICINE

## 2023-01-20 VITALS
HEART RATE: 84 BPM | DIASTOLIC BLOOD PRESSURE: 62 MMHG | RESPIRATION RATE: 16 BRPM | BODY MASS INDEX: 22.82 KG/M2 | HEIGHT: 66 IN | TEMPERATURE: 99.4 F | SYSTOLIC BLOOD PRESSURE: 116 MMHG | OXYGEN SATURATION: 99 % | WEIGHT: 142 LBS

## 2023-01-20 DIAGNOSIS — R12 HEARTBURN: ICD-10-CM

## 2023-01-20 DIAGNOSIS — E03.9 HYPOTHYROIDISM, UNSPECIFIED TYPE: ICD-10-CM

## 2023-01-20 DIAGNOSIS — R10.13 DYSPEPSIA: ICD-10-CM

## 2023-01-20 DIAGNOSIS — K58.2 IRRITABLE BOWEL SYNDROME WITH BOTH CONSTIPATION AND DIARRHEA: ICD-10-CM

## 2023-01-20 DIAGNOSIS — R10.9 ABDOMINAL CRAMPING: ICD-10-CM

## 2023-01-20 DIAGNOSIS — Z86.010 ENCOUNTER FOR COLONOSCOPY DUE TO HISTORY OF ADENOMATOUS COLONIC POLYPS: ICD-10-CM

## 2023-01-20 DIAGNOSIS — Z12.11 ENCOUNTER FOR COLONOSCOPY DUE TO HISTORY OF ADENOMATOUS COLONIC POLYPS: ICD-10-CM

## 2023-01-20 LAB
EXT PREGNANCY TEST URINE: NEGATIVE
EXT. CONTROL: NORMAL

## 2023-01-20 RX ORDER — SODIUM CHLORIDE 9 MG/ML
125 INJECTION, SOLUTION INTRAVENOUS CONTINUOUS
Status: DISCONTINUED | OUTPATIENT
Start: 2023-01-20 | End: 2023-01-24 | Stop reason: HOSPADM

## 2023-01-20 RX ORDER — MONTELUKAST SODIUM 4 MG/1
1 TABLET, CHEWABLE ORAL 2 TIMES DAILY
COMMUNITY

## 2023-01-20 RX ORDER — LIDOCAINE HYDROCHLORIDE 20 MG/ML
INJECTION, SOLUTION EPIDURAL; INFILTRATION; INTRACAUDAL; PERINEURAL AS NEEDED
Status: DISCONTINUED | OUTPATIENT
Start: 2023-01-20 | End: 2023-01-20

## 2023-01-20 RX ORDER — PROPOFOL 10 MG/ML
INJECTION, EMULSION INTRAVENOUS CONTINUOUS PRN
Status: DISCONTINUED | OUTPATIENT
Start: 2023-01-20 | End: 2023-01-20

## 2023-01-20 RX ORDER — PROPOFOL 10 MG/ML
INJECTION, EMULSION INTRAVENOUS AS NEEDED
Status: DISCONTINUED | OUTPATIENT
Start: 2023-01-20 | End: 2023-01-20

## 2023-01-20 RX ADMIN — SODIUM CHLORIDE 125 ML/HR: 0.9 INJECTION, SOLUTION INTRAVENOUS at 09:17

## 2023-01-20 RX ADMIN — PROPOFOL 130 MG: 10 INJECTION, EMULSION INTRAVENOUS at 09:23

## 2023-01-20 RX ADMIN — PROPOFOL 80 MCG/KG/MIN: 10 INJECTION, EMULSION INTRAVENOUS at 09:25

## 2023-01-20 RX ADMIN — LIDOCAINE HYDROCHLORIDE 80 MG: 20 INJECTION, SOLUTION EPIDURAL; INFILTRATION; INTRACAUDAL; PERINEURAL at 09:23

## 2023-01-20 NOTE — ANESTHESIA PREPROCEDURE EVALUATION
Procedure:  COLONOSCOPY  EGD    Relevant Problems   CARDIO   (+) Chronic migraine without aura without status migrainosus, not intractable   (+) Intractable chronic migraine without aura and without status migrainosus   (+) Other hyperlipidemia      ENDO   (+) Post-surgical hypothyroidism      GI/HEPATIC   (+) Gastroesophageal reflux disease      NEURO/PSYCH   (+) Chronic migraine without aura without status migrainosus, not intractable   (+) Chronic nonintractable headache   (+) Depression   (+) IC (intermittent claudication) (HCC)   (+) Intractable chronic migraine without aura and without status migrainosus   (+) MDD (major depressive disorder), recurrent severe, without psychosis (HCC)   (+) OCD (obsessive compulsive disorder)   (+) Tingling of face      PULMONARY   (+) Respiratory failure with hypoxia (HCC)   (+) Shortness of breath      Genitourinary   (+) Cystitis   (+) Interstitial cystitis      Other   (+) Chronic tonsillitis   (+) Tonsillar calculus        Physical Exam    Airway    Mallampati score: II  TM Distance: >3 FB  Neck ROM: full     Dental   No notable dental hx     Cardiovascular  Rhythm: regular, Rate: normal, Cardiovascular exam normal    Pulmonary  Pulmonary exam normal Breath sounds clear to auscultation,     Other Findings        Anesthesia Plan  ASA Score- 2     Anesthesia Type- IV sedation with anesthesia with ASA Monitors  Additional Monitors:   Airway Plan:     Comment: GA prn  Plan Factors-    Chart reviewed  Patient summary reviewed  Patient is not a current smoker  Patient not instructed to abstain from smoking on day of procedure  Patient did not smoke on day of surgery  Induction- intravenous  Postoperative Plan-     Informed Consent- Anesthetic plan and risks discussed with patient  I personally reviewed this patient with the CRNA  Discussed and agreed on the Anesthesia Plan with the CRNA  Emilie Garcia

## 2023-01-20 NOTE — ANESTHESIA POSTPROCEDURE EVALUATION
Post-Op Assessment Note    CV Status:  Stable    Pain management: adequate     Mental Status:  Alert and awake   Hydration Status:  Euvolemic   PONV Controlled:  Controlled   Airway Patency:  Patent      Post Op Vitals Reviewed: Yes      Staff: Anesthesiologist, CRNA         No notable events documented      BP      Temp      Pulse     Resp      SpO2      /62   Pulse 84   Temp 99 4 °F (37 4 °C) (Temporal)   Resp 16   Ht 5' 6" (1 676 m)   Wt 64 4 kg (142 lb)   SpO2 99%   BMI 22 92 kg/m²

## 2023-01-20 NOTE — DISCHARGE INSTR - AVS FIRST PAGE
Please call [phone] with any problems  Your examinations today were normal   I did do multiple biopsies to check for some unusual things but visually everything appeared normal   I have suggested repeat examination in 5 years because of your history of polyps

## 2023-01-20 NOTE — INTERVAL H&P NOTE
H&P reviewed  After examining the patient I find no changes in the patients condition since the H&P had been written      Vitals:    01/20/23 0857   BP: 113/70   Pulse: 91   Resp: 16   Temp: 99 4 °F (37 4 °C)   SpO2: 96%

## 2023-01-26 DIAGNOSIS — R92.2 DENSE BREAST TISSUE: ICD-10-CM

## 2023-01-26 DIAGNOSIS — Z12.31 VISIT FOR SCREENING MAMMOGRAM: Primary | ICD-10-CM

## 2023-02-03 ENCOUNTER — PATIENT MESSAGE (OUTPATIENT)
Dept: DERMATOLOGY | Age: 53
End: 2023-02-03

## 2023-02-03 DIAGNOSIS — L71.9 ROSACEA: Primary | ICD-10-CM

## 2023-02-15 ENCOUNTER — OFFICE VISIT (OUTPATIENT)
Dept: GYNECOLOGY | Facility: CLINIC | Age: 53
End: 2023-02-15

## 2023-02-15 ENCOUNTER — TELEPHONE (OUTPATIENT)
Dept: GYNECOLOGY | Facility: CLINIC | Age: 53
End: 2023-02-15

## 2023-02-15 DIAGNOSIS — Z30.42 ENCOUNTER FOR DEPO-PROVERA CONTRACEPTION: Primary | ICD-10-CM

## 2023-02-15 RX ORDER — MEDROXYPROGESTERONE ACETATE 150 MG/ML
150 INJECTION, SUSPENSION INTRAMUSCULAR ONCE
Status: COMPLETED | OUTPATIENT
Start: 2023-02-15 | End: 2023-02-15

## 2023-02-15 RX ADMIN — MEDROXYPROGESTERONE ACETATE 150 MG: 150 INJECTION, SUSPENSION INTRAMUSCULAR at 09:20

## 2023-02-15 NOTE — TELEPHONE ENCOUNTER
Kimberly Beck arrived on 02/15/2023 at 56 in the [facility] office  Please sign off on her order  The ordering provider is Dr Mari aDel Carmen Mora who is not in the office today  Thank you

## 2023-02-24 DIAGNOSIS — B96.89 BV (BACTERIAL VAGINOSIS): Primary | ICD-10-CM

## 2023-02-24 DIAGNOSIS — N76.0 BV (BACTERIAL VAGINOSIS): Primary | ICD-10-CM

## 2023-02-24 RX ORDER — METRONIDAZOLE 7.5 MG/G
1 GEL VAGINAL
Qty: 70 G | Refills: 0 | Status: SHIPPED | OUTPATIENT
Start: 2023-02-24 | End: 2023-03-01

## 2023-03-02 ENCOUNTER — TELEPHONE (OUTPATIENT)
Dept: GYNECOLOGY | Facility: CLINIC | Age: 53
End: 2023-03-02

## 2023-03-17 ENCOUNTER — HOSPITAL ENCOUNTER (OUTPATIENT)
Dept: MAMMOGRAPHY | Facility: CLINIC | Age: 53
Discharge: HOME | End: 2023-03-17

## 2023-03-17 ENCOUNTER — HOSPITAL ENCOUNTER (OUTPATIENT)
Dept: ULTRASOUND IMAGING | Facility: CLINIC | Age: 53
Discharge: HOME | End: 2023-03-17

## 2023-03-17 ENCOUNTER — OFFICE VISIT (OUTPATIENT)
Dept: URGENT CARE | Facility: CLINIC | Age: 53
End: 2023-03-17

## 2023-03-17 ENCOUNTER — TELEPHONE (OUTPATIENT)
Dept: FAMILY MEDICINE CLINIC | Facility: CLINIC | Age: 53
End: 2023-03-17

## 2023-03-17 VITALS
OXYGEN SATURATION: 99 % | SYSTOLIC BLOOD PRESSURE: 118 MMHG | DIASTOLIC BLOOD PRESSURE: 84 MMHG | HEART RATE: 68 BPM | RESPIRATION RATE: 16 BRPM | TEMPERATURE: 99 F

## 2023-03-17 VITALS — HEIGHT: 66 IN | WEIGHT: 138.89 LBS | BODY MASS INDEX: 22.32 KG/M2

## 2023-03-17 DIAGNOSIS — Z12.31 VISIT FOR SCREENING MAMMOGRAM: ICD-10-CM

## 2023-03-17 DIAGNOSIS — R21 RASH: Primary | ICD-10-CM

## 2023-03-17 DIAGNOSIS — R92.2 DENSE BREAST TISSUE: ICD-10-CM

## 2023-03-17 RX ORDER — NARATRIPTAN 2.5 MG/1
TABLET ORAL
COMMUNITY
Start: 2023-03-10

## 2023-03-17 RX ORDER — METHYLPREDNISOLONE 4 MG/1
TABLET ORAL
COMMUNITY
Start: 2023-02-24 | End: 2023-03-17

## 2023-03-17 RX ORDER — METHYLPREDNISOLONE SODIUM SUCCINATE 40 MG/ML
40 INJECTION, POWDER, LYOPHILIZED, FOR SOLUTION INTRAMUSCULAR; INTRAVENOUS ONCE
Status: COMPLETED | OUTPATIENT
Start: 2023-03-17 | End: 2023-03-17

## 2023-03-17 RX ORDER — PREDNISONE 20 MG/1
20 TABLET ORAL DAILY
Qty: 3 TABLET | Refills: 0 | Status: SHIPPED | OUTPATIENT
Start: 2023-03-17 | End: 2023-03-20

## 2023-03-17 RX ORDER — IPRATROPIUM BROMIDE 42 UG/1
SPRAY, METERED NASAL
COMMUNITY
Start: 2023-01-26

## 2023-03-17 RX ORDER — AMITRIPTYLINE HYDROCHLORIDE 10 MG/1
10 TABLET, FILM COATED ORAL
COMMUNITY
Start: 2023-02-17

## 2023-03-17 RX ORDER — NORTRIPTYLINE HYDROCHLORIDE 10 MG/5ML
SOLUTION ORAL
COMMUNITY
Start: 2022-12-14

## 2023-03-17 RX ORDER — NORTRIPTYLINE HYDROCHLORIDE 10 MG/5ML
SOLUTION ORAL
COMMUNITY
Start: 2023-02-13

## 2023-03-17 RX ORDER — PREDNISONE 10 MG/1
TABLET ORAL
COMMUNITY
Start: 2022-12-11 | End: 2023-03-17

## 2023-03-17 RX ORDER — MIRABEGRON 50 MG/1
1 TABLET, FILM COATED, EXTENDED RELEASE ORAL DAILY
COMMUNITY
Start: 2022-12-14

## 2023-03-17 RX ORDER — NITROFURANTOIN 25; 75 MG/1; MG/1
CAPSULE ORAL
COMMUNITY
Start: 2023-01-30

## 2023-03-17 RX ADMIN — METHYLPREDNISOLONE SODIUM SUCCINATE 40 MG: 40 INJECTION, POWDER, LYOPHILIZED, FOR SOLUTION INTRAMUSCULAR; INTRAVENOUS at 14:20

## 2023-03-17 NOTE — TELEPHONE ENCOUNTER
Pt states she started  red dots on face , neck , back and sides   Pt tries benadryl and allerga and is still itchy  Pt states she has not tried or started anything new  Pt will go to urgent care for eval and then follow up with Dr Jass Garcia

## 2023-03-17 NOTE — TELEPHONE ENCOUNTER
Patient said that she has been breaking out in blotches all over her face, neck etc   Appears to be an allergic reaction but she is not taking anything new etc  Has been on a new medication for months now     Did explain that we had no openings today and recommended [facility] but she wanted me to give  You this message and possible recommendations    [phone]

## 2023-03-23 ENCOUNTER — APPOINTMENT (OUTPATIENT)
Dept: LAB | Age: 53
End: 2023-03-23

## 2023-03-23 DIAGNOSIS — E55.9 AVITAMINOSIS D: ICD-10-CM

## 2023-03-23 DIAGNOSIS — L50.1 IDIOPATHIC URTICARIA: ICD-10-CM

## 2023-03-23 LAB
25(OH)D3 SERPL-MCNC: 39.2 NG/ML (ref 30–100)
ANION GAP SERPL CALCULATED.3IONS-SCNC: 4 MMOL/L (ref 4–13)
BASOPHILS # BLD AUTO: 0.03 THOUSANDS/ÂΜL (ref 0–0.1)
BASOPHILS NFR BLD AUTO: 1 % (ref 0–1)
BUN SERPL-MCNC: 11 MG/DL (ref 5–25)
C3 SERPL-MCNC: 106 MG/DL (ref 90–180)
C4 SERPL-MCNC: 26 MG/DL (ref 10–40)
CALCIUM SERPL-MCNC: 9.2 MG/DL (ref 8.3–10.1)
CHLORIDE SERPL-SCNC: 111 MMOL/L (ref 96–108)
CO2 SERPL-SCNC: 24 MMOL/L (ref 21–32)
CREAT SERPL-MCNC: 0.75 MG/DL (ref 0.6–1.3)
CRP SERPL QL: <3 MG/L
EOSINOPHIL # BLD AUTO: 0.11 THOUSAND/ÂΜL (ref 0–0.61)
EOSINOPHIL NFR BLD AUTO: 2 % (ref 0–6)
ERYTHROCYTE [DISTWIDTH] IN BLOOD BY AUTOMATED COUNT: 13.2 % (ref 11.6–15.1)
GFR SERPL CREATININE-BSD FRML MDRD: 93 ML/MIN/1.73SQ M
GLUCOSE SERPL-MCNC: 101 MG/DL (ref 65–140)
HCT VFR BLD AUTO: 40.4 % (ref 34.8–46.1)
HGB BLD-MCNC: 13.6 G/DL (ref 11.5–15.4)
IMM GRANULOCYTES # BLD AUTO: 0.04 THOUSAND/UL (ref 0–0.2)
IMM GRANULOCYTES NFR BLD AUTO: 1 % (ref 0–2)
LYMPHOCYTES # BLD AUTO: 2.09 THOUSANDS/ÂΜL (ref 0.6–4.47)
LYMPHOCYTES NFR BLD AUTO: 33 % (ref 14–44)
MCH RBC QN AUTO: 29.4 PG (ref 26.8–34.3)
MCHC RBC AUTO-ENTMCNC: 33.7 G/DL (ref 31.4–37.4)
MCV RBC AUTO: 87 FL (ref 82–98)
MONOCYTES # BLD AUTO: 0.39 THOUSAND/ÂΜL (ref 0.17–1.22)
MONOCYTES NFR BLD AUTO: 6 % (ref 4–12)
NEUTROPHILS # BLD AUTO: 3.77 THOUSANDS/ÂΜL (ref 1.85–7.62)
NEUTS SEG NFR BLD AUTO: 57 % (ref 43–75)
NRBC BLD AUTO-RTO: 0 /100 WBCS
PLATELET # BLD AUTO: 290 THOUSANDS/UL (ref 149–390)
PMV BLD AUTO: 9.8 FL (ref 8.9–12.7)
POTASSIUM SERPL-SCNC: 3.6 MMOL/L (ref 3.5–5.3)
RBC # BLD AUTO: 4.62 MILLION/UL (ref 3.81–5.12)
SODIUM SERPL-SCNC: 139 MMOL/L (ref 135–147)
T3 SERPL-MCNC: 0.8 NG/ML (ref 0.6–1.8)
T4 FREE SERPL-MCNC: 1.22 NG/DL (ref 0.76–1.46)
TSH SERPL DL<=0.05 MIU/L-ACNC: 4.6 UIU/ML (ref 0.45–4.5)
WBC # BLD AUTO: 6.43 THOUSAND/UL (ref 4.31–10.16)

## 2023-03-24 LAB
ANA SER QL IA: NEGATIVE
CH50 SERPL-ACNC: >60 U/ML
RHEUMATOID FACT SER QL LA: NEGATIVE
THYROGLOB AB SERPL-ACNC: <1 IU/ML (ref 0–0.9)
THYROGLOB SERPL-MCNC: 0.2 NG/ML (ref 1.5–38.5)
THYROPEROXIDASE AB SERPL-ACNC: 9 IU/ML (ref 0–34)

## 2023-03-24 NOTE — PROGRESS NOTES
[facility]        NAME: Aminata Ayala is a 50 y o  female  : 1970    MRN: [id number]  DATE: 2023  TIME: 8:16 AM    Assessment and Plan   Rash [R21]  1  Rash  hydrocortisone 2 5 % cream    methylPREDNISolone sodium succinate (Solu-MEDROL) injection 40 mg    predniSONE 20 mg tablet        Patient presents with urticarial rash to back, trunk and neck  States rash started last night  No new food/meds/contacts  Since having thyroid out has been having skin issues  Has tried benadryl and allegra  Assessment notes hive like rash to back, neck, stomach  No drainage, open areas  Discussed treatment management and monitoring for possible contact allergen  Patient Instructions       Follow up with PCP as needed    Chief Complaint     Chief Complaint   Patient presents with   • Rash     Pt reports red, blotchy spots on back since last night as well as itching of face, neck, and head, unrelieved by Benadryl and Allegra  Had thyroid out 15 months ago and has been experiencing occasional dermatitis since  History of Present Illness       Patient presents with urticarial rash to back, trunk and neck  States rash started last night  No new food/meds/contacts  Since having thyroid out has been having skin issues  Has tried benadryl and allegra  Assessment notes hive like rash to back, neck, stomach  No drainage, open areas  Discussed treatment management and monitoring for possible contact allergen  Review of Systems   Review of Systems   Constitutional: Negative for activity change, appetite change and fever  HENT: Negative for congestion  Respiratory: Negative for cough  Musculoskeletal: Negative for myalgias  Skin: Positive for rash  All other systems reviewed and are negative          Current Medications       Current Outpatient Medications:   •  hydrocortisone 2 5 % cream, Apply topically 2 (two) times a day, Disp: 30 g, Rfl: 0  •  naratriptan (AMERGE) 2 5 MG tablet, 1 po prn migraine  MRx1 in 2 hours if needed  Max 2 tabs/day  Max 2 days  , Disp: , Rfl:   •  nortriptyline (PAMELOR) 10 MG/5ML solution, , Disp: , Rfl:   •  albuterol (2 5 mg/3 mL) 0 083 % nebulizer solution, Take 1 vial (2 5 mg total) by nebulization 3 (three) times a day, Disp: 30 vial, Rfl: 2  •  albuterol (PROAIR HFA) 90 mcg/act inhaler, Inhale 2 puffs 4 (four) times a day every 4 to 6 hours as needed, Disp: 1 Inhaler, Rfl: 5  •  amitriptyline (ELAVIL) 10 mg tablet, Take 10 mg by mouth daily at bedtime, Disp: , Rfl:   •  budesonide-formoterol (Symbicort) 160-4 5 mcg/act inhaler, Inhale 2 puffs 2 (two) times a day Rinse mouth after use , Disp: 10 2 g, Rfl: 3  •  Ciclopirox 1 % shampoo, Apply 1 application topically every other day To the scalp for 5 minutes, then rinse thoroughly   (Patient not taking: Reported on 2/1/2023), Disp: 120 mL, Rfl: 1  •  colestipol (COLESTID) 1 g tablet, Take 1 g by mouth 2 (two) times a day, Disp: , Rfl:   •  dicyclomine (BENTYL) 10 mg capsule, TAKE 1 CAPSULE (10 MG TOTAL) BY MOUTH 3 (THREE) TIMES A DAY AS NEEDED (ABDOMINAL PAIN), Disp: 270 capsule, Rfl: 1  •  famotidine (PEPCID) 20 mg tablet, TAKE 1 TABLET BY MOUTH TWICE A DAY, Disp: 180 tablet, Rfl: 1  •  fluticasone (FLONASE) 50 mcg/act nasal spray, as needed  , Disp: , Rfl:   •  glycopyrrolate (ROBINUL) 1 mg tablet, TAKE 1 TABLET TWICE A DAY, Disp: 180 tablet, Rfl: 2  •  ipratropium (ATROVENT) 0 06 % nasal spray, 2 SPRAYS EVERY 6 HOURS AS NEEDED FOR 30 DAYS, Disp: , Rfl:   •  levalbuterol (XOPENEX) 1 25 mg/3 mL nebulizer solution, , Disp: , Rfl:   •  LORazepam (ATIVAN) 1 mg tablet, TAKE 1 & 1/2 TABLETS DAILY AT BEDTIME AS NEEDED, Disp: , Rfl: 1  •  medroxyPROGESTERone (DEPO-PROVERA) 150 mg/mL injection, TO BE GIVEN IN OFFICE EVERY 10 WEEKS, Disp: 3 mL, Rfl: 1  •  Meth-Hyo-M Bl-Na Phos-Ph Sal (Uribel) 118 MG CAPS, Take 1 each by mouth as needed, Disp: , Rfl:   •  metroNIDAZOLE (METROCREAM) 0 75 % cream, Apply topically 2 (two) times a day, Disp: 45 g, Rfl: 3  •  Multiple Vitamin (MULTI VITAMIN DAILY PO), Take by mouth  , Disp: , Rfl:   •  Myrbetriq 50 MG TB24, Take 1 tablet by mouth daily, Disp: , Rfl:   •  naproxen (NAPROSYN) 500 mg tablet, TAKE 1 TABLET BY MOUTH 3 TIMES A DAY AS NEEDED MIGRAINE  LIMIT USE TO 3 DAYS PER WEEK (Patient not taking: Reported on 2/1/2023), Disp: , Rfl:   •  nitrofurantoin (MACROBID) 100 mg capsule, TAKE 1 CAPSULE BY MOUTH TWICE A DAY FOR 5 DAYS, Disp: , Rfl:   •  nortriptyline (PAMELOR) 10 MG/5ML solution, TAKE 2 5ML BY MOUTH EVERY NIGHT AT BEDTIME, Disp: , Rfl:   •  omeprazole (PriLOSEC) 20 mg delayed release capsule, TAKE 1 CAPSULE BY MOUTH EVERY DAY (Patient not taking: Reported on 2/1/2023), Disp: 90 capsule, Rfl: 1  •  ondansetron (ZOFRAN) 4 mg tablet, Take 1 tablet (4 mg total) by mouth every 8 (eight) hours as needed for nausea, Disp: 20 tablet, Rfl: 5  •  pentosan polysulfate (ELMIRON) 100 mg capsule, Take 100 mg by mouth 3 (three) times a day before meals  , Disp: , Rfl:   •  Tirosint 150 MCG CAPS, Take 1 capsule by mouth daily, Disp: , Rfl:     Current Facility-Administered Medications:   •  medroxyPROGESTERone (DEPO-PROVERA) IM injection 150 mg, 150 mg, Intramuscular, Once, Berry Eddy,     Current Allergies     Allergies as of 03/17/2023 - Reviewed 03/17/2023   Allergen Reaction Noted   • Latex Itching and Hives 07/21/2021   • Vancomycin Itching and Swelling 02/23/2022   • Tessalon [benzonatate] Itching 01/20/2023   • Azithromycin Diarrhea, Nausea Only, and Other (See Comments) 07/12/2005   • Clarithromycin Other (See Comments) 05/17/2018   • Clavulanic acid Other (See Comments) 09/06/2013   • Doxycycline Other (See Comments) 09/04/2014   • Erythromycin base Other (See Comments)    • Lamotrigine Other (See Comments)    • Lithium Itching 01/27/2009   • Mirtazapine Other (See Comments) 09/19/2018   • Moxifloxacin Nausea Only 05/08/2017   • Nitrofurantoin Itching and Other (See Comments) 02/01/2023   • Sulfamethoxazole  04/05/2010   • Sulfamethoxazole-trimethoprim Other (See Comments)    • Trimethoprim  04/05/2010   • Indomethacin Rash    • Penicillins Rash 05/17/2012            The following portions of the patient's history were reviewed and updated as appropriate: allergies, current medications, past family history, past medical history, past social history, past surgical history and problem list      Past Medical History:   Diagnosis Date   • Anemia    • Anxiety    • Asthma     illness induced   • Cancer ([facility] [address] )     thyroid ca (removed Jan 2022)   • SARA I (cervical intraepithelial neoplasia I)     RESOLVED: [id number]   • Depression    • Endometriosis    • Functional ovarian cysts age 15   • History of agoraphobia age 15   • Hypercholesterolemia    • IBS (irritable bowel syndrome) age 15   • IC (interstitial cystitis)    • Malignant neoplasm of thyroid gland ([facility] ) 12/14/2021   • Migraine    • Motion sickness    • Multiple thyroid nodules 11/02/2021    Last Assessment & Plan:  Formatting of this note might be different from the original  Tracie Hammonds is a 54 y o  female   We had an extensive discussion regarding thyroid nodules, the natural course of thyroid nodules, ultrasound features which can convey an increased risk for malignancy, the general indications for fine needle aspiration, the procedure itself and possible results from a fine needl   • Pap smear abnormality of cervix with ASCUS favoring benign     RESOLVED: [id number]   • Seasonal allergies     with post-nasal drip and recurrent throat infections   • Thyroid cancer ([facility] )    • Urinary tract infection    • Varicella        Past Surgical History:   Procedure Laterality Date   • CHOLECYSTECTOMY  2019   • CHOLECYSTECTOMY LAPAROSCOPIC  08/2020   • COLONOSCOPY  06/2018    Nonbleeding angiodysplastic lesion mid ascending colon, questionable ulcerative proctitis-biopsy was negative for acute or chronic colitis   • COLONOSCOPY  06/09/2016 Normal   • COLONOSCOPY  05/23/2013    Adenoma at splenic flexure   • EGD  08/02/2018   • EGD  08/03/2020    Normal   Biopsy stomach negative for H  pylori, biopsy of the duodenum negative for celiac   • ENDOTRACHEAL INTUBATION EMERGENT  2012   • HEMORRHOID SURGERY  07/2009    Dr Hugh Jules N/A 11/21/2018    Procedure: HYSTEROSCOPY;  Surgeon: Lashae Martínez MD;  Location: [facility];  Service: Gynecology   • MD HYSTEROSCOPY ENDOMETRIAL ABLATION N/A 11/21/2018    Procedure: Rodger Caldwell;  Surgeon: Lashae Martínez MD;  Location: [facility];  Service: Gynecology   • SMALL INTESTINE SURGERY     • THYROID SURGERY  01/17/2022    Papillary cancer of thyroid nodules   • TOOTH EXTRACTION     • TUBAL LIGATION      ONSET: 36 DEC 2011       Family History   Problem Relation Age of Onset   • Other Mother         HYSTERECTOMY FOR BENIGN DISEASE    • Diabetes Mother    • Lung cancer Father    • Other Sister         HYSTERECTOMY FOR BENIGN DISEASE    • Diabetes Maternal Grandmother    • Other Maternal Grandmother         HYSTERECTOMY FOR BENIGN DISEASE    • Colon cancer Paternal Grandmother 61   • Throat cancer Paternal Uncle 61   • No Known Problems Maternal Aunt    • No Known Problems Paternal Aunt    • No Known Problems Paternal Aunt    • Diabetes Maternal Grandfather          Medications have been verified  Objective   /84   Pulse 68   Temp 99 °F (37 2 °C)   Resp 16   SpO2 99%   No LMP recorded  Patient has had an injection  Physical Exam     Physical Exam  Vitals reviewed  Constitutional:       Appearance: Normal appearance  She is normal weight  HENT:      Head: Normocephalic  Nose: Nose normal    Cardiovascular:      Rate and Rhythm: Normal rate and regular rhythm  Pulses: Normal pulses  Heart sounds: Normal heart sounds     Pulmonary:      Effort: Pulmonary effort is normal       Breath sounds: Normal breath sounds  Musculoskeletal:      Cervical back: Normal range of motion  Skin:     Capillary Refill: Capillary refill takes less than 2 seconds  Findings: Rash present  Neurological:      General: No focal deficit present  Mental Status: She is alert and oriented to person, place, and time  Mental status is at baseline     Psychiatric:         Mood and Affect: Mood normal

## 2023-03-25 DIAGNOSIS — J40 BRONCHITIS: ICD-10-CM

## 2023-03-25 LAB
ALBUMIN SERPL ELPH-MCNC: 4.1 G/DL (ref 3.5–5)
ALBUMIN SERPL ELPH-MCNC: 62.1 % (ref 52–65)
ALPHA1 GLOB SERPL ELPH-MCNC: 0.28 G/DL (ref 0.1–0.4)
ALPHA1 GLOB SERPL ELPH-MCNC: 4.3 % (ref 2.5–5)
ALPHA2 GLOB SERPL ELPH-MCNC: 0.59 G/DL (ref 0.4–1.2)
ALPHA2 GLOB SERPL ELPH-MCNC: 9 % (ref 7–13)
BETA GLOB ABNORMAL SERPL ELPH-MCNC: 0.41 G/DL (ref 0.4–0.8)
BETA1 GLOB SERPL ELPH-MCNC: 6.2 % (ref 5–13)
BETA2 GLOB SERPL ELPH-MCNC: 5.7 % (ref 2–8)
BETA2+GAMMA GLOB SERPL ELPH-MCNC: 0.38 G/DL (ref 0.2–0.5)
GAMMA GLOB ABNORMAL SERPL ELPH-MCNC: 0.84 G/DL (ref 0.5–1.6)
GAMMA GLOB SERPL ELPH-MCNC: 12.7 % (ref 12–22)
IGG/ALB SER: 1.64 {RATIO} (ref 1.1–1.8)
PROT PATTERN SERPL ELPH-IMP: NORMAL
PROT SERPL-MCNC: 6.6 G/DL (ref 6.4–8.2)
TRYPTASE SERPL-MCNC: 3.9 UG/L (ref 2.2–13.2)

## 2023-03-25 RX ORDER — BUDESONIDE AND FORMOTEROL FUMARATE DIHYDRATE 160; 4.5 UG/1; UG/1
AEROSOL RESPIRATORY (INHALATION)
Qty: 6 G | Refills: 3 | Status: SHIPPED | OUTPATIENT
Start: 2023-03-25

## 2023-04-06 ENCOUNTER — OFFICE VISIT (OUTPATIENT)
Dept: DERMATOLOGY | Facility: CLINIC | Age: 53
End: 2023-04-06

## 2023-04-06 VITALS — BODY MASS INDEX: 22.76 KG/M2 | HEIGHT: 66 IN | WEIGHT: 141.6 LBS | TEMPERATURE: 97.3 F

## 2023-04-06 DIAGNOSIS — L21.9 SEBORRHEIC DERMATITIS: Primary | ICD-10-CM

## 2023-04-06 DIAGNOSIS — Z87.2 HISTORY OF DERMATITIS: ICD-10-CM

## 2023-04-06 RX ORDER — FLUOCINOLONE ACETONIDE 0.1 MG/ML
SOLUTION TOPICAL
Qty: 60 ML | Refills: 0 | Status: SHIPPED | OUTPATIENT
Start: 2023-04-06

## 2023-04-06 NOTE — PATIENT INSTRUCTIONS
"1  SEBORRHEIC DERMATITIS    Assessment and Plan:  Based on a thorough discussion of this condition and the management approach to it (including a comprehensive discussion of the known risks, side effects and potential benefits of treatment), the patient (family) agrees to implement the following specific plan:  Dr Jenna Bryant will be prescribing a topical medication for the scalp use as directed      Seborrheic Dermatitis   Seborrheic dermatitis is a common, chronic or relapsing form of eczema/dermatitis that mainly affects the sebaceous, gland-rich regions of the scalp, face, and trunk  There are infantile and adult forms of seborrhoeic dermatitis  It is sometimes associated with psoriasis and, in that clinical scenario, may be referred to as \"sebo-psoriasis  \"  Seborrheic dermatitis is also known as \"seborrheic eczema  \"  Dandruff (also called \"pityriasis capitis\") is an uninflamed form of seborrhoeic dermatitis  Dandruff presents as bran-like scaly patches scattered within hair-bearing areas of the scalp  In an infant, this condition may be referred to as \"cradle cap  \"  The cause of seborrheic dermatitis is not completely understood  It is associated with proliferation of various species of the skin commensal Malassezia, in its yeast (non-pathogenic) form  Its metabolites (such as the fatty acids oleic acid, malssezin, and indole-3-carbaldehyde) may cause an inflammatory reaction  Differences in skin barrier lipid content and function may account for individual presentations  Infantile Seborrheic Dermatitis  Infantile seborrheic dermatitis affects babies under the age of 1 months and usually resolves by 1012 months of age  Infantile seborrheic dermatitis causes \"cradle cap\" (diffuse, greasy scaling on scalp)  The rash may spread to affect armpit and groin folds (a type of \"napkin dermatitis\")  There may be associated salmon-pink colored patches that may flake or peel    The rash in this case is usually not " especially itchy, so the baby often appears undisturbed by the rash, even when more generalized  Adult Seborrheic Dermatitis  Adult seborrheic dermatitis tends to begin in late adolescence; prevalence is greatest in young adults and in the elderly  It is more common in males than in females  The following factors are sometimes associated with severe adult seborrheic dermatitis:  Oily skin  Familial tendency to seborrhoeic dermatitis or a family history of psoriasis  Immunosuppression: organ transplant recipient, human immunodeficiency virus (HIV) infection and patients with lymphoma  Neurological and psychiatric diseases: Parkinson disease, tardive dyskinesia, depression, epilepsy, facial nerve palsy, spinal cord injury and congenital disorders such as Down syndrome  Treatment for psoriasis with psoralen and ultraviolet A (PUVA) therapy  Lack of sleep  Stressful events  In adults, seborrheic dermatitis may typically affect the scalp, face (creases around the nose, behind ears, within eyebrows) and upper trunk  Typical clinical features include: Winter flares, improving in summer following sun exposure  Minimal itch most of the time  Combination oily and dry mid-facial skin  Ill-defined localized scaly patches or diffuse scale in the scalp  Blepharitis; scaly red eyelid margins  [place]-pink, thin, scaly, and ill-defined plaques in skin folds on both sides of the face  Petal or ring-shaped flaky patches on hair-line and on anterior chest  Rash in armpits, under the breasts, in the groin folds and genital creases  Superficial folliculitis (inflamed hair follicles) on cheeks and upper trunk    Seborrheic dermatitis is diagnosed by its clinical appearance and behavior  Skin biopsy may be helpful but is rarely necessary to make this diagnosis      2  HISTORY OF DERMATITIS    Assessment and Plan:  Based on a thorough discussion of this condition and the management approach to it (including a comprehensive discussion of the known risks, side effects and potential benefits of treatment), the patient (family) agrees to implement the following specific plan:  [employer]     We use this service to prescribe medications that are often not covered by insurance  Your physician will send your prescription to the pharmacy  You will receive an email from [email or web address] where you will follow the instructions within the email to provide your billing and shipping information  Your medicine will be shipped directly to you  If you have any questions, you can call [phone] or email [email or web address]

## 2023-04-06 NOTE — PROGRESS NOTES
"[facility]     Patient Name: Malaika Griffin  Encounter Date: 4/6/2023     Have you been cared for by a [place] [address] Dermatologist in the last 3 years and, if so, which description applies to you? Yes  I have been here within the last 3 years, and my medical history has NOT changed since that time  I am FEMALE/of child-bearing potential     REVIEW OF SYSTEMS:  Have you recently had or currently have any of the following? · No changes in my recent health  PAST MEDICAL HISTORY:  Have you personally ever had or currently have any of the following? If \"YES,\" then please provide more detail  · No changes in my medical history  FAMILY HISTORY:  Any \"first degree relatives\" (parent, brother, sister, or child) with the following? • No changes in my family's known health  PATIENT EXPERIENCE:    • Do you want the Dermatologist to perform a COMPLETE skin exam today including a clinical examination under the \"bra and underwear\" areas? NO  • If necessary, do we have your permission to call and leave a detailed message on your Preferred Phone number that includes your specific medical information? Yes      Allergies   Allergen Reactions   • Latex Itching and Hives     Irritation   • Vancomycin Itching and Swelling     Patient had facial swelling, especially around her eyes and severe itching  • Tessalon [Benzonatate] Itching   • Azithromycin Diarrhea, Nausea Only and Other (See Comments)   • Clarithromycin Other (See Comments)   • Clavulanic Acid Other (See Comments)   • Doxycycline Other (See Comments)   • Erythromycin Base Other (See Comments)   • Lamotrigine Other (See Comments)     Can't recall side effects      • Lithium Itching   • Mirtazapine Other (See Comments)     (remeron)  (remeron)  (remeron)   • Moxifloxacin Nausea Only     Excessive vomiting  Excessive vomiting   • Nitrofurantoin Itching and Other (See Comments)     Blotchy skin    • Sulfamethoxazole      Other reaction(s): " Rash   • Sulfamethoxazole-Trimethoprim Other (See Comments)   • Trimethoprim      Other reaction(s): Rash   • Indomethacin Rash   • Penicillins Rash      Current Outpatient Medications:   •  albuterol (2 5 mg/3 mL) 0 083 % nebulizer solution, Take 1 vial (2 5 mg total) by nebulization 3 (three) times a day, Disp: 30 vial, Rfl: 2  •  albuterol (PROAIR HFA) 90 mcg/act inhaler, Inhale 2 puffs 4 (four) times a day every 4 to 6 hours as needed, Disp: 1 Inhaler, Rfl: 5  •  amitriptyline (ELAVIL) 10 mg tablet, Take 10 mg by mouth daily at bedtime, Disp: , Rfl:   •  Ciclopirox 1 % shampoo, Apply 1 application topically every other day To the scalp for 5 minutes, then rinse thoroughly   (Patient not taking: Reported on 2/1/2023), Disp: 120 mL, Rfl: 1  •  colestipol (COLESTID) 1 g tablet, Take 1 g by mouth 2 (two) times a day, Disp: , Rfl:   •  dicyclomine (BENTYL) 10 mg capsule, TAKE 1 CAPSULE (10 MG TOTAL) BY MOUTH 3 (THREE) TIMES A DAY AS NEEDED (ABDOMINAL PAIN), Disp: 270 capsule, Rfl: 1  •  famotidine (PEPCID) 20 mg tablet, TAKE 1 TABLET BY MOUTH TWICE A DAY, Disp: 180 tablet, Rfl: 1  •  fluticasone (FLONASE) 50 mcg/act nasal spray, as needed  , Disp: , Rfl:   •  glycopyrrolate (ROBINUL) 1 mg tablet, TAKE 1 TABLET TWICE A DAY, Disp: 180 tablet, Rfl: 2  •  hydrocortisone 2 5 % cream, Apply topically 2 (two) times a day, Disp: 30 g, Rfl: 0  •  ipratropium (ATROVENT) 0 06 % nasal spray, 2 SPRAYS EVERY 6 HOURS AS NEEDED FOR 30 DAYS, Disp: , Rfl:   •  levalbuterol (XOPENEX) 1 25 mg/3 mL nebulizer solution, , Disp: , Rfl:   •  LORazepam (ATIVAN) 1 mg tablet, TAKE 1 & 1/2 TABLETS DAILY AT BEDTIME AS NEEDED, Disp: , Rfl: 1  •  medroxyPROGESTERone (DEPO-PROVERA) 150 mg/mL injection, TO BE GIVEN IN OFFICE EVERY 10 WEEKS, Disp: 3 mL, Rfl: 1  •  Meth-Hyo-M Bl-Na Phos-Ph Sal (Uribel) 118 MG CAPS, Take 1 each by mouth as needed, Disp: , Rfl:   •  metroNIDAZOLE (METROCREAM) 0 75 % cream, Apply topically 2 (two) times a day, Disp: 45 g, Rfl: 3  •  Multiple Vitamin (MULTI VITAMIN DAILY PO), Take by mouth  , Disp: , Rfl:   •  Myrbetriq 50 MG TB24, Take 1 tablet by mouth daily, Disp: , Rfl:   •  naproxen (NAPROSYN) 500 mg tablet, TAKE 1 TABLET BY MOUTH 3 TIMES A DAY AS NEEDED MIGRAINE  LIMIT USE TO 3 DAYS PER WEEK (Patient not taking: Reported on 2/1/2023), Disp: , Rfl:   •  naratriptan (AMERGE) 2 5 MG tablet, 1 po prn migraine  MRx1 in 2 hours if needed  Max 2 tabs/day  Max 2 days  , Disp: , Rfl:   •  nitrofurantoin (MACROBID) 100 mg capsule, TAKE 1 CAPSULE BY MOUTH TWICE A DAY FOR 5 DAYS, Disp: , Rfl:   •  nortriptyline (PAMELOR) 10 MG/5ML solution, , Disp: , Rfl:   •  nortriptyline (PAMELOR) 10 MG/5ML solution, TAKE 2 5ML BY MOUTH EVERY NIGHT AT BEDTIME, Disp: , Rfl:   •  omeprazole (PriLOSEC) 20 mg delayed release capsule, TAKE 1 CAPSULE BY MOUTH EVERY DAY (Patient not taking: Reported on 2/1/2023), Disp: 90 capsule, Rfl: 1  •  ondansetron (ZOFRAN) 4 mg tablet, Take 1 tablet (4 mg total) by mouth every 8 (eight) hours as needed for nausea, Disp: 20 tablet, Rfl: 5  •  pentosan polysulfate (ELMIRON) 100 mg capsule, Take 100 mg by mouth 3 (three) times a day before meals  , Disp: , Rfl:   •  Symbicort 160-4 5 MCG/ACT inhaler, INHALE 2 PUFFS BY MOUTH 2 TIMES A DAY RINSE MOUTH AFTER USE , Disp: 6 g, Rfl: 3  •  Tirosint 150 MCG CAPS, Take 1 capsule by mouth daily, Disp: , Rfl:     Current Facility-Administered Medications:   •  medroxyPROGESTERone (DEPO-PROVERA) IM injection 150 mg, 150 mg, Intramuscular, Once, Gunnar Chavarria, DO          • Whom besides the patient is providing clinical information about today's encounter?   o NO ADDITIONAL HISTORIAN (patient alone provided history)    Physical Exam and Assessment/Plan by Diagnosis:        1  PRESUMPTIVE SEBORRHEIC DERMATITIS    Physical Exam:  • Anatomic Location Affected:  Scalp  • Morphological Description:  WNL  • Pertinent Positives:  • Pertinent Negatives:     Additional History of Present Condition: "Patient has tried ciclopirox 1% shampoo    Assessment and Plan:  Based on a thorough discussion of this condition and the management approach to it (including a comprehensive discussion of the known risks, side effects and potential benefits of treatment), the patient (family) agrees to implement the following specific plan:  • Dr Lianna Herrera will be prescribing a topical medication for the scalp use as directed      Seborrheic Dermatitis   Seborrheic dermatitis is a common, chronic or relapsing form of eczema/dermatitis that mainly affects the sebaceous, gland-rich regions of the scalp, face, and trunk  There are infantile and adult forms of seborrhoeic dermatitis  It is sometimes associated with psoriasis and, in that clinical scenario, may be referred to as \"sebo-psoriasis  \"  Seborrheic dermatitis is also known as \"seborrheic eczema  \"  Dandruff (also called \"pityriasis capitis\") is an uninflamed form of seborrhoeic dermatitis  Dandruff presents as bran-like scaly patches scattered within hair-bearing areas of the scalp  In an infant, this condition may be referred to as \"cradle cap  \"  The cause of seborrheic dermatitis is not completely understood  It is associated with proliferation of various species of the skin commensal Malassezia, in its yeast (non-pathogenic) form  Its metabolites (such as the fatty acids oleic acid, malssezin, and indole-3-carbaldehyde) may cause an inflammatory reaction  Differences in skin barrier lipid content and function may account for individual presentations  Infantile Seborrheic Dermatitis  Infantile seborrheic dermatitis affects babies under the age of 1 months and usually resolves by 1012 months of age  Infantile seborrheic dermatitis causes \"cradle cap\" (diffuse, greasy scaling on scalp)  The rash may spread to affect armpit and groin folds (a type of \"napkin dermatitis\")  There may be associated salmon-pink colored patches that may flake or peel    The rash in this case is " usually not especially itchy, so the baby often appears undisturbed by the rash, even when more generalized  Adult Seborrheic Dermatitis  Adult seborrheic dermatitis tends to begin in late adolescence; prevalence is greatest in young adults and in the elderly  It is more common in males than in females  The following factors are sometimes associated with severe adult seborrheic dermatitis:  • Oily skin  • Familial tendency to seborrhoeic dermatitis or a family history of psoriasis  • Immunosuppression: organ transplant recipient, human immunodeficiency virus (HIV) infection and patients with lymphoma  • Neurological and psychiatric diseases: Parkinson disease, tardive dyskinesia, depression, epilepsy, facial nerve palsy, spinal cord injury and congenital disorders such as Down syndrome  • Treatment for psoriasis with psoralen and ultraviolet A (PUVA) therapy  • Lack of sleep  • Stressful events  In adults, seborrheic dermatitis may typically affect the scalp, face (creases around the nose, behind ears, within eyebrows) and upper trunk  Typical clinical features include:  • Winter flares, improving in summer following sun exposure  • Minimal itch most of the time  • Combination oily and dry mid-facial skin  • Ill-defined localized scaly patches or diffuse scale in the scalp  • Blepharitis; scaly red eyelid margins  • Rhodelia-pink, thin, scaly, and ill-defined plaques in skin folds on both sides of the face  • Petal or ring-shaped flaky patches on hair-line and on anterior chest  • Rash in armpits, under the breasts, in the groin folds and genital creases  • Superficial folliculitis (inflamed hair follicles) on cheeks and upper trunk    Seborrheic dermatitis is diagnosed by its clinical appearance and behavior  Skin biopsy may be helpful but is rarely necessary to make this diagnosis  2  HISTORY OF DERMATITIS  Physical Exam:  • Anatomic Location Affected:   Face and trunk  • Morphological Description:  Not active  • Pertinent Positives:  • Pertinent Negatives: Additional History of Present Condition:  Patient has tried Claritin, Allegra, and hydrocortisone 2 5% cream     Assessment and Plan:    Possibly urticarial in nature  Not active today  The patient noted an intolerance to Allegra and Claritin, among many other medications  [employer]     We use this service to prescribe medications that are often not covered by insurance  Your physician will send your prescription to the pharmacy  You will receive an email from www [email or web address] where you will follow the instructions within the email to provide your billing and shipping information  Your medicine will be shipped directly to you  If you have any questions, you can call [phone] or email [email or web address]  •   •         Amitriptyline: 5%  Gabapentin: 10%  Lidocaine: 5%  Vehicle: Cream     Apparent rosacea on face  There are rosacea-type papules on cheeks, and the patient reported a history of being told that she has rosacea on her nose      Azelaic Acid: 15%  Ivermectin: 1%  Metronidazole: 1%  Vehicle: Cream     Scribe Attestation    I,:  Chiquis Longoria am acting as a scribe while in the presence of the attending physician :       I,:  Thi Cleveland MD personally performed the services described in this documentation    as scribed in my presence :

## 2023-04-26 ENCOUNTER — TELEPHONE (OUTPATIENT)
Dept: DERMATOLOGY | Facility: CLINIC | Age: 53
End: 2023-04-26

## 2023-04-26 DIAGNOSIS — L98.9 DISORDER OF SCALP: ICD-10-CM

## 2023-04-26 DIAGNOSIS — L21.9 SEBORRHEIC DERMATITIS: Primary | ICD-10-CM

## 2023-04-26 RX ORDER — KETOCONAZOLE 20 MG/ML
1 SHAMPOO TOPICAL 2 TIMES WEEKLY
Qty: 120 ML | Refills: 1 | Status: SHIPPED | OUTPATIENT
Start: 2023-04-27

## 2023-04-26 NOTE — TELEPHONE ENCOUNTER
Pt called in stating the shampoo prescribed was not sent to the pharmacy can you please review and advise, she also stated that her  says she has red dots on her scalp  Sergio Royal jd

## 2023-05-01 DIAGNOSIS — L21.9 SEBORRHEIC DERMATITIS: ICD-10-CM

## 2023-05-02 DIAGNOSIS — L21.9 SEBORRHEIC DERMATITIS: ICD-10-CM

## 2023-05-02 RX ORDER — HYDROCORTISONE 1 G/100G
LOTION TOPICAL
Qty: 99 ML | Refills: 0 | Status: SHIPPED | OUTPATIENT
Start: 2023-05-02

## 2023-05-02 RX ORDER — FENOPROFEN CALCIUM 200 MG
CAPSULE ORAL
Qty: 59 ML | Refills: 0 | Status: SHIPPED | OUTPATIENT
Start: 2023-05-02 | End: 2023-05-02

## 2023-05-15 ENCOUNTER — TELEPHONE (OUTPATIENT)
Dept: FAMILY MEDICINE CLINIC | Facility: CLINIC | Age: 53
End: 2023-05-15

## 2023-05-15 NOTE — TELEPHONE ENCOUNTER
Patient has to get labs from another doctor so is asking for her routine labs be put in since she is a hard stick   Let her know

## 2023-05-16 DIAGNOSIS — E78.49 OTHER HYPERLIPIDEMIA: Primary | ICD-10-CM

## 2023-05-19 ENCOUNTER — APPOINTMENT (OUTPATIENT)
Dept: LAB | Facility: IMAGING CENTER | Age: 53
End: 2023-05-19

## 2023-05-19 DIAGNOSIS — E78.49 OTHER HYPERLIPIDEMIA: ICD-10-CM

## 2023-05-19 LAB
ALBUMIN SERPL BCP-MCNC: 3.7 G/DL (ref 3.5–5)
ALP SERPL-CCNC: 67 U/L (ref 46–116)
ALT SERPL W P-5'-P-CCNC: 17 U/L (ref 12–78)
ANION GAP SERPL CALCULATED.3IONS-SCNC: 3 MMOL/L (ref 4–13)
AST SERPL W P-5'-P-CCNC: 8 U/L (ref 5–45)
BASOPHILS # BLD AUTO: 0.03 THOUSANDS/ÂΜL (ref 0–0.1)
BASOPHILS NFR BLD AUTO: 1 % (ref 0–1)
BILIRUB SERPL-MCNC: 0.71 MG/DL (ref 0.2–1)
BUN SERPL-MCNC: 9 MG/DL (ref 5–25)
CALCIUM SERPL-MCNC: 8.9 MG/DL (ref 8.3–10.1)
CHLORIDE SERPL-SCNC: 109 MMOL/L (ref 96–108)
CHOLEST SERPL-MCNC: 159 MG/DL
CO2 SERPL-SCNC: 25 MMOL/L (ref 21–32)
CREAT SERPL-MCNC: 0.84 MG/DL (ref 0.6–1.3)
EOSINOPHIL # BLD AUTO: 0.08 THOUSAND/ÂΜL (ref 0–0.61)
EOSINOPHIL NFR BLD AUTO: 2 % (ref 0–6)
ERYTHROCYTE [DISTWIDTH] IN BLOOD BY AUTOMATED COUNT: 13.9 % (ref 11.6–15.1)
GFR SERPL CREATININE-BSD FRML MDRD: 81 ML/MIN/1.73SQ M
GLUCOSE P FAST SERPL-MCNC: 86 MG/DL (ref 65–99)
HCT VFR BLD AUTO: 43.1 % (ref 34.8–46.1)
HDLC SERPL-MCNC: 39 MG/DL
HGB BLD-MCNC: 13.9 G/DL (ref 11.5–15.4)
IMM GRANULOCYTES # BLD AUTO: 0.02 THOUSAND/UL (ref 0–0.2)
IMM GRANULOCYTES NFR BLD AUTO: 0 % (ref 0–2)
LDLC SERPL CALC-MCNC: 96 MG/DL (ref 0–100)
LYMPHOCYTES # BLD AUTO: 1.63 THOUSANDS/ÂΜL (ref 0.6–4.47)
LYMPHOCYTES NFR BLD AUTO: 30 % (ref 14–44)
MCH RBC QN AUTO: 29.1 PG (ref 26.8–34.3)
MCHC RBC AUTO-ENTMCNC: 32.3 G/DL (ref 31.4–37.4)
MCV RBC AUTO: 90 FL (ref 82–98)
MONOCYTES # BLD AUTO: 0.43 THOUSAND/ÂΜL (ref 0.17–1.22)
MONOCYTES NFR BLD AUTO: 8 % (ref 4–12)
NEUTROPHILS # BLD AUTO: 3.31 THOUSANDS/ÂΜL (ref 1.85–7.62)
NEUTS SEG NFR BLD AUTO: 59 % (ref 43–75)
NRBC BLD AUTO-RTO: 0 /100 WBCS
PLATELET # BLD AUTO: 289 THOUSANDS/UL (ref 149–390)
PMV BLD AUTO: 10.3 FL (ref 8.9–12.7)
POTASSIUM SERPL-SCNC: 4.3 MMOL/L (ref 3.5–5.3)
PROT SERPL-MCNC: 6.9 G/DL (ref 6.4–8.4)
RBC # BLD AUTO: 4.77 MILLION/UL (ref 3.81–5.12)
SODIUM SERPL-SCNC: 137 MMOL/L (ref 135–147)
TRIGL SERPL-MCNC: 122 MG/DL
TSH SERPL DL<=0.05 MIU/L-ACNC: 1.63 UIU/ML (ref 0.45–4.5)
WBC # BLD AUTO: 5.5 THOUSAND/UL (ref 4.31–10.16)

## 2023-05-22 ENCOUNTER — TELEPHONE (OUTPATIENT)
Dept: FAMILY MEDICINE CLINIC | Facility: CLINIC | Age: 53
End: 2023-05-22

## 2023-05-22 NOTE — TELEPHONE ENCOUNTER
----- Message from Radha  Jackson Boatengwall sent at 5/22/2023  3:20 PM EDT -----  Regarding: Blood work  Contact: [phone]  Hi Dr Harshil Whitlock  I was wondering if there was any other blood work I can have done because I have been feeling very tired/fatigued for months  I also have had bad migraines  All I want to do is sleep  I had 2 Drs now also ask me if I ever had my heart checked because I get that occasional cough and this fatigue  Didn’t know if heart should be looked at again      Thanks,  Santos Magaña

## 2023-05-22 NOTE — TELEPHONE ENCOUNTER
Pt recently had labs done on 5/19/23  She is asking if she should have labs done due to fatigue and headaches  Please advise

## 2023-05-23 ENCOUNTER — TELEPHONE (OUTPATIENT)
Dept: FAMILY MEDICINE CLINIC | Facility: CLINIC | Age: 53
End: 2023-05-23

## 2023-05-23 DIAGNOSIS — R53.83 OTHER FATIGUE: Primary | ICD-10-CM

## 2023-05-23 NOTE — TELEPHONE ENCOUNTER
----- Message from Radha Fernando sent at 5/23/2023  7:37 AM EDT -----  Regarding: fatigue  Contact: [phone]  Huang Willson  Yes, I would like a referral to see a cardiologist  Will that give me a name and phone number who to go to? Thank you!   Carmen

## 2023-06-06 ENCOUNTER — OFFICE VISIT (OUTPATIENT)
Dept: GYNECOLOGY | Facility: CLINIC | Age: 53
End: 2023-06-06
Payer: MEDICARE

## 2023-06-06 VITALS
DIASTOLIC BLOOD PRESSURE: 70 MMHG | HEIGHT: 66 IN | SYSTOLIC BLOOD PRESSURE: 124 MMHG | BODY MASS INDEX: 23.11 KG/M2 | WEIGHT: 143.8 LBS

## 2023-06-06 DIAGNOSIS — N76.0 BV (BACTERIAL VAGINOSIS): ICD-10-CM

## 2023-06-06 DIAGNOSIS — Z86.19 HISTORY OF HERPES GENITALIS: ICD-10-CM

## 2023-06-06 DIAGNOSIS — N89.8 VAGINAL IRRITATION: ICD-10-CM

## 2023-06-06 DIAGNOSIS — N92.1 MENORRHAGIA WITH IRREGULAR CYCLE: ICD-10-CM

## 2023-06-06 DIAGNOSIS — B96.89 BV (BACTERIAL VAGINOSIS): ICD-10-CM

## 2023-06-06 DIAGNOSIS — N30.10 INTERSTITIAL CYSTITIS: ICD-10-CM

## 2023-06-06 PROCEDURE — 87510 GARDNER VAG DNA DIR PROBE: CPT | Performed by: OBSTETRICS & GYNECOLOGY

## 2023-06-06 PROCEDURE — 99214 OFFICE O/P EST MOD 30 MIN: CPT | Performed by: OBSTETRICS & GYNECOLOGY

## 2023-06-06 PROCEDURE — 87660 TRICHOMONAS VAGIN DIR PROBE: CPT | Performed by: OBSTETRICS & GYNECOLOGY

## 2023-06-06 PROCEDURE — 87480 CANDIDA DNA DIR PROBE: CPT | Performed by: OBSTETRICS & GYNECOLOGY

## 2023-06-06 RX ORDER — CLOTRIMAZOLE AND BETAMETHASONE DIPROPIONATE 10; .64 MG/G; MG/G
CREAM TOPICAL 2 TIMES DAILY
Qty: 30 G | Refills: 0 | Status: SHIPPED | OUTPATIENT
Start: 2023-06-06

## 2023-06-06 NOTE — PROGRESS NOTES
"Assessment/Plan:    Diagnoses and all orders for this visit:    BV (bacterial vaginosis)  -     VAGINOSIS DNA PROBE (AFFIRM)  -     clindamycin (CLEOCIN) 100 MG vaginal suppository; Insert 1 suppository (100 mg total) into the vagina daily at bedtime    Vaginal irritation  -     clotrimazole-betamethasone (LOTRISONE) 1-0 05 % cream; Apply topically 2 (two) times a day    History of herpes genitalis    Menorrhagia with irregular cycle    Interstitial cystitis        Subjective: Recurrent BV symptoms     Patient ID: Kar Kapadia is a 50 y o  female  HPI   37-year-old female history of chronic pelvic pain history of genital herpes and interstitial cystitis  She has frequent outbreaks of bacterial vaginosis and treated with multiple modalities  I last saw this patient in July last summer  She was given a prescription for MetroGel for BV at that time as called in for repeated refills without being seen  Has difficult reactions with most medications she tries  She did have some MetroGel leftover which she had from a previous prescription still has burning and itching on her perineum  Plan at this recurrent BV  She does use Elmiron for interstitial cystitis and valacyclovir for genital herpes outbreaks  She is on suppressive therapy with medroxyprogesterone for her pelvic pain symptoms and menometrorrhagia  Review of Systems  Unchanged from last visit    Objective: No acute distress  /70 (BP Location: Right arm, Patient Position: Sitting, Cuff Size: Standard)   Ht 5' 6\" (1 676 m)   Wt 65 2 kg (143 lb 12 8 oz)   BMI 23 21 kg/m²      Physical Exam  Vitals reviewed  Exam conducted with a chaperone present  Constitutional:       Appearance: Normal appearance  She is normal weight  Eyes:      Extraocular Movements: Extraocular movements intact  Pulmonary:      Effort: Pulmonary effort is normal    Abdominal:      General: Abdomen is flat  Palpations: Abdomen is soft     Genitourinary:     " Comments:  external genitalia, slightly red and slightly inflamed  Yellow vaginal discharge, wet mount positive for clue cells consistent with recurrent BV  Normal size uterus  Normal cervix  No CMT  No pelvic masses  Musculoskeletal:         General: Normal range of motion  Cervical back: Normal range of motion  Skin:     General: Skin is warm and dry  Neurological:      Mental Status: She is alert and oriented to person, place, and time  Psychiatric:         Mood and Affect: Mood normal          Behavior: Behavior normal          Thought Content: Thought content normal          Judgment: Judgment normal         Please note    In addition to the time spent discussing the findings and results of today's visit and exam, I spent approximately 30 minutes of face-to-face time with the patient, greater than 50% of which was spent in counseling and coordination of care for this patient

## 2023-06-07 LAB
CANDIDA RRNA VAG QL PROBE: NEGATIVE
G VAGINALIS RRNA GENITAL QL PROBE: NEGATIVE
T VAGINALIS RRNA GENITAL QL PROBE: NEGATIVE

## 2023-06-26 ENCOUNTER — TELEPHONE (OUTPATIENT)
Dept: FAMILY MEDICINE CLINIC | Facility: CLINIC | Age: 53
End: 2023-06-26

## 2023-06-28 ENCOUNTER — TELEPHONE (OUTPATIENT)
Dept: DERMATOLOGY | Facility: CLINIC | Age: 53
End: 2023-06-28

## 2023-06-28 ENCOUNTER — OFFICE VISIT (OUTPATIENT)
Dept: GYNECOLOGY | Facility: CLINIC | Age: 53
End: 2023-06-28
Payer: MEDICARE

## 2023-06-28 DIAGNOSIS — Z30.42 ENCOUNTER FOR DEPO-PROVERA CONTRACEPTION: Primary | ICD-10-CM

## 2023-06-28 NOTE — TELEPHONE ENCOUNTER
Rec'd call from patient requesting [facility] patient appt  Dx: FULL BODY / [facility] [id number]    Wait list was explained  Patient verbalized understanding  Created wait list for CV location

## 2023-06-30 RX ORDER — MEDROXYPROGESTERONE ACETATE 150 MG/ML
150 INJECTION, SUSPENSION INTRAMUSCULAR ONCE
Status: COMPLETED | OUTPATIENT
Start: 2023-06-30 | End: 2023-06-30

## 2023-06-30 RX ADMIN — MEDROXYPROGESTERONE ACETATE 150 MG: 150 INJECTION, SUSPENSION INTRAMUSCULAR at 12:03

## 2023-07-05 ENCOUNTER — OFFICE VISIT (OUTPATIENT)
Dept: CARDIOLOGY CLINIC | Facility: CLINIC | Age: 53
End: 2023-07-05
Payer: MEDICARE

## 2023-07-05 VITALS
WEIGHT: 143 LBS | OXYGEN SATURATION: 98 % | HEART RATE: 99 BPM | DIASTOLIC BLOOD PRESSURE: 68 MMHG | HEIGHT: 66 IN | SYSTOLIC BLOOD PRESSURE: 120 MMHG | BODY MASS INDEX: 22.98 KG/M2

## 2023-07-05 DIAGNOSIS — R06.09 DOE (DYSPNEA ON EXERTION): Primary | ICD-10-CM

## 2023-07-05 DIAGNOSIS — R53.83 OTHER FATIGUE: ICD-10-CM

## 2023-07-05 DIAGNOSIS — R00.2 PALPITATIONS: ICD-10-CM

## 2023-07-05 DIAGNOSIS — R00.0 TACHYCARDIA: ICD-10-CM

## 2023-07-05 PROCEDURE — 93000 ELECTROCARDIOGRAM COMPLETE: CPT | Performed by: INTERNAL MEDICINE

## 2023-07-05 PROCEDURE — 99214 OFFICE O/P EST MOD 30 MIN: CPT | Performed by: INTERNAL MEDICINE

## 2023-07-05 RX ORDER — AMITRIPTYLINE HYDROCHLORIDE 25 MG/1
TABLET, FILM COATED ORAL
COMMUNITY
Start: 2023-06-13

## 2023-07-05 RX ORDER — DULOXETIN HYDROCHLORIDE 20 MG/1
CAPSULE, DELAYED RELEASE ORAL
COMMUNITY
Start: 2023-06-04

## 2023-07-05 RX ORDER — HYDROXYZINE HYDROCHLORIDE 25 MG/1
TABLET, FILM COATED ORAL
COMMUNITY
Start: 2023-06-13

## 2023-07-05 RX ORDER — LEVOFLOXACIN 500 MG/1
TABLET, FILM COATED ORAL
COMMUNITY
Start: 2023-04-17

## 2023-07-05 RX ORDER — LIOTHYRONINE SODIUM 5 UG/1
5 TABLET ORAL DAILY
COMMUNITY
Start: 2023-04-12

## 2023-07-05 RX ORDER — TRIAMCINOLONE ACETONIDE 40 MG/ML
INJECTION, SUSPENSION INTRA-ARTICULAR; INTRAMUSCULAR
COMMUNITY
Start: 2023-06-26

## 2023-07-05 RX ORDER — VORTIOXETINE 5 MG/1
5 TABLET, FILM COATED ORAL DAILY
COMMUNITY
Start: 2023-06-09

## 2023-07-05 RX ORDER — UBROGEPANT 100 MG/1
TABLET ORAL
COMMUNITY
Start: 2023-05-03

## 2023-07-05 RX ORDER — GABAPENTIN 100 MG/1
CAPSULE ORAL
COMMUNITY
Start: 2023-05-17

## 2023-07-05 RX ORDER — HEPARIN SODIUM 10000 [USP'U]/ML
INJECTION, SOLUTION INTRAVENOUS; SUBCUTANEOUS
COMMUNITY
Start: 2023-06-27

## 2023-07-05 RX ORDER — BIOTIN 10 MG
TABLET ORAL
COMMUNITY

## 2023-07-05 NOTE — PATIENT INSTRUCTIONS
You were seen today in the Cardiology office. Please have a 48 hour Holter monitor performed to assess your average heart rates. Please keep a symptom diary and return this with your monitor. Increase your fluid intake as much as possible particularly during warm/hot weather. Thank you for choosing [facility]. Please call our office or use [employer] with any questions.

## 2023-07-05 NOTE — PROGRESS NOTES
[facility]    CHIEF COMPLAINT: No chief complaint on file. HPI:  Adin Vallejo is a 48 y.o. female with a past medical history of chronic cystitis requiring Botox injections, chronic migraines, COVID-19 infection who presents for fatigue, shortness of breath, and palpitations. She reports having COVID-19 vaccine two years ago. She subsequently developed fever, chest heaviness and shortness of breath. She reports being ill for approximately 12 weeks. There was a period of time where she required oxygen. Unclear if this was related to the vaccine or actual COVID-19 infection. She does feel that her symptoms had began to improve particularly during this past winter. In December 2022 she had flu and then COVID-19 infection back to back. She currently reports symptoms of chronic cough, fatigue, palpitations, and dyspnea on exertion. She currently feels it is difficult to breathe here in the office and that the area is muggy. She also reports sharp chest pains in the mid sternal area that occur randomly and are brief. She does not have any chest pain on exertion. She does admit to sometimes waking up and feeling heaviness in the chest.  Recently was enrolled in rehabilitation at [facility] approximately 2 weeks ago. This was at the advice of a provider she spoke to regarding COVID long-hauler symptoms. Prior to her initial illness 2 years ago she used to walk 3-5 miles per day. Now she can walk for about a mile but does feel short of breath and has to push herself to do it. She has persistent fatigue and tiredness. She has been evaluated by cardiology in 2021 and had exercise stress testing and echocardiogram which were normal.  She subsequently had another echocardiogram in June 2022 with no changes from prior. She did have bronchitis a couple of months ago. She states that her symptoms have particularly worsened since the warm weather months began in May.  Fluid intake - drinks several bottles of water per day but unable to quantify. No orthopnea, PND, leg edema. +wakes up coughing and feeling heaviness in the chest. Describes this more as someone is pushing on the chest and preventing her from breathing. Does not feel this is a squeezing or band-like pain. Social history: She is a lifetime non-smoker. She does not consume alcohol. No cocaine, amphetamines, stimulants. Family history: Not in touch with family so doesn't know of any premature CAD in immediate family members.     The following portions of the patient's history were reviewed and updated as appropriate: allergies, current medications, past family history, past medical history, past social history, past surgical history, and problem list.    SINCE LAST OV I REVIEWED WITH THE PATIENT THE INTERIM LABS, TEST RESULTS, CONSULTANT(S) NOTES AND PERFORMED AN INTERIM REVIEW OF HISTORY    Past Medical History:   Diagnosis Date   • Anemia    • Anxiety    • Asthma     illness induced   • Cancer ([address])     thyroid ca (removed Jan 2022)   • SARA I (cervical intraepithelial neoplasia I)     RESOLVED: [id number]   • Depression    • Endometriosis    • Functional ovarian cysts age 15   • History of agoraphobia age 15   • Hypercholesterolemia    • IBS (irritable bowel syndrome) age 15   • IC (interstitial cystitis)    • Malignant neoplasm of thyroid gland ([address]) 12/14/2021   • Migraine    • Motion sickness    • Multiple thyroid nodules 11/02/2021    Last Assessment & Plan:  Formatting of this note might be different from the original. Leon Olmedo is a 54 y.o. female   We had an extensive discussion regarding thyroid nodules, the natural course of thyroid nodules, ultrasound features which can convey an increased risk for malignancy, the general indications for fine needle aspiration, the procedure itself and possible results from a fine needl   • Pap smear abnormality of cervix with ASCUS favoring benign     RESOLVED: [id number]   • Seasonal allergies     with post-nasal drip and recurrent throat infections   • Thyroid cancer ([address])    • Urinary tract infection    • Varicella        Past Surgical History:   Procedure Laterality Date   • CHOLECYSTECTOMY  2019   • CHOLECYSTECTOMY LAPAROSCOPIC  08/2020   • COLONOSCOPY  06/2018    Nonbleeding angiodysplastic lesion mid ascending colon, questionable ulcerative proctitis-biopsy was negative for acute or chronic colitis   • COLONOSCOPY  06/09/2016    Normal   • COLONOSCOPY  05/23/2013    Adenoma at splenic flexure   • EGD  08/02/2018   • EGD  08/03/2020    Normal.  Biopsy stomach negative for H. pylori, biopsy of the duodenum negative for celiac   • ENDOTRACHEAL INTUBATION EMERGENT  2012   • HEMORRHOID SURGERY  07/2009    Dr Adela Dakin N/A 11/21/2018    Procedure: HYSTEROSCOPY;  Surgeon: Pratik Luna MD;  Location: [facility];  Service: Gynecology   • TN HYSTEROSCOPY ENDOMETRIAL ABLATION N/A 11/21/2018    Procedure: Jacque Giraldo;  Surgeon: Pratik Luna MD;  Location: [facility];  Service: Gynecology   • SMALL INTESTINE SURGERY     • THYROID SURGERY  01/17/2022    Papillary cancer of thyroid nodules   • TOOTH EXTRACTION     • TUBAL LIGATION      ONSET: 29 DEC 2011       Social History     Socioeconomic History   • Marital status:      Spouse name: Not on file   • Number of children: 2   • Years of education: Not on file   • Highest education level: Not on file   Occupational History   • Occupation: unemployed   Tobacco Use   • Smoking status: Never   • Smokeless tobacco: Never   Vaping Use   • Vaping Use: Never used   Substance and Sexual Activity   • Alcohol use: No   • Drug use: No   • Sexual activity: Not on file   Other Topics Concern   • Not on file   Social History Narrative    Marital history- single as per all scripts    No caffeine use    Jainism affiliation Pentecostalism    Uses safety equipment- seatbelts      Social Determinants of Health     Financial Resource Strain: Not on file   Food Insecurity: Not on file   Transportation Needs: Not on file   Physical Activity: Not on file   Stress: Not on file   Social Connections: Not on file   Intimate Partner Violence: Not on file   Housing Stability: Not on file       Family History   Problem Relation Age of Onset   • Other Mother         HYSTERECTOMY FOR BENIGN DISEASE    • Diabetes Mother    • Lung cancer Father    • Other Sister         HYSTERECTOMY FOR BENIGN DISEASE    • Diabetes Maternal Grandmother    • Other Maternal Grandmother         HYSTERECTOMY FOR BENIGN DISEASE    • Colon cancer Paternal Grandmother 61   • Throat cancer Paternal Uncle 61   • No Known Problems Maternal Aunt    • No Known Problems Paternal Aunt    • No Known Problems Paternal Aunt    • Diabetes Maternal Grandfather        Allergies   Allergen Reactions   • Latex Itching and Hives     Irritation   • Vancomycin Itching and Swelling     Patient had facial swelling, especially around her eyes and severe itching. • Tessalon [Benzonatate] Itching   • Azithromycin Diarrhea, Nausea Only and Other (See Comments)   • Clarithromycin Other (See Comments)   • Clavulanic Acid Other (See Comments)   • Erythromycin Base Other (See Comments)   • Lithium Itching   • Mirtazapine Other (See Comments)     (remeron)  (remeron)  (remeron)   • Moxifloxacin Nausea Only     Excessive vomiting  Excessive vomiting   • Nitrofurantoin Itching and Other (See Comments)     Blotchy skin    • Sulfamethoxazole      Other reaction(s): Rash   • Sulfamethoxazole-Trimethoprim Other (See Comments)   • Trimethoprim      Other reaction(s): Rash   • Doxycycline Other (See Comments), Itching and Rash     Itching and rash   • Indomethacin Rash   • Lamotrigine Other (See Comments) and Rash     Can't recall side effects.     • Penicillins Rash       Current Outpatient Medications   Medication Sig Dispense Refill   • albuterol (ProAir HFA) 90 mcg/act inhaler Inhale 2 puffs every 6 (six) hours as needed for wheezing or shortness of breath 25.5 g 3   • amitriptyline (ELAVIL) 25 mg tablet      • Botulinum Toxin Type A 200 units SOLR every 84 days. • cimetidine (TAGAMET) 200 mg tablet Take 200 mg by mouth     • clindamycin (CLEOCIN) 100 MG vaginal suppository Insert 1 suppository (100 mg total) into the vagina daily at bedtime 3 suppository 0   • clotrimazole-betamethasone (LOTRISONE) 1-0.05 % cream Apply topically 2 (two) times a day 30 g 0   • colestipol (COLESTID) 1 g tablet Take 1 tablet (1 g total) by mouth 2 (two) times a day 270 tablet 3   • [employer] Cortisone Maximum Strength 1 % lotion APPLY TO AFFECTED AREAS OF SCALP UP TO 4 TIMES A DAY ONLY AS NEEDED. 99 mL 0   • DULoxetine (CYMBALTA) 20 mg capsule      • famotidine (PEPCID) 20 mg tablet Take 1 tablet (20 mg total) by mouth 2 (two) times a day 180 tablet 1   • fluticasone (FLONASE) 50 mcg/act nasal spray as needed       • gabapentin (NEURONTIN) 100 mg capsule TAKE 1 CAPSULES (100 MG) BY MOUTH IN THE MORNING, AND 2 CAPSULES (200 MG) AT BEDTIME AS TOLERATED     • Heparin Sodium, Porcine, (heparin, porcine,) 93164 UNIT/ML      • hydrocortisone 2.5 % cream Apply topically 2 (two) times a day 30 g 0   • hydrOXYzine HCL (ATARAX) 25 mg tablet      • ipratropium (ATROVENT) 0.06 % nasal spray 2 SPRAYS EVERY 6 HOURS AS NEEDED FOR 30 DAYS     • ketoconazole (NIZORAL) 2 % shampoo Apply 1 application. topically 2 (two) times a week SHMP twice a wk x 8 wk, then PRN. Leave on for 5 min before rinsing.  120 mL 1   • levofloxacin (LEVAQUIN) 500 mg tablet TAKE 1 TABLET BY MOUTH DAILY FOR 10 DAYS     • liothyronine (CYTOMEL) 5 mcg tablet Take 5 mcg by mouth daily     • LORazepam (ATIVAN) 1 mg tablet TAKE 1 & 1/2 TABLETS DAILY AT BEDTIME AS NEEDED  1   • medroxyPROGESTERone (DEPO-PROVERA) 150 mg/mL injection TO BE GIVEN IN OFFICE EVERY 10 WEEKS 3 mL 1   • Multiple Vitamin (MULTI VITAMIN DAILY PO) Take by mouth       • nitrofurantoin (MACROBID) 100 mg capsule TAKE 1 CAPSULE BY MOUTH TWICE A DAY FOR 5 DAYS     • nortriptyline (PAMELOR) 10 MG/5ML solution      • pentosan polysulfate (ELMIRON) 100 mg capsule Take 100 mg by mouth 3 (three) times a day before meals       • Symbicort 160-4.5 MCG/ACT inhaler INHALE 2 PUFFS BY MOUTH 2 TIMES A DAY RINSE MOUTH AFTER USE. 6 g 3   • Tirosint 150 MCG CAPS Take 1 capsule by mouth daily     • triamcinolone acetonide (KENALOG-40) 40 mg/mL INSTILL 1 ML INTO BLADDER WEEKLY     • Trintellix 5 MG tablet Take 5 mg by mouth daily     • Ubrelvy 100 MG tablet TAKE 1 TABLET BY MOUTH TWICE A DAY AS NEEDED FOR MIGRAINE     • albuterol (2.5 mg/3 mL) 0.083 % nebulizer solution Take 1 vial (2.5 mg total) by nebulization 3 (three) times a day (Patient not taking: Reported on 4/6/2023) 30 vial 2   • amitriptyline (ELAVIL) 10 mg tablet Take 10 mg by mouth daily at bedtime (Patient not taking: Reported on 4/6/2023)     • Ciclopirox 1 % shampoo Apply 1 application topically every other day To the scalp for 5 minutes, then rinse thoroughly.  (Patient not taking: Reported on 2/1/2023) 120 mL 1   • dicyclomine (BENTYL) 10 mg capsule TAKE 1 CAPSULE (10 MG TOTAL) BY MOUTH 3 (THREE) TIMES A DAY AS NEEDED (ABDOMINAL PAIN) (Patient not taking: Reported on 4/6/2023) 270 capsule 1   • glycopyrrolate (ROBINUL) 1 mg tablet TAKE 1 TABLET TWICE A DAY (Patient not taking: Reported on 4/6/2023) 180 tablet 2   • levalbuterol (XOPENEX) 1.25 mg/3 mL nebulizer solution  (Patient not taking: Reported on 4/6/2023)     • Meth-Hyo-M Bl-Na Phos-Ph Sal (Uribel) 118 MG CAPS Take 1 each by mouth as needed (Patient not taking: Reported on 4/6/2023)     • metroNIDAZOLE (METROCREAM) 0.75 % cream Apply topically 2 (two) times a day (Patient not taking: Reported on 4/6/2023) 45 g 3   • Multiple Vitamins-Minerals (Multivitamin Adult) CHEW Chew (Patient not taking: Reported on 7/5/2023)     • Myrbetriq 50 MG TB24 Take 1 tablet by mouth daily (Patient not taking: Reported on 7/5/2023)     • naproxen (NAPROSYN) 500 mg tablet      • naratriptan (AMERGE) 2.5 MG tablet 1 po prn migraine. MRx1 in 2 hours if needed. Max 2 tabs/day. Max 2 days. (Patient not taking: Reported on 4/6/2023)     • nortriptyline (PAMELOR) 10 MG/5ML solution TAKE 2.5ML BY MOUTH EVERY NIGHT AT BEDTIME (Patient not taking: Reported on 4/6/2023)     • omeprazole (PriLOSEC) 20 mg delayed release capsule TAKE 1 CAPSULE BY MOUTH EVERY DAY (Patient not taking: Reported on 2/1/2023) 90 capsule 1   • ondansetron (ZOFRAN) 4 mg tablet Take 1 tablet (4 mg total) by mouth every 8 (eight) hours as needed for nausea (Patient not taking: Reported on 4/6/2023) 20 tablet 5     Current Facility-Administered Medications   Medication Dose Route Frequency Provider Last Rate Last Admin   • medroxyPROGESTERone (DEPO-PROVERA) IM injection 150 mg  150 mg Intramuscular Once Manus Meuse, DO       • medroxyPROGESTERone (DEPO-PROVERA) IM injection 150 mg  150 mg Intramuscular Once Manus Meuse, DO           /68 (BP Location: Left arm, Patient Position: Sitting, Cuff Size: Standard)   Pulse 99   Ht 5' 6" (1.676 m)   Wt 64.9 kg (143 lb)   SpO2 98%   BMI 23.08 kg/m²     Review of Systems   All other systems reviewed and are negative. Physical Exam  Vitals and nursing note reviewed. Constitutional:       General: She is not in acute distress. Appearance: She is well-developed. She is not toxic-appearing. HENT:      Head: Normocephalic and atraumatic. Eyes:      Extraocular Movements: Extraocular movements intact. Conjunctiva/sclera: Conjunctivae normal.   Cardiovascular:      Rate and Rhythm: Normal rate and regular rhythm. Pulses: Normal pulses. Heart sounds: Normal heart sounds. No murmur heard. No friction rub. No gallop. Pulmonary:      Effort: Pulmonary effort is normal. No respiratory distress. Breath sounds: Normal breath sounds.  No stridor. No wheezing, rhonchi or rales. Abdominal:      General: Abdomen is flat. Bowel sounds are normal. There is no distension. Palpations: Abdomen is soft. Tenderness: There is no abdominal tenderness. There is no guarding. Musculoskeletal:      Cervical back: Neck supple. Right lower leg: No edema. Left lower leg: No edema. Skin:     General: Skin is warm and dry. Capillary Refill: Capillary refill takes less than 2 seconds. Coloration: Skin is not jaundiced. Neurological:      Mental Status: She is alert. Psychiatric:         Mood and Affect: Mood normal.          Lab Results   Component Value Date    K 4.3 05/19/2023     (H) 05/19/2023    CO2 25 05/19/2023    BUN 9 05/19/2023    CREATININE 0.84 05/19/2023    CALCIUM 8.9 05/19/2023    ALT 17 05/19/2023    AST 8 05/19/2023       Lab Results   Component Value Date    HDL 39 (L) 05/19/2023    LDLCALC 96 05/19/2023    TRIG 122 05/19/2023       Lab Results   Component Value Date    WBC 5.50 05/19/2023    HGB 13.9 05/19/2023    HCT 43.1 05/19/2023     05/19/2023       Cardiac studies:   Results for orders placed or performed in visit on 07/05/23   POCT ECG    Impression    Normal sinus rhythm  Normal ECG     TTE - 2/2/22: Normal left ventricular cavity size and wall thickness. Normal wall motion and systolic function. Normal diastolic function. Normal right ventricular cavity size and systolic function. Normal left and right atrial size. No significant valvular abnormalities. Estimated PASP 20 mmHg. EST - 6/15/21:  STRESS SUMMARY: Rest SpO2 100%, peak stress SpO2 98%. Duration of exercise was 10 min and 34 sec. The patient exercised to protocol stage 4. Maximal work rate was 13.4 METs. Functional capacity was normal. Maximal heart rate during stress  was 162 bpm ( 94 % of maximal predicted heart rate).  The heart rate response to stress was normal. There was normal resting blood pressure with an appropriate response to stress. The rate-pressure product for the peak heart rate and blood  pressure was 65850. There was no chest pain during stress. The stress test was terminated due to achievement of maximal (symptom limited) exercise. The stress ECG was negative for ischemia. Arrhythmia during stress: isolated premature  ventricular beats. Based on [facility] Treadmill Scoring, this patient was at low risk for cardiac events.       ASSESSMENT AND PLAN:  Diagnoses and all orders for this visit:    AYON (dyspnea on exertion): She has seen pulmonology and spirometry was normal.  Negative methacholine challenge. She does report having bronchitis a couple of months ago. Symptoms are particularly worse during warm weather. I do suspect she may have long COVID. Lipid panel from 5/19/2023 with total cholesterol 159, triglycerides 122, HDL 39, LDL 96.  10-year ASCVD risk score is 1%. TSH 1.6. Prior rheumatologic testing including MARCOS, rheumatoid factor, C-reactive protein earlier this year were negative. Previous cardiac work-up has been unremarkable. Low suspicion for obstructive coronary artery disease but given exertional symptoms which she feels are worse than prior, we will repeat an exercise stress test.  -     Stress test only, exercise; Future    Other fatigue  Palpitations  -     Holter monitor; Future  Tachycardia: ECG today is normal.  Her heart rates are at the upper limits of normal at rest.  She does report palpitations and rapid heart rates which occur sometimes when she is resting. She does not have any particular symptoms with changes in position. We will check a 48-hour Holter monitor to assess average heart rate, day/night variation, and to rule out arrhythmias. Of note, she is on amitriptyline and albuterol which can cause elevated heart rates. She has a history of thyroid cancer status post thyroidectomy.  -     Holter monitor;  Future      Ethel Saxena MD

## 2023-07-07 ENCOUNTER — TELEPHONE (OUTPATIENT)
Dept: FAMILY MEDICINE CLINIC | Facility: CLINIC | Age: 53
End: 2023-07-07

## 2023-07-17 ENCOUNTER — TELEPHONE (OUTPATIENT)
Dept: PULMONOLOGY | Facility: CLINIC | Age: 53
End: 2023-07-17

## 2023-07-17 NOTE — TELEPHONE ENCOUNTER
Patient called stating that for the past 3 weeks her pulse ox rates have been really high. She also states that she has been feeling very out of breath. She went to the hospital and confirmed that it is not her heart but also states that her lungs are clear. Please advise.

## 2023-07-17 NOTE — TELEPHONE ENCOUNTER
Spoke with patient she confirmed this is not a cold, flu, covid sick call. For the last 3 weeks her pulse rate has been high. She is seeing a cardiologist and is scheduled for a heart monitor. Extremely sob with any type of activity including talking. Is not on oxygen but feels exactly what she felt when she was placed previously on oxygen. Ekg, blood work, and cat scan was done of chest when she was in ER and was told lungs were clear. She is having a very hard time catching her breath. Feels at times tight in chest with a cough. Does not know what else to do. Please advise.

## 2023-07-18 ENCOUNTER — TELEPHONE (OUTPATIENT)
Dept: CARDIOLOGY CLINIC | Facility: CLINIC | Age: 53
End: 2023-07-18

## 2023-07-18 NOTE — TELEPHONE ENCOUNTER
Patient called wanting Dr Basil Pace to know that she went to [facility] ED on Friday, 7/14 due to SOB, tachycardia, & chest pain that felt like a heaviness on her chest.    Patient stated @ PT they have to stop sessions due to patients tachycardia & SOB. Has a stress test tomorrow. Please advise.

## 2023-07-19 ENCOUNTER — TELEPHONE (OUTPATIENT)
Dept: CARDIOLOGY CLINIC | Facility: CLINIC | Age: 53
End: 2023-07-19

## 2023-07-19 ENCOUNTER — APPOINTMENT (OUTPATIENT)
Dept: LAB | Age: 53
End: 2023-07-19
Payer: MEDICARE

## 2023-07-19 ENCOUNTER — ANNUAL EXAM (OUTPATIENT)
Dept: GYNECOLOGY | Facility: CLINIC | Age: 53
End: 2023-07-19
Payer: MEDICARE

## 2023-07-19 ENCOUNTER — HOSPITAL ENCOUNTER (OUTPATIENT)
Dept: NON INVASIVE DIAGNOSTICS | Facility: CLINIC | Age: 53
Discharge: HOME | End: 2023-07-19
Payer: MEDICARE

## 2023-07-19 VITALS
DIASTOLIC BLOOD PRESSURE: 72 MMHG | WEIGHT: 141.8 LBS | HEIGHT: 67 IN | SYSTOLIC BLOOD PRESSURE: 128 MMHG | BODY MASS INDEX: 22.26 KG/M2

## 2023-07-19 VITALS — HEART RATE: 98 BPM | SYSTOLIC BLOOD PRESSURE: 130 MMHG | DIASTOLIC BLOOD PRESSURE: 80 MMHG | OXYGEN SATURATION: 100 %

## 2023-07-19 DIAGNOSIS — N89.8 VAGINAL DISCHARGE: ICD-10-CM

## 2023-07-19 DIAGNOSIS — Z30.42 DEPO-PROVERA CONTRACEPTIVE STATUS: ICD-10-CM

## 2023-07-19 DIAGNOSIS — N92.0 MENORRHAGIA WITH REGULAR CYCLE: ICD-10-CM

## 2023-07-19 DIAGNOSIS — Z86.19 HISTORY OF HERPES GENITALIS: ICD-10-CM

## 2023-07-19 DIAGNOSIS — Z01.419 WOMEN'S ANNUAL ROUTINE GYNECOLOGICAL EXAMINATION: ICD-10-CM

## 2023-07-19 DIAGNOSIS — Z11.51 SCREENING FOR HPV (HUMAN PAPILLOMAVIRUS): ICD-10-CM

## 2023-07-19 DIAGNOSIS — K58.0 IRRITABLE BOWEL SYNDROME WITH DIARRHEA: ICD-10-CM

## 2023-07-19 DIAGNOSIS — E89.0 HISTORY OF TOTAL THYROIDECTOMY: ICD-10-CM

## 2023-07-19 DIAGNOSIS — N95.1 MENOPAUSAL SYNDROME (HOT FLUSHES): ICD-10-CM

## 2023-07-19 DIAGNOSIS — R06.09 DOE (DYSPNEA ON EXERTION): ICD-10-CM

## 2023-07-19 DIAGNOSIS — N30.10 INTERSTITIAL CYSTITIS: ICD-10-CM

## 2023-07-19 DIAGNOSIS — Z98.51 STATUS POST TUBAL LIGATION: Primary | ICD-10-CM

## 2023-07-19 PROBLEM — Z90.89 HISTORY OF TOTAL THYROIDECTOMY: Status: ACTIVE | Noted: 2022-03-01

## 2023-07-19 PROBLEM — Z98.890 HISTORY OF TOTAL THYROIDECTOMY: Status: ACTIVE | Noted: 2022-03-01

## 2023-07-19 LAB
FSH SERPL-ACNC: 38.7 MIU/ML
LH SERPL-ACNC: 18.2 MIU/ML
MAX HR PERCENT: 97 %
MAX HR: 164 BPM
RATE PRESSURE PRODUCT: NORMAL
SL CV STRESS RECOVERY BP: NORMAL MMHG
SL CV STRESS RECOVERY HR: 102 BPM
SL CV STRESS RECOVERY O2 SAT: 98 %
SL CV STRESS STAGE REACHED: 4
STRESS ANGINA INDEX: 1
STRESS BASELINE BP: NORMAL MMHG
STRESS BASELINE HR: 98 BPM
STRESS O2 SAT REST: 100 %
STRESS PEAK HR: 164 BPM
STRESS POST ESTIMATED WORKLOAD: 13.4 METS
STRESS POST EXERCISE DUR MIN: 10 MIN
STRESS POST EXERCISE DUR SEC: 0 SEC
STRESS POST O2 SAT PEAK: 98 %
STRESS POST PEAK BP: 158 MMHG

## 2023-07-19 PROCEDURE — 93017 CV STRESS TEST TRACING ONLY: CPT

## 2023-07-19 PROCEDURE — G0476 HPV COMBO ASSAY CA SCREEN: HCPCS | Performed by: OBSTETRICS & GYNECOLOGY

## 2023-07-19 PROCEDURE — 93016 CV STRESS TEST SUPVJ ONLY: CPT | Performed by: INTERNAL MEDICINE

## 2023-07-19 PROCEDURE — G0101 CA SCREEN;PELVIC/BREAST EXAM: HCPCS | Performed by: OBSTETRICS & GYNECOLOGY

## 2023-07-19 PROCEDURE — 36415 COLL VENOUS BLD VENIPUNCTURE: CPT

## 2023-07-19 PROCEDURE — G0145 SCR C/V CYTO,THINLAYER,RESCR: HCPCS | Performed by: OBSTETRICS & GYNECOLOGY

## 2023-07-19 PROCEDURE — 87660 TRICHOMONAS VAGIN DIR PROBE: CPT | Performed by: OBSTETRICS & GYNECOLOGY

## 2023-07-19 PROCEDURE — 83001 ASSAY OF GONADOTROPIN (FSH): CPT

## 2023-07-19 PROCEDURE — 93018 CV STRESS TEST I&R ONLY: CPT | Performed by: INTERNAL MEDICINE

## 2023-07-19 PROCEDURE — 83002 ASSAY OF GONADOTROPIN (LH): CPT

## 2023-07-19 PROCEDURE — 87510 GARDNER VAG DNA DIR PROBE: CPT | Performed by: OBSTETRICS & GYNECOLOGY

## 2023-07-19 PROCEDURE — 87480 CANDIDA DNA DIR PROBE: CPT | Performed by: OBSTETRICS & GYNECOLOGY

## 2023-07-19 NOTE — PROGRESS NOTES
Assessment   Annual well woman exam  Menopausal syndrome with hot flashes  History of menorrhagia with regular cycle, on Depo-Provera  Secondary amenorrhea  History of IBS  History of interstitial cystitis  History of genital herpes  Ongoing vaginal irritation          Plan   Screening laboratory studies including [address] and [facility]  Renewed Depo-Provera consider discontinue  Screening mammography completed dense breast tissue noted, routine follow-up recommended   All questions answered. Await pap smear results. Subjective here for annual exam   Carmen Garza is a 48 y.o. female who presents for annual exam.  She no longer has menstrual cycles however he does have hot flashes and night sweats daily. Has been on suppressive for the past several years every 10 weeks to control her menorrhagia symptoms. She does have intermittent vaginal discharge and vaginal irritation sensitive to many medications because of her IBS. The patient reports that there is not domestic violence in her life. Current contraception: Depo-Provera injections  History of abnormal Pap smear: no  Family history of uterine or ovarian cancer: no  Regular self breast exam: yes  History of abnormal mammogram: no  Family history of breast cancer: no  History of abnormal lipids: no  Menstrual History:  OB History        3    Para   2    Term   2            AB   1    Living   2       SAB        IAB   1    Ectopic        Multiple        Live Births                    Menarche age: 17  No LMP recorded. (Menstrual status: Birth Control). Review of Systems  Pertinent items are noted in HPI.       Objective no acute distress   /72   Ht 5' 7" (1.702 m)   Wt 64.3 kg (141 lb 12.8 oz)   BMI 22.21 kg/m²     General:   alert and oriented, in no acute distress, alert, appears stated age and cooperative   Heart: regular rate and rhythm, S1, S2 normal, no murmur, click, rub or gallop   Lungs: clear to auscultation bilaterally   Abdomen: soft, non-tender, without masses or organomegaly   Vulva: normal, Bartholin's, Urethra, [place]'s normal, female escutcheon   Vagina: normal mucosa, thin grey discharge, wet prep done, few clue cells identified, Affirm test completed results pending   Cervix: anteverted, no bleeding following Pap, no cervical motion tenderness and no lesions   Uterus: normal size, anteverted, bulky, mobile, non-tender, normal shape and consistency   Adnexa: normal adnexa and no mass, fullness, tenderness   Bilateral breast exam in the sitting and supine position with chaperone present, no visible or palpable breast lesions identified. No breast masses noted. No supraclavicular or axillary lymphadenopathy noted. No nipple discharge. Reviewed self-breast exam techniques.    Rectal exam,  deferred

## 2023-07-19 NOTE — TELEPHONE ENCOUNTER
Clearance letter started  for Dr Kal So to complete. Patient had a stress test today. She is scheduled for a cystoscopy on Friday, 7/21 and  Urology is requesting cardiac clearance. Dr Kal So made aware of letter via [facility] Text. I told the patient I would let her know when the letter is completed.

## 2023-07-20 ENCOUNTER — OFFICE VISIT (OUTPATIENT)
Dept: PULMONOLOGY | Facility: CLINIC | Age: 53
End: 2023-07-20
Payer: MEDICARE

## 2023-07-20 VITALS
HEIGHT: 67 IN | OXYGEN SATURATION: 98 % | WEIGHT: 141.7 LBS | DIASTOLIC BLOOD PRESSURE: 84 MMHG | RESPIRATION RATE: 18 BRPM | SYSTOLIC BLOOD PRESSURE: 118 MMHG | TEMPERATURE: 100 F | BODY MASS INDEX: 22.24 KG/M2 | HEART RATE: 90 BPM

## 2023-07-20 DIAGNOSIS — C73 MALIGNANT NEOPLASM OF THYROID GLAND (HCC): ICD-10-CM

## 2023-07-20 DIAGNOSIS — R06.02 SHORTNESS OF BREATH: Primary | ICD-10-CM

## 2023-07-20 LAB
CANDIDA RRNA VAG QL PROBE: NEGATIVE
CHEST PAIN STATEMENT: NORMAL
G VAGINALIS RRNA GENITAL QL PROBE: NEGATIVE
HPV HR 12 DNA CVX QL NAA+PROBE: NEGATIVE
HPV16 DNA CVX QL NAA+PROBE: NEGATIVE
HPV18 DNA CVX QL NAA+PROBE: NEGATIVE
MAX DIASTOLIC BP: 78 MMHG
MAX HEART RATE: 164 BPM
MAX PREDICTED HEART RATE: 169 BPM
MAX. SYSTOLIC BP: 184 MMHG
PROTOCOL NAME: NORMAL
REASON FOR TERMINATION: NORMAL
T VAGINALIS RRNA GENITAL QL PROBE: NEGATIVE
TARGET HR FORMULA: NORMAL
TEST INDICATION: NORMAL
TIME IN EXERCISE PHASE: NORMAL

## 2023-07-20 PROCEDURE — 99214 OFFICE O/P EST MOD 30 MIN: CPT | Performed by: INTERNAL MEDICINE

## 2023-07-20 RX ORDER — PREDNISONE 20 MG/1
40 TABLET ORAL DAILY
Qty: 14 TABLET | Refills: 0 | Status: SHIPPED | OUTPATIENT
Start: 2023-07-20

## 2023-07-20 NOTE — PROGRESS NOTES
Pulmonary Follow Up Note   Bobby Reed 48 y.o. female MRN: [id number]  7/21/2023    Assessment:    Shortness of breath  Carmen has had a very extensive workup to date. By methacholine challenge as well as by her response to inhalers, she does not appear to have asthma. There are no abnormalities on prior chest x-ryas. Cardiac testing has all proven negative with the exception of an upcoming Holter monitor. She does report some improvement of chest tightness with ativan. I would put the issue of any intrapulmonary abnormalities to rest with obtaining a CT scan specifically to assess for mediastinal pathology which cannot be assessed via CXR. Given her therapeutic response to ativan I would favor attempting to gain control of any anxiety she has prior to any other testing. For attempts at symptom control, a week of prednisone was ordered. Plan:    Diagnoses and all orders for this visit:    Shortness of breath  -     predniSONE 20 mg tablet; Take 2 tablets (40 mg total) by mouth daily  -     CT chest without contrast; Future    Malignant neoplasm of thyroid gland (HCC)  -     CT chest without contrast; Future      No follow-ups on file. History of Present Illness   HPI:  Yimi Amador is a 48 y.o. female who presents for an acute visit. She reports having issues with shortness of breath going on for the past 3-4 weeks. She does not think it is getting better or worse over that time. She is short of breath doing things like walking from her car to our building. Alleviating factors include having a fan present which helps her feel like she can get enough air. She has not used a nebulizer due to fear of exacerbating her elevated resting heart rate. Inhalers have never proven helpful for her. She does not have exacerbating factors outside of activity.     She also reports a chest pressure with sensation like someone is sitting on her chest.  This happens throughout the day but is commonly first thing in the morning when she awakens as well. She recently underwent stress testing and will undergo holter monitoring through her Cardiologist though no arrhythmias were seen during treadmill testing. Review of Systems   Respiratory: Positive for chest tightness and shortness of breath. Cardiovascular: Positive for chest pain. Psychiatric/Behavioral: The patient is nervous/anxious.       Historical Information   Past Medical History:   Diagnosis Date   • Anemia    • Anxiety    • Asthma     illness induced   • Cancer ([address])     thyroid ca (removed Jan 2022)   • SARA I (cervical intraepithelial neoplasia I)     RESOLVED: [id number]   • Depression    • Endometriosis    • Functional ovarian cysts age 15   • History of agoraphobia age 15   • Hypercholesterolemia    • IBS (irritable bowel syndrome) age 15   • IC (interstitial cystitis)    • Malignant neoplasm of thyroid gland ([address]) 12/14/2021   • Migraine    • Motion sickness    • Multiple thyroid nodules 11/02/2021    Last Assessment & Plan:  Formatting of this note might be different from the original. Satish Joyce is a 54 y.o. female   We had an extensive discussion regarding thyroid nodules, the natural course of thyroid nodules, ultrasound features which can convey an increased risk for malignancy, the general indications for fine needle aspiration, the procedure itself and possible results from a fine needl   • Pap smear abnormality of cervix with ASCUS favoring benign     RESOLVED: [id number]   • Seasonal allergies     with post-nasal drip and recurrent throat infections   • Thyroid cancer ([address])    • Urinary tract infection    • Varicella      Past Surgical History:   Procedure Laterality Date   • CHOLECYSTECTOMY  2019   • CHOLECYSTECTOMY LAPAROSCOPIC  08/2020   • COLONOSCOPY  06/2018    Nonbleeding angiodysplastic lesion mid ascending colon, questionable ulcerative proctitis-biopsy was negative for acute or chronic colitis   • COLONOSCOPY  06/09/2016 Normal   • COLONOSCOPY  05/23/2013    Adenoma at splenic flexure   • EGD  08/02/2018   • EGD  08/03/2020    Normal.  Biopsy stomach negative for H. pylori, biopsy of the duodenum negative for celiac   • ENDOTRACHEAL INTUBATION EMERGENT  2012   • HEMORRHOID SURGERY  07/2009    Dr Negar Bal   • LAPAROSCOPY     • CO HYSTEROSCOPY DIAGNOSTIC SEPARATE PROCEDURE N/A 11/21/2018    Procedure: HYSTEROSCOPY;  Surgeon: Maryjane Leija MD;  Location: [facility];  Service: Gynecology   • CO HYSTEROSCOPY ENDOMETRIAL ABLATION N/A 11/21/2018    Procedure: Rudy Bolivar;  Surgeon: Maryjane Leija MD;  Location: [facility];  Service: Gynecology   • SMALL INTESTINE SURGERY     • THYROID SURGERY  01/17/2022    Papillary cancer of thyroid nodules   • TOOTH EXTRACTION     • TUBAL LIGATION      ONSET: 36 DEC 2011     Family History   Problem Relation Age of Onset   • Other Mother         HYSTERECTOMY FOR BENIGN DISEASE    • Diabetes Mother    • Lung cancer Father    • Other Sister         HYSTERECTOMY FOR BENIGN DISEASE    • Diabetes Maternal Grandmother    • Other Maternal Grandmother         HYSTERECTOMY FOR BENIGN DISEASE    • Colon cancer Paternal Grandmother 61   • Throat cancer Paternal Uncle 61   • No Known Problems Maternal Aunt    • No Known Problems Paternal Aunt    • No Known Problems Paternal Aunt    • Diabetes Maternal Grandfather        Meds/Allergies     Current Outpatient Medications:   •  colestipol (COLESTID) 1 g tablet, Take 1 tablet (1 g total) by mouth 2 (two) times a day, Disp: 270 tablet, Rfl: 3  •  LORazepam (ATIVAN) 1 mg tablet, TAKE 1 & 1/2 TABLETS DAILY AT BEDTIME AS NEEDED, Disp: , Rfl: 1  •  medroxyPROGESTERone (DEPO-PROVERA) 150 mg/mL injection, TO BE GIVEN IN OFFICE EVERY 10 WEEKS, Disp: 3 mL, Rfl: 1  •  Multiple Vitamin (MULTI VITAMIN DAILY PO), Take by mouth  , Disp: , Rfl:   •  naproxen (NAPROSYN) 500 mg tablet, , Disp: , Rfl:   •  ondansetron (ZOFRAN) 4 mg tablet, Take 1 tablet (4 mg total) by mouth every 8 (eight) hours as needed for nausea, Disp: 20 tablet, Rfl: 5  •  pentosan polysulfate (ELMIRON) 100 mg capsule, Take 100 mg by mouth 3 (three) times a day before meals  , Disp: , Rfl:   •  predniSONE 20 mg tablet, Take 2 tablets (40 mg total) by mouth daily, Disp: 14 tablet, Rfl: 0  •  Tirosint 150 MCG CAPS, Take 1 capsule by mouth daily, Disp: , Rfl:   •  albuterol (2.5 mg/3 mL) 0.083 % nebulizer solution, Take 1 vial (2.5 mg total) by nebulization 3 (three) times a day (Patient not taking: Reported on 4/6/2023), Disp: 30 vial, Rfl: 2  •  albuterol (ProAir HFA) 90 mcg/act inhaler, Inhale 2 puffs every 6 (six) hours as needed for wheezing or shortness of breath (Patient not taking: Reported on 7/20/2023), Disp: 25.5 g, Rfl: 3  •  amitriptyline (ELAVIL) 10 mg tablet, Take 10 mg by mouth daily at bedtime (Patient not taking: Reported on 4/6/2023), Disp: , Rfl:   •  amitriptyline (ELAVIL) 25 mg tablet, , Disp: , Rfl:   •  Botulinum Toxin Type A 200 units SOLR, every 84 days. (Patient not taking: Reported on 7/20/2023), Disp: , Rfl:   •  Ciclopirox 1 % shampoo, Apply 1 application topically every other day To the scalp for 5 minutes, then rinse thoroughly. (Patient not taking: Reported on 2/1/2023), Disp: 120 mL, Rfl: 1  •  cimetidine (TAGAMET) 200 mg tablet, Take 200 mg by mouth (Patient not taking: Reported on 7/20/2023), Disp: , Rfl:   •  clindamycin (CLEOCIN) 100 MG vaginal suppository, Insert 1 suppository (100 mg total) into the vagina daily at bedtime (Patient not taking: Reported on 7/20/2023), Disp: 3 suppository, Rfl: 0  •  clotrimazole-betamethasone (LOTRISONE) 1-0.05 % cream, Apply topically 2 (two) times a day (Patient not taking: Reported on 7/20/2023), Disp: 30 g, Rfl: 0  •  [employer] Cortisone Maximum Strength 1 % lotion, APPLY TO AFFECTED AREAS OF SCALP UP TO 4 TIMES A DAY ONLY AS NEEDED.  (Patient not taking: Reported on 7/20/2023), Disp: 99 mL, Rfl: 0  •  dicyclomine (BENTYL) 10 mg capsule, TAKE 1 CAPSULE (10 MG TOTAL) BY MOUTH 3 (THREE) TIMES A DAY AS NEEDED (ABDOMINAL PAIN) (Patient not taking: Reported on 4/6/2023), Disp: 270 capsule, Rfl: 1  •  DULoxetine (CYMBALTA) 20 mg capsule, , Disp: , Rfl:   •  famotidine (PEPCID) 20 mg tablet, Take 1 tablet (20 mg total) by mouth 2 (two) times a day (Patient not taking: Reported on 7/20/2023), Disp: 180 tablet, Rfl: 1  •  fluticasone (FLONASE) 50 mcg/act nasal spray, as needed   (Patient not taking: Reported on 7/20/2023), Disp: , Rfl:   •  gabapentin (NEURONTIN) 100 mg capsule, TAKE 1 CAPSULES (100 MG) BY MOUTH IN THE MORNING, AND 2 CAPSULES (200 MG) AT BEDTIME AS TOLERATED (Patient not taking: Reported on 7/20/2023), Disp: , Rfl:   •  glycopyrrolate (ROBINUL) 1 mg tablet, TAKE 1 TABLET TWICE A DAY (Patient not taking: Reported on 4/6/2023), Disp: 180 tablet, Rfl: 2  •  Heparin Sodium, Porcine, (heparin, porcine,) 79406 UNIT/ML, , Disp: , Rfl:   •  hydrocortisone 2.5 % cream, Apply topically 2 (two) times a day (Patient not taking: Reported on 7/20/2023), Disp: 30 g, Rfl: 0  •  hydrOXYzine HCL (ATARAX) 25 mg tablet, , Disp: , Rfl:   •  ipratropium (ATROVENT) 0.06 % nasal spray, 2 SPRAYS EVERY 6 HOURS AS NEEDED FOR 30 DAYS (Patient not taking: Reported on 7/20/2023), Disp: , Rfl:   •  ketoconazole (NIZORAL) 2 % shampoo, Apply 1 application. topically 2 (two) times a week SHMP twice a wk x 8 wk, then PRN. Leave on for 5 min before rinsing.  (Patient not taking: Reported on 7/20/2023), Disp: 120 mL, Rfl: 1  •  levalbuterol (XOPENEX) 1.25 mg/3 mL nebulizer solution, , Disp: , Rfl:   •  liothyronine (CYTOMEL) 5 mcg tablet, Take 5 mcg by mouth daily (Patient not taking: Reported on 7/20/2023), Disp: , Rfl:   •  Meth-Hyo-M Bl-Na Phos-Ph Sal (Uribel) 118 MG CAPS, Take 1 each by mouth as needed (Patient not taking: Reported on 4/6/2023), Disp: , Rfl:   •  metroNIDAZOLE (METROCREAM) 0.75 % cream, Apply topically 2 (two) times a day (Patient not taking: Reported on 4/6/2023), Disp: 45 g, Rfl: 3  •  Multiple Vitamins-Minerals (Multivitamin Adult) CHEW, Chew (Patient not taking: Reported on 7/5/2023), Disp: , Rfl:   •  Myrbetriq 50 MG TB24, Take 1 tablet by mouth daily (Patient not taking: Reported on 7/5/2023), Disp: , Rfl:   •  naratriptan (AMERGE) 2.5 MG tablet, 1 po prn migraine. MRx1 in 2 hours if needed. Max 2 tabs/day. Max 2 days.  (Patient not taking: Reported on 4/6/2023), Disp: , Rfl:   •  nitrofurantoin (MACROBID) 100 mg capsule, TAKE 1 CAPSULE BY MOUTH TWICE A DAY FOR 5 DAYS (Patient not taking: Reported on 7/20/2023), Disp: , Rfl:   •  nortriptyline (PAMELOR) 10 MG/5ML solution, , Disp: , Rfl:   •  nortriptyline (PAMELOR) 10 MG/5ML solution, TAKE 2.5ML BY MOUTH EVERY NIGHT AT BEDTIME (Patient not taking: Reported on 4/6/2023), Disp: , Rfl:   •  omeprazole (PriLOSEC) 20 mg delayed release capsule, TAKE 1 CAPSULE BY MOUTH EVERY DAY (Patient not taking: Reported on 2/1/2023), Disp: 90 capsule, Rfl: 1  •  Symbicort 160-4.5 MCG/ACT inhaler, INHALE 2 PUFFS BY MOUTH 2 TIMES A DAY RINSE MOUTH AFTER USE. (Patient not taking: Reported on 7/20/2023), Disp: 6 g, Rfl: 3  •  triamcinolone acetonide (KENALOG-40) 40 mg/mL, INSTILL 1 ML INTO BLADDER WEEKLY (Patient not taking: Reported on 7/20/2023), Disp: , Rfl:   •  Trintellix 5 MG tablet, Take 5 mg by mouth daily (Patient not taking: Reported on 7/20/2023), Disp: , Rfl:   •  Ubrelvy 100 MG tablet, TAKE 1 TABLET BY MOUTH TWICE A DAY AS NEEDED FOR MIGRAINE (Patient not taking: Reported on 7/20/2023), Disp: , Rfl:     Current Facility-Administered Medications:   •  medroxyPROGESTERone (DEPO-PROVERA) IM injection 150 mg, 150 mg, Intramuscular, Once, Katie Luevano DO  •  medroxyPROGESTERone (DEPO-PROVERA) IM injection 150 mg, 150 mg, Intramuscular, Once, Katie Luevano DO  Allergies   Allergen Reactions   • Latex Itching and Hives     Irritation   • Vancomycin Itching and Swelling     Patient had facial swelling, especially around her eyes and severe itching. • Tessalon [Benzonatate] Itching   • Azithromycin Diarrhea, Nausea Only and Other (See Comments)   • Clarithromycin Other (See Comments)   • Clavulanic Acid Other (See Comments)   • Erythromycin Base Other (See Comments)   • Lithium Itching   • Mirtazapine Other (See Comments)     (remeron)  (remeron)  (remeron)   • Moxifloxacin Nausea Only     Excessive vomiting  Excessive vomiting   • Nitrofurantoin Itching and Other (See Comments)     Blotchy skin    • Sulfamethoxazole      Other reaction(s): Rash   • Sulfamethoxazole-Trimethoprim Other (See Comments)   • Trimethoprim      Other reaction(s): Rash   • Doxycycline Other (See Comments), Itching and Rash     Itching and rash   • Indomethacin Rash   • Lamotrigine Other (See Comments) and Rash     Can't recall side effects. • Penicillins Rash       Vitals: Blood pressure 118/84, pulse 90, temperature 100 °F (37.8 °C), temperature source Tympanic, resp. rate 18, height 5' 7" (1.702 m), weight 64.3 kg (141 lb 11.2 oz), SpO2 98 %, not currently breastfeeding. Body mass index is 22.19 kg/m². Oxygen Therapy  SpO2: 98 %  Oxygen Therapy: None (Room air)    Physical Exam  Physical Exam  Vitals reviewed. Constitutional:       General: She is not in acute distress. Appearance: Normal appearance. She is well-developed. She is not ill-appearing. HENT:      Head: Normocephalic and atraumatic. Eyes:      General: No scleral icterus. Conjunctiva/sclera: Conjunctivae normal.   Neck:      Vascular: No JVD. Cardiovascular:      Rate and Rhythm: Regular rhythm. Tachycardia present. Heart sounds: Normal heart sounds. No murmur heard. No friction rub. No gallop. Pulmonary:      Effort: Pulmonary effort is normal. No respiratory distress. Breath sounds: Normal breath sounds. No wheezing or rales. Musculoskeletal:      Cervical back: Neck supple. Right lower leg: No edema. Left lower leg: No edema.    Skin:     General: Skin is warm and dry. Findings: No rash. Neurological:      General: No focal deficit present. Mental Status: She is alert and oriented to person, place, and time. Mental status is at baseline. Psychiatric:         Mood and Affect: Mood normal.         Behavior: Behavior normal.     Labs: I have personally reviewed pertinent lab results. Lab Results   Component Value Date    WBC 5.50 05/19/2023    HGB 13.9 05/19/2023    HCT 43.1 05/19/2023    MCV 90 05/19/2023     05/19/2023     Lab Results   Component Value Date    CALCIUM 8.9 05/19/2023    K 4.3 05/19/2023    CO2 25 05/19/2023     (H) 05/19/2023    BUN 9 05/19/2023    CREATININE 0.84 05/19/2023     Lab Results   Component Value Date    IGE 2.28 07/18/2022     Lab Results   Component Value Date    ALT 17 05/19/2023    AST 8 05/19/2023    ALKPHOS 67 05/19/2023       Imaging and other studies: I have personally reviewed relevant films in PACS. EKG, Pathology, and Other Studies: I have personally reviewed relevant reports in Epic. Muriel Cockayne, M.D.   [facility]

## 2023-07-21 NOTE — ASSESSMENT & PLAN NOTE
Carmen has had a very extensive workup to date. By methacholine challenge as well as by her response to inhalers, she does not appear to have asthma. There are no abnormalities on prior chest x-ryas. Cardiac testing has all proven negative with the exception of an upcoming Holter monitor. She does report some improvement of chest tightness with ativan. I would put the issue of any intrapulmonary abnormalities to rest with obtaining a CT scan specifically to assess for mediastinal pathology which cannot be assessed via CXR. Given her therapeutic response to ativan I would favor attempting to gain control of any anxiety she has prior to any other testing. For attempts at symptom control, a week of prednisone was ordered.

## 2023-07-26 ENCOUNTER — APPOINTMENT (OUTPATIENT)
Dept: NON INVASIVE DIAGNOSTICS | Facility: CLINIC | Age: 53
End: 2023-07-26
Payer: MEDICARE

## 2023-07-26 ENCOUNTER — HOSPITAL ENCOUNTER (OUTPATIENT)
Dept: NON INVASIVE DIAGNOSTICS | Facility: CLINIC | Age: 53
Discharge: HOME | End: 2023-07-26
Payer: MEDICARE

## 2023-07-26 DIAGNOSIS — R00.2 PALPITATIONS: ICD-10-CM

## 2023-07-26 DIAGNOSIS — R00.0 TACHYCARDIA: ICD-10-CM

## 2023-07-26 LAB
LAB AP GYN PRIMARY INTERPRETATION: NORMAL
Lab: NORMAL

## 2023-07-26 PROCEDURE — 93225 XTRNL ECG REC<48 HRS REC: CPT

## 2023-07-26 PROCEDURE — 93226 XTRNL ECG REC<48 HR SCAN A/R: CPT

## 2023-07-26 NOTE — TELEPHONE ENCOUNTER
Prior authorization started for Noritate 1 % cream prescribed on 07 06 2022  Dx: Rosacea     Ref# [id number]  Spoke with [facility] C[facility]:  I discussed with the patient the risks of Cimetidine including but not limited to gynecomastia, headache, diarrhea, nausea, drowsiness, arrhythmias, pancreatitis, skin rashes, psychosis, bone marrow suppression and kidney toxicity.

## 2023-07-27 ENCOUNTER — HOSPITAL ENCOUNTER (OUTPATIENT)
Dept: RADIOLOGY | Facility: HOSPITAL | Age: 53
Discharge: HOME | End: 2023-07-27
Attending: INTERNAL MEDICINE
Payer: MEDICARE

## 2023-07-27 DIAGNOSIS — C73 MALIGNANT NEOPLASM OF THYROID GLAND (HCC): ICD-10-CM

## 2023-07-27 DIAGNOSIS — R06.02 SHORTNESS OF BREATH: ICD-10-CM

## 2023-07-27 PROCEDURE — 71250 CT THORAX DX C-: CPT

## 2023-07-27 PROCEDURE — G1004 CDSM NDSC: HCPCS

## 2023-07-28 ENCOUNTER — TELEPHONE (OUTPATIENT)
Dept: PULMONOLOGY | Facility: MEDICAL CENTER | Age: 53
End: 2023-07-28

## 2023-07-31 ENCOUNTER — TELEPHONE (OUTPATIENT)
Dept: CARDIOLOGY CLINIC | Facility: CLINIC | Age: 53
End: 2023-07-31

## 2023-07-31 NOTE — TELEPHONE ENCOUNTER
Patient called for result of 48 hour holter monitor. It was returned on 7/28 and advised it has not yet been resulted and we would call her when result is in. She said to let you know that she still feel tachy with  even at rest.  It wakes her up during the night,with the same heaviness  in her chest as described at ov on 7/5. even after taking Ativan to sleep.

## 2023-08-01 PROCEDURE — 93227 XTRNL ECG REC<48 HR R&I: CPT | Performed by: INTERNAL MEDICINE

## 2023-08-03 ENCOUNTER — TELEPHONE (OUTPATIENT)
Dept: PULMONOLOGY | Facility: CLINIC | Age: 53
End: 2023-08-03

## 2023-08-03 ENCOUNTER — TELEPHONE (OUTPATIENT)
Dept: CARDIOLOGY CLINIC | Facility: CLINIC | Age: 53
End: 2023-08-03

## 2023-08-03 DIAGNOSIS — I31.39 PERICARDIAL EFFUSION: Primary | ICD-10-CM

## 2023-08-03 RX ORDER — COLCHICINE 0.6 MG/1
0.6 TABLET ORAL DAILY
Qty: 31 TABLET | Refills: 0 | Status: SHIPPED | OUTPATIENT
Start: 2023-08-03 | End: 2023-08-10

## 2023-08-03 NOTE — TELEPHONE ENCOUNTER
Pt LVM stating her pharmacy called her and advised the medication Dr. Neymar Kimbrough sent in is not covered without prior authorizatoin. Pt is asking if there is an alternative that could be sent in. Pt expressed concern regarding taking the medication that was sent it. If no alternative then prior auth needs to be submitted.

## 2023-08-03 NOTE — TELEPHONE ENCOUNTER
Patient called, asking if you would review the Chest CT result. It was ordered by her pulmonologist and she asked if the result showing "trace pericardial effusion" is the reason she doesn't feel well. She said she is still out of breath, she is always fatigued and has constant palpitations. She asked if this finding needs to be treated. The holter monitor result is also final in the chart. Carmen is scheduled for a follow up with you on 8/10. Please advise.

## 2023-08-03 NOTE — TELEPHONE ENCOUNTER
Patient called, she had to leave work early today because she was struggling to keep her breath. She is really hoping to hear back today regarding these results and what the Doctor advises. The steroids she was prescribed had helped her breathing but she had ended them about a week ago and her breathing is worse. She would appreciate a return call to discuss.  Please advise

## 2023-08-03 NOTE — TELEPHONE ENCOUNTER
Spoke to her. Colchicine called in for pericardial effusion. Side effects discussed. Patient will  the medication.

## 2023-08-09 NOTE — TELEPHONE ENCOUNTER
Pt calling in stating she finished the steroids and while she was on them she felt great but the last few days her breathing is bad, she is coughing, and she has pressure in her chest again. She is wondering if she should continue the steroids since they helped her significantly.

## 2023-08-09 NOTE — TELEPHONE ENCOUNTER
It is reasonable to continue prednisone while waiting for the colchicine prior authorization. However, I prescribed her a week of steroids back on 7/20, so I'm not sure what she is taking now. Can we clarify - did she wait to start taking the steroids or was she on a different dose from a different provider?

## 2023-08-10 ENCOUNTER — OFFICE VISIT (OUTPATIENT)
Dept: CARDIOLOGY CLINIC | Facility: CLINIC | Age: 53
End: 2023-08-10
Payer: MEDICARE

## 2023-08-10 ENCOUNTER — TELEPHONE (OUTPATIENT)
Dept: PULMONOLOGY | Facility: CLINIC | Age: 53
End: 2023-08-10

## 2023-08-10 ENCOUNTER — APPOINTMENT (OUTPATIENT)
Dept: LAB | Age: 53
End: 2023-08-10
Payer: MEDICARE

## 2023-08-10 VITALS
OXYGEN SATURATION: 98 % | WEIGHT: 144 LBS | HEIGHT: 66 IN | HEART RATE: 99 BPM | BODY MASS INDEX: 23.14 KG/M2 | DIASTOLIC BLOOD PRESSURE: 72 MMHG | SYSTOLIC BLOOD PRESSURE: 118 MMHG

## 2023-08-10 DIAGNOSIS — R00.2 PALPITATIONS: ICD-10-CM

## 2023-08-10 DIAGNOSIS — R06.09 DOE (DYSPNEA ON EXERTION): ICD-10-CM

## 2023-08-10 DIAGNOSIS — I31.39 PERICARDIAL EFFUSION: Primary | ICD-10-CM

## 2023-08-10 DIAGNOSIS — I31.39 PERICARDIAL EFFUSION: ICD-10-CM

## 2023-08-10 LAB
CRP SERPL QL: <3 MG/L
ERYTHROCYTE [SEDIMENTATION RATE] IN BLOOD: 9 MM/HOUR (ref 0–29)

## 2023-08-10 PROCEDURE — 85652 RBC SED RATE AUTOMATED: CPT | Performed by: INTERNAL MEDICINE

## 2023-08-10 PROCEDURE — 36415 COLL VENOUS BLD VENIPUNCTURE: CPT

## 2023-08-10 PROCEDURE — 99214 OFFICE O/P EST MOD 30 MIN: CPT | Performed by: INTERNAL MEDICINE

## 2023-08-10 PROCEDURE — 86140 C-REACTIVE PROTEIN: CPT

## 2023-08-10 RX ORDER — CLOBETASOL PROPIONATE 0.46 MG/ML
SOLUTION TOPICAL
COMMUNITY
Start: 2023-07-31

## 2023-08-10 RX ORDER — PHENAZOPYRIDINE HYDROCHLORIDE 100 MG/1
TABLET, FILM COATED ORAL
COMMUNITY
Start: 2023-07-21

## 2023-08-10 RX ORDER — COLCHICINE 0.6 MG/1
0.6 CAPSULE ORAL DAILY
Qty: 30 CAPSULE | Refills: 2 | Status: SHIPPED | OUTPATIENT
Start: 2023-08-10

## 2023-08-10 NOTE — PATIENT INSTRUCTIONS
You were seen today in the Cardiology office for follow up. Please have an echocardiogram performed to assess for fluid around the heart. I have also ordered blood work to check for inflammation. Thank you for choosing [address] [place] [address].

## 2023-08-10 NOTE — TELEPHONE ENCOUNTER
Patient called in requesting if someone can give her a call with an update on the prior authorization for the Colchine medication. She states that she just had to pay for it out of pocket and has not heard back from us. Please advise.

## 2023-08-10 NOTE — TELEPHONE ENCOUNTER
I will send in their preferred version. Kyle Simpsonr - was she describing the colchicine as a steroid? Order sent to her pharmacy.

## 2023-08-10 NOTE — PROGRESS NOTES
[facility]    CHIEF COMPLAINT:   Chief Complaint   Patient presents with   • Follow-up       HPI:  Christine Christianson is a 48 y.o. female with a past medical history of chronic cystitis requiring Botox injections, chronic migraines, COVID-19 infection who presents for follow up. Initial OV - 7/5/23: P/w fatigue, shortness of breath, and palpitations. Reports COVID-19 vaccine two years ago. She subsequently developed fever, chest heaviness and shortness of breath. She reports being ill for approximately 12 weeks. There was a period of time where she required oxygen. Unclear if this was related to the vaccine or actual COVID-19 infection. She does feel that her symptoms had began to improve particularly during this past winter. In December 2022 she had flu and then COVID-19 infection back to back. Currently reports symptoms of chronic cough, fatigue, palpitations, and dyspnea on exertion. She also reports sharp chest pains in the mid sternal area that occur randomly and are brief. She does not have any chest pain on exertion. She does admit to sometimes waking up and feeling heaviness in the chest. Recently was enrolled in rehabilitation at [facility] approximately 2 weeks ago. This was at the advice of a provider she spoke to regarding COVID long-hauler symptoms. Prior to her initial illness 2 years ago she used to walk 3-5 miles per day. Now she can walk for about a mile but does feel short of breath and has to push herself to do it. She has persistent fatigue and tiredness. She did have bronchitis a couple of months ago. She states that her symptoms have particularly worsened since the warm weather months began in May. No orthopnea, PND, leg edema. +wakes up coughing and feeling heaviness in the chest. Describes this more as someone is pushing on the chest and preventing her from breathing. Does not feel this is a squeezing or band-like pain.      Social history: She is a lifetime non-smoker. She does not consume alcohol. No cocaine, amphetamines, stimulants. Family history: Not in touch with family so doesn't know of any premature CAD in immediate family members. Interval history: She had exercise stress testing and Holter monitor performed with results as noted below. She also had a CT chest on 7/27/23 which showed a trace anterior pericardial effusion and was started on colchicine through her pulmonologist. Has taken colchicine for 3 days but has been awaiting prior auth. Colchicine caused worsening of her sharp chest pain one evening. She still feels very short of breath. Pain is not worse or alleviated by laying down or sitting forward, respectively. Pain is not worse with deep inspiration. She was on steroids for 7 days prior to her CT chest and reports feeling excellent with improvement in her breathing.       The following portions of the patient's history were reviewed and updated as appropriate: allergies, current medications, past family history, past medical history, past social history, past surgical history, and problem list.    SINCE LAST OV I REVIEWED WITH THE PATIENT THE INTERIM LABS, TEST RESULTS, CONSULTANT(S) NOTES AND PERFORMED AN INTERIM REVIEW OF HISTORY    Past Medical History:   Diagnosis Date   • Anemia    • Anxiety    • Asthma     illness induced   • Cancer ([address])     thyroid ca (removed Jan 2022)   • SARA I (cervical intraepithelial neoplasia I)     RESOLVED: [id number]   • Depression    • Endometriosis    • Functional ovarian cysts age 15   • History of agoraphobia age 15   • Hypercholesterolemia    • IBS (irritable bowel syndrome) age 15   • IC (interstitial cystitis)    • Malignant neoplasm of thyroid gland ([address]) 12/14/2021   • Migraine    • Motion sickness    • Multiple thyroid nodules 11/02/2021    Last Assessment & Plan:  Formatting of this note might be different from the original. Guanaco Sahu is a 54 y.o. female   We had an extensive discussion regarding thyroid nodules, the natural course of thyroid nodules, ultrasound features which can convey an increased risk for malignancy, the general indications for fine needle aspiration, the procedure itself and possible results from a fine needl   • Pap smear abnormality of cervix with ASCUS favoring benign     RESOLVED: [id number]   • Seasonal allergies     with post-nasal drip and recurrent throat infections   • Thyroid cancer ([address])    • Urinary tract infection    • Varicella        Past Surgical History:   Procedure Laterality Date   • CHOLECYSTECTOMY  2019   • CHOLECYSTECTOMY LAPAROSCOPIC  08/2020   • COLONOSCOPY  06/2018    Nonbleeding angiodysplastic lesion mid ascending colon, questionable ulcerative proctitis-biopsy was negative for acute or chronic colitis   • COLONOSCOPY  06/09/2016    Normal   • COLONOSCOPY  05/23/2013    Adenoma at splenic flexure   • EGD  08/02/2018   • EGD  08/03/2020    Normal.  Biopsy stomach negative for H. pylori, biopsy of the duodenum negative for celiac   • ENDOTRACHEAL INTUBATION EMERGENT  2012   • HEMORRHOID SURGERY  07/2009    Dr Vilchis [place]   • LAPAROSCOPY     • MD HYSTEROSCOPY DIAGNOSTIC SEPARATE PROCEDURE N/A 11/21/2018    Procedure: HYSTEROSCOPY;  Surgeon: Dillon Wiggins MD;  Location: [facility];  Service: Gynecology   • MD HYSTEROSCOPY ENDOMETRIAL ABLATION N/A 11/21/2018    Procedure: ABLATION ENDOMETRIAL Elloree Balsam;  Surgeon: Dillon Wiggins MD;  Location: [facility];  Service: Gynecology   • SMALL INTESTINE SURGERY     • THYROID SURGERY  01/17/2022    Papillary cancer of thyroid nodules   • TOOTH EXTRACTION     • TUBAL LIGATION      ONSET: 29 DEC 2011       Social History     Socioeconomic History   • Marital status:      Spouse name: Not on file   • Number of children: 2   • Years of education: Not on file   • Highest education level: Not on file   Occupational History   • Occupation: unemployed   Tobacco Use   • Smoking status: Never   • Smokeless tobacco: Never   Vaping Use • Vaping Use: Never used   Substance and Sexual Activity   • Alcohol use: No   • Drug use: No   • Sexual activity: Not on file   Other Topics Concern   • Not on file   Social History Narrative    Marital history- single as per all scripts    No caffeine use    Jewish affiliation Pent[employer]al    Uses safety equipment- seatbelts      Social Determinants of Health     Financial Resource Strain: Not on file   Food Insecurity: Not on file   Transportation Needs: Not on file   Physical Activity: Not on file   Stress: Not on file   Social Connections: Not on file   Intimate Partner Violence: Not on file   Housing Stability: Not on file       Family History   Problem Relation Age of Onset   • Other Mother         HYSTERECTOMY FOR BENIGN DISEASE    • Diabetes Mother    • Lung cancer Father    • Other Sister         HYSTERECTOMY FOR BENIGN DISEASE    • Diabetes Maternal Grandmother    • Other Maternal Grandmother         HYSTERECTOMY FOR BENIGN DISEASE    • Colon cancer Paternal Grandmother 61   • Throat cancer Paternal Uncle 61   • No Known Problems Maternal Aunt    • No Known Problems Paternal Aunt    • No Known Problems Paternal Aunt    • Diabetes Maternal Grandfather        Allergies   Allergen Reactions   • Latex Itching and Hives     Irritation   • Vancomycin Itching and Swelling     Patient had facial swelling, especially around her eyes and severe itching.    • Tessalon [Benzonatate] Itching   • Azithromycin Diarrhea, Nausea Only and Other (See Comments)   • Clarithromycin Other (See Comments)   • Clavulanic Acid Other (See Comments)   • Erythromycin Base Other (See Comments)   • Lithium Itching   • Mirtazapine Other (See Comments)     (remeron)  (remeron)  (remeron)   • Moxifloxacin Nausea Only     Excessive vomiting  Excessive vomiting   • Nitrofurantoin Itching and Other (See Comments)     Blotchy skin    • Sulfamethoxazole      Other reaction(s): Rash   • Sulfamethoxazole-Trimethoprim Other (See Comments)   • Trimethoprim      Other reaction(s): Rash   • Doxycycline Other (See Comments), Itching and Rash     Itching and rash   • Indomethacin Rash   • Lamotrigine Other (See Comments) and Rash     Can't recall side effects. • Penicillins Rash       Current Outpatient Medications   Medication Sig Dispense Refill   • albuterol (ProAir HFA) 90 mcg/act inhaler Inhale 2 puffs every 6 (six) hours as needed for wheezing or shortness of breath 25.5 g 3   • Botulinum Toxin Type A 200 units SOLR      • Ciclopirox 1 % shampoo Apply 1 application topically every other day To the scalp for 5 minutes, then rinse thoroughly. 120 mL 1   • clobetasol (TEMOVATE) 0.05 % external solution APPLY THIN LAYER ON SCALP TWICE DAILY X 2 WEEKS THEN STOP X 2 WEEKS. REPEAT 2 WEEKS ON AND 2 WEEKS OFF.AVOID FACE/SKIN FOLDS/GROIN     • clotrimazole-betamethasone (LOTRISONE) 1-0.05 % cream Apply topically 2 (two) times a day 30 g 0   • colchicine (COLCRYS) 0.6 mg tablet Take 1 tablet (0.6 mg total) by mouth daily 31 tablet 0   • colestipol (COLESTID) 1 g tablet Take 1 tablet (1 g total) by mouth 2 (two) times a day 270 tablet 3   • dicyclomine (BENTYL) 10 mg capsule TAKE 1 CAPSULE (10 MG TOTAL) BY MOUTH 3 (THREE) TIMES A DAY AS NEEDED (ABDOMINAL PAIN) 270 capsule 1   • fluticasone (FLONASE) 50 mcg/act nasal spray as needed     • gabapentin (NEURONTIN) 100 mg capsule      • ketoconazole (NIZORAL) 2 % shampoo Apply 1 application. topically 2 (two) times a week SHMP twice a wk x 8 wk, then PRN. Leave on for 5 min before rinsing.  120 mL 1   • LORazepam (ATIVAN) 1 mg tablet TAKE 1 & 1/2 TABLETS DAILY AT BEDTIME AS NEEDED  1   • medroxyPROGESTERone (DEPO-PROVERA) 150 mg/mL injection TO BE GIVEN IN OFFICE EVERY 10 WEEKS 3 mL 1   • Meth-Hyo-M Bl-Na Phos-Ph Sal (Uribel) 118 MG CAPS Take 1 each by mouth as needed     • metroNIDAZOLE (METROCREAM) 0.75 % cream Apply topically 2 (two) times a day 45 g 3   • Multiple Vitamin (MULTI VITAMIN DAILY PO) Take by mouth • naproxen (NAPROSYN) 500 mg tablet      • ondansetron (ZOFRAN) 4 mg tablet Take 1 tablet (4 mg total) by mouth every 8 (eight) hours as needed for nausea 20 tablet 5   • pentosan polysulfate (ELMIRON) 100 mg capsule Take 100 mg by mouth 3 (three) times a day before meals       • Tirosint 150 MCG CAPS Take 1 capsule by mouth daily     • albuterol (2.5 mg/3 mL) 0.083 % nebulizer solution Take 1 vial (2.5 mg total) by nebulization 3 (three) times a day (Patient not taking: Reported on 4/6/2023) 30 vial 2   • amitriptyline (ELAVIL) 10 mg tablet Take 10 mg by mouth daily at bedtime (Patient not taking: Reported on 4/6/2023)     • amitriptyline (ELAVIL) 25 mg tablet  (Patient not taking: Reported on 7/20/2023)     • cimetidine (TAGAMET) 200 mg tablet Take 200 mg by mouth (Patient not taking: Reported on 7/20/2023)     • clindamycin (CLEOCIN) 100 MG vaginal suppository Insert 1 suppository (100 mg total) into the vagina daily at bedtime (Patient not taking: Reported on 7/20/2023) 3 suppository 0   • [employer] Cortisone Maximum Strength 1 % lotion APPLY TO AFFECTED AREAS OF SCALP UP TO 4 TIMES A DAY ONLY AS NEEDED.  (Patient not taking: Reported on 7/20/2023) 99 mL 0   • DULoxetine (CYMBALTA) 20 mg capsule  (Patient not taking: Reported on 7/20/2023)     • famotidine (PEPCID) 20 mg tablet Take 1 tablet (20 mg total) by mouth 2 (two) times a day (Patient not taking: Reported on 7/20/2023) 180 tablet 1   • glycopyrrolate (ROBINUL) 1 mg tablet TAKE 1 TABLET TWICE A DAY (Patient not taking: Reported on 4/6/2023) 180 tablet 2   • Heparin Sodium, Porcine, (heparin, porcine,) 92801 UNIT/ML  (Patient not taking: Reported on 7/20/2023)     • hydrocortisone 2.5 % cream Apply topically 2 (two) times a day (Patient not taking: Reported on 7/20/2023) 30 g 0   • hydrOXYzine HCL (ATARAX) 25 mg tablet  (Patient not taking: Reported on 7/20/2023)     • ipratropium (ATROVENT) 0.06 % nasal spray 2 SPRAYS EVERY 6 HOURS AS NEEDED FOR 30 DAYS (Patient not taking: Reported on 7/20/2023)     • levalbuterol (XOPENEX) 1.25 mg/3 mL nebulizer solution  (Patient not taking: Reported on 4/6/2023)     • liothyronine (CYTOMEL) 5 mcg tablet Take 5 mcg by mouth daily (Patient not taking: Reported on 7/20/2023)     • Multiple Vitamins-Minerals (Multivitamin Adult) CHEW Chew (Patient not taking: Reported on 7/5/2023)     • Myrbetriq 50 MG TB24 Take 1 tablet by mouth daily (Patient not taking: Reported on 7/5/2023)     • naratriptan (AMERGE) 2.5 MG tablet 1 po prn migraine. MRx1 in 2 hours if needed. Max 2 tabs/day. Max 2 days.  (Patient not taking: Reported on 4/6/2023)     • nitrofurantoin (MACROBID) 100 mg capsule TAKE 1 CAPSULE BY MOUTH TWICE A DAY FOR 5 DAYS (Patient not taking: Reported on 7/20/2023)     • nortriptyline (PAMELOR) 10 MG/5ML solution  (Patient not taking: Reported on 7/20/2023)     • nortriptyline (PAMELOR) 10 MG/5ML solution TAKE 2.5ML BY MOUTH EVERY NIGHT AT BEDTIME (Patient not taking: Reported on 4/6/2023)     • omeprazole (PriLOSEC) 20 mg delayed release capsule TAKE 1 CAPSULE BY MOUTH EVERY DAY (Patient not taking: Reported on 2/1/2023) 90 capsule 1   • phenazopyridine (PYRIDIUM) 100 mg tablet TAKE 1 TABLET BY MOUTH THREE TIMES DAILY AS NEEDED FOR BLADDER SPASMS (Patient not taking: Reported on 8/10/2023)     • predniSONE 20 mg tablet Take 2 tablets (40 mg total) by mouth daily (Patient not taking: Reported on 8/10/2023) 14 tablet 0   • Symbicort 160-4.5 MCG/ACT inhaler INHALE 2 PUFFS BY MOUTH 2 TIMES A DAY RINSE MOUTH AFTER USE. (Patient not taking: Reported on 7/20/2023) 6 g 3   • triamcinolone acetonide (KENALOG-40) 40 mg/mL INSTILL 1 ML INTO BLADDER WEEKLY (Patient not taking: Reported on 7/20/2023)     • Trintellix 5 MG tablet Take 5 mg by mouth daily (Patient not taking: Reported on 7/20/2023)     • Ubrelvy 100 MG tablet TAKE 1 TABLET BY MOUTH TWICE A DAY AS NEEDED FOR MIGRAINE (Patient not taking: Reported on 7/20/2023)       Current Facility-Administered Medications   Medication Dose Route Frequency Provider Last Rate Last Admin   • medroxyPROGESTERone (DEPO-PROVERA) IM injection 150 mg  150 mg Intramuscular Once Mylinda Lilyk, DO       • medroxyPROGESTERone (DEPO-PROVERA) IM injection 150 mg  150 mg Intramuscular Once Mylinda Lilyk, DO           /72 (BP Location: Right arm, Patient Position: Sitting, Cuff Size: Standard)   Pulse 99   Ht 5' 6" (1.676 m)   Wt 65.3 kg (144 lb)   SpO2 98%   BMI 23.24 kg/m²     Review of Systems   All other systems reviewed and are negative. Physical Exam  Vitals reviewed. Constitutional:       General: She is not in acute distress. Appearance: She is well-developed. She is not toxic-appearing. HENT:      Head: Normocephalic and atraumatic. Eyes:      General: No scleral icterus. Extraocular Movements: Extraocular movements intact. Conjunctiva/sclera: Conjunctivae normal.   Cardiovascular:      Rate and Rhythm: Normal rate and regular rhythm. Pulses: Normal pulses. Heart sounds: Normal heart sounds. No murmur heard. No friction rub. No gallop. Pulmonary:      Effort: Pulmonary effort is normal. No respiratory distress. Breath sounds: Normal breath sounds. No stridor. No wheezing, rhonchi or rales. Abdominal:      General: Abdomen is flat. Bowel sounds are normal.      Palpations: Abdomen is soft. Tenderness: There is no abdominal tenderness. There is no guarding. Musculoskeletal:      Cervical back: Neck supple. Right lower leg: No edema. Left lower leg: No edema. Skin:     General: Skin is warm and dry. Capillary Refill: Capillary refill takes less than 2 seconds. Coloration: Skin is not jaundiced. Neurological:      Mental Status: She is alert.    Psychiatric:         Mood and Affect: Mood normal.          Lab Results   Component Value Date    K 4.3 05/19/2023     (H) 05/19/2023    CO2 25 05/19/2023    BUN 9 05/19/2023 CREATININE 0.84 05/19/2023    CALCIUM 8.9 05/19/2023    ALT 17 05/19/2023    AST 8 05/19/2023       Lab Results   Component Value Date    HDL 39 (L) 05/19/2023    LDLCALC 96 05/19/2023    TRIG 122 05/19/2023       Lab Results   Component Value Date    WBC 5.50 05/19/2023    HGB 13.9 05/19/2023    HCT 43.1 05/19/2023     05/19/2023       Cardiac studies:   48 hours  Holter monitor - 7/26/23: The patient was monitored for a total of 48 hours and 00 minutes. The patient was predominantly in sinus rhythm during the study period. The average heart rate was 92 beats per minute. The heart rate ranged from a low of 60 beats per minute at 4:03 AM to a maximum of 154 beats per minute at 2:27 PM.    Ventricular ectopic activity consisted of 14 beats (0.0% of total beats, of which, 4 single VE, 10 single PVC). There was no sustained or nonsustained ventricular tachycardia. Supraventricular ectopic activity consisted of 23 beats (0.0% of total beats, of which, 2 atrial couplets, 2 late beats, 19 single PAC). There was no evidence of atrial fibrillation or atrial flutter. There were no significant pauses. The longest R-R interval was 1.3 seconds. There was no evidence of advanced degree heart block. A diary was attached. None of the reported symptoms correlated with any significant arrhythmias.      EST - 7/19/23:   •  Stress ECG: No ST deviation is noted. There were no arrhythmias during stress. Arrhythmias during recovery: rare PACs. The ECG was negative for ischemia. The stress ECG is negative for ischemia after maximal exercise, without reproduction of symptoms. •  Stress ECG: A Shelton protocol stress test was performed. The patient reached stage 4.0 of the protocol after exercising for 10 min and 0 sec and had a maximal HR of 164 bpm (97 % of MPHR) and 13.4 METS. The patient experienced non-limiting angina during the test. The patient achieved their maximal exercise threshold.  The patient reported dyspnea and chest tightness 6/10 during the stress test. Symptoms ended during recovery. Blood pressure demonstrated a normal response and heart rate demonstrated a normal response to stress. The patient's heart rate recovery was normal.    TTE - 2/2/22: Normal left ventricular cavity size and wall thickness. Normal wall motion and systolic function. Normal diastolic function. Normal right ventricular cavity size and systolic function. Normal left and right atrial size. No significant valvular abnormalities. Estimated PASP 20 mmHg. EST - 6/15/21:  STRESS SUMMARY: Rest SpO2 100%, peak stress SpO2 98%. Duration of exercise was 10 min and 34 sec. The patient exercised to protocol stage 4. Maximal work rate was 13.4 METs. Functional capacity was normal. Maximal heart rate during stress  was 162 bpm ( 94 % of maximal predicted heart rate). The heart rate response to stress was normal. There was normal resting blood pressure with an appropriate response to stress. The rate-pressure product for the peak heart rate and blood  pressure was 28854. There was no chest pain during stress. The stress test was terminated due to achievement of maximal (symptom limited) exercise. The stress ECG was negative for ischemia. Arrhythmia during stress: isolated premature  ventricular beats. Based on [facility] Treadmill Scoring, this patient was at low risk for cardiac events.       ASSESSMENT AND PLAN:  Lisha Tariq was seen today for follow-up. Diagnoses and all orders for this visit:    Pericardial effusion: CT imaging from 7/27/2023 showed a trace anterior pericardial effusion. She started taking colchicine and feels it made the sharp pain worse. Her symptoms are a bit atypical for pericarditis.  She had a normal ECG last office visit and no pericardial rub on exam. It would still be reasonable to empirically continue colchicine once daily and assess for improvement in symptoms and follow up on her echocardiogram which is scheduled for next week. I have placed an order for ESR/CRP. AYON (dyspnea on exertion): She has seen pulmonology and spirometry was normal.  Negative methacholine challenge. She does report having bronchitis a couple of months ago. Symptoms are particularly worse during warm weather. I do suspect she may have long COVID. Lipid panel from 5/19/2023 with total cholesterol 159, triglycerides 122, HDL 39, LDL 96.  10-year ASCVD risk score is 1%. TSH 1.6. Prior rheumatologic testing including MARCOS, rheumatoid factor, C-reactive protein earlier this year were negative. She has been evaluated by cardiology in 2021 and had exercise stress testing and echocardiogram which were normal.  She subsequently had another echocardiogram in June 2022 with no changes from prior.  -Repeat exercise stress testing on 7/19/2023 showed exercise duration of 10 minutes or 13.4 METS. She experienced nonlimiting atypical chest pain and dyspnea with exercise. She had a normal blood pressure and heart rate response to exercise. Her exercise stress ECG was negative for ischemia. -Reports improvement with steroid course prior to her CT chest last month     Palpitations: Holter monitor shows predominantly normal sinus rhythm throughout the duration of the study. She had rare ventricular ectopic or supraventricular ectopic. There was no atrial fibrillation or atrial flutter. There were no supraventricular or ventricular arrhythmias. Reported symptoms did not correlate with any arrhythmias. Average heart rate 92 bpm ( bpm).     Mikal Romberg, MD

## 2023-08-10 NOTE — TELEPHONE ENCOUNTER
Patient only took the prednisone back when it was prescribed. She was taking the colchicine and paying out of pocket for it, stated it has not messing with her stomach yet. Informed that we have sent in the preferred version to pharmacy. She will call us next week if she is still experiencing sob or any issues.

## 2023-08-10 NOTE — TELEPHONE ENCOUNTER
Insurance has denied Colchicine 0.6 mg tablets; their preferred formulary is   [address]. ..  I you do not want to prescribe this medication, will you please write a letter of medical necessity so I can appeal.

## 2023-08-10 NOTE — TELEPHONE ENCOUNTER
So patient has been buying the Colchicine she is on her 3rd day. Still feels pressure in chest and when I called she was still lying down because of sob. Please advise.

## 2023-08-11 ENCOUNTER — TELEPHONE (OUTPATIENT)
Dept: CARDIOLOGY CLINIC | Facility: CLINIC | Age: 53
End: 2023-08-11

## 2023-08-11 NOTE — TELEPHONE ENCOUNTER
P/C had saw Dr Alina He the other day, and he recommended another test, CAT scan with contrast. She call to schedule and it is currenlty not ordered.     Please advise if you still want her to have the CAT scan w/contrast.     She definitely would like to have    Please advise     C/b [phone]

## 2023-08-11 NOTE — TELEPHONE ENCOUNTER
I called and left her a message that her insurance denied the colchicine (COLCRYS) 0.6 mg tablet version and approved Colchicine (Mitigare) 0.6 MG CAPS instead. Dr. Korina Parra has sent in a RX for this medication.

## 2023-08-11 NOTE — TELEPHONE ENCOUNTER
I called and left Carmen a message regarding the preferred version Colchicine (Mitigare) 0.6 MG CAPS was sent in to her pharmacy.

## 2023-08-13 ENCOUNTER — PATIENT MESSAGE (OUTPATIENT)
Dept: PULMONOLOGY | Facility: CLINIC | Age: 53
End: 2023-08-13

## 2023-08-13 DIAGNOSIS — I31.39 PERICARDIAL EFFUSION: Primary | ICD-10-CM

## 2023-08-16 ENCOUNTER — HOSPITAL ENCOUNTER (OUTPATIENT)
Dept: NON INVASIVE DIAGNOSTICS | Facility: CLINIC | Age: 53
Discharge: HOME | End: 2023-08-16
Payer: MEDICARE

## 2023-08-16 VITALS
HEIGHT: 66 IN | WEIGHT: 144 LBS | DIASTOLIC BLOOD PRESSURE: 72 MMHG | SYSTOLIC BLOOD PRESSURE: 118 MMHG | HEART RATE: 80 BPM | BODY MASS INDEX: 23.14 KG/M2

## 2023-08-16 DIAGNOSIS — I31.39 PERICARDIAL EFFUSION: ICD-10-CM

## 2023-08-16 LAB — SL CV LV EF: 60

## 2023-08-16 PROCEDURE — 93325 DOPPLER ECHO COLOR FLOW MAPG: CPT | Performed by: INTERNAL MEDICINE

## 2023-08-16 PROCEDURE — 93325 DOPPLER ECHO COLOR FLOW MAPG: CPT

## 2023-08-16 PROCEDURE — 93321 DOPPLER ECHO F-UP/LMTD STD: CPT | Performed by: INTERNAL MEDICINE

## 2023-08-16 PROCEDURE — 93308 TTE F-UP OR LMTD: CPT

## 2023-08-16 PROCEDURE — 93321 DOPPLER ECHO F-UP/LMTD STD: CPT

## 2023-08-16 PROCEDURE — 93308 TTE F-UP OR LMTD: CPT | Performed by: INTERNAL MEDICINE

## 2023-08-16 RX ORDER — PREDNISONE 20 MG/1
TABLET ORAL
Qty: 35 TABLET | Refills: 0 | Status: SHIPPED | OUTPATIENT
Start: 2023-08-16 | End: 2023-09-13

## 2023-08-23 DIAGNOSIS — G43.909 MIGRAINE WITHOUT STATUS MIGRAINOSUS, NOT INTRACTABLE, UNSPECIFIED MIGRAINE TYPE: ICD-10-CM

## 2023-08-23 RX ORDER — MEDROXYPROGESTERONE ACETATE 150 MG/ML
INJECTION, SUSPENSION INTRAMUSCULAR
Qty: 1 ML | Refills: 5 | Status: SHIPPED | OUTPATIENT
Start: 2023-08-23

## 2023-08-28 ENCOUNTER — TELEPHONE (OUTPATIENT)
Dept: CARDIOLOGY CLINIC | Facility: CLINIC | Age: 53
End: 2023-08-28

## 2023-08-28 NOTE — TELEPHONE ENCOUNTER
Patient called asking if you want her to have an Echo performed as you had stated in your Instructions in 8/10 OV,  to assess for fluid around her heart? Patient is asking what is the next step? Please advise.

## 2023-09-03 ENCOUNTER — NURSE TRIAGE (OUTPATIENT)
Dept: OTHER | Facility: OTHER | Age: 53
End: 2023-09-03

## 2023-09-03 DIAGNOSIS — N76.0 BACTERIAL VAGINOSIS: Primary | ICD-10-CM

## 2023-09-03 DIAGNOSIS — B96.89 BACTERIAL VAGINOSIS: Primary | ICD-10-CM

## 2023-09-03 NOTE — TELEPHONE ENCOUNTER
Reason for Disposition  • [1] Constant abdominal pain AND [2] present > 2 hours    Answer Assessment - Initial Assessment Questions  Patient stated that she has had BV in the past that is difficult to treat. Patient was on a 28 day course of steroids for long-haul Covid/breathing symptoms. 1. DISCHARGE: "Describe the discharge." (e.g., white, yellow, green, gray, foamy, cottage cheese-like)      Sticky white discharge  2. ODOR: "Is there a bad odor?"      Denies  3. ONSET: "When did the discharge begin?"      Today, but symptoms started yesterday  4. RASH: "Is there a rash in that area?" If Yes, ask: "Describe it." (e.g., redness, blisters, sores, bumps)      Irritated  5. ABDOMINAL PAIN: "Are you having any abdominal pain?" If Yes, ask: "What does it feel like? " (e.g., crampy, dull, intermittent, constant)       Pelvic pain usually is first symptom  6. ABDOMINAL PAIN SEVERITY: If present, ask: "How bad is it?"  (e.g., mild, moderate, severe)   - MILD - doesn't interfere with normal activities    - MODERATE - interferes with normal activities or awakens from sleep    - SEVERE - patient doesn't want to move (R/O peritonitis)       5/10, sometimes is sharp, but patient stated it is the first symptom form it. 7. CAUSE: "What do you think is causing the discharge?" "Have you had the same problem before? What happened then?"      Bacterial Vaginosis  8. OTHER SYMPTOMS: "Do you have any other symptoms?" (e.g., fever, itching, vaginal bleeding, pain with urination, injury to genital area, vaginal foreign body)     Denies urinary issues, de  9.  PREGNANCY: "Is there any chance you are pregnant?" "When was your last menstrual period?"      *No Answer*    Protocols used: VAGINAL DISCHARGE-ADULT-

## 2023-09-03 NOTE — TELEPHONE ENCOUNTER
Regarding: BV  ----- Message from Jacob Rollins sent at 9/3/2023 12:27 PM EDT -----  "I have reoccurring BV and it just doesn't want to go away this time.  I need a script for something iliana had in the past"

## 2023-09-03 NOTE — TELEPHONE ENCOUNTER
TC on call provider patient's symptoms and request for medication. On call stated would send in medication for patient. Patient informed medication was sent to pharmacy; receipt confirmed by pharmacy.

## 2023-09-06 ENCOUNTER — OFFICE VISIT (OUTPATIENT)
Dept: GYNECOLOGY | Facility: CLINIC | Age: 53
End: 2023-09-06
Payer: MEDICARE

## 2023-09-06 VITALS — WEIGHT: 148 LBS | BODY MASS INDEX: 23.89 KG/M2 | SYSTOLIC BLOOD PRESSURE: 116 MMHG | DIASTOLIC BLOOD PRESSURE: 72 MMHG

## 2023-09-06 DIAGNOSIS — Z30.42 DEPO-PROVERA CONTRACEPTIVE STATUS: Primary | ICD-10-CM

## 2023-09-06 PROCEDURE — 96372 THER/PROPH/DIAG INJ SC/IM: CPT

## 2023-09-06 RX ORDER — MEDROXYPROGESTERONE ACETATE 150 MG/ML
150 INJECTION, SUSPENSION INTRAMUSCULAR ONCE
Status: COMPLETED | OUTPATIENT
Start: 2023-09-06 | End: 2023-09-06

## 2023-09-06 RX ADMIN — MEDROXYPROGESTERONE ACETATE 150 MG: 150 INJECTION, SUSPENSION INTRAMUSCULAR at 10:51

## 2023-09-06 NOTE — PROGRESS NOTES
Carmen RAMIREZ Keyla Causey presents for a scheduled *depo provera injection. This was administered in the *right deltoid without any difficulty. Pt has the following complaints/concerns none . A notice was sent to Dr. Paulino Ross to sign off on order.   Pt will schedule next injection in 10 weeks    [address] - [id number]    LOT- [id number]    EXP- 11/2024

## 2023-09-08 DIAGNOSIS — B37.31 VAGINAL YEAST INFECTION: Primary | ICD-10-CM

## 2023-09-11 ENCOUNTER — ULTRASOUND (OUTPATIENT)
Dept: GYNECOLOGY | Facility: CLINIC | Age: 53
End: 2023-09-11
Payer: MEDICARE

## 2023-09-11 ENCOUNTER — TELEPHONE (OUTPATIENT)
Dept: GYNECOLOGY | Facility: CLINIC | Age: 53
End: 2023-09-11

## 2023-09-11 ENCOUNTER — OFFICE VISIT (OUTPATIENT)
Dept: PULMONOLOGY | Facility: CLINIC | Age: 53
End: 2023-09-11
Payer: MEDICARE

## 2023-09-11 VITALS
HEIGHT: 66 IN | HEART RATE: 85 BPM | BODY MASS INDEX: 23.46 KG/M2 | SYSTOLIC BLOOD PRESSURE: 120 MMHG | OXYGEN SATURATION: 97 % | DIASTOLIC BLOOD PRESSURE: 78 MMHG | WEIGHT: 146 LBS

## 2023-09-11 DIAGNOSIS — R00.0 TACHYCARDIA: ICD-10-CM

## 2023-09-11 DIAGNOSIS — R10.2 PELVIC PAIN: Primary | ICD-10-CM

## 2023-09-11 DIAGNOSIS — U09.9 POST-ACUTE SEQUELAE OF COVID-19 (PASC): ICD-10-CM

## 2023-09-11 DIAGNOSIS — R06.02 SHORTNESS OF BREATH: Primary | ICD-10-CM

## 2023-09-11 PROCEDURE — 99214 OFFICE O/P EST MOD 30 MIN: CPT | Performed by: INTERNAL MEDICINE

## 2023-09-11 PROCEDURE — 76830 TRANSVAGINAL US NON-OB: CPT | Performed by: OBSTETRICS & GYNECOLOGY

## 2023-09-11 RX ORDER — FLUCONAZOLE 150 MG/1
150 TABLET ORAL
Qty: 2 TABLET | Refills: 0 | Status: SHIPPED | OUTPATIENT
Start: 2023-09-11 | End: 2023-09-19

## 2023-09-11 NOTE — TELEPHONE ENCOUNTER
Patient left message to inform Dr. Katie Hanson that she is no longer taking Colchicine. She only took it for "a couple of days".

## 2023-09-11 NOTE — PROGRESS NOTES
Pulmonary Follow Up Note  Esau Chacon 48 y.o. female MRN: [id number]  9/11/2023      HPI:    Patient continues to have intermittent sudden tachycardia and shortness of breath per her. This has not happened since prescribed prednisone taper in July of this year. Currently no shortness of breath or cough. No palpitations. No recent infections    Meds:  No inhalers    ROS:  Constitutional: + Fatigue, - chills, - fever, - weight change. HEENT: - rhinorrhea, - sneezing, - sore throat. Respiratory: - cough, + intermittent shortness of breath, - wheezing. Cardiovascular: - chest pain,  + intermittent palpitations, - leg swelling. Gastrointestinal: - abdominal pain, - constipation, - diarrhea, - nausea, - vomiting. Endocrine: - cold intolerance, - heat intolerance. Genitourinary: - dysuria. Musculoskeletal: - arthralgias. Skin:- rash, - wound. Allergic/Immunologic: - allergies  Neurological: - dizziness, - numbness        Vitals: Blood pressure 120/78, pulse 85, height 5' 6" (1.676 m), weight 66.2 kg (146 lb), SpO2 97 %, not currently breastfeeding., Body mass index is 23.57 kg/m². Physical Exam:  GEN  NAD  HEENT  ncat, non icteric, MM moist  NECK  supple, no JVD, no LAD  CV  +s1s2, no mrg, RRR  PULM  CTA BL, no wrr  ABD  soft, ntnd, + BS  EXT  no edema, no cyanosis, no clubbing  NEURO  Aox3, no focal weakness    Imaging and other studies:   I personally viewed and interpreted the following imaging studies:  CT chest 7/27/2023 shows no gross intraparenchymal or pleural abnormalities. Very small pericardial effusion    Pulmonary function testing:   Methacholine challenge 8/24/2022 is negative    Assessment:  Intermittent shortness of breath with tachycardia  History of thyroid malignancy status post resection    Plan:  · Because of patient's symptoms remain unclear.   · CT of the chest shows no gross intraparenchymal or pleural abnormalities and pulmonary function test and methacholine challenge test are inconsistent with pulmonary abnormalities. · Etiology of symptoms are unclear yet are unlikely to be secondary to pulmonary cause. · Consult cardiology. Patient requesting new cardiologist.  · This note will be forwarded to patient's primary care physician. I recommend multidisciplinary discussion regarding etiology of his patient's symptoms. Return visit in 4 to 6 months  On next visit we will evaluate symptoms    Daryl Sloan M.D.   [facility]

## 2023-09-11 NOTE — PROGRESS NOTES
AMB US Pelvic Non OB    Date/Time: 9/11/2023 1:00 PM    Performed by: Yogi Chaudhary  Authorized by: Farhat Ronquillo DO  Universal Protocol:  Consent given by: patient  Patient identity confirmed: verbally with patient      Procedure details:     Technique:  Transvaginal US, Non-OB    Position: lithotomy exam    Uterine findings:     Length (cm): 4.41    Height (cm):  2.44    Width (cm):  3.68    Endometrial stripe: identified      Endometrium thickness (mm):  2.7  Left ovary findings:     Left ovary:  Visualized    Length (cm): 3.04    Height (cm): 1.41    Width (cm): 1.64  Right ovary findings:     Right ovary:  Visualized    Length (cm): 3.88    Height (cm): 2.12    Width (cm): 2.82  Other findings:     Free pelvic fluid: not identified      Free peritoneal fluid: not identified    Post-Procedure Details:     Impression:  Anteverted uterus and left ovary appear within normal limits. The right ovary contains cyst with thick septation, 2.7cm. No free fluid. Tolerance: Tolerated well, no immediate complications    Complications: no complications    Additional Procedure Comments:      GetThis F8 E8C-RS transvaginal transducer Serial # [id number] was used to perform the examination today and subsequently followed with high level disinfection utilizing Trophon EPR procedure. Ultrasound performed at:     [address], [address] [place] [address]  Phone:  [phone]  Fax:  [phone]

## 2023-09-13 DIAGNOSIS — N83.201 RIGHT OVARIAN CYST: Primary | ICD-10-CM

## 2023-09-13 DIAGNOSIS — R10.2 PELVIC PAIN IN FEMALE: ICD-10-CM

## 2023-09-14 ENCOUNTER — TELEPHONE (OUTPATIENT)
Dept: GYNECOLOGY | Facility: CLINIC | Age: 53
End: 2023-09-14

## 2023-09-14 ENCOUNTER — APPOINTMENT (OUTPATIENT)
Dept: LAB | Age: 53
End: 2023-09-14
Payer: MEDICARE

## 2023-09-14 DIAGNOSIS — C56.2 MALIGNANT NEOPLASM OF LEFT OVARY (HCC): ICD-10-CM

## 2023-09-14 DIAGNOSIS — N83.202 LEFT OVARIAN CYST: ICD-10-CM

## 2023-09-14 DIAGNOSIS — N83.202 LEFT OVARIAN CYST: Primary | ICD-10-CM

## 2023-09-14 LAB — CANCER AG125 SERPL-ACNC: 11 U/ML (ref 0–35)

## 2023-09-14 PROCEDURE — 86304 IMMUNOASSAY TUMOR CA 125: CPT

## 2023-09-14 PROCEDURE — 36415 COLL VENOUS BLD VENIPUNCTURE: CPT

## 2023-09-20 ENCOUNTER — OFFICE VISIT (OUTPATIENT)
Dept: FAMILY MEDICINE CLINIC | Facility: CLINIC | Age: 53
End: 2023-09-20
Payer: MEDICARE

## 2023-09-20 VITALS
SYSTOLIC BLOOD PRESSURE: 126 MMHG | BODY MASS INDEX: 23.59 KG/M2 | OXYGEN SATURATION: 99 % | HEIGHT: 66 IN | DIASTOLIC BLOOD PRESSURE: 78 MMHG | WEIGHT: 146.8 LBS | RESPIRATION RATE: 16 BRPM | HEART RATE: 78 BPM | TEMPERATURE: 99.4 F

## 2023-09-20 DIAGNOSIS — Z00.00 MEDICARE ANNUAL WELLNESS VISIT, SUBSEQUENT: ICD-10-CM

## 2023-09-20 DIAGNOSIS — R07.89 CHEST HEAVINESS: ICD-10-CM

## 2023-09-20 DIAGNOSIS — R05.3 CHRONIC COUGH: Primary | ICD-10-CM

## 2023-09-20 DIAGNOSIS — R05.3 CHRONIC COUGH: ICD-10-CM

## 2023-09-20 DIAGNOSIS — C73 MALIGNANT NEOPLASM OF THYROID GLAND (HCC): ICD-10-CM

## 2023-09-20 DIAGNOSIS — F31.81 BIPOLAR II DISORDER (HCC): ICD-10-CM

## 2023-09-20 DIAGNOSIS — E78.49 OTHER HYPERLIPIDEMIA: ICD-10-CM

## 2023-09-20 DIAGNOSIS — J96.91 RESPIRATORY FAILURE WITH HYPOXIA, UNSPECIFIED CHRONICITY (HCC): ICD-10-CM

## 2023-09-20 DIAGNOSIS — I73.9 IC (INTERMITTENT CLAUDICATION) (HCC): ICD-10-CM

## 2023-09-20 PROCEDURE — G0439 PPPS, SUBSEQ VISIT: HCPCS | Performed by: FAMILY MEDICINE

## 2023-09-20 PROCEDURE — 99214 OFFICE O/P EST MOD 30 MIN: CPT | Performed by: FAMILY MEDICINE

## 2023-09-20 RX ORDER — FLUTICASONE PROPIONATE 110 UG/1
2 AEROSOL, METERED RESPIRATORY (INHALATION) 2 TIMES DAILY
Qty: 12 G | Refills: 2 | Status: SHIPPED | OUTPATIENT
Start: 2023-09-20 | End: 2023-09-21

## 2023-09-20 RX ORDER — TRIAMCINOLONE ACETONIDE 40 MG/ML
INJECTION, SUSPENSION INTRA-ARTICULAR; INTRAMUSCULAR
COMMUNITY
Start: 2023-09-14

## 2023-09-20 RX ORDER — HEPARIN SODIUM 10000 [USP'U]/ML
INJECTION, SOLUTION INTRAVENOUS; SUBCUTANEOUS
COMMUNITY
Start: 2023-09-12

## 2023-09-20 NOTE — PROGRESS NOTES
Assessment and Plan:     Problem List Items Addressed This Visit        Endocrine    Malignant neoplasm of thyroid gland ([address])     Continue on her thyroid replacement treatment and following with endocrinologist.         Relevant Medications    triamcinolone acetonide (KENALOG-40) 40 mg/mL       Respiratory    RESOLVED: Respiratory failure with hypoxia (HCC)       Other    Bipolar II disorder (HCC)     Stable. Continue same. We will continue to monitor. Other hyperlipidemia     Her last blood work showed HDL slightly decreased otherwise the rest of her lipid profile was normal.  Continue to follow low-fat diet. Continue to monitor fasting lipid profile. Relevant Orders    CBC and differential    Comprehensive metabolic panel    TSH, 3rd generation with Free T4 reflex    Lipid Panel with Direct LDL reflex    Medicare annual wellness visit, subsequent     It was discussed about immunizations, diet, exercise and safety measures. Chronic cough - Primary     She was seen already by pulmonologist.  She states prednisone helps but she does not like the side effects. Also his Symbicort she had a problems with the powder in the inhaler. I am going to start her on Flovent 110 twice a day. Was discussed with possible side effects. We will continue to monitor. Chest heaviness     Referral to see cardiologist.  She was told if symptoms worsen to go to emergency room. Relevant Orders    Ambulatory Referral to Cardiology    RESOLVED: IC (intermittent claudication) [facility])        Preventive health issues were discussed with patient, and age appropriate screening tests were ordered as noted in patient's After Visit Summary. Personalized health advice and appropriate referrals for health education or preventive services given if needed, as noted in patient's After Visit Summary.      History of Present Illness:     Patient presents for a Medicare Wellness Visit    HPI   Patient Care Team:  Janae Cordoba MD as PCP - General (Family Medicine)  Yoli Gonzalez [facility]  Delfina Stafford DO     Review of Systems:     Review of Systems     Problem List:     Patient Active Problem List   Diagnosis   • Menorrhagia with irregular cycle   • Bipolar II disorder ([address])   • Chronic migraine without aura without status migrainosus, not intractable   • Cystitis   • Constipation   • Dense breasts   • Depression   • Fatigue   • Interstitial cystitis   • Irritable bowel syndrome   • Other hyperlipidemia   • Intractable chronic migraine without aura and without status migrainosus   • OCD (obsessive compulsive disorder)   • Urinary tract infection   • Stress incontinence   • Chronic tonsillitis   • Tonsillar calculus   • Urinary frequency   • Insomnia   • Neck pain   • Right lumbar radiculopathy   • Dizziness   • Shortness of breath   • Nasal sinus congestion   • Seasonal allergies   • Medicare annual wellness visit, subsequent   • Bronchitis   • Chronic cough   • Altered taste   • 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[facility])   • Hx of colonoscopy   • Low blood sugar   • Tachycardia   • Malignant neoplasm of thyroid gland (HCC)   • Multiple thyroid nodules   • Myalgia   • Chronic nonintractable headache   • MDD (major depressive disorder), recurrent severe, without psychosis ([address])   • Vitamin D insufficiency   • Leg cramps   • Right leg pain   • Tingling of face   • Gastroesophageal reflux disease   • Epigastric pain   • History of total thyroidectomy   • Status post tubal ligation   • History of herpes genitalis   • Menopausal syndrome (hot flushes)   • Chest heaviness      Past Medical and Surgical History:     Past Medical History:   Diagnosis Date   • Anemia    • Anxiety    • Asthma     illness induced   • Cancer ([address])     thyroid ca (removed Jan 2022)   • SARA I (cervical intraepithelial neoplasia I)     RESOLVED: [id number]   • Depression    • Endometriosis    • Functional ovarian cysts age 15   • History of agoraphobia age 15   • Hypercholesterolemia    • IBS (irritable bowel syndrome) age 15   • IC (interstitial cystitis)    • Malignant neoplasm of thyroid gland ([address]) 12/14/2021   • Migraine    • Motion sickness    • Multiple thyroid nodules 11/02/2021    Last Assessment & Plan:  Formatting of this note might be different from the original. Yovani Boss is a 54 y.o. female   We had an extensive discussion regarding thyroid nodules, the natural course of thyroid nodules, ultrasound features which can convey an increased risk for malignancy, the general indications for fine needle aspiration, the procedure itself and possible results from a fine needl   • Pap smear abnormality of cervix with ASCUS favoring benign     RESOLVED: [id number]   • Seasonal allergies     with post-nasal drip and recurrent throat infections   • Thyroid cancer ([address])    • Urinary tract infection    • Varicella      Past Surgical History:   Procedure Laterality Date   • CHOLECYSTECTOMY  2019   • CHOLECYSTECTOMY LAPAROSCOPIC  08/2020   • COLONOSCOPY  06/2018    Nonbleeding angiodysplastic lesion mid ascending colon, questionable ulcerative proctitis-biopsy was negative for acute or chronic colitis   • COLONOSCOPY  06/09/2016    Normal   • COLONOSCOPY  05/23/2013    Adenoma at splenic flexure   • EGD  08/02/2018   • EGD  08/03/2020    Normal.  Biopsy stomach negative for H. pylori, biopsy of the duodenum negative for celiac   • EGD AND COLONOSCOPY  01/20/2023    ST. MARIZA Escalante MD.- Normal EGD; Small bile lake stomach./ Normal colonoscopy with ileoscopy. Bx's: No active inflammation, granulomas, organisms, dysplasia or neoplasia; no intestinal metaplasia./ No active inflammation or collagenous colitis, granulomas, dysplasia, or neoplasia.    • ENDOTRACHEAL INTUBATION EMERGENT  2012   • HEMORRHOID SURGERY  07/2009    Dr Karrie Harp N/A 11/21/2018    Procedure: HYSTEROSCOPY;  Surgeon: Jovan Rivera MD;  Location: [facility];  Service: Gynecology   • AR HYSTEROSCOPY ENDOMETRIAL ABLATION N/A 11/21/2018    Procedure: Cortney Noel;  Surgeon: Jovan Rivera MD;  Location: [facility];  Service: Gynecology   • SMALL INTESTINE SURGERY     • THYROID SURGERY  01/17/2022    Papillary cancer of thyroid nodules   • TOOTH EXTRACTION     • TUBAL LIGATION      ONSET: 34 DEC 2011   • US GUIDED THYROID BIOPSY  11/24/2021      Family History:     Family History   Problem Relation Age of Onset   • Other Mother         HYSTERECTOMY FOR BENIGN DISEASE    • Diabetes Mother    • Lung cancer Father    • Other Sister         HYSTERECTOMY FOR BENIGN DISEASE    • Diabetes Maternal Grandmother    • Other Maternal Grandmother         HYSTERECTOMY FOR BENIGN DISEASE    • Colon cancer Paternal Grandmother 61   • Throat cancer Paternal Uncle 61   • No Known Problems Maternal Aunt    • No Known Problems Paternal Aunt    • No Known Problems Paternal Aunt    • Diabetes Maternal Grandfather       Social History:     Social History     Socioeconomic History   • Marital status:      Spouse name: None   • Number of children: 2   • Years of education: None   • Highest education level: None   Occupational History   • Occupation: unemployed   Tobacco Use   • Smoking status: Never   • Smokeless tobacco: Never   Vaping Use   • Vaping Use: Never used   Substance and Sexual Activity   • Alcohol use: No   • Drug use: No   • Sexual activity: None   Other Topics Concern   • None   Social History Narrative    Marital history- single as per all scripts    No caffeine use    Orthodoxy affiliation Pent[employer]ism    Uses safety equipment- seatbelts      Social Determinants of Health     Financial Resource Strain: Not on file   Food Insecurity: Not on file   Transportation Needs: Not on file   Physical Activity: Not on file   Stress: Not on file   Social Connections: Not on file   Intimate Partner Violence: Not on file   Housing Stability: Not on file      Medications and Allergies:     Current Outpatient Medications   Medication Sig Dispense Refill   • albuterol (ProAir HFA) 90 mcg/act inhaler Inhale 2 puffs every 6 (six) hours as needed for wheezing or shortness of breath 25.5 g 3   • Ciclopirox 1 % shampoo Apply 1 application topically every other day To the scalp for 5 minutes, then rinse thoroughly. 120 mL 1   • clobetasol (TEMOVATE) 0.05 % external solution APPLY THIN LAYER ON SCALP TWICE DAILY X 2 WEEKS THEN STOP X 2 WEEKS. REPEAT 2 WEEKS ON AND 2 WEEKS OFF.AVOID FACE/SKIN FOLDS/GROIN     • clotrimazole-betamethasone (LOTRISONE) 1-0.05 % cream Apply topically 2 (two) times a day 30 g 0   • colestipol (COLESTID) 1 g tablet Take 1 tablet (1 g total) by mouth 2 (two) times a day 270 tablet 3   • [employer] Cortisone Maximum Strength 1 % lotion APPLY TO AFFECTED AREAS OF SCALP UP TO 4 TIMES A DAY ONLY AS NEEDED.  99 mL 0   • dicyclomine (BENTYL) 10 mg capsule TAKE 1 CAPSULE (10 MG TOTAL) BY MOUTH 3 (THREE) TIMES A DAY AS NEEDED (ABDOMINAL PAIN) 270 capsule 1   • levalbuterol (XOPENEX) 1.25 mg/3 mL nebulizer solution if needed for shortness of breath     • LORazepam (ATIVAN) 1 mg tablet TAKE 1 & 1/2 TABLETS DAILY AT BEDTIME AS NEEDED  1   • medroxyPROGESTERone (DEPO-PROVERA) 150 mg/mL injection TO BE GIVEN IN OFFICE EVERY 10 WEEKS 1 mL 5   • Multiple Vitamin (MULTI VITAMIN DAILY PO) Take by mouth       • naproxen (NAPROSYN) 500 mg tablet as needed for headaches     • ondansetron (ZOFRAN) 4 mg tablet Take 1 tablet (4 mg total) by mouth every 8 (eight) hours as needed for nausea 20 tablet 5   • pentosan polysulfate (ELMIRON) 100 mg capsule Take 100 mg by mouth 3 (three) times a day before meals       • Tirosint 150 MCG CAPS Take 1 capsule by mouth daily     • triamcinolone acetonide (KENALOG-40) 40 mg/mL      • albuterol (2.5 mg/3 mL) 0.083 % nebulizer solution Take 1 vial (2.5 mg total) by nebulization 3 (three) times a day (Patient not taking: Reported on 4/6/2023) 30 vial 2   • Botulinum Toxin Type A 200 units SOLR  (Patient not taking: Reported on 9/11/2023)     • clindamycin (CLEOCIN) 100 MG vaginal suppository Insert 1 suppository (100 mg total) into the vagina daily at bedtime (Patient not taking: Reported on 7/20/2023) 3 suppository 0   • clindamycin (CLEOCIN) 100 MG vaginal suppository Insert 1 suppository (100 mg total) into the vagina daily at bedtime (Patient not taking: Reported on 9/11/2023) 3 suppository 0   • fluticasone (FLONASE) 50 mcg/act nasal spray as needed (Patient not taking: Reported on 9/11/2023)     • fluticasone (FLOVENT HFA) 110 MCG/ACT inhaler INHALE 2 PUFFS BY MOUTH TWICE DAILY. RINSE MOUTH AFTER USE 36 g 0   • gabapentin (NEURONTIN) 100 mg capsule  (Patient not taking: Reported on 9/11/2023)     • glycopyrrolate (ROBINUL) 1 mg tablet TAKE 1 TABLET TWICE A DAY (Patient not taking: Reported on 4/6/2023) 180 tablet 2   • Heparin Sodium, Porcine, (heparin, porcine,) 88812 UNIT/ML      • hydrocortisone 2.5 % cream Apply topically 2 (two) times a day (Patient not taking: Reported on 7/20/2023) 30 g 0   • ipratropium (ATROVENT) 0.06 % nasal spray 2 SPRAYS EVERY 6 HOURS AS NEEDED FOR 30 DAYS (Patient not taking: Reported on 7/20/2023)     • ketoconazole (NIZORAL) 2 % shampoo Apply 1 application. topically 2 (two) times a week SHMP twice a wk x 8 wk, then PRN. Leave on for 5 min before rinsing. 120 mL 1   • Meth-Hyo-M Bl-Na Phos-Ph Sal (Uribel) 118 MG CAPS Take 1 each by mouth as needed (Patient not taking: Reported on 9/11/2023)     • metroNIDAZOLE (METROCREAM) 0.75 % cream Apply topically 2 (two) times a day (Patient not taking: Reported on 9/11/2023) 45 g 3   • Multiple Vitamins-Minerals (Multivitamin Adult) CHEW Chew (Patient not taking: Reported on 7/5/2023)     • naratriptan (AMERGE) 2.5 MG tablet 1 po prn migraine. MRx1 in 2 hours if needed. Max 2 tabs/day. Max 2 days.  (Patient not taking: Reported on 4/6/2023)     • nitrofurantoin (MACROBID) 100 mg capsule TAKE 1 CAPSULE BY MOUTH TWICE A DAY FOR 5 DAYS (Patient not taking: Reported on 7/20/2023)     • nortriptyline (PAMELOR) 10 MG/5ML solution  (Patient not taking: Reported on 7/20/2023)     • nortriptyline (PAMELOR) 10 MG/5ML solution TAKE 2.5ML BY MOUTH EVERY NIGHT AT BEDTIME (Patient not taking: Reported on 4/6/2023)     • omeprazole (PriLOSEC) 20 mg delayed release capsule TAKE 1 CAPSULE BY MOUTH EVERY DAY (Patient not taking: Reported on 2/1/2023) 90 capsule 1   • Trintellix 5 MG tablet Take 5 mg by mouth daily (Patient not taking: Reported on 7/20/2023)     • Ubrelvy 100 MG tablet TAKE 1 TABLET BY MOUTH TWICE A DAY AS NEEDED FOR MIGRAINE (Patient not taking: Reported on 7/20/2023)       Current Facility-Administered Medications   Medication Dose Route Frequency Provider Last Rate Last Admin   • medroxyPROGESTERone (DEPO-PROVERA) IM injection 150 mg  150 mg Intramuscular Once Kirsty Smolder, DO       • medroxyPROGESTERone (DEPO-PROVERA) IM injection 150 mg  150 mg Intramuscular Once Kirsty Smolder, DO         Allergies   Allergen Reactions   • Latex Itching and Hives     Irritation   • Vancomycin Itching and Swelling     Patient had facial swelling, especially around her eyes and severe itching.    • Tessalon [Benzonatate] Itching   • Azithromycin Diarrhea, Nausea Only and Other (See Comments)   • Clarithromycin Other (See Comments)   • Clavulanic Acid Other (See Comments)   • Erythromycin Base Other (See Comments)   • Lithium Itching   • Mirtazapine Other (See Comments)     (remeron)  (remeron)  (remeron)   • Moxifloxacin Nausea Only     Excessive vomiting  Excessive vomiting   • Nitrofurantoin Itching and Other (See Comments)     Blotchy skin    • Sulfamethoxazole      Other reaction(s): Rash   • Sulfamethoxazole-Trimethoprim Other (See Comments)   • Trimethoprim      Other reaction(s): Rash   • Doxycycline Other (See Comments), Itching and Rash     Itching and rash   • Indomethacin Rash   • Lamotrigine Other (See Comments) and Rash     Can't recall side effects. • Penicillins Rash      Immunizations:     Immunization History   Administered Date(s) Administered   • COVID-19 MODERNA VACC 0.5 ML IM 03/17/2021, 04/14/2021   • H1N1, All Formulations 11/20/2009   • INFLUENZA 12/06/2013, 10/25/2017, 10/25/2017, 10/10/2018, 10/11/2020, 09/22/2021, 10/19/2022   • Influenza Injectable, MDCK, Preservative Free, Quadrivalent, 0.5 mL 10/21/2019, 10/11/2020   • Influenza Quadrivalent Preservative Free 3 years and older IM 10/11/2020   • Influenza Split 12/05/2013, 09/22/2014   • Influenza, injectable, quadrivalent, preservative free 0.5 mL 10/11/2020   • Influenza, seasonal, injectable 10/05/2010   • Td (adult), adsorbed 03/15/2012   • Tdap 03/15/2012      Health Maintenance:         Topic Date Due   • Hepatitis C Screening  Never done   • HIV Screening  Never done   • Breast Cancer Screening: Mammogram  03/17/2024   • Cervical Cancer Screening  07/19/2026   • Colorectal Cancer Screening  01/19/2028         Topic Date Due   • COVID-19 Vaccine (3 - Arely Roots series) 06/09/2021   • Influenza Vaccine (1) 09/01/2023      Medicare Screening Tests and Risk Assessments:     Carmen is here for her Subsequent Wellness visit. Health Risk Assessment:   Patient rates overall health as fair. Patient feels that their physical health rating is slightly worse. Patient is very dissatisfied with their life. Eyesight was rated as slightly worse. Hearing was rated as same. Patient feels that their emotional and mental health rating is slightly worse. Patients states they are sometimes angry. Patient states they are always unusually tired/fatigued. Pain experienced in the last 7 days has been some. Patient's pain rating has been 5/10. Patient states that she has experienced no weight loss or gain in last 6 months. Fall Risk Screening:    In the past year, patient has experienced: no history of falling in past year      Urinary Incontinence Screening:   Patient has leaked urine accidently in the last six months. Home Safety:  Patient does not have trouble with stairs inside or outside of their home. Patient has working smoke alarms and has working carbon monoxide detector. Home safety hazards include: none. Nutrition:   Current diet is Regular. Medications:   Patient is currently taking over-the-counter supplements. OTC medications include: see medication list. Patient is able to manage medications. Activities of Daily Living (ADLs)/Instrumental Activities of Daily Living (IADLs):   Walk and transfer into and out of bed and chair?: Yes  Dress and groom yourself?: Yes    Bathe or shower yourself?: Yes    Feed yourself? Yes  Do your laundry/housekeeping?: Yes  Manage your money, pay your bills and track your expenses?: Yes  Make your own meals?: Yes    Do your own shopping?: Yes    Previous Hospitalizations:   Any hospitalizations or ED visits within the last 12 months?: Yes    How many hospitalizations have you had in the last year?: 1-2    Advance Care Planning:   Living will: Yes    Durable POA for healthcare:  Yes    Advanced directive: Yes      Cognitive Screening:   Provider or family/friend/caregiver concerned regarding cognition?: No    PREVENTIVE SCREENINGS      Cardiovascular Screening:    General: Screening Not Indicated and History Lipid Disorder      Diabetes Screening:     General: Screening Current      Colorectal Cancer Screening:     General: Screening Current      Breast Cancer Screening:     General: Screening Current      Cervical Cancer Screening:    General: History Cervical Cancer      Osteoporosis Screening:    General: Risks and Benefits Discussed      Abdominal Aortic Aneurysm (AAA) Screening:        General: Screening Not Indicated      Lung Cancer Screening:     General: Screening Not Indicated      Hepatitis C Screening:    General: Risks and Benefits Discussed    Screening, Brief Intervention, and Referral to Treatment (SBIRT)    Screening  Typical number of drinks in a day: 0  Typical number of drinks in a week: 0  Interpretation: Low risk drinking behavior. Brief Intervention  Alcohol & drug use screenings were reviewed. No concerns regarding substance use disorder identified. Other Counseling Topics:   Regular weightbearing exercise and calcium and vitamin D intake. No results found.      Physical Exam:     /78 (BP Location: Left arm, Patient Position: Sitting, Cuff Size: Large)   Pulse 78   Temp 99.4 °F (37.4 °C) (Tympanic)   Resp 16   Ht 5' 6" (1.676 m)   Wt 66.6 kg (146 lb 12.8 oz)   SpO2 99%   BMI 23.69 kg/m²     Physical Exam     Jenni Enriquez MD

## 2023-09-21 ENCOUNTER — TELEPHONE (OUTPATIENT)
Dept: FAMILY MEDICINE CLINIC | Facility: CLINIC | Age: 53
End: 2023-09-21

## 2023-09-21 PROBLEM — J96.91 RESPIRATORY FAILURE WITH HYPOXIA (HCC): Status: RESOLVED | Noted: 2021-05-26 | Resolved: 2023-09-21

## 2023-09-21 PROBLEM — I73.9 IC (INTERMITTENT CLAUDICATION) (HCC): Status: RESOLVED | Noted: 2021-05-25 | Resolved: 2023-09-21

## 2023-09-21 RX ORDER — FLUTICASONE PROPIONATE 110 UG/1
2 AEROSOL, METERED RESPIRATORY (INHALATION) 2 TIMES DAILY
Qty: 36 G | Refills: 0 | Status: SHIPPED | OUTPATIENT
Start: 2023-09-21 | End: 2023-09-26

## 2023-09-21 NOTE — ASSESSMENT & PLAN NOTE
Her last blood work showed HDL slightly decreased otherwise the rest of her lipid profile was normal.  Continue to follow low-fat diet. Continue to monitor fasting lipid profile.

## 2023-09-21 NOTE — ASSESSMENT & PLAN NOTE
She was seen already by pulmonologist.  She states prednisone helps but she does not like the side effects. Also his Symbicort she had a problems with the powder in the inhaler. I am going to start her on Flovent 110 twice a day. Was discussed with possible side effects. We will continue to monitor.

## 2023-09-21 NOTE — TELEPHONE ENCOUNTER
Pt called stating Flovent cost $300. Per Rosalind See CRNP refer to Pulmonary for alternative. Pt aware and will contact Pulmonary provider.

## 2023-09-26 DIAGNOSIS — J45.21 MILD INTERMITTENT REACTIVE AIRWAY DISEASE WITH ACUTE EXACERBATION: Primary | ICD-10-CM

## 2023-09-26 RX ORDER — BECLOMETHASONE DIPROPIONATE HFA 80 UG/1
2 AEROSOL, METERED RESPIRATORY (INHALATION) 2 TIMES DAILY
Qty: 10.6 G | Refills: 0 | Status: SHIPPED | OUTPATIENT
Start: 2023-09-26 | End: 2023-09-26

## 2023-10-09 ENCOUNTER — TELEPHONE (OUTPATIENT)
Dept: FAMILY MEDICINE CLINIC | Facility: CLINIC | Age: 53
End: 2023-10-09

## 2023-10-09 DIAGNOSIS — J45.21 MILD INTERMITTENT REACTIVE AIRWAY DISEASE WITH ACUTE EXACERBATION: Primary | ICD-10-CM

## 2023-10-09 RX ORDER — BECLOMETHASONE DIPROPIONATE HFA 80 UG/1
2 AEROSOL, METERED RESPIRATORY (INHALATION) 2 TIMES DAILY
Qty: 10.6 G | Refills: 0 | Status: SHIPPED | OUTPATIENT
Start: 2023-10-09

## 2023-10-09 NOTE — TELEPHONE ENCOUNTER
Pt called back and states her COVID test was negative. Again advised pt no provider in office, suggested to go to [facility] or ER for SOB or check with pulmonary provider. Pt agrees.

## 2023-10-09 NOTE — TELEPHONE ENCOUNTER
Pt called office c/o , chills, heavy on chest, SOB. Feels like she has a cold. . She feels very fatigue, body ache. She did not test for COVID. She was requesting Prednisone. Informed pt we do not have a provider in office today. Suggested ER for  SOB and feeling heavy on chest.   Pt states she will do a covid test and go to [facility].

## 2023-10-13 DIAGNOSIS — J30.2 SEASONAL ALLERGIES: Primary | ICD-10-CM

## 2023-10-13 DIAGNOSIS — J30.2 SEASONAL ALLERGIES: ICD-10-CM

## 2023-10-13 RX ORDER — FLUTICASONE PROPIONATE 50 MCG
SPRAY, SUSPENSION (ML) NASAL
Qty: 48 G | OUTPATIENT
Start: 2023-10-13

## 2023-10-13 RX ORDER — FLUTICASONE PROPIONATE 50 MCG
1 SPRAY, SUSPENSION (ML) NASAL AS NEEDED
Qty: 16 G | Refills: 0 | Status: SHIPPED | OUTPATIENT
Start: 2023-10-13

## 2023-10-24 ENCOUNTER — TELEPHONE (OUTPATIENT)
Dept: PULMONOLOGY | Facility: CLINIC | Age: 53
End: 2023-10-24

## 2023-10-24 ENCOUNTER — ULTRASOUND (OUTPATIENT)
Dept: GYNECOLOGY | Facility: CLINIC | Age: 53
End: 2023-10-24
Payer: MEDICARE

## 2023-10-24 ENCOUNTER — OFFICE VISIT (OUTPATIENT)
Dept: GYNECOLOGY | Facility: CLINIC | Age: 53
End: 2023-10-24
Payer: MEDICARE

## 2023-10-24 VITALS
SYSTOLIC BLOOD PRESSURE: 126 MMHG | DIASTOLIC BLOOD PRESSURE: 84 MMHG | BODY MASS INDEX: 23.78 KG/M2 | HEIGHT: 66 IN | WEIGHT: 148 LBS

## 2023-10-24 DIAGNOSIS — N95.1 PERIMENOPAUSAL: ICD-10-CM

## 2023-10-24 DIAGNOSIS — N83.201 RIGHT OVARIAN CYST: ICD-10-CM

## 2023-10-24 DIAGNOSIS — J45.21 MILD INTERMITTENT REACTIVE AIRWAY DISEASE WITH ACUTE EXACERBATION: Primary | ICD-10-CM

## 2023-10-24 DIAGNOSIS — N30.10 INTERSTITIAL CYSTITIS: ICD-10-CM

## 2023-10-24 DIAGNOSIS — R10.2 PELVIC PAIN: Primary | ICD-10-CM

## 2023-10-24 DIAGNOSIS — N76.0 BV (BACTERIAL VAGINOSIS): ICD-10-CM

## 2023-10-24 DIAGNOSIS — N89.8 VAGINAL DRYNESS: ICD-10-CM

## 2023-10-24 DIAGNOSIS — N89.8 VAGINAL IRRITATION: Primary | ICD-10-CM

## 2023-10-24 DIAGNOSIS — B96.89 BV (BACTERIAL VAGINOSIS): ICD-10-CM

## 2023-10-24 DIAGNOSIS — R10.2 PELVIC PAIN IN FEMALE: ICD-10-CM

## 2023-10-24 PROBLEM — Z90.89 HISTORY OF TOTAL THYROIDECTOMY: Status: RESOLVED | Noted: 2022-03-01 | Resolved: 2023-10-24

## 2023-10-24 PROBLEM — R10.13 EPIGASTRIC PAIN: Status: RESOLVED | Noted: 2022-11-18 | Resolved: 2023-10-24

## 2023-10-24 PROBLEM — E89.0 HISTORY OF TOTAL THYROIDECTOMY: Status: RESOLVED | Noted: 2022-03-01 | Resolved: 2023-10-24

## 2023-10-24 PROBLEM — Z98.890 HISTORY OF TOTAL THYROIDECTOMY: Status: RESOLVED | Noted: 2022-03-01 | Resolved: 2023-10-24

## 2023-10-24 PROCEDURE — 87510 GARDNER VAG DNA DIR PROBE: CPT | Performed by: OBSTETRICS & GYNECOLOGY

## 2023-10-24 PROCEDURE — 87480 CANDIDA DNA DIR PROBE: CPT | Performed by: OBSTETRICS & GYNECOLOGY

## 2023-10-24 PROCEDURE — 87660 TRICHOMONAS VAGIN DIR PROBE: CPT | Performed by: OBSTETRICS & GYNECOLOGY

## 2023-10-24 PROCEDURE — 99214 OFFICE O/P EST MOD 30 MIN: CPT | Performed by: OBSTETRICS & GYNECOLOGY

## 2023-10-24 PROCEDURE — 76830 TRANSVAGINAL US NON-OB: CPT | Performed by: OBSTETRICS & GYNECOLOGY

## 2023-10-24 RX ORDER — TINIDAZOLE 250 MG/1
250 TABLET ORAL 2 TIMES DAILY
Qty: 5 TABLET | Refills: 0 | Status: SHIPPED | OUTPATIENT
Start: 2023-10-24 | End: 2023-10-24 | Stop reason: SDUPTHER

## 2023-10-24 RX ORDER — TINIDAZOLE 250 MG/1
250 TABLET ORAL 2 TIMES DAILY
Qty: 10 TABLET | Refills: 0 | Status: SHIPPED | OUTPATIENT
Start: 2023-10-24 | End: 2023-10-29

## 2023-10-24 RX ORDER — ESTRADIOL 0.1 MG/G
1 CREAM VAGINAL 2 TIMES WEEKLY
Qty: 42.5 G | Refills: 1 | Status: SHIPPED | OUTPATIENT
Start: 2023-10-26

## 2023-10-24 NOTE — TELEPHONE ENCOUNTER
----- Message from [place], [place] sent at 10/10/2023  8:30 AM EDT -----  Regarding: JOHNNY: Ness  Contact: [phone]    ----- Message -----  From: Madison Lemos  Sent: 10/9/2023   8:52 PM EDT  To: Pulmonary [place] [facility]  Subject: Yimi Combs                                         My pharmacy let me know it’s not covered under my insurance. It needs an authorization. They said they would let him know. Thank you!   Carmen

## 2023-10-24 NOTE — TELEPHONE ENCOUNTER
Dr. Yashira Stewart, I called and spoke to Carmen regarding her Flovent Inhaler. It was prescribed by her PCP this time and was told to try it again. She stated she started using it again and her symptoms seem to be a bit better. She has not picked up the Qvar and would like to keep using her Flovent. Will you please send in a 90 day supply of her Flovent to her local pharmacy. Her PCP had stated she needed to get additional refills by her Pulmonologist.  Please advise. Thank you.

## 2023-10-24 NOTE — PROGRESS NOTES
Assessment/Plan:    Diagnoses and all orders for this visit:    Vaginal irritation  -     VAGINOSIS DNA PROBE (AFFIRM)  -     estradiol (ESTRACE) 0.1 mg/g vaginal cream; Insert 1 g into the vagina 2 (two) times a week At Bedtime,  and     BV (bacterial vaginosis)  -     tinidazole (TINDAMAX) 250 MG tablet; Take 1 tablet (250 mg total) by mouth 2 (two) times a day for 5 doses  -     estradiol (ESTRACE) 0.1 mg/g vaginal cream; Insert 1 g into the vagina 2 (two) times a week At Bedtime,  and     Interstitial cystitis    Vaginal dryness    Perimenopausal    History of IBS    Right ovarian cyst      Subjective: Here for follow-up     Patient ID: Laura Barillas is a 48 y.o. female. HPI  79-year-old female  3 para 2-0-1-2 seen here in September for pelvic pain symptoms and recurrent vaginal infections. She does have a history of interstitial cystitis and received injections treatments for this. Has IBS is very sensitive to many medications. Frequently has bacterial vaginosis has been treated with multiple modalities. She is scheduled for Botox injections for overactive bladder symptoms in November. Recently treated for UTI has been on different antibiotics has been on steroids which seems to make her vaginal irritation worse. Pelvic ultrasound today reveals normal size uterus, normal endometrial thickness, complex right ovary cyst has resolved. There is a less than 1 cm follicle present on the right ovary. No free fluid  She does complain of ongoing vaginal irritation wet mount does reveal evidence of bacterial vaginosis. We will try using Tindamax and a lower dose to 250 mg twice daily for 5 days. Hopefully this is covered by her insurance and does not have any negative GI side effects.     Review of Systems unchanged    Objective: No acute distress  /84 (BP Location: Left arm, Patient Position: Sitting, Cuff Size: Standard)   Ht 5' 6" (1.676 m)   Wt 67.1 kg (148 lb)   BMI 23.89 kg/m²      Physical Exam  Vitals and nursing note reviewed. Exam conducted with a chaperone present. Constitutional:       Appearance: Normal appearance. She is normal weight. HENT:      Head: Normocephalic. Eyes:      Pupils: Pupils are equal, round, and reactive to light. Pulmonary:      Effort: Pulmonary effort is normal.   Abdominal:      General: Abdomen is flat. Palpations: Abdomen is soft. Genitourinary:     General: Normal vulva. Comments: Normal external genitalia  Normal size uterus  Normal cervix  No CMT  No pelvic masses  Yellow vaginal discharge, wet mount positive for clue cells consistent with recurrent bacterial vaginosis. Affirm test also done today  Musculoskeletal:         General: Normal range of motion. Cervical back: Normal range of motion. Neurological:      Mental Status: She is alert and oriented to person, place, and time. Psychiatric:         Mood and Affect: Mood normal.         Behavior: Behavior normal.         Thought Content: Thought content normal.         Judgment: Judgment normal.        Please note    In addition to the time spent discussing the findings and results of today's visit and exam, I spent approximately 30 minutes of face-to-face time with the patient, greater than 50% of which was spent in counseling and coordination of care for this patient.

## 2023-10-24 NOTE — PROGRESS NOTES
AMB US Pelvic Non OB    Date/Time: 10/24/2023 11:20 AM    Performed by: Urvashi Ruggiero  Authorized by: Denise Espino DO  Universal Protocol:  Consent given by: patient  Patient identity confirmed: verbally with patient    Procedure details:     Indications: non-obstetric abdominal pain      Technique:  Transvaginal US, Non-OB    Position: lithotomy exam    Uterine findings:     Length (cm): 4.63    Height (cm):  1.98    Width (cm):  3.25    Endometrial stripe: identified      Endometrium thickness (mm):  3.3  Left ovary findings:     Left ovary:  Visualized    Length (cm): 3.02    Height (cm): 1.56    Width (cm): 1.69  Right ovary findings:     Right ovary:  Visualized    Length (cm): 2.99    Height (cm): 1.82    Width (cm): 2.04  Other findings:     Free pelvic fluid: not identified      Free peritoneal fluid: not identified    Post-Procedure Details:     Impression:  Anteverted uterus and bilateral ovaries appear within normal limits. No free fluid. The right ovary demonstrates a less than 1cm follicle. Tolerance: Tolerated well, no immediate complications    Complications: no complications    Additional Procedure Comments:      [employer] F8 E8C-RS transvaginal transducer Serial # [id number] was used to perform the examination today and subsequently followed with high level disinfection utilizing Trophon EPR procedure. Ultrasound performed at:     [address], [address] [place] [address]  Phone:  [phone]  Fax:  [phone]

## 2023-10-26 RX ORDER — FLUTICASONE PROPIONATE 110 UG/1
2 AEROSOL, METERED RESPIRATORY (INHALATION) 2 TIMES DAILY
Qty: 36 G | Refills: 5 | Status: SHIPPED | OUTPATIENT
Start: 2023-10-26

## 2023-11-08 DIAGNOSIS — J30.2 SEASONAL ALLERGIES: ICD-10-CM

## 2023-11-08 RX ORDER — FLUTICASONE PROPIONATE 50 MCG
1 SPRAY, SUSPENSION (ML) NASAL AS NEEDED
Qty: 16 G | Refills: 1 | Status: SHIPPED | OUTPATIENT
Start: 2023-11-08 | End: 2023-11-08

## 2023-11-08 RX ORDER — FLUTICASONE PROPIONATE 50 MCG
SPRAY, SUSPENSION (ML) NASAL
Qty: 48 G | Refills: 0 | Status: SHIPPED | OUTPATIENT
Start: 2023-11-08

## 2023-11-10 NOTE — PROGRESS NOTES
AMB US Pelvic Non OB  Date/Time: 9/27/2018 8:04 AM  Performed by: Shantal Li  Authorized by: Manjula Schroeder     Procedure details:     Indications: non-obstetric abdominal pain      Technique:  Transvaginal US, Non-OB    Position: lithotomy exam    Uterine findings:     Diameter (mm):  30    Length (mm):  53    Width (mm):  49    Endometrial stripe: identified      Endometrium thickness (mm):  3 8  Left ovary findings:     Left ovary:  Visualized    Diameter (mm):  19 6    Length (mm):  28 8    Width (mm):  22 5  Right ovary findings:     Right ovary:  Visualized    Diameter (mm):  19 9    Length (mm):  38 4    Width (mm):  27 9  Other findings:     Free pelvic fluid: not identified      Free peritoneal fluid: not identified    Post-Procedure Details:     Impression:  Anteverted uterus and bilateral ovaries appear within normal limits  The right ovary contains two follicles 8 3JE and 4 5JF  No free fluid  Tolerance: Tolerated well, no immediate complications    Complications: no complications    Additional Procedure Comments:      Patient had a tubal ligation as her form of birth control  Siemens Acuson X150 [id number] transvaginal transducer Serial # [id number] was used to perform the examination today and subsequently followed with high level disinfection utilizing Trophon EPR procedure       Sonohysterogram  Date/Time: 9/27/2018 8:06 AM  Performed by: Heather Lilly by: Manjula Schroeder     Consent:     Consent obtained:  Verbal and written    Consent given by:  Patient    Patient questions answered: yes      Patient agrees, verbalizes understanding, and wants to proceed: yes    Pre-procedure:     Pre-procedure timeout performed: yes      Prepped with: Betadine    Procedure:     Cervix cleaned and prepped: yes      Catheter inserted: yes      Uterine cavity distended with saline: yes    Post-procedure:     No complications: no      Post procedure instructions given to patient: yes      Patient [facility]ed request [id number]    GLIPIZIDE 5 MG       She is also requesting a refill on she stated the pharmacy wont fill it until 11/21/23   [facility] she needs it before then  CLOPIDOGREL 75 MG tolerated procedure well with no complications: yes    Comments:      Sonohysterogram demonstrates a smooth appearing endometrium  Endometrial biopsy  Date/Time: 9/27/2018 8:06 AM  Performed by: Tammy Guevara by: Sherin Pak     Consent:     Consent obtained:  Verbal and written    Consent given by:  Patient    Patient questions answered: yes      Patient agrees, verbalizes understanding, and wants to proceed: yes    Indication:     Indications:  Other disorder of menstruation and other abnormal bleeding from female genital tract    Procedure:     A bivalve speculum was placed in the vagina: yes      Cervix cleaned and prepped: yes      Specimen collected: specimen collected and sent to pathology

## 2023-11-13 ENCOUNTER — CLINICAL SUPPORT (OUTPATIENT)
Dept: GYNECOLOGY | Facility: CLINIC | Age: 53
End: 2023-11-13

## 2023-11-13 ENCOUNTER — TELEPHONE (OUTPATIENT)
Dept: GYNECOLOGY | Facility: CLINIC | Age: 53
End: 2023-11-13

## 2023-11-13 VITALS — BODY MASS INDEX: 24.37 KG/M2 | WEIGHT: 151 LBS | SYSTOLIC BLOOD PRESSURE: 124 MMHG | DIASTOLIC BLOOD PRESSURE: 72 MMHG

## 2023-11-13 DIAGNOSIS — Z30.42 DEPO-PROVERA CONTRACEPTIVE STATUS: Primary | ICD-10-CM

## 2023-11-13 RX ORDER — MEDROXYPROGESTERONE ACETATE 150 MG/ML
150 INJECTION, SUSPENSION INTRAMUSCULAR ONCE
Status: COMPLETED | OUTPATIENT
Start: 2023-11-13 | End: 2023-11-13

## 2023-11-13 RX ADMIN — MEDROXYPROGESTERONE ACETATE 150 MG: 150 INJECTION, SUSPENSION INTRAMUSCULAR at 01:00

## 2023-11-13 NOTE — PROGRESS NOTES
Carmen Ramirez presents for a scheduled *depo provera injection. This was administered in the *right deltoid without any difficulty. Pt has the following complaints/concerns none . A notice was sent to Dr. Sonja Ron to sign off on order.   Pt will schedule next injection in 12 weeks    [address] - [id number]    LOT- [id number]    EXP- 08/2024

## 2023-11-19 PROBLEM — Z00.00 MEDICARE ANNUAL WELLNESS VISIT, SUBSEQUENT: Status: RESOLVED | Noted: 2020-05-28 | Resolved: 2023-11-19

## 2023-11-22 ENCOUNTER — TELEPHONE (OUTPATIENT)
Dept: GYNECOLOGY | Facility: CLINIC | Age: 53
End: 2023-11-22

## 2023-11-22 DIAGNOSIS — N76.0 BV (BACTERIAL VAGINOSIS): Primary | ICD-10-CM

## 2023-11-22 DIAGNOSIS — B96.89 BV (BACTERIAL VAGINOSIS): Primary | ICD-10-CM

## 2023-11-22 RX ORDER — TINIDAZOLE 250 MG/1
250 TABLET ORAL 2 TIMES DAILY
Qty: 14 TABLET | Refills: 0 | Status: SHIPPED | OUTPATIENT
Start: 2023-11-22 | End: 2023-11-29

## 2023-11-23 NOTE — TELEPHONE ENCOUNTER
I spoke with Carmen and read Dr Parminder Landaverde response to her.  She will call CS to schedule the echocardiogram 4

## 2023-11-30 NOTE — PROGRESS NOTES
AMB US Pelvic Non OB    Date/Time: 12/1/2023 10:00 AM    Performed by: Sujit Glaser  Authorized by: Linda Schrader DO  Universal Protocol:  Consent given by: patient  Patient understanding: patient states understanding of the procedure being performed  Patient identity confirmed: verbally with patient    Procedure details:     Technique:  Transvaginal US, Non-OB    Position: lithotomy exam    Uterine findings:     Length (cm): 4.41    Height (cm):  2.15    Width (cm):  4.07    Endometrial stripe: identified      Endometrium thickness (mm):  2.5  Left ovary findings:     Left ovary:  Visualized    Length (cm): 2.68    Height (cm): 1.58    Width (cm): 1.71  Right ovary findings:     Right ovary:  Visualized    Length (cm): 3.12    Height (cm): 1.77    Width (cm): 1.8  Other findings:     Free pelvic fluid: not identified      Free peritoneal fluid: not identified    Post-Procedure Details:     Impression:  Anteverted uterus and bilateral ovaries appear within normal limits. No free fluid. Tolerance: Tolerated well, no immediate complications    Complications: no complications    Additional Procedure Comments:      [employer] F8 E8C-RS transvaginal transducer Serial # [id number] was used to perform the examination today and subsequently followed with high level disinfection utilizing Trophon EPR procedure. Ultrasound performed at:     [address], [address] [place] [address]  Phone:  [phone]  Fax:  [phone]

## 2023-12-01 ENCOUNTER — TELEPHONE (OUTPATIENT)
Dept: GYNECOLOGY | Facility: CLINIC | Age: 53
End: 2023-12-01

## 2023-12-01 ENCOUNTER — ULTRASOUND (OUTPATIENT)
Dept: GYNECOLOGY | Facility: CLINIC | Age: 53
End: 2023-12-01
Payer: MEDICARE

## 2023-12-01 DIAGNOSIS — R10.2 PELVIC PAIN: Primary | ICD-10-CM

## 2023-12-01 PROCEDURE — 76830 TRANSVAGINAL US NON-OB: CPT | Performed by: OBSTETRICS & GYNECOLOGY

## 2023-12-01 NOTE — TELEPHONE ENCOUNTER
Patient would like a refill of the medication given to her for BV last time. She currently does not have infection but would like to have on hand as she frequently gets them. She said last time it took 4-5 days until the pharmacy got it in because it is something that is ordered. She called several pharmacies with the same result. This way she can start on it right away if she has recurrence.

## 2023-12-03 DIAGNOSIS — B96.89 BACTERIAL VAGINOSIS: ICD-10-CM

## 2023-12-03 DIAGNOSIS — N76.0 BACTERIAL VAGINOSIS: ICD-10-CM

## 2023-12-04 DIAGNOSIS — N76.0 BV (BACTERIAL VAGINOSIS): Primary | ICD-10-CM

## 2023-12-04 DIAGNOSIS — B96.89 BV (BACTERIAL VAGINOSIS): Primary | ICD-10-CM

## 2023-12-04 RX ORDER — CLINDAMYCIN HYDROCHLORIDE 300 MG/1
300 CAPSULE ORAL 2 TIMES DAILY
Qty: 14 CAPSULE | Refills: 0 | Status: SHIPPED | OUTPATIENT
Start: 2023-12-04 | End: 2023-12-11

## 2023-12-05 ENCOUNTER — TELEPHONE (OUTPATIENT)
Dept: GYNECOLOGY | Facility: CLINIC | Age: 53
End: 2023-12-05

## 2023-12-05 NOTE — TELEPHONE ENCOUNTER
[facility] on [address] faxed a form stating that Cleocin ovules are not covered by [employer]. The preferred alternative is metronidazole.

## 2023-12-12 DIAGNOSIS — N76.0 BV (BACTERIAL VAGINOSIS): Primary | ICD-10-CM

## 2023-12-12 DIAGNOSIS — B96.89 BV (BACTERIAL VAGINOSIS): Primary | ICD-10-CM

## 2023-12-12 RX ORDER — TINIDAZOLE 250 MG/1
250 TABLET ORAL
Qty: 5 TABLET | Refills: 0 | Status: SHIPPED | OUTPATIENT
Start: 2023-12-12 | End: 2023-12-17

## 2023-12-29 ENCOUNTER — TELEPHONE (OUTPATIENT)
Dept: GYNECOLOGY | Facility: CLINIC | Age: 53
End: 2023-12-29

## 2023-12-29 ENCOUNTER — OFFICE VISIT (OUTPATIENT)
Dept: GYNECOLOGY | Facility: CLINIC | Age: 53
End: 2023-12-29
Payer: MEDICARE

## 2023-12-29 VITALS — SYSTOLIC BLOOD PRESSURE: 112 MMHG | WEIGHT: 149 LBS | DIASTOLIC BLOOD PRESSURE: 70 MMHG | BODY MASS INDEX: 24.05 KG/M2

## 2023-12-29 DIAGNOSIS — N76.1 CHRONIC VAGINITIS: Primary | ICD-10-CM

## 2023-12-29 LAB
BV WHIFF TEST VAG QL: NEGATIVE
CLUE CELLS SPEC QL WET PREP: NEGATIVE
SL AMB POCT WET MOUNT: NORMAL
T VAGINALIS VAG QL WET PREP: NEGATIVE
YEAST VAG QL WET PREP: NEGATIVE

## 2023-12-29 PROCEDURE — 87210 SMEAR WET MOUNT SALINE/INK: CPT | Performed by: OBSTETRICS & GYNECOLOGY

## 2023-12-29 PROCEDURE — 99213 OFFICE O/P EST LOW 20 MIN: CPT | Performed by: OBSTETRICS & GYNECOLOGY

## 2023-12-29 RX ORDER — TINIDAZOLE 250 MG/1
250 TABLET ORAL
Qty: 5 TABLET | Refills: 1 | Status: SHIPPED | OUTPATIENT
Start: 2023-12-29 | End: 2024-01-03

## 2023-12-29 NOTE — TELEPHONE ENCOUNTER
Patient called, she had bladder instillation yesterday. They did office urine dip which she says was fine. She has burning and pelvic pressure and wants more oral tindamax. She was prescribed this 10/24, 11/22, and 12/12. She was informed we need to discuss with provider since she has had this medication so often recently. Please advise.

## 2023-12-29 NOTE — TELEPHONE ENCOUNTER
Patient called again requesting refill on oral Tindazole. Since Dr. Riedel is out of office today and has not yet checked his messages,Dr. Hollis advised patient call the gynecology group she saw this week for bladder instillation since they are the pelvic pain specialists. Patient states she never saw a doctor, only a nurse who performed the bladder instillation and she does not need to call them, she just needs the medication to treat BV because that is what it is. Dr. Hollis said she should come in for exam to check wet mount. Appointment given this afternoon.

## 2023-12-29 NOTE — PROGRESS NOTES
Assessment/Plan:     Vaginal irritation-she does not have obvious bacterial vaginosis.  She states that she often has it but her symptoms are not typical.  She feels that the Tindamax is the only thing that helps.  It usually provides relief for an extended period of time.  She used it at the beginning of the month.  I explained that often BV can be recurrent.    We cannot send her urine because it is blue from the [employer].  She had a normal urine dip in the office 2 days ago prior to her bladder instillation.      She would like to take a prescription for Tindamax with her and she will see what her symptoms do over the next few days.  She states that it often takes time to find it at the pharmacy.  I explained that the amount of Tindamax she has been taking is lower than the recommended treatment for BV.  I offered her 2 g/day x 2 days.  She is concerned that she would be too sensitive to the higher doses as sometimes medications bother her stomach.  This may be why she is having a recurrence.  If she does use it and has relief, I would then recommend she use boric acid suppositories nightly for the next 30 days.  She has used them in the past.  She is very careful with them and keeps them out of reach.  She has no small children in her house.  She is aware that they are fatal if swallowed and are for vaginal use only.    We discussed the vaginitis clinic at [facility] as this may be a good option for her.  She does go to [facility] for her headaches and would be willing to do this.  Information was given to her     Diagnoses and all orders for this visit:    Chronic vaginitis  -     tinidazole (TINDAMAX) 250 MG tablet; Take 1 tablet (250 mg total) by mouth daily with breakfast for 5 days  -     POCT wet mount          Subjective:     Patient ID: Carmen Decker is a 53 y.o. female.    Patient here for possible bacterial vaginosis.  She has a long history of recurrent vaginitis.  She recently has been using Tindamax to  treat this and it has worked well for her.  She states that she does not have typical symptoms of BV such as discharge and odor.  She has vaginal irritation and discomfort in the suprapubic area.    2 days ago, she had a bladder instillation done.  They dipped her urine and she did not have a UTI.  Today she is very uncomfortable.        Review of Systems   Constitutional: Negative.    Genitourinary:  Positive for vaginal discharge and vaginal pain. Negative for dysuria.         Objective:     Physical Exam  Genitourinary:     General: Normal vulva.      Vagina: Normal.      Cervix: Normal.      Uterus: Normal.       Adnexa: Right adnexa normal and left adnexa normal.      Comments: Small amount of white discharge

## 2024-01-17 DIAGNOSIS — J30.2 SEASONAL ALLERGIES: ICD-10-CM

## 2024-01-17 RX ORDER — FLUTICASONE PROPIONATE 50 MCG
1 SPRAY, SUSPENSION (ML) NASAL DAILY
Qty: 48 G | Refills: 0 | Status: SHIPPED | OUTPATIENT
Start: 2024-01-17

## 2024-01-17 RX ORDER — FLUTICASONE PROPIONATE 50 MCG
SPRAY, SUSPENSION (ML) NASAL
Qty: 48 G | Refills: 1 | Status: SHIPPED | OUTPATIENT
Start: 2024-01-17

## 2024-01-17 NOTE — TELEPHONE ENCOUNTER
Patient requesting a refill on her flonase nasal spray be sent to [facility] in [place].

## 2024-01-22 ENCOUNTER — CLINICAL SUPPORT (OUTPATIENT)
Dept: GYNECOLOGY | Facility: CLINIC | Age: 54
End: 2024-01-22
Payer: MEDICARE

## 2024-01-22 VITALS — BODY MASS INDEX: 24.11 KG/M2 | SYSTOLIC BLOOD PRESSURE: 130 MMHG | WEIGHT: 149.4 LBS | DIASTOLIC BLOOD PRESSURE: 72 MMHG

## 2024-01-22 DIAGNOSIS — G43.709 CHRONIC MIGRAINE WITHOUT AURA WITHOUT STATUS MIGRAINOSUS, NOT INTRACTABLE: Primary | ICD-10-CM

## 2024-01-22 DIAGNOSIS — N92.1 MENORRHAGIA WITH IRREGULAR CYCLE: ICD-10-CM

## 2024-01-22 PROCEDURE — 96372 THER/PROPH/DIAG INJ SC/IM: CPT

## 2024-01-22 RX ORDER — MEDROXYPROGESTERONE ACETATE 150 MG/ML
150 INJECTION, SUSPENSION INTRAMUSCULAR ONCE
Status: COMPLETED | OUTPATIENT
Start: 2024-01-22 | End: 2024-01-22

## 2024-01-22 RX ADMIN — MEDROXYPROGESTERONE ACETATE 150 MG: 150 INJECTION, SUSPENSION INTRAMUSCULAR at 08:40

## 2024-01-22 NOTE — PROGRESS NOTES
Carmen ASHLEY Decker presents for a scheduled medroxyprotesterone 150mg injection. This was administered in the R deltoid without any difficulty.  Pt has the following complaints/concerns none .    A notice was sent to Dr. Riedel to sign off on order.  Pt will schedule next injection in approx.10 weeks    NDC- 63427-671-53    LOT- [id number]    EXP- 11/30/2024

## 2024-02-05 DIAGNOSIS — J30.2 SEASONAL ALLERGIES: ICD-10-CM

## 2024-02-05 RX ORDER — FLUTICASONE PROPIONATE 50 MCG
2 SPRAY, SUSPENSION (ML) NASAL DAILY
Qty: 48 G | Refills: 1 | Status: SHIPPED | OUTPATIENT
Start: 2024-02-05 | End: 2024-02-05

## 2024-02-05 NOTE — TELEPHONE ENCOUNTER
Pt requesting dose change in Flonase to 2 sprays daily rather than 1 spray daily. She is using it this way so will run out sooner per script.

## 2024-02-05 NOTE — TELEPHONE ENCOUNTER
----- Message from Carmen Decker sent at 2/5/2024 12:01 PM EST -----  Regarding: [place]  Contact: [phone]  Dr. Naranjo, I been having some sinus issues past few months, not sure who called in the Flonase but it was called in as 1 spray once a day. I never took it as 1 spray and it’s doing nothing for me so I’m taking 2 sprays once a day. If I get real stuffy my allergist would tell me to take it as 2 puffs twice a day. The pharmacist asked if you could possibly call it in the correct way? I will be running out sooner this way. I still use [facility] on [address].    Thank you,  Carmen

## 2024-02-08 ENCOUNTER — NURSE TRIAGE (OUTPATIENT)
Age: 54
End: 2024-02-08

## 2024-02-08 ENCOUNTER — TELEPHONE (OUTPATIENT)
Age: 54
End: 2024-02-08

## 2024-02-08 DIAGNOSIS — J45.909 REACTIVE AIRWAY DISEASE WITHOUT COMPLICATION, UNSPECIFIED ASTHMA SEVERITY, UNSPECIFIED WHETHER PERSISTENT: ICD-10-CM

## 2024-02-08 DIAGNOSIS — J45.901 ASTHMA WITH ACUTE EXACERBATION, UNSPECIFIED ASTHMA SEVERITY, UNSPECIFIED WHETHER PERSISTENT: ICD-10-CM

## 2024-02-08 DIAGNOSIS — J45.21 MILD INTERMITTENT REACTIVE AIRWAY DISEASE WITH ACUTE EXACERBATION: Primary | ICD-10-CM

## 2024-02-08 RX ORDER — ALBUTEROL SULFATE 90 UG/1
2 AEROSOL, METERED RESPIRATORY (INHALATION) EVERY 6 HOURS PRN
Qty: 25.5 G | Refills: 3 | Status: SHIPPED | OUTPATIENT
Start: 2024-02-08

## 2024-02-08 RX ORDER — ALBUTEROL SULFATE 2.5 MG/3ML
2.5 SOLUTION RESPIRATORY (INHALATION) 3 TIMES DAILY
Qty: 180 ML | Refills: 5 | Status: SHIPPED | OUTPATIENT
Start: 2024-02-08

## 2024-02-08 RX ORDER — CIPROFLOXACIN 500 MG/1
500 TABLET, FILM COATED ORAL EVERY 12 HOURS SCHEDULED
Qty: 7 TABLET | Refills: 0 | Status: SHIPPED | OUTPATIENT
Start: 2024-02-08 | End: 2024-02-09

## 2024-02-08 RX ORDER — PREDNISONE 10 MG/1
TABLET ORAL
Qty: 30 TABLET | Refills: 0 | Status: SHIPPED | OUTPATIENT
Start: 2024-02-08

## 2024-02-08 NOTE — TELEPHONE ENCOUNTER
"Pt of Dr. Naranjo with hx of SOB, unclear etiology. LOV 9/11/2023.    Pt calling c/o moderate-severe SOB. She states today she is feeling very SOB and \"struggling to breath\". States she is SOB with exertion and at rest. Feels as though her heart is racing. Temp 100.5. Denies URI symptoms. She is requesting steroids. Due to pt's significant SOB with palpitations, recommended she be evaluated in the ER. Pt declines ER evaluation and states she is normally prescribed steroids for this. Will review with provider and update pt with further recommendations.   "

## 2024-02-08 NOTE — TELEPHONE ENCOUNTER
"Reason for Disposition   MODERATE difficulty breathing (e.g., speaks in phrases, SOB even at rest, pulse 100-120) of new-onset or worse than normal    Answer Assessment - Initial Assessment Questions  1. RESPIRATORY STATUS: \"Describe your breathing?\" (e.g., wheezing, shortness of breath, unable to speak, severe coughing)       SOB  2. ONSET: \"When did this breathing problem begin?\"       Last week  3. PATTERN \"Does the difficult breathing come and go, or has it been constant since it started?\"       Comes and goes  4. SEVERITY: \"How bad is your breathing?\" (e.g., mild, moderate, severe)     - MILD: No SOB at rest, mild SOB with walking, speaks normally in sentences, can lay down, no retractions, pulse < 100.     - MODERATE: SOB at rest, SOB with minimal exertion and prefers to sit, cannot lie down flat, speaks in phrases, mild retractions, audible wheezing, pulse 100-120.     - SEVERE: Very SOB at rest, speaks in single words, struggling to breathe, sitting hunched forward, retractions, pulse > 120       Moderate, palpitations- feels heart is racing   5. RECURRENT SYMPTOM: \"Have you had difficulty breathing before?\" If Yes, ask: \"When was the last time?\" and \"What happened that time?\"       Yes  6. CARDIAC HISTORY: \"Do you have any history of heart disease?\" (e.g., heart attack, angina, bypass surgery, angioplasty)       See chart  7. LUNG HISTORY: \"Do you have any history of lung disease?\"  (e.g., pulmonary embolus, asthma, emphysema)      None  8. CAUSE: \"What do you think is causing the breathing problem?\"       Unsure  9. OTHER SYMPTOMS: \"Do you have any other symptoms? (e.g., dizziness, runny nose, cough, chest pain, fever)      SOB, dry cough  10. OTHER       Temp 100.5       Flovent- rescue inhaler    Protocols used: Breathing Difficulty-ADULT-[place]      Pt of Dr. Naranjo with hx of SOB, unclear etiology. LOV 9/11/2023.    Pt calling c/o moderate-severe SOB. She states today she is feeling very SOB and \"struggling " "to breath\". States she is SOB with exertion and at rest. Feels as though her heart is racing. Temp 100.5. Denies URI symptoms. She is requesting steroids. Due to pt's significant SOB with palpitations, recommended she be evaluated in the ER. Pt declines ER evaluation and states she is normally prescribed steroids for this. Will review with provider and update pt with further recommendations.     "

## 2024-02-08 NOTE — TELEPHONE ENCOUNTER
Please let patient know that I have ordered her a prednisone taper and if she would like an antibiotic as well please ask her which when she would like it looks like she has several allergies to multiple antibiotics finally-can we please schedule her within the next 2 to 3 weeks to evaluate as she has not been seen since September

## 2024-02-08 NOTE — TELEPHONE ENCOUNTER
Regarding: SOB occasional chest pains  ----- Message from Grace Adame sent at 2/8/2024 12:04 PM EST -----  High priority    ----- Message from Grace Wendi sent at 2/8/2024 12:03 PM EST -----  Pt calls in and states she has a bad cough and she feels SOB, like someone is sitting on her chest. She also has occasional chest pains. She is thinking she needs a steroid.

## 2024-02-09 RX ORDER — CIPROFLOXACIN 500 MG/1
500 TABLET, FILM COATED ORAL EVERY 12 HOURS SCHEDULED
Qty: 14 TABLET | Refills: 0 | Status: SHIPPED | OUTPATIENT
Start: 2024-02-09 | End: 2024-02-16

## 2024-02-09 NOTE — TELEPHONE ENCOUNTER
Pt calls in and states Her insurance denied Cipro because instructions state take one tablet every 12 hours but the quantity was 7 tablets. Also she states her Flonase is written as 1 spray into each nostril daily but in the system it shows 2 sprays in each nostril. She is wondering if we can change instructions for this.Please call pt with updates

## 2024-02-15 ENCOUNTER — TELEPHONE (OUTPATIENT)
Age: 54
End: 2024-02-15

## 2024-02-15 ENCOUNTER — TELEPHONE (OUTPATIENT)
Dept: PULMONOLOGY | Facility: CLINIC | Age: 54
End: 2024-02-15

## 2024-02-15 DIAGNOSIS — R09.81 NASAL SINUS CONGESTION: Primary | ICD-10-CM

## 2024-02-15 NOTE — TELEPHONE ENCOUNTER
Received [employer] Fax from [facility] requesting Prior Authorization for Ciproflaxin  MG tablets prescription by the pharmacy.    Key: [id number]

## 2024-02-15 NOTE — TELEPHONE ENCOUNTER
Pt called in requesting Dr. Bautista Saucedo to give her with ENT Referral as she is having ongoing breathing problem and sinus. Once referral is ready pt requested c/b to update her on the same. Thanks

## 2024-02-16 NOTE — TELEPHONE ENCOUNTER
SPOKE TO PHARMACIST AT [facility]. PT USED DISCOUNT CARD TO  MEDICATION ON 02/09/2024    NO PA INITIATED

## 2024-02-21 PROBLEM — J35.01 CHRONIC TONSILLITIS: Status: RESOLVED | Noted: 2019-05-15 | Resolved: 2024-02-21

## 2024-02-22 ENCOUNTER — OFFICE VISIT (OUTPATIENT)
Dept: PULMONOLOGY | Facility: CLINIC | Age: 54
End: 2024-02-22
Payer: MEDICARE

## 2024-02-22 VITALS
HEART RATE: 97 BPM | SYSTOLIC BLOOD PRESSURE: 120 MMHG | OXYGEN SATURATION: 97 % | TEMPERATURE: 99.7 F | DIASTOLIC BLOOD PRESSURE: 58 MMHG

## 2024-02-22 DIAGNOSIS — J31.0 RHINITIS, NONALLERGIC, CHRONIC: ICD-10-CM

## 2024-02-22 DIAGNOSIS — R06.09 EXERTIONAL DYSPNEA: Primary | ICD-10-CM

## 2024-02-22 DIAGNOSIS — J30.2 SEASONAL ALLERGIES: ICD-10-CM

## 2024-02-22 PROCEDURE — 99214 OFFICE O/P EST MOD 30 MIN: CPT | Performed by: INTERNAL MEDICINE

## 2024-02-22 RX ORDER — PROPRANOLOL HYDROCHLORIDE 10 MG/1
10 TABLET ORAL DAILY
COMMUNITY
Start: 2023-12-03

## 2024-02-22 RX ORDER — MOMETASONE FUROATE 1 MG/ML
SOLUTION TOPICAL
COMMUNITY
Start: 2023-12-13

## 2024-02-22 RX ORDER — NALTREXONE 100 %
POWDER (GRAM) MISCELLANEOUS
COMMUNITY
Start: 2024-02-08

## 2024-02-22 RX ORDER — METHYLPREDNISOLONE 4 MG/1
TABLET ORAL
COMMUNITY
Start: 2023-12-22

## 2024-02-22 RX ORDER — TIZANIDINE 2 MG/1
2 TABLET ORAL EVERY 8 HOURS PRN
COMMUNITY
Start: 2024-02-08 | End: 2024-03-12

## 2024-02-22 NOTE — PROGRESS NOTES
Pulmonary Follow Up Note  Carmen Decker 53 y.o. female MRN: [id number]  2/22/2024      HPI:    Patient continues to have intermittent bouts of chest tightness that does not resolve.  She states that she has been evaluated by multiple cardiologist without finding any specific cause.  She was recently prescribed ciprofloxacin that she states improved her upper airway sinuses but did not alleviate chest tightness.  No fevers or chills.      Exercise Tolerance: Poor.  She states that she feels significantly short of breath when walking around a grocery store.       Meds:  No inhalers    ROS:  Constitutional: - Fatigue, - chills, - fever, - weight change.   HEENT: - rhinorrhea, - sneezing, - sore throat.    Respiratory: - cough, + shortness of breath, - wheezing.    Cardiovascular: - chest pain,  -palpitations, - leg swelling.   Gastrointestinal: - abdominal pain, - constipation, - diarrhea, - nausea, - vomiting.   Endocrine: - cold intolerance, - heat intolerance.   Genitourinary: - dysuria.   Musculoskeletal: - arthralgias.   Skin:- rash, - wound.   Allergic/Immunologic: - allergies  Neurological: - dizziness, - numbness      Vitals: Blood pressure 120/58, pulse 97, temperature 99.7 °F (37.6 °C), temperature source Tympanic, SpO2 97%, not currently breastfeeding., There is no height or weight on file to calculate BMI.    Physical Exam:  GEN  NAD  HEENT  ncat, non icteric, MM moist  NECK  supple, no JVD, no LAD  CV  +s1s2, no mrg, RRR  PULM  CTA BL, no wrr  ABD  soft, ntnd, + BS  EXT  no edema, no cyanosis, no clubbing  NEURO  Aox3, no focal weakness    Imaging and other studies:   CT chest 9/27/2023 shows no gross intraparenchymal or pleural abnormalities    Pulmonary function testing:   Methacholine challenge 8/24/2022 is negative for bronchial reactivity    Assessment:  Intermittent shortness of breath/chest tightness  Intermittent palpitations    Plan:  FeNO in office today is 16.  This is interpreted as  "low  Reemphasized that because of symptoms are highly unlikely to be pulmonary in origin given negative methacholine challenge and normal-appearing parenchyma and pleura on CT chest.  Check exercise stress test for evaluation of of exercise limitation.  Patient states that she is followed with cardiology and describes no pathologic condition.    Return visit in 3 months  On next visit we will evaluate results of exercise stress test.    Note: Portions of the record may have been created with voice recognition software. Occasional wrong word or \"sound a like\" substitutions may have occurred due to the inherent limitations of voice recognition software. Read the chart carefully and recognize, using context, where substitutions have occurred.     Daryl Naranjo M.D.  [facility]  "

## 2024-03-14 ENCOUNTER — TELEPHONE (OUTPATIENT)
Age: 54
End: 2024-03-14

## 2024-03-14 DIAGNOSIS — Z12.31 ENCOUNTER FOR SCREENING MAMMOGRAM FOR BREAST CANCER: Primary | ICD-10-CM

## 2024-04-01 ENCOUNTER — OFFICE VISIT (OUTPATIENT)
Dept: GYNECOLOGY | Facility: CLINIC | Age: 54
End: 2024-04-01
Payer: MEDICARE

## 2024-04-01 ENCOUNTER — TELEPHONE (OUTPATIENT)
Dept: GYNECOLOGY | Facility: CLINIC | Age: 54
End: 2024-04-01

## 2024-04-01 VITALS — BODY MASS INDEX: 23.79 KG/M2 | DIASTOLIC BLOOD PRESSURE: 76 MMHG | WEIGHT: 147.4 LBS | SYSTOLIC BLOOD PRESSURE: 134 MMHG

## 2024-04-01 DIAGNOSIS — Z30.42 ENCOUNTER FOR SURVEILLANCE OF INJECTABLE CONTRACEPTIVE: Primary | ICD-10-CM

## 2024-04-01 PROCEDURE — 96372 THER/PROPH/DIAG INJ SC/IM: CPT | Performed by: OBSTETRICS & GYNECOLOGY

## 2024-04-01 NOTE — PROGRESS NOTES
Carmen RAMIREZ Decker arrived on 4/1/24 at 9:00 am in the [place] office.  Please sign off on her order.  The ordering provider is Dr. Hollis who is in the office today.  Thank you.       NDC: 81175-873-42    EXP: 01/31/2026    LOT: [id number]

## 2024-04-01 NOTE — TELEPHONE ENCOUNTER
Patient would like to know if she needs to go back to [facility] for her mammogram.  She does have one scheduled at [place], but is concerned because of dense breast tissue.

## 2024-04-02 ENCOUNTER — TELEPHONE (OUTPATIENT)
Dept: GYNECOLOGY | Facility: CLINIC | Age: 54
End: 2024-04-02

## 2024-04-02 RX ORDER — MEDROXYPROGESTERONE ACETATE 150 MG/ML
150 INJECTION, SUSPENSION INTRAMUSCULAR ONCE
Status: COMPLETED | OUTPATIENT
Start: 2024-04-02 | End: 2024-04-04

## 2024-04-02 NOTE — TELEPHONE ENCOUNTER
LM for patient to call office and make aware, that she should keep her routine mammogram at [place].  Also, she may be a candidate for an ABUS due to dense breast.

## 2024-04-04 RX ADMIN — MEDROXYPROGESTERONE ACETATE 150 MG: 150 INJECTION, SUSPENSION INTRAMUSCULAR at 09:04

## 2024-04-09 ENCOUNTER — HOSPITAL ENCOUNTER (OUTPATIENT)
Dept: RADIOLOGY | Facility: IMAGING CENTER | Age: 54
Discharge: HOME | End: 2024-04-09
Payer: MEDICARE

## 2024-04-09 VITALS — HEIGHT: 66 IN | WEIGHT: 147 LBS | BODY MASS INDEX: 23.63 KG/M2

## 2024-04-09 DIAGNOSIS — Z12.31 ENCOUNTER FOR SCREENING MAMMOGRAM FOR BREAST CANCER: ICD-10-CM

## 2024-04-09 PROCEDURE — 77063 BREAST TOMOSYNTHESIS BI: CPT

## 2024-04-09 PROCEDURE — 77067 SCR MAMMO BI INCL CAD: CPT

## 2024-04-10 DIAGNOSIS — R92.30 DENSE BREAST TISSUE ON MAMMOGRAM, UNSPECIFIED TYPE: Primary | ICD-10-CM

## 2024-04-11 ENCOUNTER — TELEPHONE (OUTPATIENT)
Age: 54
End: 2024-04-11

## 2024-04-11 NOTE — TELEPHONE ENCOUNTER
----- Message from Carmen Decker sent at 4/10/2024  9:03 PM EDT -----  Regarding: Mammo  Contact: [phone]  Hi Dr Hollis. Yes, I would like to do the ultrasound. I was told last year when I called it’s always covered. Who do I call to schedule it? I also forget the name of the place I went to. I know it [address]    Thanks,  Carmen

## 2024-04-11 NOTE — TELEPHONE ENCOUNTER
Left detailed message explaining ABUS process.  Advised call insurance to check coverage.  Gave number of Central Scheduling.

## 2024-04-16 ENCOUNTER — HOSPITAL ENCOUNTER (OUTPATIENT)
Dept: PULMONOLOGY | Facility: HOSPITAL | Age: 54
Discharge: HOME | End: 2024-04-16
Attending: INTERNAL MEDICINE
Payer: MEDICARE

## 2024-04-16 DIAGNOSIS — R06.09 EXERTIONAL DYSPNEA: ICD-10-CM

## 2024-04-16 LAB
ARTERIAL PATENCY WRIST A: ABNORMAL
BASE EXCESS BLDA CALC-SCNC: -3 MMOL/L (ref -2–3)
CA-I BLD-SCNC: 1.22 MMOL/L (ref 1.12–1.32)
FIO2 GAS DIL.REBREATH: 21 L
GLUCOSE SERPL-MCNC: 103 MG/DL (ref 65–140)
HCO3 BLDA-SCNC: 20.4 MMOL/L (ref 22–28)
HCT VFR BLD CALC: 37 % (ref 34.8–46.1)
HGB BLDA-MCNC: 12.6 G/DL (ref 11.5–15.4)
PCO2 BLD: 21 MMOL/L (ref 21–32)
PCO2 BLD: 31.5 MM HG (ref 36–44)
PH BLD: 7.42 [PH] (ref 7.35–7.45)
PO2 BLD: 104 MM HG (ref 75–129)
POTASSIUM BLD-SCNC: 3.7 MMOL/L (ref 3.5–5.3)
SAMPLE SITE: ABNORMAL
SAO2 % BLD FROM PO2: 98 % (ref 60–85)
SODIUM BLD-SCNC: 138 MMOL/L (ref 136–145)
SPECIMEN SOURCE: ABNORMAL

## 2024-04-16 PROCEDURE — 84132 ASSAY OF SERUM POTASSIUM: CPT

## 2024-04-16 PROCEDURE — 84295 ASSAY OF SERUM SODIUM: CPT

## 2024-04-16 PROCEDURE — 85014 HEMATOCRIT: CPT

## 2024-04-16 PROCEDURE — 82803 BLOOD GASES ANY COMBINATION: CPT

## 2024-04-16 PROCEDURE — 94621 CARDIOPULM EXERCISE TESTING: CPT

## 2024-04-16 PROCEDURE — 36600 WITHDRAWAL OF ARTERIAL BLOOD: CPT

## 2024-04-16 PROCEDURE — 94621 CARDIOPULM EXERCISE TESTING: CPT | Performed by: INTERNAL MEDICINE

## 2024-04-16 PROCEDURE — 82947 ASSAY GLUCOSE BLOOD QUANT: CPT

## 2024-04-16 PROCEDURE — 82330 ASSAY OF CALCIUM: CPT

## 2024-05-01 ENCOUNTER — HOSPITAL ENCOUNTER (OUTPATIENT)
Dept: ULTRASOUND IMAGING | Facility: CLINIC | Age: 54
Discharge: HOME | End: 2024-05-01
Payer: MEDICARE

## 2024-05-01 VITALS — HEIGHT: 66 IN | WEIGHT: 147 LBS | BODY MASS INDEX: 23.63 KG/M2

## 2024-05-01 DIAGNOSIS — R92.30 DENSE BREAST TISSUE ON MAMMOGRAM, UNSPECIFIED TYPE: ICD-10-CM

## 2024-05-01 PROCEDURE — 76641 ULTRASOUND BREAST COMPLETE: CPT

## 2024-06-03 ENCOUNTER — NURSE TRIAGE (OUTPATIENT)
Age: 54
End: 2024-06-03

## 2024-06-03 NOTE — TELEPHONE ENCOUNTER
"Pt called in stating that she feels as if her breasts are tender, and that her \"nipples are constantly hard\". Patient reporting this started about 4 days ago. Pt denies fever, redness or rash, nipple discharge    Reason for Disposition   Breast pain and cause is not known    Answer Assessment - Initial Assessment Questions  1. SYMPTOM: \"What's the main symptom you're concerned about?\"  (e.g., lump, pain, rash, nipple discharge)      Pt stating that she's having breast tenderness and that her nipples are \"constantly hard\"  3. ONSET: \"When did tenderness  start?\"      4 days ago   4. PRIOR HISTORY: \"Do you have any history of prior problems with your breasts?\" (e.g., lumps, cancer, fibrocystic breast disease)      Pt denies   5. CAUSE: \"What do you think is causing this symptom?\"      Pt unsure   6. OTHER SYMPTOMS: \"Do you have any other symptoms?\" (e.g., fever, breast pain, redness or rash, nipple discharge)      Pt denies fever, redness or rash, nipple discharge  7. PREGNANCY-BREASTFEEDING: \"Is there any chance you are pregnant?\" \"When was your last menstrual period?\" \"Are you breastfeeding?\"      Pt currently on the depo shot , pt denies    Protocols used: Breast Symptoms-ADULT-[place]    "

## 2024-06-03 NOTE — TELEPHONE ENCOUNTER
Regarding: breast tenderness  ----- Message from Maite OWEN sent at 6/3/2024  2:06 PM EDT -----  Patient called in regarding breast tenderness. She experienced this previously she stated she is in a lot of pain to touch or even to shower hurts. Patient not sure what this could be related too would like a call back.

## 2024-06-05 ENCOUNTER — NURSE TRIAGE (OUTPATIENT)
Age: 54
End: 2024-06-05

## 2024-06-05 NOTE — TELEPHONE ENCOUNTER
"Patient called c/o sticky, white vaginal discharge and fishy odor since yesterday. Denies itching, fevers. Patient wants appointment for evaluation. Scheduled for 6/6/24 in office.  Reason for Disposition   All other vaginal symptoms (Exception: feels like prior yeast infection, minor abrasion, mild rash < 24 hour duration, mild itching)    Answer Assessment - Initial Assessment Questions  1. SYMPTOM: \"What's the main symptom you're concerned about?\" (e.g., pain, itching, dryness)      Sticky white d/c, fishy odor    3. ONSET: \"When did the  symptoms  start?\"      yesterday  4. PAIN: \"Is there any pain?\" If Yes, ask: \"How bad is it?\" (Scale: 1-10; mild, moderate, severe)      denies  5. ITCHING: \"Is there any itching?\" If Yes, ask: \"How bad is it?\" (Scale: 1-10; mild, moderate, severe)      denies  6. CAUSE: \"What do you think is causing the discharge?\" \"Have you had the same problem before? What happened then?\"      unknown  7. OTHER SYMPTOMS: \"Do you have any other symptoms?\" (e.g., fever, itching, vaginal bleeding, pain with urination, injury to genital area, vaginal foreign body)      denies  8. PREGNANCY: \"Is there any chance you are pregnant?\" \"When was your last menstrual period?\"      On Depo    Protocols used: Vaginal Symptoms-ADULT-[place]    "

## 2024-06-06 ENCOUNTER — OFFICE VISIT (OUTPATIENT)
Dept: GYNECOLOGY | Facility: CLINIC | Age: 54
End: 2024-06-06
Payer: MEDICARE

## 2024-06-06 VITALS — SYSTOLIC BLOOD PRESSURE: 126 MMHG | DIASTOLIC BLOOD PRESSURE: 74 MMHG | WEIGHT: 148.6 LBS | BODY MASS INDEX: 23.98 KG/M2

## 2024-06-06 DIAGNOSIS — N64.4 BREAST TENDERNESS: ICD-10-CM

## 2024-06-06 DIAGNOSIS — N89.8 VAGINAL DISCHARGE: Primary | ICD-10-CM

## 2024-06-06 LAB
BV WHIFF TEST VAG QL: NEGATIVE
CLUE CELLS SPEC QL WET PREP: NEGATIVE
PH SMN: 4 [PH]
SL AMB POCT WET MOUNT: NORMAL
T VAGINALIS VAG QL WET PREP: NEGATIVE
YEAST VAG QL WET PREP: NEGATIVE

## 2024-06-06 PROCEDURE — 99213 OFFICE O/P EST LOW 20 MIN: CPT | Performed by: OBSTETRICS & GYNECOLOGY

## 2024-06-06 PROCEDURE — 87210 SMEAR WET MOUNT SALINE/INK: CPT | Performed by: OBSTETRICS & GYNECOLOGY

## 2024-06-06 NOTE — PROGRESS NOTES
Assessment/Plan:     Breast tenderness-her exam is normal.  This may be related to perimenopause.  It is bilateral.  She has no nipple discharge she is on Depo-Provera however with a normal exam, no change in medications and a normal recent mammogram and ABUS, I feel this is more physiologic.  We discussed evening primrose oil    Vaginal discharge-she was reassured that this is normal.  The vaginitis specialist felt she should use the vaginal estrogen.  I recommended that she apply it with her fingertip as when she uses the applicator, she needs a panty liner and these are very irritating to her sensitive skin.     There are no diagnoses linked to this encounter.      Subjective:     Patient ID: Carmen Decker is a 53 y.o. female.    Patient here for evaluation of breast tenderness and vaginitis.  This has happened 4 times, she has tenderness mostly around her nipples and they feel very hard and it lasts up to a week.  They are very tender.  It is bilateral.  She has no nipple discharge, no new medications or change in activity.    She had some vaginal discharge a few days ago and thought she had odor, she has had no itching or irritation.  Seems better but she would like it evaluated.  She did see the vaginitis specialist in [place] a few months ago and she was told that she did not have BV but she had signs of vaginal atrophy and she was prescribed vaginal estrogen.  She is having hard time using this because when she uses a panty liner, she becomes very irritated.    Normal 3D mammogram in April with dense tissue and average risk.  Normal ABUS last month        Review of Systems   Constitutional: Negative.    Gastrointestinal: Negative.    Genitourinary:  Positive for vaginal discharge. Negative for vaginal bleeding.        Breast and nipple tenderness         Objective:     Physical Exam  Exam conducted with a chaperone present.   Chest:   Breasts:     Right: Normal. No swelling, bleeding, inverted nipple,  mass, nipple discharge, skin change or tenderness.      Left: Normal. No swelling, bleeding, inverted nipple, mass, nipple discharge, skin change or tenderness.      Comments: She was examined seated and supine, no skin changes, nipple discharge or abnormality noted  Genitourinary:     Vagina: Normal.      Cervix: Normal.      Uterus: Normal.       Adnexa: Right adnexa normal and left adnexa normal.

## 2024-06-10 ENCOUNTER — CLINICAL SUPPORT (OUTPATIENT)
Dept: GYNECOLOGY | Facility: CLINIC | Age: 54
End: 2024-06-10
Payer: MEDICARE

## 2024-06-10 VITALS — SYSTOLIC BLOOD PRESSURE: 120 MMHG | BODY MASS INDEX: 23.76 KG/M2 | WEIGHT: 147.2 LBS | DIASTOLIC BLOOD PRESSURE: 80 MMHG

## 2024-06-10 DIAGNOSIS — G43.709 CHRONIC MIGRAINE WITHOUT AURA WITHOUT STATUS MIGRAINOSUS, NOT INTRACTABLE: Primary | ICD-10-CM

## 2024-06-10 DIAGNOSIS — N92.1 MENORRHAGIA WITH IRREGULAR CYCLE: ICD-10-CM

## 2024-06-10 PROCEDURE — 96372 THER/PROPH/DIAG INJ SC/IM: CPT

## 2024-06-10 RX ADMIN — MEDROXYPROGESTERONE ACETATE 150 MG: 150 INJECTION, SUSPENSION INTRAMUSCULAR at 09:00

## 2024-06-10 NOTE — PROGRESS NOTES
Carmen ASHLEY Decker presents for a scheduled medroxyprogesterone 150mg injection. This was administered in the R deltoid without any difficulty.  Pt has the following complaints/concerns none .    A notice was sent to Dr. Riedel to sign off on order.  Pt will schedule next injection in approx 10 weeks.    [id number]    LOT- [id number]    EXP- 07/31/2026

## 2024-06-11 RX ORDER — MEDROXYPROGESTERONE ACETATE 150 MG/ML
150 INJECTION, SUSPENSION INTRAMUSCULAR ONCE
Status: COMPLETED | OUTPATIENT
Start: 2024-06-11 | End: 2024-06-10

## 2024-08-03 ENCOUNTER — APPOINTMENT (OUTPATIENT)
Dept: LAB | Facility: IMAGING CENTER | Age: 54
End: 2024-08-03
Payer: MEDICARE

## 2024-08-03 DIAGNOSIS — R19.7 DIARRHEA OF PRESUMED INFECTIOUS ORIGIN: ICD-10-CM

## 2024-08-03 PROCEDURE — 87505 NFCT AGENT DETECTION GI: CPT

## 2024-08-04 LAB
C COLI+JEJUNI TUF STL QL NAA+PROBE: NEGATIVE
EC STX1+STX2 GENES STL QL NAA+PROBE: NEGATIVE
SALMONELLA SP SPAO STL QL NAA+PROBE: NEGATIVE
SHIGELLA SP+EIEC IPAH STL QL NAA+PROBE: NEGATIVE

## 2024-08-20 ENCOUNTER — APPOINTMENT (OUTPATIENT)
Dept: LAB | Facility: IMAGING CENTER | Age: 54
End: 2024-08-20
Payer: MEDICARE

## 2024-08-20 DIAGNOSIS — E78.49 OTHER HYPERLIPIDEMIA: ICD-10-CM

## 2024-08-20 LAB
ALBUMIN SERPL BCG-MCNC: 4.3 G/DL (ref 3.5–5)
ALP SERPL-CCNC: 79 U/L (ref 34–104)
ALT SERPL W P-5'-P-CCNC: 13 U/L (ref 7–52)
ANION GAP SERPL CALCULATED.3IONS-SCNC: 9 MMOL/L (ref 4–13)
AST SERPL W P-5'-P-CCNC: 15 U/L (ref 13–39)
BASOPHILS # BLD AUTO: 0.02 THOUSANDS/ÂΜL (ref 0–0.1)
BASOPHILS NFR BLD AUTO: 0 % (ref 0–1)
BILIRUB SERPL-MCNC: 0.52 MG/DL (ref 0.2–1)
BUN SERPL-MCNC: 8 MG/DL (ref 5–25)
CALCIUM SERPL-MCNC: 9.4 MG/DL (ref 8.4–10.2)
CHLORIDE SERPL-SCNC: 106 MMOL/L (ref 96–108)
CHOLEST SERPL-MCNC: 173 MG/DL
CO2 SERPL-SCNC: 26 MMOL/L (ref 21–32)
CREAT SERPL-MCNC: 0.77 MG/DL (ref 0.6–1.3)
EOSINOPHIL # BLD AUTO: 0.08 THOUSAND/ÂΜL (ref 0–0.61)
EOSINOPHIL NFR BLD AUTO: 1 % (ref 0–6)
ERYTHROCYTE [DISTWIDTH] IN BLOOD BY AUTOMATED COUNT: 13.7 % (ref 11.6–15.1)
GFR SERPL CREATININE-BSD FRML MDRD: 89 ML/MIN/1.73SQ M
GLUCOSE P FAST SERPL-MCNC: 87 MG/DL (ref 65–99)
HCT VFR BLD AUTO: 43.8 % (ref 34.8–46.1)
HDLC SERPL-MCNC: 37 MG/DL
HGB BLD-MCNC: 14.2 G/DL (ref 11.5–15.4)
IMM GRANULOCYTES # BLD AUTO: 0.03 THOUSAND/UL (ref 0–0.2)
IMM GRANULOCYTES NFR BLD AUTO: 0 % (ref 0–2)
LDLC SERPL CALC-MCNC: 109 MG/DL (ref 0–100)
LYMPHOCYTES # BLD AUTO: 1.56 THOUSANDS/ÂΜL (ref 0.6–4.47)
LYMPHOCYTES NFR BLD AUTO: 20 % (ref 14–44)
MCH RBC QN AUTO: 28.9 PG (ref 26.8–34.3)
MCHC RBC AUTO-ENTMCNC: 32.4 G/DL (ref 31.4–37.4)
MCV RBC AUTO: 89 FL (ref 82–98)
MONOCYTES # BLD AUTO: 0.51 THOUSAND/ÂΜL (ref 0.17–1.22)
MONOCYTES NFR BLD AUTO: 7 % (ref 4–12)
NEUTROPHILS # BLD AUTO: 5.45 THOUSANDS/ÂΜL (ref 1.85–7.62)
NEUTS SEG NFR BLD AUTO: 72 % (ref 43–75)
NRBC BLD AUTO-RTO: 0 /100 WBCS
PLATELET # BLD AUTO: 295 THOUSANDS/UL (ref 149–390)
PMV BLD AUTO: 10.2 FL (ref 8.9–12.7)
POTASSIUM SERPL-SCNC: 4.3 MMOL/L (ref 3.5–5.3)
PROT SERPL-MCNC: 7.1 G/DL (ref 6.4–8.4)
RBC # BLD AUTO: 4.92 MILLION/UL (ref 3.81–5.12)
SODIUM SERPL-SCNC: 141 MMOL/L (ref 135–147)
TRIGL SERPL-MCNC: 135 MG/DL
TSH SERPL DL<=0.05 MIU/L-ACNC: 0.79 UIU/ML (ref 0.45–4.5)
WBC # BLD AUTO: 7.65 THOUSAND/UL (ref 4.31–10.16)

## 2024-08-20 PROCEDURE — 84443 ASSAY THYROID STIM HORMONE: CPT

## 2024-08-20 PROCEDURE — 80053 COMPREHEN METABOLIC PANEL: CPT

## 2024-08-20 PROCEDURE — 80061 LIPID PANEL: CPT

## 2024-08-20 PROCEDURE — 85025 COMPLETE CBC W/AUTO DIFF WBC: CPT

## 2024-08-20 PROCEDURE — 36415 COLL VENOUS BLD VENIPUNCTURE: CPT

## 2024-08-22 ENCOUNTER — CLINICAL SUPPORT (OUTPATIENT)
Dept: GYNECOLOGY | Facility: CLINIC | Age: 54
End: 2024-08-22
Payer: MEDICARE

## 2024-08-22 VITALS — WEIGHT: 145 LBS | DIASTOLIC BLOOD PRESSURE: 70 MMHG | SYSTOLIC BLOOD PRESSURE: 126 MMHG | BODY MASS INDEX: 23.4 KG/M2

## 2024-08-22 DIAGNOSIS — N92.1 MENORRHAGIA WITH IRREGULAR CYCLE: Primary | ICD-10-CM

## 2024-08-22 PROCEDURE — 96372 THER/PROPH/DIAG INJ SC/IM: CPT | Performed by: OBSTETRICS & GYNECOLOGY

## 2024-08-22 RX ORDER — MEDROXYPROGESTERONE ACETATE 150 MG/ML
150 INJECTION, SUSPENSION INTRAMUSCULAR ONCE
Status: COMPLETED | OUTPATIENT
Start: 2024-08-22 | End: 2024-08-22

## 2024-08-22 RX ADMIN — MEDROXYPROGESTERONE ACETATE 150 MG: 150 INJECTION, SUSPENSION INTRAMUSCULAR at 15:33

## 2024-08-22 NOTE — PROGRESS NOTES
Carmen ASHLEY Decker presents for a scheduled Depo injection. This was administered in the Left deltoid without any difficulty.  Pt has the following complaints/concerns none .    A notice was sent to Dr. Hollis to sign off on order.  Pt scheduled next injection 10/31/24.    NDC- 72464-369-85    LOT- [id number]    EXP- 1/31/26

## 2024-09-11 ENCOUNTER — OFFICE VISIT (OUTPATIENT)
Dept: FAMILY MEDICINE CLINIC | Facility: CLINIC | Age: 54
End: 2024-09-11
Payer: MEDICARE

## 2024-09-11 ENCOUNTER — TELEPHONE (OUTPATIENT)
Age: 54
End: 2024-09-11

## 2024-09-11 VITALS
WEIGHT: 143.3 LBS | DIASTOLIC BLOOD PRESSURE: 80 MMHG | OXYGEN SATURATION: 98 % | HEIGHT: 66 IN | BODY MASS INDEX: 23.03 KG/M2 | SYSTOLIC BLOOD PRESSURE: 132 MMHG | HEART RATE: 96 BPM | TEMPERATURE: 100.4 F | RESPIRATION RATE: 16 BRPM

## 2024-09-11 DIAGNOSIS — L60.0 INGROWN TOENAIL: Primary | ICD-10-CM

## 2024-09-11 DIAGNOSIS — G43.709 CHRONIC MIGRAINE WITHOUT AURA WITHOUT STATUS MIGRAINOSUS, NOT INTRACTABLE: ICD-10-CM

## 2024-09-11 DIAGNOSIS — K13.79 MOUTH SORES: ICD-10-CM

## 2024-09-11 DIAGNOSIS — F31.81 BIPOLAR II DISORDER (HCC): ICD-10-CM

## 2024-09-11 DIAGNOSIS — E78.49 OTHER HYPERLIPIDEMIA: ICD-10-CM

## 2024-09-11 DIAGNOSIS — C73 MALIGNANT NEOPLASM OF THYROID GLAND (HCC): ICD-10-CM

## 2024-09-11 PROCEDURE — 99214 OFFICE O/P EST MOD 30 MIN: CPT | Performed by: NURSE PRACTITIONER

## 2024-09-11 RX ORDER — DIPHENHYDRAMINE HYDROCHLORIDE AND LIDOCAINE HYDROCHLORIDE AND ALUMINUM HYDROXIDE AND MAGNESIUM HYDRO
10 KIT EVERY 4 HOURS PRN
Qty: 119 ML | Refills: 0 | Status: SHIPPED | OUTPATIENT
Start: 2024-09-11

## 2024-09-11 NOTE — ASSESSMENT & PLAN NOTE
Component      Latest Ref Rng 5/19/2023 8/20/2024   Cholesterol      See Comment mg/dL 159  173    Triglycerides      See Comment mg/dL 122  135    HDL      >=50 mg/dL 39 (L)  37 (L)    LDL Calculated      0 - 100 mg/dL 96  109 (H)       - LDL slightly elevated.   - Encourage healthy diet and regular exercise.  - Will continue to monitor fasting lipid panel.

## 2024-09-11 NOTE — ASSESSMENT & PLAN NOTE
- Prescription sent for magic mouthwash.   Orders:    diphenhydramine, lidocaine, Al/Mg hydroxide, simethicone (Magic Mouthwash) SUSP; Swish and spit 10 mL every 4 (four) hours as needed for mouth pain or discomfort

## 2024-09-11 NOTE — PROGRESS NOTES
Ambulatory Visit  Name: Carmen Decker      : 1970      MRN: [id number]  Encounter Provider: DON Reno  Encounter Date: 2024   Encounter department: [facility] [address] [facility]    Assessment & Plan  Ingrown toenail  - Improving.   - Recommend soaking in warm soapy water.   - Contact office if no improvement.        Mouth sores  - Prescription sent for magic mouthwash.   Orders:    diphenhydramine, lidocaine, Al/Mg hydroxide, simethicone (Magic Mouthwash) SUSP; Swish and spit 10 mL every 4 (four) hours as needed for mouth pain or discomfort    Bipolar II disorder (HCC)  - Stable.  - Will continue to monitor.        Malignant neoplasm of thyroid gland (HCC)  - TSH therapeutic.   - Continue follow up with Endocrinology.        Chronic migraine without aura without status migrainosus, not intractable  - Stable.  - Continue routine follow up with Neurology.          Other hyperlipidemia  Component      Latest Ref Rng 2023   Cholesterol      See Comment mg/dL 159  173    Triglycerides      See Comment mg/dL 122  135    HDL      >=50 mg/dL 39 (L)  37 (L)    LDL Calculated      0 - 100 mg/dL 96  109 (H)       - LDL slightly elevated.   - Encourage healthy diet and regular exercise.  - Will continue to monitor fasting lipid panel.               History of Present Illness     Patient presents to office today with complaints of redness and soreness of her right great toe. States that she went to a new nail salon before this occurred. She went back and had them trim her nail different. The swelling and redness is improving. She denies any drainage. Also reports canker sores on the inside of her lips that caused them to feel like they were swollen. Has pain with eating. Denies any other concerns or complaints today.           Review of Systems   Constitutional:  Negative for fatigue and fever.   HENT:  Positive for mouth sores. Negative for trouble swallowing.    Eyes:  Negative  for visual disturbance.   Respiratory:  Negative for cough and shortness of breath.    Cardiovascular:  Negative for chest pain and palpitations.   Gastrointestinal:  Negative for abdominal pain and blood in stool.   Endocrine: Negative for cold intolerance and heat intolerance.   Genitourinary:  Negative for difficulty urinating and dysuria.   Musculoskeletal:  Negative for gait problem.   Skin:  Positive for color change. Negative for rash.   Neurological:  Negative for dizziness, syncope and headaches.   Hematological:  Negative for adenopathy.   Psychiatric/Behavioral:  Negative for behavioral problems.      Past Medical History:   Diagnosis Date    Anemia     Anxiety     Asthma     illness induced    Cancer (HCC)     thyroid ca (removed Jan 2022)    SARA I (cervical intraepithelial neoplasia I)     RESOLVED: [id number]    Depression     Endometriosis     Functional ovarian cysts age 17    History of agoraphobia age 17    Hypercholesterolemia     IBS (irritable bowel syndrome) age 17    IC (interstitial cystitis)     Malignant neoplasm of thyroid gland (HCC) 12/14/2021    Migraine     Motion sickness     Multiple thyroid nodules 11/02/2021    Last Assessment & Plan:  Formatting of this note might be different from the original. Carmen Decker is a 51 y.o. female   We had an extensive discussion regarding thyroid nodules, the natural course of thyroid nodules, ultrasound features which can convey an increased risk for malignancy, the general indications for fine needle aspiration, the procedure itself and possible results from a fine needl    Pap smear abnormality of cervix with ASCUS favoring benign     RESOLVED: [id number]    Seasonal allergies     with post-nasal drip and recurrent throat infections    Thyroid cancer (HCC)     Urinary tract infection     Varicella      Past Surgical History:   Procedure Laterality Date    CHOLECYSTECTOMY  2019    CHOLECYSTECTOMY LAPAROSCOPIC  08/2020    COLONOSCOPY  06/2018     Nonbleeding angiodysplastic lesion mid ascending colon, questionable ulcerative proctitis-biopsy was negative for acute or chronic colitis    COLONOSCOPY  06/09/2016    Normal    COLONOSCOPY  05/23/2013    Adenoma at splenic flexure    EGD  08/02/2018    EGD  08/03/2020    Normal.  Biopsy stomach negative for H. pylori, biopsy of the duodenum negative for celiac    EGD AND COLONOSCOPY  01/20/2023    ST. MARIZA Escalante MD.- Normal EGD; Small bile lake stomach./ Normal colonoscopy with ileoscopy. Bx's: No active inflammation, granulomas, organisms, dysplasia or neoplasia; no intestinal metaplasia./ No active inflammation or collagenous colitis, granulomas, dysplasia, or neoplasia.    ENDOTRACHEAL INTUBATION EMERGENT  2012    HEMORRHOID SURGERY  07/2009    Dr Boswell    LAPAROSCOPY      ID HYSTEROSCOPY DIAGNOSTIC SEPARATE PROCEDURE N/A 11/21/2018    Procedure: HYSTEROSCOPY;  Surgeon: Earnest Buchanan MD;  Location: [facility];  Service: Gynecology    ID HYSTEROSCOPY ENDOMETRIAL ABLATION N/A 11/21/2018    Procedure: ABLATION ENDOMETRIAL NOVASURE;  Surgeon: Earnest Bucahnan MD;  Location: [facility];  Service: Gynecology    SMALL INTESTINE SURGERY      THYROID SURGERY  01/17/2022    Papillary cancer of thyroid nodules    TOOTH EXTRACTION      TUBAL LIGATION      ONSET: 29 DEC 2011    US GUIDED THYROID BIOPSY  11/24/2021     Family History   Problem Relation Age of Onset    Other Mother         HYSTERECTOMY FOR BENIGN DISEASE     Diabetes Mother     Lung cancer Father 70    Other Sister         HYSTERECTOMY FOR BENIGN DISEASE     Diabetes Maternal Grandmother     Other Maternal Grandmother         HYSTERECTOMY FOR BENIGN DISEASE     Colon cancer Paternal Grandmother 60    Throat cancer Paternal Uncle 60    No Known Problems Maternal Aunt     No Known Problems Paternal Aunt     No Known Problems Paternal Aunt     Diabetes Maternal Grandfather      Social History     Tobacco Use    Smoking status: Never    Smokeless tobacco:  Never   Vaping Use    Vaping status: Never Used   Substance and Sexual Activity    Alcohol use: No    Drug use: No    Sexual activity: Not on file     Current Outpatient Medications on File Prior to Visit   Medication Sig    albuterol (ProAir HFA) 90 mcg/act inhaler Inhale 2 puffs every 6 (six) hours as needed for wheezing or shortness of breath    Ciclopirox 1 % shampoo Apply 1 application topically every other day To the scalp for 5 minutes, then rinse thoroughly.    clobetasol (TEMOVATE) 0.05 % external solution APPLY THIN LAYER ON SCALP TWICE DAILY X 2 WEEKS THEN STOP X 2 WEEKS. REPEAT 2 WEEKS ON AND 2 WEEKS OFF.AVOID FACE/SKIN FOLDS/GROIN    clotrimazole-betamethasone (LOTRISONE) 1-0.05 % cream Apply topically 2 (two) times a day    colestipol (COLESTID) 1 g tablet Take 1 tablet (1 g total) by mouth 2 (two) times a day    [employer] Cortisone Maximum Strength 1 % lotion APPLY TO AFFECTED AREAS OF SCALP UP TO 4 TIMES A DAY ONLY AS NEEDED.    dicyclomine (BENTYL) 10 mg capsule TAKE 1 CAPSULE (10 MG TOTAL) BY MOUTH 3 (THREE) TIMES A DAY AS NEEDED (ABDOMINAL PAIN)    ketoconazole (NIZORAL) 2 % shampoo Apply 1 application. topically 2 (two) times a week SHMP twice a wk x 8 wk, then PRN.  Leave on for 5 min before rinsing.    LORazepam (ATIVAN) 1 mg tablet TAKE 1 & 1/2 TABLETS DAILY AT BEDTIME AS NEEDED    medroxyPROGESTERone (DEPO-PROVERA) 150 mg/mL injection TO BE GIVEN IN OFFICE EVERY 10 WEEKS    mometasone (ELOCON) 0.1 % lotion APPLY THIN LAYER TO AFFECTED AREA ON SCALP TWICE DAILY FOR 2 WEEKS THEN STOP FOR 2 WEEKS. REPEAT AS NEEDED    Multiple Vitamin (MULTI VITAMIN DAILY PO) Take by mouth      Multiple Vitamins-Minerals (Multivitamin Adult) CHEW Chew    ondansetron (ZOFRAN) 4 mg tablet Take 1 tablet (4 mg total) by mouth every 8 (eight) hours as needed for nausea    pentosan polysulfate (ELMIRON) 100 mg capsule Take 100 mg by mouth 3 (three) times a day before meals      Tirosint 150 MCG CAPS Take 1 capsule by mouth  daily    albuterol (2.5 mg/3 mL) 0.083 % nebulizer solution Take 3 mL (2.5 mg total) by nebulization 3 (three) times a day (Patient not taking: Reported on 7/31/2024)    Botulinum Toxin Type A 200 units SOLR  (Patient not taking: Reported on 9/11/2023)    estradiol (ESTRACE) 0.1 mg/g vaginal cream Insert 1 g into the vagina 2 (two) times a week At Bedtime, Mondays and Thursdays (Patient not taking: Reported on 7/31/2024)    fluticasone (FLONASE) 50 mcg/act nasal spray 1 spray into each nostril daily (Patient not taking: Reported on 7/31/2024)    fluticasone (VERAMYST) 27.5 MCG/SPRAY nasal spray 2 sprays into each nostril daily (Patient not taking: Reported on 7/31/2024)    hydrocortisone 2.5 % cream Apply topically 2 (two) times a day (Patient not taking: Reported on 7/20/2023)    ipratropium (ATROVENT) 0.06 % nasal spray 2 SPRAYS EVERY 6 HOURS AS NEEDED FOR 30 DAYS (Patient not taking: Reported on 2/22/2024)    levalbuterol (XOPENEX) 1.25 mg/3 mL nebulizer solution if needed for shortness of breath (Patient not taking: Reported on 7/31/2024)    Meth-Hyo-M Bl-Na Phos-Ph Sal (Uribel) 118 MG CAPS Take 1 each by mouth as needed (Patient not taking: Reported on 9/11/2023)    metroNIDAZOLE (METROCREAM) 0.75 % cream Apply topically 2 (two) times a day (Patient not taking: Reported on 9/11/2023)    Naltrexone POWD Compounded low dose naltrexone 1mg tabs. Take 1 tab PO daily at bedtime.    naproxen (NAPROSYN) 500 mg tablet as needed for headaches    naratriptan (AMERGE) 2.5 MG tablet 1 po prn migraine. MRx1 in 2 hours if needed. Max 2 tabs/day. Max 2 days. (Patient not taking: Reported on 4/6/2023)    nortriptyline (PAMELOR) 10 MG/5ML solution  (Patient not taking: Reported on 7/20/2023)    [DISCONTINUED] gabapentin (NEURONTIN) 100 mg capsule  (Patient not taking: Reported on 9/11/2023)    [DISCONTINUED] glycopyrrolate (ROBINUL) 1 mg tablet TAKE 1 TABLET TWICE A DAY (Patient not taking: Reported on 4/6/2023)     [DISCONTINUED] Heparin Sodium, Porcine, (heparin, porcine,) 23822 UNIT/ML     [DISCONTINUED] methylPREDNISolone 4 MG tablet therapy pack Follow package directions    [DISCONTINUED] nitrofurantoin (MACROBID) 100 mg capsule TAKE 1 CAPSULE BY MOUTH TWICE A DAY FOR 5 DAYS (Patient not taking: Reported on 7/20/2023)    [DISCONTINUED] nortriptyline (PAMELOR) 10 MG/5ML solution TAKE 2.5ML BY MOUTH EVERY NIGHT AT BEDTIME (Patient not taking: Reported on 4/6/2023)    [DISCONTINUED] omeprazole (PriLOSEC) 20 mg delayed release capsule TAKE 1 CAPSULE BY MOUTH EVERY DAY (Patient not taking: Reported on 2/1/2023)    [DISCONTINUED] predniSONE 10 mg tablet 4 tabs x 3 decrease by 1 tablet every third day    [DISCONTINUED] propranolol (INDERAL) 10 mg tablet Take 10 mg by mouth daily    [DISCONTINUED] tiZANidine (ZANAFLEX) 2 mg tablet Take 2 mg by mouth every 8 (eight) hours as needed    [DISCONTINUED] triamcinolone acetonide (KENALOG-40) 40 mg/mL     [DISCONTINUED] Trintellix 5 MG tablet Take 5 mg by mouth daily (Patient not taking: Reported on 7/20/2023)    [DISCONTINUED] Ubrelvy 100 MG tablet TAKE 1 TABLET BY MOUTH TWICE A DAY AS NEEDED FOR MIGRAINE (Patient not taking: Reported on 7/20/2023)     Allergies   Allergen Reactions    Latex Itching and Hives     Irritation    Vancomycin Itching and Swelling     Patient had facial swelling, especially around her eyes and severe itching.    Tessalon [Benzonatate] Itching    Azithromycin Diarrhea, Nausea Only and Other (See Comments)    Clarithromycin Other (See Comments)     Does not know    Clavulanic Acid Other (See Comments)    Erythromycin Base Other (See Comments)    Levofloxacin Hives    Lithium Itching    Meropenem Itching     itching    Mirtazapine Other (See Comments)     (remeron)  (remeron)  (remeron)    Moxifloxacin Nausea Only     Excessive vomiting  Excessive vomiting    Nitrofurantoin Itching and Other (See Comments)     Blotchy skin     Sulfamethoxazole      Other  "reaction(s): Rash    Sulfamethoxazole-Trimethoprim Other (See Comments)    Trimethoprim      Other reaction(s): Rash    Doxycycline Other (See Comments), Itching and Rash     Itching and rash    Indomethacin Rash    Lamotrigine Other (See Comments) and Rash     Can't recall side effects.     Penicillins Rash     Immunization History   Administered Date(s) Administered    COVID-19 MODERNA VACC 0.5 ML IM 03/17/2021, 04/14/2021    H1N1, All Formulations 11/20/2009    INFLUENZA 12/06/2013, 10/25/2017, 10/25/2017, 10/10/2018, 10/11/2020, 09/22/2021, 10/19/2022    Influenza Injectable, MDCK, Preservative Free, Quadrivalent, 0.5 mL 10/21/2019, 10/11/2020    Influenza Quadrivalent Preservative Free 3 years and older IM 10/11/2020    Influenza Split 12/05/2013, 09/22/2014    Influenza, injectable, quadrivalent, preservative free 0.5 mL 10/11/2020    Influenza, seasonal, injectable 10/05/2010    Td (adult), adsorbed 03/15/2012    Tdap 03/15/2012     Objective     /80 (BP Location: Left arm, Patient Position: Sitting, Cuff Size: Standard)   Pulse 96   Temp 100.4 °F (38 °C) (Tympanic)   Resp 16   Ht 5' 6\" (1.676 m)   Wt 65 kg (143 lb 4.8 oz)   SpO2 98%   BMI 23.13 kg/m²     Physical Exam  Vitals and nursing note reviewed.   Constitutional:       Appearance: Normal appearance. She is well-developed.   HENT:      Head: Normocephalic and atraumatic.      Right Ear: External ear normal.      Left Ear: External ear normal.   Eyes:      Conjunctiva/sclera: Conjunctivae normal.   Cardiovascular:      Rate and Rhythm: Normal rate and regular rhythm.      Heart sounds: Normal heart sounds.   Pulmonary:      Effort: Pulmonary effort is normal.      Breath sounds: Normal breath sounds.   Musculoskeletal:         General: Normal range of motion.      Cervical back: Normal range of motion.        Feet:    Skin:     General: Skin is warm and dry.   Neurological:      Mental Status: She is alert and oriented to person, place, and " time.   Psychiatric:         Mood and Affect: Mood normal.         Behavior: Behavior normal.

## 2024-09-11 NOTE — TELEPHONE ENCOUNTER
Pt called stated she was seen today and a mouthwash was sent to the pharmacy.  She was checking the status because she has been notified by pharmacy.    Confirmed script was sent and received by pharmacy.  She will call pharmacy.

## 2024-09-11 NOTE — ASSESSMENT & PLAN NOTE
- Improving.   - Recommend soaking in warm soapy water.   - Contact office if no improvement.

## 2024-09-12 ENCOUNTER — TELEPHONE (OUTPATIENT)
Age: 54
End: 2024-09-12

## 2024-09-17 ENCOUNTER — TELEPHONE (OUTPATIENT)
Dept: FAMILY MEDICINE CLINIC | Facility: CLINIC | Age: 54
End: 2024-09-17

## 2024-09-17 DIAGNOSIS — K13.79 MOUTH SORES: Primary | ICD-10-CM

## 2024-09-17 RX ORDER — LIDOCAINE HYDROCHLORIDE 20 MG/ML
15 SOLUTION OROPHARYNGEAL 4 TIMES DAILY PRN
Qty: 100 ML | Refills: 0 | Status: SHIPPED | OUTPATIENT
Start: 2024-09-17

## 2024-09-17 NOTE — TELEPHONE ENCOUNTER
Pt called again today, she says the sores are bad and is hoping that Serina could call in a cheaper alternative very soon because she will be going today to the pharmacy after work. She says its been 6 days and needs help. Please advise [phone] thank you.

## 2024-09-17 NOTE — TELEPHONE ENCOUNTER
Patient said she left messages.  The mouthwash ordered is over 100.00 even with a Good Rx card.  Asking what else she can do, still has sores in her mouth.    Was waiting for a call from a previous message.

## 2024-09-25 ENCOUNTER — NURSE TRIAGE (OUTPATIENT)
Age: 54
End: 2024-09-25

## 2024-09-25 ENCOUNTER — OFFICE VISIT (OUTPATIENT)
Dept: URGENT CARE | Age: 54
End: 2024-09-25
Payer: MEDICARE

## 2024-09-25 ENCOUNTER — TELEPHONE (OUTPATIENT)
Dept: FAMILY MEDICINE CLINIC | Facility: CLINIC | Age: 54
End: 2024-09-25

## 2024-09-25 VITALS
RESPIRATION RATE: 18 BRPM | BODY MASS INDEX: 22.79 KG/M2 | OXYGEN SATURATION: 97 % | HEART RATE: 75 BPM | TEMPERATURE: 98.9 F | DIASTOLIC BLOOD PRESSURE: 75 MMHG | HEIGHT: 66 IN | WEIGHT: 141.8 LBS | SYSTOLIC BLOOD PRESSURE: 123 MMHG

## 2024-09-25 DIAGNOSIS — R21 RASH OF FACE: Primary | ICD-10-CM

## 2024-09-25 PROCEDURE — G0463 HOSPITAL OUTPT CLINIC VISIT: HCPCS | Performed by: EMERGENCY MEDICINE

## 2024-09-25 PROCEDURE — 99213 OFFICE O/P EST LOW 20 MIN: CPT | Performed by: EMERGENCY MEDICINE

## 2024-09-25 NOTE — PATIENT INSTRUCTIONS
Recommended to start valtrex as previously directed.  Recommended to follow-up with eye doctor as previously directed.  Recommend to avoid elderly, pediatric, pregnant, or high risk patients.  PCP follow-up in 2 to 3 days.  See to the ER if symptoms worsen

## 2024-09-25 NOTE — TELEPHONE ENCOUNTER
Call from access about patient's rash/shingles. Did not mention shingles when she called this morning.  Patient said [facility] told her there is a certain test we can order for shingles. Unsure what she is referring to after discussing with our nurse. Patient has appt tomorrow morning with Dr. NAVA

## 2024-09-25 NOTE — TELEPHONE ENCOUNTER
"Patient calls with c/o \"shingles\". Patient was seen on 9/24/24 at [facility] urgent care and was diagnosed with Shingles and prescribed valcyclovir 1,000 mg po 3x day x 7 days. Patient then presented on 9/25/24 to [facility]. Patient was again educated that rash appears to be shingles. Patient was encouraged to fill the Valcyclovir. Patient is concerned regarding the medication because she has a sensitive stomach. I encouraged the patient to start the medication and to report any side effects to the PCP. Patient was told at urgent care that she needs a varicella titer to be certain that the she has shingles. Patient is very concerned and is requesting urgent lab orders for varicella titer. Patient can be reached at [phone]    Reason for Disposition   Shingles rash and onset > 72 hours ago    Answer Assessment - Initial Assessment Questions  1. APPEARANCE of RASH: \"Describe the rash.\"       Rash-face  2. LOCATION: \"Where is the rash located?\"       L side of the face  3. ONSET: \"When did the rash start?\"       3 days ago  4. ITCHING: \"Does the rash itch?\" If Yes, ask: \"How bad is the itch?\"  (Scale 1-10; or mild, moderate, severe)      Not now  5. PAIN: \"Does the rash hurt?\" If Yes, ask: \"How bad is the pain?\"  (Scale 1-10; or mild, moderate, severe)      Denies pain  6. OTHER SYMPTOMS: \"Do you have any other symptoms?\" (e.g., fever)      denies  7. PREGNANCY: \"Is there any chance you are pregnant?\" \"When was your last menstrual period?\"      N/A    Protocols used: Shingles-ADULT-[place]    "

## 2024-09-25 NOTE — LETTER
September 25, 2024     Patient: Carmen Decker   YOB: 1970   Date of Visit: 9/25/2024       To Whom It May Concern:    It is my medical opinion that Carmen Decker may return to work once lesions resolve or cleared by PCP.       Sincerely,        DON Walsh    CC: No Recipients

## 2024-09-25 NOTE — TELEPHONE ENCOUNTER
"Patient called, states she has a rash on face  Urgent Care diagnosed as \"shingles\".Urgent care provider, Advised Dr. Saucedo to order specific testing(Vericella) for diagnosis , while patient is at the Urgent care. Patient expressed emotion , states Urgent Care provider  tended to patient from afar. Patient request a callback As soon as Possible, Please advise Patient at [phone], if any further questions.   "

## 2024-09-25 NOTE — PROGRESS NOTES
[facility]        NAME: Carmen Decker is a 54 y.o. female  : 1970    MRN: [id number]  DATE: 2024  TIME: 1:00 PM      Assessment and Plan     Rash of face [R21]  1. Rash of face          Patient recommended to start Valtrex as rash is consistent with shingles.  Patient aware to follow-up with PCP and educated on airborne precautions.    Patient Instructions     Recommended to start valtrex as previously directed.  Recommended to follow-up with eye doctor as previously directed.  Recommend to avoid elderly, pediatric, pregnant, or high risk patients.  PCP follow-up in 2 to 3 days.  See to the ER if symptoms worsen    Chief Complaint     Chief Complaint   Patient presents with    Rash     Patient reports having a rash started to her left side of temple area starting 2 days ago. States area is itchy and a tingling feeling.          History of Present Illness     Patient is a 54-year-old female who presents with rash to left side of face.  Patient was diagnosed with shingles yesterday at [facility] urgent care.  States that she was requesting a second opinion for testing for shingles.  Reports she is unsure if it is shingles as she has a history of rosacea and her son has poison.  States they did stain her eye yesterday and told her there was no ocular involvement.  States they did refer her to an eye doctor.  Has not started the Valtrex if she is worried about the dose and she has a sensitive stomach.  Reports she has been using her topical cream for rosacea.    Rash        Review of Systems     Review of Systems   Skin:  Positive for rash.   Neurological:  Positive for numbness (Tingling noted to rash).   All other systems reviewed and are negative.        Current Medications       Current Outpatient Medications:     albuterol (2.5 mg/3 mL) 0.083 % nebulizer solution, Take 3 mL (2.5 mg total) by nebulization 3 (three) times a day (Patient not taking: Reported on 2024), Disp: 180  mL, Rfl: 5    albuterol (ProAir HFA) 90 mcg/act inhaler, Inhale 2 puffs every 6 (six) hours as needed for wheezing or shortness of breath, Disp: 25.5 g, Rfl: 3    Botulinum Toxin Type A 200 units SOLR, , Disp: , Rfl:     Ciclopirox 1 % shampoo, Apply 1 application topically every other day To the scalp for 5 minutes, then rinse thoroughly., Disp: 120 mL, Rfl: 1    clobetasol (TEMOVATE) 0.05 % external solution, APPLY THIN LAYER ON SCALP TWICE DAILY X 2 WEEKS THEN STOP X 2 WEEKS. REPEAT 2 WEEKS ON AND 2 WEEKS OFF.AVOID FACE/SKIN FOLDS/GROIN, Disp: , Rfl:     clotrimazole-betamethasone (LOTRISONE) 1-0.05 % cream, Apply topically 2 (two) times a day, Disp: 30 g, Rfl: 0    colestipol (COLESTID) 1 g tablet, TAKE 1 TABLET BY MOUTH TWICE DAILY, Disp: 270 tablet, Rfl: 3    [employer] Cortisone Maximum Strength 1 % lotion, APPLY TO AFFECTED AREAS OF SCALP UP TO 4 TIMES A DAY ONLY AS NEEDED., Disp: 99 mL, Rfl: 0    dicyclomine (BENTYL) 10 mg capsule, TAKE 1 CAPSULE (10 MG TOTAL) BY MOUTH 3 (THREE) TIMES A DAY AS NEEDED (ABDOMINAL PAIN), Disp: 270 capsule, Rfl: 1    diphenhydramine, lidocaine, Al/Mg hydroxide, simethicone (Magic Mouthwash) SUSP, Swish and spit 10 mL every 4 (four) hours as needed for mouth pain or discomfort, Disp: 119 mL, Rfl: 0    estradiol (ESTRACE) 0.1 mg/g vaginal cream, Insert 1 g into the vagina 2 (two) times a week At Bedtime, Mondays and Thursdays (Patient not taking: Reported on 7/31/2024), Disp: 42.5 g, Rfl: 1    fluticasone (FLONASE) 50 mcg/act nasal spray, 1 spray into each nostril daily (Patient not taking: Reported on 7/31/2024), Disp: 9.9 mL, Rfl: 5    fluticasone (VERAMYST) 27.5 MCG/SPRAY nasal spray, 2 sprays into each nostril daily (Patient not taking: Reported on 7/31/2024), Disp: 5.9 mL, Rfl: 5    hydrocortisone 2.5 % cream, Apply topically 2 (two) times a day (Patient not taking: Reported on 7/20/2023), Disp: 30 g, Rfl: 0    ipratropium (ATROVENT) 0.06 % nasal spray, 2 SPRAYS EVERY 6 HOURS AS  NEEDED FOR 30 DAYS (Patient not taking: Reported on 2/22/2024), Disp: , Rfl:     ketoconazole (NIZORAL) 2 % shampoo, Apply 1 application. topically 2 (two) times a week SHMP twice a wk x 8 wk, then PRN.  Leave on for 5 min before rinsing., Disp: 120 mL, Rfl: 1    levalbuterol (XOPENEX) 1.25 mg/3 mL nebulizer solution, if needed for shortness of breath (Patient not taking: Reported on 7/31/2024), Disp: , Rfl:     Lidocaine Viscous HCl (XYLOCAINE) 2 % mucosal solution, Swish and spit 15 mL 4 (four) times a day as needed for mouth pain or discomfort, Disp: 100 mL, Rfl: 0    LORazepam (ATIVAN) 1 mg tablet, TAKE 1 & 1/2 TABLETS DAILY AT BEDTIME AS NEEDED, Disp: , Rfl: 1    medroxyPROGESTERone (DEPO-PROVERA) 150 mg/mL injection, TO BE GIVEN IN OFFICE EVERY 10 WEEKS, Disp: 1 mL, Rfl: 5    Meth-Hyo-M Bl-Na Phos-Ph Sal (Uribel) 118 MG CAPS, Take 1 each by mouth as needed (Patient not taking: Reported on 9/11/2023), Disp: , Rfl:     metroNIDAZOLE (METROCREAM) 0.75 % cream, Apply topically 2 (two) times a day (Patient not taking: Reported on 9/11/2023), Disp: 45 g, Rfl: 3    mometasone (ELOCON) 0.1 % lotion, APPLY THIN LAYER TO AFFECTED AREA ON SCALP TWICE DAILY FOR 2 WEEKS THEN STOP FOR 2 WEEKS. REPEAT AS NEEDED, Disp: , Rfl:     Multiple Vitamin (MULTI VITAMIN DAILY PO), Take by mouth  , Disp: , Rfl:     Multiple Vitamins-Minerals (Multivitamin Adult) CHEW, Chew, Disp: , Rfl:     Naltrexone POWD, Compounded low dose naltrexone 1mg tabs. Take 1 tab PO daily at bedtime., Disp: , Rfl:     naproxen (NAPROSYN) 500 mg tablet, as needed for headaches, Disp: , Rfl:     naratriptan (AMERGE) 2.5 MG tablet, 1 po prn migraine. MRx1 in 2 hours if needed. Max 2 tabs/day. Max 2 days. (Patient not taking: Reported on 4/6/2023), Disp: , Rfl:     nortriptyline (PAMELOR) 10 MG/5ML solution, , Disp: , Rfl:     ondansetron (ZOFRAN) 4 mg tablet, Take 1 tablet (4 mg total) by mouth every 8 (eight) hours as needed for nausea, Disp: 20 tablet, Rfl:  5    pentosan polysulfate (ELMIRON) 100 mg capsule, Take 100 mg by mouth 3 (three) times a day before meals  , Disp: , Rfl:     Tirosint 150 MCG CAPS, Take 1 capsule by mouth daily, Disp: , Rfl:     Current Facility-Administered Medications:     medroxyPROGESTERone (DEPO-PROVERA) IM injection 150 mg, 150 mg, Intramuscular, Once, Lela Hollis DO    medroxyPROGESTERone (DEPO-PROVERA) IM injection 150 mg, 150 mg, Intramuscular, Once, Lela Hollis DO    Current Allergies     Allergies as of 09/25/2024 - Reviewed 09/25/2024   Allergen Reaction Noted    Latex Itching and Hives 07/21/2021    Vancomycin Itching and Swelling 02/23/2022    Tessalon [benzonatate] Itching 01/20/2023    Azithromycin Diarrhea, Nausea Only, and Other (See Comments) 07/12/2005    Clarithromycin Other (See Comments) 05/17/2018    Clavulanic acid Other (See Comments) 09/06/2013    Erythromycin base Other (See Comments)     Levofloxacin Hives 01/09/2024    Lithium Itching 01/27/2009    Meropenem Itching 11/07/2023    Mirtazapine Other (See Comments) 09/19/2018    Moxifloxacin Nausea Only 05/08/2017    Nitrofurantoin Itching and Other (See Comments) 02/01/2023    Sulfamethoxazole  04/05/2010    Sulfamethoxazole-trimethoprim Other (See Comments)     Trimethoprim  04/05/2010    Doxycycline Other (See Comments), Itching, and Rash 09/04/2014    Indomethacin Rash     Lamotrigine Other (See Comments) and Rash     Penicillins Rash 05/17/2012              The following portions of the patient's history were reviewed and updated as appropriate: allergies, current medications, past family history, past medical history, past social history, past surgical history and problem list.     Past Medical History:   Diagnosis Date    Anemia     Anxiety     Asthma     illness induced    Cancer (HCC)     thyroid ca (removed Jan 2022)    SARA I (cervical intraepithelial neoplasia I)     RESOLVED: [id number]    Depression     Endometriosis     Functional ovarian cysts age 17     History of agoraphobia age 17    Hypercholesterolemia     IBS (irritable bowel syndrome) age 17    IC (interstitial cystitis)     Malignant neoplasm of thyroid gland (HCC) 12/14/2021    Migraine     Motion sickness     Multiple thyroid nodules 11/02/2021    Last Assessment & Plan:  Formatting of this note might be different from the original. Carmen Decker is a 51 y.o. female   We had an extensive discussion regarding thyroid nodules, the natural course of thyroid nodules, ultrasound features which can convey an increased risk for malignancy, the general indications for fine needle aspiration, the procedure itself and possible results from a fine needl    Pap smear abnormality of cervix with ASCUS favoring benign     RESOLVED: [id number]    Seasonal allergies     with post-nasal drip and recurrent throat infections    Thyroid cancer (HCC)     Urinary tract infection     Varicella        Past Surgical History:   Procedure Laterality Date    CHOLECYSTECTOMY  2019    CHOLECYSTECTOMY LAPAROSCOPIC  08/2020    COLONOSCOPY  06/2018    Nonbleeding angiodysplastic lesion mid ascending colon, questionable ulcerative proctitis-biopsy was negative for acute or chronic colitis    COLONOSCOPY  06/09/2016    Normal    COLONOSCOPY  05/23/2013    Adenoma at splenic flexure    EGD  08/02/2018    EGD  08/03/2020    Normal.  Biopsy stomach negative for H. pylori, biopsy of the duodenum negative for celiac    EGD AND COLONOSCOPY  01/20/2023    ST. MARIZA Escalante MD.- Normal EGD; Small bile lake stomach./ Normal colonoscopy with ileoscopy. Bx's: No active inflammation, granulomas, organisms, dysplasia or neoplasia; no intestinal metaplasia./ No active inflammation or collagenous colitis, granulomas, dysplasia, or neoplasia.    ENDOTRACHEAL INTUBATION EMERGENT  2012    HEMORRHOID SURGERY  07/2009    Dr Boswell    LAPAROSCOPY      NH HYSTEROSCOPY DIAGNOSTIC SEPARATE PROCEDURE N/A 11/21/2018    Procedure: HYSTEROSCOPY;  Surgeon: Earnest  "MD Chanel;  Location: [facility];  Service: Gynecology    MS HYSTEROSCOPY ENDOMETRIAL ABLATION N/A 11/21/2018    Procedure: ABLATION ENDOMETRIAL NOVASURE;  Surgeon: Earnest Buchanan MD;  Location: [facility];  Service: Gynecology    SMALL INTESTINE SURGERY      THYROID SURGERY  01/17/2022    Papillary cancer of thyroid nodules    TOOTH EXTRACTION      TUBAL LIGATION      ONSET: 29 DEC 2011    US GUIDED THYROID BIOPSY  11/24/2021       Family History   Problem Relation Age of Onset    Other Mother         HYSTERECTOMY FOR BENIGN DISEASE     Diabetes Mother     Lung cancer Father 70    Other Sister         HYSTERECTOMY FOR BENIGN DISEASE     Diabetes Maternal Grandmother     Other Maternal Grandmother         HYSTERECTOMY FOR BENIGN DISEASE     Colon cancer Paternal Grandmother 60    Throat cancer Paternal Uncle 60    No Known Problems Maternal Aunt     No Known Problems Paternal Aunt     No Known Problems Paternal Aunt     Diabetes Maternal Grandfather          Medications have been verified.        Objective     /75   Pulse 75   Temp 98.9 °F (37.2 °C) (Tympanic)   Resp 18   Ht 5' 6\" (1.676 m)   Wt 64.3 kg (141 lb 12.8 oz)   SpO2 97%   BMI 22.89 kg/m²   No LMP recorded.         Physical Exam     Physical Exam  Vitals and nursing note reviewed.   Constitutional:       General: She is awake. She is not in acute distress.     Appearance: Normal appearance. She is not ill-appearing, toxic-appearing or diaphoretic.   HENT:      Head:     Pulmonary:      Breath sounds: Normal air entry.   Skin:     General: Skin is warm.      Capillary Refill: Capillary refill takes less than 2 seconds.   Neurological:      Mental Status: She is alert.   Psychiatric:         Mood and Affect: Mood normal.         Behavior: Behavior normal.         Thought Content: Thought content normal.         Judgment: Judgment normal.       "

## 2024-09-26 ENCOUNTER — OFFICE VISIT (OUTPATIENT)
Dept: FAMILY MEDICINE CLINIC | Facility: CLINIC | Age: 54
End: 2024-09-26
Payer: MEDICARE

## 2024-09-26 VITALS
HEART RATE: 98 BPM | OXYGEN SATURATION: 97 % | RESPIRATION RATE: 16 BRPM | BODY MASS INDEX: 23.01 KG/M2 | SYSTOLIC BLOOD PRESSURE: 106 MMHG | TEMPERATURE: 99.3 F | HEIGHT: 66 IN | WEIGHT: 143.2 LBS | DIASTOLIC BLOOD PRESSURE: 70 MMHG

## 2024-09-26 DIAGNOSIS — B02.9 HERPES ZOSTER WITHOUT COMPLICATION: Primary | ICD-10-CM

## 2024-09-26 DIAGNOSIS — Z00.00 MEDICARE ANNUAL WELLNESS VISIT, SUBSEQUENT: ICD-10-CM

## 2024-09-26 DIAGNOSIS — E78.49 OTHER HYPERLIPIDEMIA: ICD-10-CM

## 2024-09-26 PROCEDURE — 99213 OFFICE O/P EST LOW 20 MIN: CPT | Performed by: FAMILY MEDICINE

## 2024-09-26 PROCEDURE — G0439 PPPS, SUBSEQ VISIT: HCPCS | Performed by: FAMILY MEDICINE

## 2024-09-26 RX ORDER — PREDNISONE 50 MG/1
50 TABLET ORAL DAILY
Qty: 5 TABLET | Refills: 0 | Status: SHIPPED | OUTPATIENT
Start: 2024-09-26 | End: 2024-10-01

## 2024-09-26 RX ORDER — VALACYCLOVIR HYDROCHLORIDE 1 G/1
TABLET, FILM COATED ORAL
COMMUNITY
Start: 2024-09-24

## 2024-09-26 NOTE — PROGRESS NOTES
Ambulatory Visit  Name: Carmen Decker      : 1970      MRN: [id number]  Encounter Provider: Bautista Saucedo MD  Encounter Date: 2024   [facility]    Assessment & Plan  Herpes zoster without complication  She was given prescription for prednisone 50 mg 1 daily for 5 days.  Discussed with possible side effects.    Orders:    predniSONE 50 mg tablet; Take 1 tablet (50 mg total) by mouth daily for 5 days    Medicare annual wellness visit, subsequent  It was discussed about immunizations, diet, exercise and safety measures.         Other hyperlipidemia  Slightly elevated LDL.  Will continue to follow low-fat diet and regular exercise.  Continue to monitor.            Preventive health issues were discussed with patient, and age appropriate screening tests were ordered as noted in patient's After Visit Summary. Personalized health advice and appropriate referrals for health education or preventive services given if needed, as noted in patient's After Visit Summary.    History of Present Illness     She is here today for follow-up post urgent care visit for shingles on her temple area close to her left eye.  She denies any blurry vision.  She stated her symptoms improving but continues with the rash.  Also started on Valtrex 1000 mg 3 times a day 2 days ago.  She states she has been having side effects including headache and diarrhea.    Rash  Pertinent negatives include no cough, diarrhea, fever or shortness of breath.      Patient Care Team:  Bautista Saucedo MD as PCP - General (Family Medicine)  Lucia Black MD as PCP - PCP-[facility] ([facility]  Ann Freeman, DO C William Riedel, DO (Obstetrics and Gynecology)    Review of Systems   Constitutional:  Negative for chills and fever.   HENT:  Negative for trouble swallowing.    Eyes:  Negative for visual disturbance.   Respiratory:  Negative for cough and shortness of breath.    Cardiovascular:  Negative  for chest pain, palpitations and leg swelling.   Gastrointestinal:  Negative for abdominal pain, constipation and diarrhea.   Endocrine: Negative for cold intolerance and heat intolerance.   Genitourinary:  Negative for difficulty urinating and dysuria.   Musculoskeletal:  Negative for gait problem.   Skin:  Positive for rash.   Neurological:  Negative for dizziness, tremors, seizures and headaches.   Hematological:  Negative for adenopathy.   Psychiatric/Behavioral:  Negative for behavioral problems.      Medical History Reviewed by provider this encounter:       Annual Wellness Visit Questionnaire   Carmen is here for her Subsequent Wellness visit.     Health Risk Assessment:   Patient rates overall health as good. Patient feels that their physical health rating is slightly better. Patient is dissatisfied with their life. Eyesight was rated as same. Hearing was rated as same. Patient feels that their emotional and mental health rating is same. Patients states they are never, rarely angry. Patient states they are never, rarely unusually tired/fatigued. Pain experienced in the last 7 days has been none. Patient states that she has experienced no weight loss or gain in last 6 months.     Depression Screening:   PHQ-9 Score: 19      Fall Risk Screening:   In the past year, patient has experienced: no history of falling in past year      Urinary Incontinence Screening:   Patient has not leaked urine accidently in the last six months.     Home Safety:  Patient does not have trouble with stairs inside or outside of their home. Patient has working smoke alarms and has working carbon monoxide detector. Home safety hazards include: none.     Nutrition:   Current diet is Regular.     Medications:   Patient is currently taking over-the-counter supplements. OTC medications include: see medication list. Patient is able to manage medications.     Activities of Daily Living (ADLs)/Instrumental Activities of Daily Living (IADLs):  "  Walk and transfer into and out of bed and chair?: Yes  Dress and groom yourself?: Yes    Bathe or shower yourself?: Yes    Feed yourself? Yes  Do your laundry/housekeeping?: Yes  Manage your money, pay your bills and track your expenses?: Yes  Make your own meals?: Yes    Do your own shopping?: Yes    Previous Hospitalizations:   Any hospitalizations or ED visits within the last 12 months?: No      Advance Care Planning:   Living will: No    Durable POA for healthcare: No    Advanced directive: No      Cognitive Screening:   Provider or family/friend/caregiver concerned regarding cognition?: No    PREVENTIVE SCREENINGS      Cardiovascular Screening:    General: Screening Not Indicated and History Lipid Disorder      Diabetes Screening:     General: Screening Current      Colorectal Cancer Screening:     General: Screening Current      Breast Cancer Screening:     General: Screening Current      Cervical Cancer Screening:    General: History Cervical Cancer      Osteoporosis Screening:    General: Risks and Benefits Discussed      Abdominal Aortic Aneurysm (AAA) Screening:        General: Screening Not Indicated      Lung Cancer Screening:     General: Screening Not Indicated      Hepatitis C Screening:    General: Risks and Benefits Discussed    Screening, Brief Intervention, and Referral to Treatment (SBIRT)    Screening  Typical number of drinks in a day: 0  Typical number of drinks in a week: 0  Interpretation: Low risk drinking behavior.    Brief Intervention  Alcohol & drug use screenings were reviewed. No concerns regarding substance use disorder identified.     Other Counseling Topics:   Regular weightbearing exercise and calcium and vitamin D intake.        No results found.    Objective     Resp 16   Ht 5' 6\" (1.676 m)   BMI 22.89 kg/m²     Physical Exam  Vitals and nursing note reviewed.   Constitutional:       Appearance: She is well-developed.   HENT:      Head: Normocephalic and atraumatic.   Eyes: "      Pupils: Pupils are equal, round, and reactive to light.   Cardiovascular:      Rate and Rhythm: Normal rate and regular rhythm.      Heart sounds: Normal heart sounds.   Pulmonary:      Effort: Pulmonary effort is normal.      Breath sounds: Normal breath sounds.   Abdominal:      General: Bowel sounds are normal.      Palpations: Abdomen is soft.   Musculoskeletal:      Cervical back: Normal range of motion and neck supple.   Lymphadenopathy:      Cervical: No cervical adenopathy.   Skin:     General: Skin is warm.      Findings: Rash present. Rash is macular and papular.          Neurological:      Mental Status: She is alert and oriented to person, place, and time.

## 2024-09-26 NOTE — ASSESSMENT & PLAN NOTE
Slightly elevated LDL.  Will continue to follow low-fat diet and regular exercise.  Continue to monitor.

## 2024-09-26 NOTE — ASSESSMENT & PLAN NOTE
She was given prescription for prednisone 50 mg 1 daily for 5 days.  Discussed with possible side effects.    Orders:    predniSONE 50 mg tablet; Take 1 tablet (50 mg total) by mouth daily for 5 days

## 2024-10-04 ENCOUNTER — TELEPHONE (OUTPATIENT)
Age: 54
End: 2024-10-04

## 2024-10-04 NOTE — TELEPHONE ENCOUNTER
Patient states that she was seen earlier in the week for suspected shingles. She had a rash on one side of her face. She started the valacyclovir that was ordered and the rash went away the next day. She stopped the med because it was giving her stomach issues. States that the rash has not returned but she now had pain, itching and tingling all over scalp, face and torso. She see's an occasional red dot but it goes away. States the symptom are worse than they were with the rash. She would like to know if she needs to start the valacyclovir again and if so, will need a new script because she took half of the other script. Would like follow up asap.  Could not come into the office because she is working.

## 2024-10-11 DIAGNOSIS — G43.909 MIGRAINE WITHOUT STATUS MIGRAINOSUS, NOT INTRACTABLE, UNSPECIFIED MIGRAINE TYPE: ICD-10-CM

## 2024-10-11 RX ORDER — MEDROXYPROGESTERONE ACETATE 150 MG/ML
INJECTION, SUSPENSION INTRAMUSCULAR
Qty: 1 ML | Refills: 3 | Status: SHIPPED | OUTPATIENT
Start: 2024-10-11

## 2024-10-21 ENCOUNTER — TELEPHONE (OUTPATIENT)
Age: 54
End: 2024-10-21

## 2024-10-21 NOTE — TELEPHONE ENCOUNTER
Pt stated Pulm Provider: Dr. Naranjo    Actionable item:Pt scheduled for sick appt per request and requesting nebulizer solution to be sent to pharmacy on file    What is the reason for the call/chief complaint?    1-2 weeks ago she started having cough, tightness in chest. She was given Prednisone taper. She states she found a bottle in her home of Prednisone 50mg she took, she states that she was feeling better and then started getting up again. Cough, dry, no phlegm. Doesn't believe any wheezing, has been feeling fatigued.     Per pt states that she is only taking Flonase, not taking anything else. Not taking OTC as well, she states due to testing results she was not allowed to be on nebulizer or really any inhalers.     Recommended home remedies and OTC treatment, pt states that she has gotten reactions from those.       Priority:  AURE

## 2024-10-23 ENCOUNTER — APPOINTMENT (OUTPATIENT)
Dept: RADIOLOGY | Age: 54
End: 2024-10-23
Payer: MEDICARE

## 2024-10-23 ENCOUNTER — OFFICE VISIT (OUTPATIENT)
Dept: PULMONOLOGY | Facility: CLINIC | Age: 54
End: 2024-10-23
Payer: MEDICARE

## 2024-10-23 VITALS
WEIGHT: 141.6 LBS | HEART RATE: 87 BPM | BODY MASS INDEX: 22.85 KG/M2 | SYSTOLIC BLOOD PRESSURE: 116 MMHG | OXYGEN SATURATION: 99 % | DIASTOLIC BLOOD PRESSURE: 74 MMHG | TEMPERATURE: 99.4 F

## 2024-10-23 DIAGNOSIS — J30.2 SEASONAL ALLERGIES: ICD-10-CM

## 2024-10-23 DIAGNOSIS — R05.1 ACUTE COUGH: ICD-10-CM

## 2024-10-23 DIAGNOSIS — R05.9 COUGH, UNSPECIFIED TYPE: Primary | ICD-10-CM

## 2024-10-23 DIAGNOSIS — R07.89 CHEST TIGHTNESS: ICD-10-CM

## 2024-10-23 DIAGNOSIS — J31.0 RHINITIS, NONALLERGIC, CHRONIC: ICD-10-CM

## 2024-10-23 DIAGNOSIS — R06.09 EXERTIONAL DYSPNEA: ICD-10-CM

## 2024-10-23 PROCEDURE — 95012 NITRIC OXIDE EXP GAS DETER: CPT | Performed by: INTERNAL MEDICINE

## 2024-10-23 PROCEDURE — 99213 OFFICE O/P EST LOW 20 MIN: CPT | Performed by: INTERNAL MEDICINE

## 2024-10-23 PROCEDURE — 71046 X-RAY EXAM CHEST 2 VIEWS: CPT

## 2024-10-23 RX ORDER — ALBUTEROL SULFATE 0.83 MG/ML
2.5 SOLUTION RESPIRATORY (INHALATION) 3 TIMES DAILY
Qty: 180 ML | Refills: 5 | Status: SHIPPED | OUTPATIENT
Start: 2024-10-23

## 2024-10-23 RX ORDER — BUDESONIDE AND FORMOTEROL FUMARATE DIHYDRATE 80; 4.5 UG/1; UG/1
2 AEROSOL RESPIRATORY (INHALATION) 2 TIMES DAILY
Qty: 10.2 G | Refills: 0 | Status: SHIPPED | OUTPATIENT
Start: 2024-10-23
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
## 2024-10-26 PROBLEM — Z00.00 MEDICARE ANNUAL WELLNESS VISIT, SUBSEQUENT: Status: RESOLVED | Noted: 2020-05-28 | Resolved: 2024-10-26

## 2024-10-31 ENCOUNTER — CLINICAL SUPPORT (OUTPATIENT)
Dept: GYNECOLOGY | Facility: CLINIC | Age: 54
End: 2024-10-31
Payer: MEDICARE

## 2024-10-31 VITALS — WEIGHT: 141.2 LBS | BODY MASS INDEX: 22.79 KG/M2 | SYSTOLIC BLOOD PRESSURE: 118 MMHG | DIASTOLIC BLOOD PRESSURE: 78 MMHG

## 2024-10-31 DIAGNOSIS — R10.2 PELVIC PAIN IN FEMALE: Primary | ICD-10-CM

## 2024-10-31 PROCEDURE — 96372 THER/PROPH/DIAG INJ SC/IM: CPT

## 2024-10-31 RX ORDER — MEDROXYPROGESTERONE ACETATE 150 MG/ML
150 INJECTION, SUSPENSION INTRAMUSCULAR ONCE
Status: COMPLETED | OUTPATIENT
Start: 2024-10-31 | End: 2024-10-31

## 2024-10-31 RX ADMIN — MEDROXYPROGESTERONE ACETATE 150 MG: 150 INJECTION, SUSPENSION INTRAMUSCULAR at 11:56

## 2024-10-31 NOTE — PROGRESS NOTES
Carmen ASHLEY Decker presents for a scheduled medroxyprogesterone 150mg injection. This was administered in the R deltoid  without any difficulty.  Pt has the following complaints/concerns None .    A notice was sent to Dr. Hollis to sign off on order.  Pt will schedule next injection in approx 10 weeks    NDC- 18575-158-23    LOT- [id number]    EXP- 12/31/2026

## 2024-11-01 ENCOUNTER — TELEPHONE (OUTPATIENT)
Age: 54
End: 2024-11-01

## 2024-11-01 NOTE — TELEPHONE ENCOUNTER
Pt stated Pulm Provider: Dr. Salinas    Actionable item: Request for Prednisone - [facility] per EMR    What is the reason for the call/chief complaint? Update to 10/23/24 OV. Pt states inhaler helped for 1st few days, then S/S discussed at OV returned. Pt states it was recommended she have a cardiac workup, states had in the past. Patient states she thinks she needs a steroid; states she can handle Prednisone 40mg, but not 50mg. Advised to call back with worsening S/S; however, if unable to catch breath/severe difficulty breathing go to ER and/or call 911. Pt acknowledged instructions. Patient had no further questions and/or concerns at this time. Please advise further    Priority: High

## 2024-11-06 DIAGNOSIS — J45.21 MILD INTERMITTENT REACTIVE AIRWAY DISEASE WITH ACUTE EXACERBATION: Primary | ICD-10-CM

## 2024-11-06 RX ORDER — PREDNISONE 20 MG/1
40 TABLET ORAL DAILY
Qty: 10 TABLET | Refills: 0 | Status: SHIPPED | OUTPATIENT
Start: 2024-11-06 | End: 2024-11-11

## 2024-11-06 NOTE — TELEPHONE ENCOUNTER
Incoming call:    Patient requesting update regarding return of symptoms previously described. Would like further recommendations by a provider.    Also mentioned taking a tramadol that she received from the provider who administers her Botox injections and this helped with her symptoms of headache and chest discomfort related to coughing. Is wondering if this would be an option.    Educated that Tramadol would not typically be an option but will route to providers for review and recommendation

## 2024-11-06 NOTE — TELEPHONE ENCOUNTER
Please let her know I sent 5 days of prednisone. Dr. Salinas, not sure what further workup you wanted to do from here?

## 2024-11-07 ENCOUNTER — NURSE TRIAGE (OUTPATIENT)
Age: 54
End: 2024-11-07

## 2024-11-07 NOTE — TELEPHONE ENCOUNTER
"Reason for Conversation: pt states her chest heaviness and sob returned approx one month ago. Her symptoms increase when she coughs, she feels very winded.  She has been seeing pulmonology, they prescribed steroids yesterday.She also states she feels like her heart is fluttering and racing again, this is not constant.  She states the symptoms are exactly the same as when she saw  August 2023.she had multiple testing done and was advised she had pericardial effusion. A CXR was done 10/23/24. Pt is concerned she may have a PE (no hx of this and states she has been evaluated in past and was negative. Pt is going to go to [facility] ED to be evaluated. She is scheduled for OV 11/25, was unable to make earlier apts offered due to work schedule. Advised I would make provider aware of return of symptoms and that she is going to ED for eval. Please call her back w/ any additional recommendations.     VS/Weight: (Note: Please include date/time vitals/weight were measured)  pulse currently 92 and feels regular. Pt has not been checking pulse or O2 sats.     Pain: Yes         -Location: mid chest     -Radiation: no    -Duration: 30\"    -Type (tightness, crushing etc.): heaviness    Risk Factors: Other palpitations, DOA, pericardial effusion     Recent relevant testing and date of testing: Other none  no    Medication:     Upcoming Office Visit: Yes 11/25    Last Office Visit: 08/10/23       Reason for Disposition   Difficulty breathing    Answer Assessment - Initial Assessment Questions  1. RESPIRATORY STATUS: \"Describe your breathing?\" (e.g., wheezing, shortness of breath, unable to speak, severe coughing)       Shortness of breath, she feels winded after speaking, heaviness in chest at rest  2. ONSET: \"When did this breathing problem begin?\"       1 month ago  3. PATTERN \"Does the difficult breathing come and go, or has it been constant since it started?\"       constant  4. SEVERITY: \"How bad is your breathing?\" " "(e.g., mild, moderate, severe)       Moderate but can be severe when she is coughing  5. RECURRENT SYMPTOM: \"Have you had difficulty breathing before?\" If Yes, ask: \"When was the last time?\" and \"What happened that time?\"       Yes, saw Dr. Stanley 08/10/23, testing was ordered  6. CARDIAC HISTORY: \"Do you have any history of heart disease?\" (e.g., heart attack, angina, bypass surgery, angioplasty)       Palpitations, DOA, pericardial effusion   7. LUNG HISTORY: \"Do you have any history of lung disease?\"  (e.g., pulmonary embolus, asthma, emphysema)      Cough/breathing, asthma (dx in past but states she has now been told she does not have), denies hx PE  8. CAUSE: \"What do you think is causing the breathing problem?\"       unsure  9. OTHER SYMPTOMS: \"Do you have any other symptoms?\" (e.g., chest pain, cough, dizziness, fever, runny nose)      Chest heaviness, cough  10. O2 SATURATION MONITOR:  \"Do you use an oxygen saturation monitor (pulse oximeter) at home?\" If Yes, ask: \"What is your reading (oxygen level) today?\" \"What is your usual oxygen saturation reading?\" (e.g., 95%)        She has not been checking   11. PREGNANCY: \"Is there any chance you are pregnant?\" \"When was your last menstrual period?\"        N/A  12. TRAVEL: \"Have you traveled out of the country in the last month?\" (e.g., travel history, exposures)        no    Answer Assessment - Initial Assessment Questions  1. DESCRIPTION: \"Please describe your heart rate or heartbeat that you are having\" (e.g., fast/slow, regular/irregular, skipped or extra beats, \"palpitations\")      She feels as if heart is racing, it is not constant, notices more when coughing  2. ONSET: \"When did it start?\" (e.g., minutes, hours, days)       1 month ago  3. DURATION: \"How long does it last\" (e.g., seconds, minutes, hours)      One hour  4. PATTERN \"Does it come and go, or has it been constant since it started?\"  \"Does it get worse with exertion?\"   \"Are you feeling it now?\"   " "   Worse w/ coughing   5. TAP: \"Using your hand, can you tap out what you are feeling on a chair or table in front of you, so that I can hear?\" Note: Not all patients can do this.        Pt states it feels regular  6. HEART RATE: \"Can you tell me your heart rate?\" \"How many beats in 15 seconds?\"  Note: Not all patients can do this.        23 beats in 15 seconds, regular  7. RECURRENT SYMPTOM: \"Have you ever had this before?\" If Yes, ask: \"When was the last time?\" and \"What happened that time?\"       Yes, was evaluated August 2023 w/ Dr. Stanley and testing was done  8. CAUSE: \"What do you think is causing the palpitations?\"      unsure  9. CARDIAC HISTORY: \"Do you have any history of heart disease?\" (e.g., heart attack, angina, bypass surgery, angioplasty, arrhythmia)       Palpitations, DOA, pericardial effusion  10. OTHER SYMPTOMS: \"Do you have any other symptoms?\" (e.g., dizziness, chest pain, sweating, difficulty breathing)        Denies dizziness, she is c/o chest heaviness, she feels warm all the time, also sob    Protocols used: Breathing Difficulty-Adult-[place], Heart Rate and Heartbeat Questions-Adult-[place]    "

## 2024-11-07 NOTE — TELEPHONE ENCOUNTER
Myles Parker MD  You; Araceli Stanley MD; Cardiology [facility]14 minutes ago (2:13 PM)       Sounds good.  If she is already going to the ED then they should hopefully take care of it.  But if she gets discharged from the without any major testing, then she should likely have an echocardiogram and a 48 hour Holter monitor completed at some point after that.

## 2024-11-12 ENCOUNTER — NURSE TRIAGE (OUTPATIENT)
Age: 54
End: 2024-11-12

## 2024-11-12 NOTE — TELEPHONE ENCOUNTER
"Reason for Disposition  • Patient wants to be seen    Answer Assessment - Initial Assessment Questions  1. ONSET: \"When did the cough begin?\"       About a month ago  2. SEVERITY: \"How bad is the cough today?\"       Unspecified  3. SPUTUM: \"Describe the color of your sputum\" (e.g., none, dry cough; clear, white, yellow, green)      Dry  4. HEMOPTYSIS: \"Are you coughing up any blood?\" If Yes, ask: \"How much?\" (e.g., flecks, streaks, tablespoons, etc.)      No  5. DIFFICULTY BREATHING: \"Are you having difficulty breathing?\" If Yes, ask: \"How bad is it?\" (e.g., mild, moderate, severe)       SOB  6. FEVER: \"Do you have a fever?\" If Yes, ask: \"What is your temperature, how was it measured, and when did it start?\"      Denies fever- noticed chills today. Unsure if she has a fever now  7. CARDIAC HISTORY: \"Do you have any history of heart disease?\" (e.g., heart attack, congestive heart failure)       See chart  8. LUNG HISTORY: \"Do you have any history of lung disease?\"  (e.g., pulmonary embolus, asthma, emphysema)      Cough, chest tightness, dyspnea  9. PE RISK FACTORS: \"Do you have a history of blood clots?\" (or: recent major surgery, recent prolonged travel, bedridden)      None specified  10. OTHER SYMPTOMS: \"Do you have any other symptoms?\" (e.g., runny nose, wheezing, chest pain)        Wheezing    Protocols used: Cough-Adult-[place]    "

## 2024-11-12 NOTE — TELEPHONE ENCOUNTER
Regarding: SOB chest pain wheezing  ----- Message from Grace BUENO sent at 11/12/2024  3:18 PM EST -----  Pt calls in and states she fiished steroids and is back to feeling bad cough SOB wheezing and chest pains please advise

## 2024-11-12 NOTE — TELEPHONE ENCOUNTER
Pt stated Pulm Provider:  Dr. Naranjo    Actionable item: Scheduled OV 11/4    What is the reason for the call/chief complaint?    Pt calling for persistent symptoms. Pt has a hx of cough, SOB with no known pulmonary origin. Pt had office visit on 10/23 and was evaluated in ER for the same on 11/7. Pt continues to experience dry cough, increased SOB and wheezing. She completed steroids which were somewhat effective but symptoms returned once they were completed 2 days ago. Pt denies fevers or chest pain. Scheduled pt for sick visit 11/14 with Dr. Naranjo. Pt aware if SOB becomes severe she should proceed to the ER. Will review with provider and update pt with any further recommendations.

## 2024-11-14 ENCOUNTER — OFFICE VISIT (OUTPATIENT)
Dept: PULMONOLOGY | Facility: CLINIC | Age: 54
End: 2024-11-14
Payer: MEDICARE

## 2024-11-14 ENCOUNTER — APPOINTMENT (OUTPATIENT)
Dept: LAB | Age: 54
End: 2024-11-14
Payer: MEDICARE

## 2024-11-14 VITALS
TEMPERATURE: 99.6 F | BODY MASS INDEX: 22.87 KG/M2 | HEART RATE: 86 BPM | SYSTOLIC BLOOD PRESSURE: 122 MMHG | OXYGEN SATURATION: 98 % | DIASTOLIC BLOOD PRESSURE: 66 MMHG | WEIGHT: 141.7 LBS

## 2024-11-14 DIAGNOSIS — J40 BRONCHITIS: ICD-10-CM

## 2024-11-14 DIAGNOSIS — R05.9 COUGH, UNSPECIFIED TYPE: Primary | ICD-10-CM

## 2024-11-14 DIAGNOSIS — R50.9 FEVER, UNSPECIFIED FEVER CAUSE: ICD-10-CM

## 2024-11-14 PROCEDURE — 99214 OFFICE O/P EST MOD 30 MIN: CPT | Performed by: INTERNAL MEDICINE

## 2024-11-14 PROCEDURE — G2211 COMPLEX E/M VISIT ADD ON: HCPCS | Performed by: INTERNAL MEDICINE

## 2024-11-14 PROCEDURE — 86738 MYCOPLASMA ANTIBODY: CPT | Performed by: INTERNAL MEDICINE

## 2024-11-14 PROCEDURE — 87636 SARSCOV2 & INF A&B AMP PRB: CPT | Performed by: INTERNAL MEDICINE

## 2024-11-14 RX ORDER — BUDESONIDE AND FORMOTEROL FUMARATE DIHYDRATE 160; 4.5 UG/1; UG/1
2 AEROSOL RESPIRATORY (INHALATION) 2 TIMES DAILY
Qty: 10.2 G | Refills: 2 | Status: SHIPPED | OUTPATIENT
Start: 2024-11-14

## 2024-11-14 RX ORDER — TRAMADOL HYDROCHLORIDE 50 MG/1
TABLET ORAL
COMMUNITY
Start: 2024-10-15

## 2024-11-14 NOTE — PROGRESS NOTES
Pulmonary Follow Up Note  Carmen Decker 54 y.o. female MRN: [id number]  11/14/2024      HPI:    Patient complains of shortness of breath, raspy cough and general malaise for approximately 1 month.  During this time she states that symptoms have worsened.  She was evaluated in the emergency department and was told that she had an upper respiratory infection.  She was given a rapid steroid taper and no antibiotics.  She states that steroids had improved her symptoms temporarily but have since gotten worse.    Exercise Tolerance: Poor.  Feels short of breath with ambulation.       Meds:  Symbicort 80 twice daily  Albuterol as needed, frequently      ROS:  Constitutional: + Fatigue, - chills, - fever, - weight change.   HEENT: - rhinorrhea, - sneezing, - sore throat.    Respiratory: + Dry cough, + shortness of breath, - wheezing.    Cardiovascular: - chest pain,  -palpitations, - leg swelling.   Gastrointestinal: - abdominal pain, - constipation, - diarrhea, - nausea, - vomiting.   Endocrine: - cold intolerance, - heat intolerance.   Genitourinary: - dysuria.   Musculoskeletal: - arthralgias.   Skin:- rash, - wound.   Allergic/Immunologic: - allergies  Neurological: - dizziness, - numbness        Vitals: Blood pressure 122/66, pulse 86, temperature 99.6 °F (37.6 °C), temperature source Tympanic Core, weight 64.3 kg (141 lb 11.2 oz), SpO2 98%, not currently breastfeeding., Body mass index is 22.87 kg/m².    Physical Exam:  GEN  NAD  HEENT  ncat, non icteric, MM moist  NECK  supple, no JVD, no LAD  CV  +s1s2, no mrg, RRR  PULM + forced expiratory wheeze, no rhonchi, no rales  ABD  soft, ntnd, + BS  EXT  no edema, no cyanosis, no clubbing  NEURO  Aox3, no focal weakness    Imaging and other studies:   I personally viewed and interpreted the following imaging studies:  Chest x-ray 10/23/2024 shows no gross intraparenchymal or pleural abnormalities    Assessment:  Prolonged respiratory tract infection  Seasonal  "allergies  Allergic rhinitis      Plan:  Increase Symbicort to 160 twice daily  Check CT chest for nonresolving respiratory tract infection  I do not recommend any further systemic corticosteroids  Check mycoplasma IgG and IgM  Check COVID and influenza swab  Patient will follow-up in approximately 2 weeks and pulmonary fellow clinic.  Patient will call pulmonary office back with any worsening or development of new pulmonary symptoms prior to next visit.    Return visit in 2 weeks with pulmonary fellow clinic  On next visit we will evaluate symptoms, mycoplasma antibody results, COVID and flu swab results, CT chest results    Note: Portions of the record may have been created with voice recognition software. Occasional wrong word or \"sound a like\" substitutions may have occurred due to the inherent limitations of voice recognition software. Read the chart carefully and recognize, using context, where substitutions have occurred.     Daryl Naranjo M.D.  [facility]  "

## 2024-11-16 LAB
M PNEUMO IGG SER IA-ACNC: <100 U/ML (ref 0–99)
M PNEUMO IGM SER IA-ACNC: <770 U/ML (ref 0–769)

## 2024-11-18 NOTE — PROGRESS NOTES
Advanced Heart Failure / Pulmonary Hypertension Outpatient Visit    Carmen Decker 54 y.o. female   MRN: [id number]  Encounter: [id number]    Assessment:  Patient Active Problem List    Diagnosis Date Noted    Shortness of breath 11/25/2024    Herpes zoster without complication 09/26/2024    Mouth sores 09/11/2024    Ingrown toenail 09/11/2024    Rhinitis, nonallergic, chronic 02/22/2024    Chest heaviness 09/20/2023    Status post tubal ligation 07/19/2023    History of herpes genitalis 07/19/2023    Menopausal syndrome (hot flushes) 07/19/2023    Gastroesophageal reflux disease 09/09/2022    Right leg pain 08/10/2022    Tingling of face 08/10/2022    Leg cramps 06/08/2022    Vitamin D insufficiency 02/28/2022    Myalgia 02/16/2022    Chronic nonintractable headache 02/16/2022    Tachycardia 01/12/2022    Malignant neoplasm of thyroid gland (HCC) 12/14/2021    Multiple thyroid nodules 11/02/2021    Low blood sugar 10/20/2021    Altered taste 06/09/2021    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    Bronchitis 05/05/2021    Chronic cough 05/05/2021    Nasal sinus congestion 05/28/2020    Seasonal allergies 05/28/2020    Exertional dyspnea 05/20/2020    Right lumbar radiculopathy 10/18/2019    Dizziness 10/18/2019    Urinary frequency 09/11/2019    Tonsillar calculus 05/15/2019    Menorrhagia with irregular cycle 10/10/2018    Hx of colonoscopy 06/15/2018    Dense breasts 11/14/2017    Bipolar II disorder (HCC) 05/12/2017    Interstitial cystitis 05/02/2017    Intractable chronic migraine without aura and without status migrainosus 01/31/2017    OCD (obsessive compulsive disorder) 09/29/2016    MDD (major depressive disorder), recurrent severe, without psychosis (HCC) 07/25/2011    Cystitis 11/15/2010    Fatigue 11/15/2010    Stress incontinence 11/15/2010    Chronic migraine without aura without status migrainosus, not intractable 10/09/2009    Constipation 10/09/2009    Depression 10/09/2009    Irritable  bowel syndrome 10/09/2009    Other hyperlipidemia 10/09/2009    Insomnia 12/05/2006    Neck pain 05/12/2006       Today's Plan:  Will order holter monitor for 1 week to evaluate palpitations  Echo ordered to evaluate SOB, chest heaviness, activity intolerance  RTO with Cardiologist at [facility] once ECHO complete.    Go to ER with worsening SOB, CP, palpitations    Plan:  Shortness of breath   Has been ongoing for over a year since COVID in 2023.     Worsening AYON with associated symptoms of chest heaviness, palpitations, activity intolerance    Steroids and NSAIDS help alleviate symptoms   Will repeat ECHO   Chest heaviness   BP Today: 122/72   EKG today: NSR, HR 88,  normal ECG     Saw [facility] for same symptoms in 10/2023--stated steroids help pain, ECHO ordered (see below results)     Last ECHO 08/2023-  Normal systolic and diastolic function. EF 60-65%.  Small peric effusion noted     CXR 11/07/2024: Heart size and pulmonary vasculature normal. Lung fields well aerated. No   infiltrates, edema, or effusions. Hilar mediastinal compartments are without   adenopathy. No pneumothorax seen.      CT Chest 11/19/2024--LUNGS: Patent central airways.  Lung nodules annotated on series 3:  3 mm lower lobe juxtapleural nodule image #156, stable.  3 mm right upper lobe juxtapleural nodule image #121, stable     Stress Test 2023: No ST deviation is noted. There were no arrhythmias during stress. Arrhythmias during recovery: rare PACs. The ECG was negative for ischemia. The stress ECG is negative for ischemia after maximal exercise, without reproduction of symptoms.      Last BMP 11/07/2024: , K 4.4, creat 0.60    Pericardial effusion (2023)   S/P Covid infection--c/p Colchicine treatment did not tolerate), s/p Steroid therapy    Hypercholesteremia    HPI:   Carmen Decker is a 54 yr old female with past medical history of chronic cystitis requiring Botox injections, chronic migraines, COVID-19 infection.  She saw Dr. Stanley in  2023 08/2023 Dr. Stanley follow up: She had exercise stress testing and Holter monitor performed with results as noted below. She also had a CT chest on 7/27/23 which showed a trace anterior pericardial effusion, suspected to be viral, and was started on colchicine through her pulmonologist. Has taken colchicine for 3 days but has been awaiting prior auth. Colchicine caused worsening of her sharp chest pain one evening. She still feels very short of breath. Pain is not worse or alleviated by laying down or sitting forward, respectively. Pain is not worse with deep inspiration. She was on steroids for 7 days prior to her CT chest and reports feeling excellent with improvement in her breathing.      10/10/2023: Saw [facility] for chest heaviness, SOB.  No changes made. F/U in 2 months or if symptoms worsen    11/07/2024: Presented to [facility] ER with SOB, complaints of dry wheezy cough, sob, and chest heaviness for 3 weeks. Patients reports an intermitent wheezing dry cough, worsen when she lays down or in a car/confined space. She also endorses chest heaviness with sternal pleuritic chest pain reproducible on palpation and feeling more winded than usual. Patient has tried nebulizer over the last few weeks with little improvement. Patient was seen by pulmonology who started her on prednisone taper which has helped but not resolved her symptoms, and today recommended she go the ED for further evaluation. She has a cardiology appointment at the end of the month.     11/18/2024: Acute visit for long standing chest heaviness, SOB, palpitations. She has not had any recent  exposures to fevers or viruses. She feels her AYON, cough, palpitations, chest heaviness and activity intolerance has worsened over the last 2 months and she has stopped walking and going to the gym.  Her SOB wakes her up at night and she is unable to lie flat (sleeps on 3 pillows).  So far, the only relief she gets is taking steroids, antibiotics, or  Advil.  She works as a part time [employer] - [place].        Past Medical History:   Diagnosis Date    Anemia     Anxiety     Asthma     illness induced    Cancer (HCC)     thyroid ca (removed Jan 2022)    SARA I (cervical intraepithelial neoplasia I)     RESOLVED: [id number]    Depression     Endometriosis     Functional ovarian cysts age 17    History of agoraphobia age 17    Hypercholesterolemia     IBS (irritable bowel syndrome) age 17    IC (interstitial cystitis)     Malignant neoplasm of thyroid gland (HCC) 12/14/2021    Migraine     Motion sickness     Multiple thyroid nodules 11/02/2021    Last Assessment & Plan:  Formatting of this note might be different from the original. Carmen Decker is a 51 y.o. female   We had an extensive discussion regarding thyroid nodules, the natural course of thyroid nodules, ultrasound features which can convey an increased risk for malignancy, the general indications for fine needle aspiration, the procedure itself and possible results from a fine needl    Pap smear abnormality of cervix with ASCUS favoring benign     RESOLVED: 07FEB2017    Seasonal allergies     with post-nasal drip and recurrent throat infections    Thyroid cancer (HCC)     Urinary tract infection     Varicella        Review of Systems   Constitutional:  Positive for activity change, fatigue and unexpected weight change.   HENT: Negative.     Eyes: Negative.    Respiratory:  Positive for cough and shortness of breath.    Cardiovascular:  Positive for chest pain and palpitations. Negative for leg swelling.   Gastrointestinal: Negative.    Endocrine: Negative.    Genitourinary: Negative.    Musculoskeletal: Negative.    Skin: Negative.    Allergic/Immunologic: Negative.    Neurological: Negative.  Negative for dizziness and syncope.   Hematological: Negative.    Psychiatric/Behavioral: Negative.         Allergies   Allergen Reactions    Latex Itching and Hives     Irritation    Vancomycin Itching and Swelling     Patient had  facial swelling, especially around her eyes and severe itching.    Tessalon [Benzonatate] Itching    Azithromycin Diarrhea, Nausea Only and Other (See Comments)    Clarithromycin Other (See Comments)     Does not know    Clavulanic Acid Other (See Comments)    Erythromycin Base Other (See Comments)    Levofloxacin Hives    Lithium Itching    Meropenem Itching     itching    Mirtazapine Other (See Comments)     (remeron)  (remeron)  (remeron)    Moxifloxacin Nausea Only     Excessive vomiting  Excessive vomiting    Nitrofurantoin Itching and Other (See Comments)     Blotchy skin     Sulfamethoxazole      Other reaction(s): Rash    Sulfamethoxazole-Trimethoprim Other (See Comments)    Trimethoprim      Other reaction(s): Rash    Doxycycline Other (See Comments), Itching and Rash     Itching and rash    Indomethacin Rash    Lamotrigine Other (See Comments) and Rash     Can't recall side effects.     Penicillins Rash         Current Outpatient Medications:     albuterol (2.5 mg/3 mL) 0.083 % nebulizer solution, Take 3 mL (2.5 mg total) by nebulization 3 (three) times a day, Disp: 180 mL, Rfl: 5    albuterol (ProAir HFA) 90 mcg/act inhaler, Inhale 2 puffs every 6 (six) hours as needed for wheezing or shortness of breath, Disp: 25.5 g, Rfl: 3    budesonide-formoterol (Symbicort) 160-4.5 mcg/act inhaler, Inhale 2 puffs 2 (two) times a day Rinse mouth after use., Disp: 10.2 g, Rfl: 2    Ciclopirox 1 % shampoo, Apply 1 application topically every other day To the scalp for 5 minutes, then rinse thoroughly., Disp: 120 mL, Rfl: 1    clobetasol (TEMOVATE) 0.05 % external solution, APPLY THIN LAYER ON SCALP TWICE DAILY X 2 WEEKS THEN STOP X 2 WEEKS. REPEAT 2 WEEKS ON AND 2 WEEKS OFF.AVOID FACE/SKIN FOLDS/GROIN, Disp: , Rfl:     clotrimazole-betamethasone (LOTRISONE) 1-0.05 % cream, Apply topically 2 (two) times a day, Disp: 30 g, Rfl: 0    colestipol (COLESTID) 1 g tablet, TAKE 1 TABLET BY MOUTH TWICE DAILY, Disp: 270 tablet,  Rfl: 3    [employer]S Cortisone Maximum Strength 1 % lotion, APPLY TO AFFECTED AREAS OF SCALP UP TO 4 TIMES A DAY ONLY AS NEEDED., Disp: 99 mL, Rfl: 0    dicyclomine (BENTYL) 10 mg capsule, TAKE 1 CAPSULE (10 MG TOTAL) BY MOUTH 3 (THREE) TIMES A DAY AS NEEDED (ABDOMINAL PAIN), Disp: 270 capsule, Rfl: 1    fluticasone (FLONASE) 50 mcg/act nasal spray, 1 spray into each nostril daily, Disp: 9.9 mL, Rfl: 5    guaiFENesin (ROBITUSSIN) 100 MG/5ML oral liquid, Take 10 mL (200 mg total) by mouth 3 (three) times a day as needed for cough, Disp: 120 mL, Rfl: 0    hydrocortisone 2.5 % cream, Apply topically 2 (two) times a day, Disp: 30 g, Rfl: 0    ketoconazole (NIZORAL) 2 % shampoo, Apply 1 application. topically 2 (two) times a week SHMP twice a wk x 8 wk, then PRN.  Leave on for 5 min before rinsing., Disp: 120 mL, Rfl: 1    LORazepam (ATIVAN) 1 mg tablet, TAKE 1 & 1/2 TABLETS DAILY AT BEDTIME AS NEEDED, Disp: , Rfl: 1    medroxyPROGESTERone (DEPO-PROVERA) 150 mg/mL injection, TO BE GIVEN IN OFFICE EVERY 10 WEEKS, Disp: 1 mL, Rfl: 3    Meth-Hyo-M Bl-Na Phos-Ph Sal (Uribel) 118 MG CAPS, Take 1 each by mouth as needed, Disp: , Rfl:     metroNIDAZOLE (METROCREAM) 0.75 % cream, Apply topically 2 (two) times a day, Disp: 45 g, Rfl: 3    mometasone (ELOCON) 0.1 % lotion, APPLY THIN LAYER TO AFFECTED AREA ON SCALP TWICE DAILY FOR 2 WEEKS THEN STOP FOR 2 WEEKS. REPEAT AS NEEDED, Disp: , Rfl:     Multiple Vitamin (MULTI VITAMIN DAILY PO), Take by mouth  , Disp: , Rfl:     Multiple Vitamins-Minerals (Multivitamin Adult) CHEW, Chew, Disp: , Rfl:     naproxen (NAPROSYN) 500 mg tablet, as needed for headaches, Disp: , Rfl:     Tirosint 150 MCG CAPS, Take 1 capsule by mouth daily, Disp: , Rfl:     traMADol (ULTRAM) 50 mg tablet, , Disp: , Rfl:     naratriptan (AMERGE) 2.5 MG tablet, , Disp: , Rfl:     pentosan polysulfate (ELMIRON) 100 mg capsule, Take 100 mg by mouth 3 (three) times a day before meals   (Patient not taking: Reported on  11/25/2024), Disp: , Rfl:     Current Facility-Administered Medications:     medroxyPROGESTERone (DEPO-PROVERA) IM injection 150 mg, 150 mg, Intramuscular, Once, Lela Hollis DO    medroxyPROGESTERone (DEPO-PROVERA) IM injection 150 mg, 150 mg, Intramuscular, Once, Lela Hollis DO    Social History     Socioeconomic History    Marital status:      Spouse name: Not on file    Number of children: 2    Years of education: Not on file    Highest education level: Not on file   Occupational History    Occupation:    Tobacco Use    Smoking status: Never    Smokeless tobacco: Never   Vaping Use    Vaping status: Never Used   Substance and Sexual Activity    Alcohol use: No    Drug use: No    Sexual activity: Not on file   Other Topics Concern    Not on file   Social History Narrative    Marital history- single as per all scripts    No caffeine use    Taoism affiliation Gnosticism    Uses safety equipment- seatbelts      Social Drivers of Health     Financial Resource Strain: Not on file   Food Insecurity: Not on file   Transportation Needs: Not on file   Physical Activity: Not on file   Stress: Not on file   Social Connections: Not on file   Intimate Partner Violence: Not on file   Housing Stability: Not on file     Family History   Problem Relation Age of Onset    Other Mother         HYSTERECTOMY FOR BENIGN DISEASE     Diabetes Mother     Lung cancer Father 70    Other Sister         HYSTERECTOMY FOR BENIGN DISEASE     Diabetes Maternal Grandmother     Other Maternal Grandmother         HYSTERECTOMY FOR BENIGN DISEASE     Colon cancer Paternal Grandmother 60    Throat cancer Paternal Uncle 60    No Known Problems Maternal Aunt     No Known Problems Paternal Aunt     No Known Problems Paternal Aunt     Diabetes Maternal Grandfather        Vitals:   Blood pressure 122/72, pulse 88, weight 63.5 kg (140 lb), SpO2 99%, not currently breastfeeding.    Wt Readings from Last 10 Encounters:   11/25/24 63.5 kg (140  lb)   11/14/24 64.3 kg (141 lb 11.2 oz)   10/31/24 64 kg (141 lb 3.2 oz)   10/23/24 64.2 kg (141 lb 9.6 oz)   09/26/24 65 kg (143 lb 3.2 oz)   09/25/24 64.3 kg (141 lb 12.8 oz)   09/11/24 65 kg (143 lb 4.8 oz)   08/22/24 65.8 kg (145 lb)   07/31/24 65.8 kg (145 lb)   06/10/24 66.8 kg (147 lb 3.2 oz)     Vitals:    11/25/24 0856 11/25/24 0919   BP: (!) 188/70 122/72   BP Location: Left arm Left arm   Patient Position: Sitting    Cuff Size: Standard    Pulse: 88    SpO2: 99%    Weight: 63.5 kg (140 lb)        Physical Exam  Constitutional:       Appearance: She is well-developed.   Eyes:      Extraocular Movements: Extraocular movements intact.   Neck:      Vascular: No JVD.   Cardiovascular:      Rate and Rhythm: Normal rate and regular rhythm. No extrasystoles are present.     Pulses:           Radial pulses are 2+ on the right side and 2+ on the left side.      Heart sounds: Normal heart sounds, S1 normal and S2 normal. No murmur heard.  Pulmonary:      Effort: Pulmonary effort is normal. No tachypnea or respiratory distress.      Breath sounds: Normal breath sounds and air entry.   Abdominal:      General: Abdomen is flat.      Palpations: Abdomen is soft.   Musculoskeletal:      Cervical back: Neck supple.      Right lower leg: No edema.      Left lower leg: No edema.   Skin:     General: Skin is cool and dry.   Neurological:      Mental Status: She is alert and oriented to person, place, and time. Mental status is at baseline.   Psychiatric:         Mood and Affect: Mood normal.         Behavior: Behavior is cooperative.         Cognition and Memory: Cognition normal.       Labs & Results:  Lab Results   Component Value Date    WBC 7.65 08/20/2024    HGB 14.2 08/20/2024    HCT 43.8 08/20/2024    MCV 89 08/20/2024     08/20/2024     Lab Results   Component Value Date    SODIUM 137 11/07/2024    K 4.4 11/07/2024     11/07/2024    CO2 21 11/07/2024    BUN 10 11/07/2024    CREATININE 0.60 11/07/2024     "GLUC 124 (H) 11/07/2024    CALCIUM 10.0 11/07/2024     Lab Results   Component Value Date    INR 1.1 11/27/2020    INR 1.0 05/27/2018     No results found for: \"BNP\"     DON Zheng  "

## 2024-11-19 ENCOUNTER — HOSPITAL ENCOUNTER (OUTPATIENT)
Dept: RADIOLOGY | Age: 54
Discharge: HOME | End: 2024-11-19
Payer: MEDICARE

## 2024-11-19 DIAGNOSIS — R05.9 COUGH, UNSPECIFIED TYPE: ICD-10-CM

## 2024-11-19 DIAGNOSIS — J06.9 UPPER RESPIRATORY TRACT INFECTION, UNSPECIFIED TYPE: Primary | ICD-10-CM

## 2024-11-19 PROCEDURE — 71250 CT THORAX DX C-: CPT

## 2024-11-19 RX ORDER — CIPROFLOXACIN 750 MG/1
750 TABLET, FILM COATED ORAL EVERY 12 HOURS SCHEDULED
Qty: 10 TABLET | Refills: 0 | Status: SHIPPED | OUTPATIENT
Start: 2024-11-19 | End: 2024-11-24

## 2024-11-19 RX ORDER — GUAIFENESIN 200 MG/10ML
200 LIQUID ORAL 3 TIMES DAILY PRN
Qty: 120 ML | Refills: 0 | Status: SHIPPED | OUTPATIENT
Start: 2024-11-19

## 2024-11-19 NOTE — TELEPHONE ENCOUNTER
Dr. Naranjo    Pt called back regarding CT Chest results. Reviewed GGO.   Pt states still SOB and has cough for over 1 month.  Requesting Cipro. [facility] as per EMR.    Please advise.

## 2024-11-19 NOTE — TELEPHONE ENCOUNTER
Patient calling stating she is not feeling any better, still has bad cough heard on call. She had CT competed this morning. Asking what to do next, please advise.

## 2024-11-25 ENCOUNTER — OFFICE VISIT (OUTPATIENT)
Dept: CARDIOLOGY CLINIC | Facility: CLINIC | Age: 54
End: 2024-11-25
Payer: MEDICARE

## 2024-11-25 ENCOUNTER — TELEPHONE (OUTPATIENT)
Dept: CARDIOLOGY CLINIC | Facility: CLINIC | Age: 54
End: 2024-11-25

## 2024-11-25 VITALS
SYSTOLIC BLOOD PRESSURE: 122 MMHG | HEART RATE: 88 BPM | WEIGHT: 140 LBS | DIASTOLIC BLOOD PRESSURE: 72 MMHG | BODY MASS INDEX: 22.6 KG/M2 | OXYGEN SATURATION: 99 %

## 2024-11-25 DIAGNOSIS — R07.89 CHEST HEAVINESS: ICD-10-CM

## 2024-11-25 DIAGNOSIS — R06.02 SHORTNESS OF BREATH: Primary | ICD-10-CM

## 2024-11-25 PROCEDURE — 93000 ELECTROCARDIOGRAM COMPLETE: CPT

## 2024-11-25 PROCEDURE — 99214 OFFICE O/P EST MOD 30 MIN: CPT

## 2024-11-25 NOTE — PATIENT INSTRUCTIONS
Schedule ECHO.  Holter monitor on today    Establish care with [facility]    Return to the ER for increased SOB, chest pain, palpitations

## 2024-12-03 ENCOUNTER — HOSPITAL ENCOUNTER (OUTPATIENT)
Dept: NON INVASIVE DIAGNOSTICS | Facility: HOSPITAL | Age: 54
Discharge: HOME | End: 2024-12-03
Payer: MEDICARE

## 2024-12-03 VITALS
BODY MASS INDEX: 22.5 KG/M2 | SYSTOLIC BLOOD PRESSURE: 122 MMHG | HEART RATE: 88 BPM | HEIGHT: 66 IN | WEIGHT: 140 LBS | DIASTOLIC BLOOD PRESSURE: 72 MMHG

## 2024-12-03 DIAGNOSIS — R06.02 SHORTNESS OF BREATH: ICD-10-CM

## 2024-12-03 DIAGNOSIS — R07.89 CHEST HEAVINESS: ICD-10-CM

## 2024-12-03 LAB
AORTIC ROOT: 2.9 CM
APICAL FOUR CHAMBER EJECTION FRACTION: 66 %
ASCENDING AORTA: 3.4 CM
BSA FOR ECHO PROCEDURE: 1.72 M2
E WAVE DECELERATION TIME: 185 MS
E/A RATIO: 1.01
FRACTIONAL SHORTENING: 32 (ref 28–44)
INTERVENTRICULAR SEPTUM IN DIASTOLE (PARASTERNAL SHORT AXIS VIEW): 0.8 CM
INTERVENTRICULAR SEPTUM: 0.8 CM (ref 0.6–1.1)
LAAS-AP2: 16.2 CM2
LAAS-AP4: 10.5 CM2
LEFT ATRIUM SIZE: 2.8 CM
LEFT ATRIUM VOLUME (MOD BIPLANE): 31 ML
LEFT ATRIUM VOLUME INDEX (MOD BIPLANE): 18 ML/M2
LEFT INTERNAL DIMENSION IN SYSTOLE: 3.2 CM (ref 2.1–4)
LEFT VENTRICULAR INTERNAL DIMENSION IN DIASTOLE: 4.7 CM (ref 3.5–6)
LEFT VENTRICULAR POSTERIOR WALL IN END DIASTOLE: 0.7 CM
LEFT VENTRICULAR STROKE VOLUME: 60 ML
LVSV (TEICH): 60 ML
MV E'TISSUE VEL-LAT: 13 CM/S
MV E'TISSUE VEL-SEP: 10 CM/S
MV PEAK A VEL: 0.67 M/S
MV PEAK E VEL: 68 CM/S
MV STENOSIS PRESSURE HALF TIME: 54 MS
MV VALVE AREA P 1/2 METHOD: 4.07
RA PRESSURE ESTIMATED: 3 MMHG
RIGHT ATRIUM AREA SYSTOLE A4C: 7.5 CM2
RIGHT VENTRICLE ID DIMENSION: 2.8 CM
RV PSP: 14 MMHG
SL CV LEFT ATRIUM LENGTH A2C: 4.8 CM
SL CV LV EF: 65
SL CV PED ECHO LEFT VENTRICLE DIASTOLIC VOLUME (MOD BIPLANE) 2D: 101 ML
SL CV PED ECHO LEFT VENTRICLE SYSTOLIC VOLUME (MOD BIPLANE) 2D: 41 ML
TR MAX PG: 11 MMHG
TR PEAK VELOCITY: 1.6 M/S
TRICUSPID ANNULAR PLANE SYSTOLIC EXCURSION: 1.9 CM
TRICUSPID VALVE PEAK REGURGITATION VELOCITY: 1.64 M/S

## 2024-12-03 PROCEDURE — 93306 TTE W/DOPPLER COMPLETE: CPT | Performed by: INTERNAL MEDICINE

## 2024-12-03 PROCEDURE — 93306 TTE W/DOPPLER COMPLETE: CPT

## 2024-12-12 ENCOUNTER — CLINICAL SUPPORT (OUTPATIENT)
Dept: CARDIOLOGY CLINIC | Facility: CLINIC | Age: 54
End: 2024-12-12

## 2024-12-12 DIAGNOSIS — R06.02 SHORTNESS OF BREATH: ICD-10-CM

## 2024-12-12 DIAGNOSIS — R07.89 CHEST HEAVINESS: ICD-10-CM

## 2024-12-26 ENCOUNTER — ANNUAL EXAM (OUTPATIENT)
Dept: GYNECOLOGY | Facility: CLINIC | Age: 54
End: 2024-12-26
Payer: MEDICARE

## 2024-12-26 VITALS
BODY MASS INDEX: 22.66 KG/M2 | HEIGHT: 66 IN | WEIGHT: 141 LBS | SYSTOLIC BLOOD PRESSURE: 124 MMHG | DIASTOLIC BLOOD PRESSURE: 76 MMHG

## 2024-12-26 DIAGNOSIS — Z12.31 ENCOUNTER FOR SCREENING MAMMOGRAM FOR BREAST CANCER: Primary | ICD-10-CM

## 2024-12-26 DIAGNOSIS — G43.909 MIGRAINE WITHOUT STATUS MIGRAINOSUS, NOT INTRACTABLE, UNSPECIFIED MIGRAINE TYPE: ICD-10-CM

## 2024-12-26 DIAGNOSIS — N92.1 MENORRHAGIA WITH IRREGULAR CYCLE: ICD-10-CM

## 2024-12-26 DIAGNOSIS — Z86.19 HISTORY OF HERPES GENITALIS: ICD-10-CM

## 2024-12-26 DIAGNOSIS — N30.10 INTERSTITIAL CYSTITIS: ICD-10-CM

## 2024-12-26 DIAGNOSIS — R92.30 DENSE BREASTS: ICD-10-CM

## 2024-12-26 DIAGNOSIS — N39.3 STRESS INCONTINENCE: ICD-10-CM

## 2024-12-26 PROBLEM — Z98.51 STATUS POST TUBAL LIGATION: Status: RESOLVED | Noted: 2023-07-19 | Resolved: 2024-12-26

## 2024-12-26 PROCEDURE — G0101 CA SCREEN;PELVIC/BREAST EXAM: HCPCS | Performed by: OBSTETRICS & GYNECOLOGY

## 2024-12-26 RX ORDER — MEDROXYPROGESTERONE ACETATE 150 MG/ML
INJECTION, SUSPENSION INTRAMUSCULAR
Qty: 1 ML | Refills: 4 | Status: SHIPPED | OUTPATIENT
Start: 2024-12-26

## 2024-12-26 RX ORDER — EPINEPHRINE 0.3 MG/.3ML
INJECTION SUBCUTANEOUS
COMMUNITY
Start: 2024-12-18

## 2024-12-26 NOTE — PROGRESS NOTES
Assessment & Plan   Diagnoses and all orders for this visit:    Encounter for screening mammogram for breast cancer  -     Mammo screening bilateral w 3d and cad; Future    Stress incontinence    Interstitial cystitis    Menorrhagia with irregular cycle    Dense breasts  -     US breast screening bilateral complete ([facility]); Future    History of herpes genitalis    Migraine without status migrainosus, not intractable, unspecified migraine type  -     medroxyPROGESTERone (DEPO-PROVERA) 150 mg/mL injection; TO BE GIVEN IN OFFICE EVERY 10 WEEKS    Other orders  -     betamethasone valerate (VALISONE) 0.1 % ointment  -     EPINEPHrine (EPIPEN) 0.3 mg/0.3 mL SOAJ; INJECT 1 PEN IN THE MUSCLE ONE TIME AS DIRECTED    1.  Yearly exam-Pap smear deferred, self breast awareness reviewed, calcium/vitamin D recommendations discussed, mammogram request given, colonoscopy done 1/20/2020 3 follow-up as per specialist.  2. history of menorrhagia-notes amenorrhea on Depo-Provera at 10-week intervals.  To continue with this.  3.  History of menstrual headaches-this was also an impetus to start Depo-Provera years ago.  She notes no headaches on this medication.  She will continue with Depo-Provera.  4. Depo-Provera-at 10-week intervals.  Counseled about possible bone density changes with long-term use, normally reversible on cessation of medication.  Calcium, vitamin D, weightbearing exercise recommended.  5.  History of vaginitis/vulvitis-did see specialist in [place] sometime back.  Apparently, she had some type of reaction to vaginal estrogen previously.  She is no longer taking this.  She has been treated externally with various products including clotrimazole, Lotrisone, Valisone.  Has had reaction to vaginal estrogen as noted above.  Generally, creams irritate her but ointments are better.  Also, reviewed mild soap such as Dove and laundry detergent without dye or perfume.  Recommend change of underwear or showering if any  sweating or irritation in this area.  6. history of chronic migraine-better on Depo-Provera  7.  History of dense breast-counseled about limited visualization and increased risk related to this.  Had 3D mammogram followed by [facility] last year.  She is interested in continuing this, request for each was given.  8.  History of HSV-no current concerns  9.  Tubal ligation for contraception  10.  History of of ART/IC symptoms-denies any current issues.  11.  Menopause-unclear when she may have gone through this.  She denies any significant menopause symptoms.  Did discuss checking FSH/estradiol, but this might be altered by Depo-Provera use.  Did discuss stopping Depo-Provera, but she wishes to avoid this given severity of headaches and menstrual flow with menses.  We will continue Depo-Provera and reassess going forward.  Would suggest stopping by age 57 at the latest.  Follow-up 1 year for yearly exam or as needed.    Subjective   Patient ID: Carmen Decker is a 54 y.o. female.    Vitals:    12/26/24 1026   BP: 124/76     Patient was seen today for yearly exam.  Please see assessment and plan for details.      The following portions of the patient's history were reviewed and updated as appropriate: allergies, current medications, past family history, past medical history, past social history, past surgical history, and problem list.  Past Medical History:   Diagnosis Date    Anemia     Anxiety     Asthma     illness induced    Cancer (HCC)     thyroid ca (removed Jan 2022)    SARA I (cervical intraepithelial neoplasia I)     RESOLVED: [id number]    Depression     Endometriosis     Functional ovarian cysts age 17    History of agoraphobia age 17    Hypercholesterolemia     IBS (irritable bowel syndrome) age 17    IC (interstitial cystitis)     Malignant neoplasm of thyroid gland (HCC) 12/14/2021    Migraine     Motion sickness     Multiple thyroid nodules 11/02/2021    Last Assessment & Plan:  Formatting of this note might be  different from the original. Carmen Decker is a 51 y.o. female   We had an extensive discussion regarding thyroid nodules, the natural course of thyroid nodules, ultrasound features which can convey an increased risk for malignancy, the general indications for fine needle aspiration, the procedure itself and possible results from a fine needl    Pap smear abnormality of cervix with ASCUS favoring benign     RESOLVED: [id number]    Seasonal allergies     with post-nasal drip and recurrent throat infections    Thyroid cancer (HCC)     Urinary tract infection     Varicella      Past Surgical History:   Procedure Laterality Date    CHOLECYSTECTOMY  2019    CHOLECYSTECTOMY LAPAROSCOPIC  08/2020    COLONOSCOPY  06/2018    Nonbleeding angiodysplastic lesion mid ascending colon, questionable ulcerative proctitis-biopsy was negative for acute or chronic colitis    COLONOSCOPY  06/09/2016    Normal    COLONOSCOPY  05/23/2013    Adenoma at splenic flexure    EGD  08/02/2018    EGD  08/03/2020    Normal.  Biopsy stomach negative for H. pylori, biopsy of the duodenum negative for celiac    EGD AND COLONOSCOPY  01/20/2023    ST. MARIZA Escalante MD.- Normal EGD; Small bile lake stomach./ Normal colonoscopy with ileoscopy. Bx's: No active inflammation, granulomas, organisms, dysplasia or neoplasia; no intestinal metaplasia./ No active inflammation or collagenous colitis, granulomas, dysplasia, or neoplasia.    ENDOTRACHEAL INTUBATION EMERGENT  2012    HEMORRHOID SURGERY  07/2009    Dr Boswell    LAPAROSCOPY      CT HYSTEROSCOPY DIAGNOSTIC SEPARATE PROCEDURE N/A 11/21/2018    Procedure: HYSTEROSCOPY;  Surgeon: Earnest Buchanan MD;  Location: [facility];  Service: Gynecology    CT HYSTEROSCOPY ENDOMETRIAL ABLATION N/A 11/21/2018    Procedure: ABLATION ENDOMETRIAL NOVASURE;  Surgeon: Earnest Buchanan MD;  Location: [facility];  Service: Gynecology    SMALL INTESTINE SURGERY      THYROID SURGERY  01/17/2022    Papillary cancer of thyroid  nodules    TOOTH EXTRACTION      TUBAL LIGATION      ONSET: 29 DEC 2011    US GUIDED THYROID BIOPSY  2021     OB History    Para Term  AB Living   3 2 2  1 2   SAB IAB Ectopic Multiple Live Births    1   2      # Outcome Date GA Lbr William/2nd Weight Sex Type Anes PTL Lv   3 IAB            2 Term            1 Term                Current Outpatient Medications:     albuterol (2.5 mg/3 mL) 0.083 % nebulizer solution, Take 3 mL (2.5 mg total) by nebulization 3 (three) times a day, Disp: 180 mL, Rfl: 5    albuterol (ProAir HFA) 90 mcg/act inhaler, Inhale 2 puffs every 6 (six) hours as needed for wheezing or shortness of breath, Disp: 25.5 g, Rfl: 3    betamethasone valerate (VALISONE) 0.1 % ointment, , Disp: , Rfl:     budesonide-formoterol (Symbicort) 160-4.5 mcg/act inhaler, Inhale 2 puffs 2 (two) times a day Rinse mouth after use., Disp: 10.2 g, Rfl: 2    Ciclopirox 1 % shampoo, Apply 1 application topically every other day To the scalp for 5 minutes, then rinse thoroughly., Disp: 120 mL, Rfl: 1    clobetasol (TEMOVATE) 0.05 % external solution, APPLY THIN LAYER ON SCALP TWICE DAILY X 2 WEEKS THEN STOP X 2 WEEKS. REPEAT 2 WEEKS ON AND 2 WEEKS OFF.AVOID FACE/SKIN FOLDS/GROIN, Disp: , Rfl:     clotrimazole-betamethasone (LOTRISONE) 1-0.05 % cream, Apply topically 2 (two) times a day, Disp: 30 g, Rfl: 0    colestipol (COLESTID) 1 g tablet, TAKE 1 TABLET BY MOUTH TWICE DAILY, Disp: 270 tablet, Rfl: 3    [employer] Cortisone Maximum Strength 1 % lotion, APPLY TO AFFECTED AREAS OF SCALP UP TO 4 TIMES A DAY ONLY AS NEEDED., Disp: 99 mL, Rfl: 0    dicyclomine (BENTYL) 10 mg capsule, TAKE 1 CAPSULE (10 MG TOTAL) BY MOUTH 3 (THREE) TIMES A DAY AS NEEDED (ABDOMINAL PAIN), Disp: 270 capsule, Rfl: 1    EPINEPHrine (EPIPEN) 0.3 mg/0.3 mL SOAJ, INJECT 1 PEN IN THE MUSCLE ONE TIME AS DIRECTED, Disp: , Rfl:     fluticasone (FLONASE) 50 mcg/act nasal spray, 1 spray into each nostril daily, Disp: 9.9 mL, Rfl: 5     hydrocortisone 2.5 % cream, Apply topically 2 (two) times a day, Disp: 30 g, Rfl: 0    ketoconazole (NIZORAL) 2 % shampoo, Apply 1 application. topically 2 (two) times a week SHMP twice a wk x 8 wk, then PRN.  Leave on for 5 min before rinsing., Disp: 120 mL, Rfl: 1    LORazepam (ATIVAN) 1 mg tablet, TAKE 1 & 1/2 TABLETS DAILY AT BEDTIME AS NEEDED, Disp: , Rfl: 1    medroxyPROGESTERone (DEPO-PROVERA) 150 mg/mL injection, TO BE GIVEN IN OFFICE EVERY 10 WEEKS, Disp: 1 mL, Rfl: 3    Meth-Hyo-M Bl-Na Phos-Ph Sal (Uribel) 118 MG CAPS, Take 1 each by mouth as needed, Disp: , Rfl:     metroNIDAZOLE (METROCREAM) 0.75 % cream, Apply topically 2 (two) times a day, Disp: 45 g, Rfl: 3    mometasone (ELOCON) 0.1 % lotion, APPLY THIN LAYER TO AFFECTED AREA ON SCALP TWICE DAILY FOR 2 WEEKS THEN STOP FOR 2 WEEKS. REPEAT AS NEEDED, Disp: , Rfl:     Multiple Vitamins-Minerals (Multivitamin Adult) CHEW, Chew, Disp: , Rfl:     naproxen (NAPROSYN) 500 mg tablet, as needed for headaches, Disp: , Rfl:     Tirosint 150 MCG CAPS, Take 1 capsule by mouth daily, Disp: , Rfl:     traMADol (ULTRAM) 50 mg tablet, , Disp: , Rfl:     guaiFENesin (ROBITUSSIN) 100 MG/5ML oral liquid, Take 10 mL (200 mg total) by mouth 3 (three) times a day as needed for cough (Patient not taking: Reported on 12/26/2024), Disp: 120 mL, Rfl: 0    Multiple Vitamin (MULTI VITAMIN DAILY PO), Take by mouth  , Disp: , Rfl:     naratriptan (AMERGE) 2.5 MG tablet, , Disp: , Rfl:     pentosan polysulfate (ELMIRON) 100 mg capsule, Take 100 mg by mouth 3 (three) times a day before meals   (Patient not taking: Reported on 11/25/2024), Disp: , Rfl:     Current Facility-Administered Medications:     medroxyPROGESTERone (DEPO-PROVERA) IM injection 150 mg, 150 mg, Intramuscular, Once, Lela Hollis DO    medroxyPROGESTERone (DEPO-PROVERA) IM injection 150 mg, 150 mg, Intramuscular, Once, Lela Hollis DO  Allergies   Allergen Reactions    Latex Itching and Hives     Irritation     Vancomycin Itching and Swelling     Patient had facial swelling, especially around her eyes and severe itching.    Tessalon [Benzonatate] Itching    Azithromycin Diarrhea, Nausea Only and Other (See Comments)    Clarithromycin Other (See Comments)     Does not know    Clavulanic Acid Other (See Comments)    Erythromycin Base Other (See Comments)    Levofloxacin Hives    Lithium Itching    Meropenem Itching     itching    Mirtazapine Other (See Comments)     (remeron)  (remeron)  (remeron)    Moxifloxacin Nausea Only     Excessive vomiting  Excessive vomiting    Nitrofurantoin Itching and Other (See Comments)     Blotchy skin     Sulfamethoxazole      Other reaction(s): Rash    Sulfamethoxazole-Trimethoprim Other (See Comments)    Trimethoprim      Other reaction(s): Rash    Doxycycline Other (See Comments), Itching and Rash     Itching and rash    Indomethacin Rash    Lamotrigine Other (See Comments) and Rash     Can't recall side effects.     Penicillins Rash     Social History     Socioeconomic History    Marital status:      Spouse name: None    Number of children: 2    Years of education: None    Highest education level: None   Occupational History    Occupation: Nanny   Tobacco Use    Smoking status: Never    Smokeless tobacco: Never   Vaping Use    Vaping status: Never Used   Substance and Sexual Activity    Alcohol use: No    Drug use: No    Sexual activity: None   Other Topics Concern    None   Social History Narrative    Marital history- single as per all scripts    No caffeine use    [employer] affiliation [employer]    Uses safety equipment- seatbelts      Social Drivers of Health     Financial Resource Strain: Not on file   Food Insecurity: Not on file   Transportation Needs: Not on file   Physical Activity: Not on file   Stress: Not on file   Social Connections: Not on file   Intimate Partner Violence: Not on file   Housing Stability: Not on file     Family History   Problem Relation Age of Onset  "   Other Mother         HYSTERECTOMY FOR BENIGN DISEASE     Diabetes Mother     Lung cancer Father 70    Other Sister         HYSTERECTOMY FOR BENIGN DISEASE     Diabetes Maternal Grandmother     Other Maternal Grandmother         HYSTERECTOMY FOR BENIGN DISEASE     Colon cancer Paternal Grandmother 60    Throat cancer Paternal Uncle 60    No Known Problems Maternal Aunt     No Known Problems Paternal Aunt     No Known Problems Paternal Aunt     Diabetes Maternal Grandfather        Review of Systems   Constitutional:  Negative for chills, diaphoresis, fatigue and fever.   Respiratory:  Negative for apnea, cough, chest tightness, shortness of breath and wheezing.    Cardiovascular:  Negative for chest pain, palpitations and leg swelling.   Gastrointestinal:  Negative for abdominal distention, abdominal pain, anal bleeding, constipation, diarrhea, nausea, rectal pain and vomiting.   Genitourinary:  Negative for difficulty urinating, dyspareunia, dysuria, frequency, hematuria, menstrual problem, pelvic pain, urgency, vaginal bleeding, vaginal discharge and vaginal pain.   Musculoskeletal:  Negative for arthralgias, back pain and myalgias.   Skin:  Negative for color change and rash.   Neurological:  Negative for dizziness, syncope, light-headedness, numbness and headaches.   Hematological:  Negative for adenopathy. Does not bruise/bleed easily.   Psychiatric/Behavioral:  Negative for dysphoric mood and sleep disturbance. The patient is not nervous/anxious.        Objective   Physical Exam  OBGyn Exam     Objective      /76 (BP Location: Left arm, Patient Position: Sitting)   Ht 5' 6\" (1.676 m)   Wt 64 kg (141 lb)   BMI 22.76 kg/m²     General:   alert and oriented, in no acute distress   Neck: normal to inspection and palpation   Breast: normal appearance, no masses or tenderness   Heart:    Lungs:    Abdomen: soft, non-tender, without masses or organomegaly   Vulva: normal   Vagina: Minimally atrophic, " without erythema or lesions or discharge.   Cervix: Minimally atrophic, without lesions or discharge or cervicitis.  No CMT   Uterus: top normal size, anteverted, non-tender   Adnexa: no mass, fullness, tenderness   Rectum: Deferred, patient declined    Psych:  Normal mood and affect   Skin:  Without obvious lesions   Eyes: symmetric, with normal movements and reactivity   Musculoskeletal:  Normal muscle tone and movements appreciated

## 2025-01-08 ENCOUNTER — NURSE TRIAGE (OUTPATIENT)
Age: 55
End: 2025-01-08

## 2025-01-08 DIAGNOSIS — J06.9 UPPER RESPIRATORY TRACT INFECTION, UNSPECIFIED TYPE: Primary | ICD-10-CM

## 2025-01-08 RX ORDER — PREDNISONE 10 MG/1
TABLET ORAL
Qty: 30 TABLET | Refills: 0 | Status: SHIPPED | OUTPATIENT
Start: 2025-01-08

## 2025-01-08 NOTE — TELEPHONE ENCOUNTER
"Patient call:  Pt stated provider: Dr. Naranjo    Actionable item: Appointment scheduled    Chief complaint: Reports onset of cough over the last several days with symptom progression (chills, congestion, SOB, & AYON) as of yesterday.    Symptom severity worsened today. Requesting steroid prescription as this is what usually helps her. Verbalized apprehension with office visit due to recent exposure to Covid by granddaughter whom she recently cared for.    Does not have a home test but may present to a care now for testing.      Dispo: Appointment scheduled with Dr. Salinas as requested by Dr. Naranjo. Routing for request of medication management.  Call [facility] with worsening symptoms.  Agrees with plan.   All questions answered.       Reason for Disposition   SEVERE coughing spells (e.g., whooping sound after coughing, vomiting after coughing)    Answer Assessment - Initial Assessment Questions  1. ONSET: \"When did the cough begin?\"       Several days ago  2. SEVERITY: \"How bad is the cough today?\"       Worsened   3. SPUTUM: \"Describe the color of your sputum\" (e.g., none, dry cough; clear, white, yellow, green)      Non productive   4. HEMOPTYSIS: \"Are you coughing up any blood?\" If Yes, ask: \"How much?\" (e.g., flecks, streaks, tablespoons, etc.)      denies  5. DIFFICULTY BREATHING: \"Are you having difficulty breathing?\" If Yes, ask: \"How bad is it?\" (e.g., mild, moderate, severe)       AYON and SOB  6. FEVER: \"Do you have a fever?\" If Yes, ask: \"What is your temperature, how was it measured, and when did it start?\"      Chills - no thermometer  7. CARDIAC HISTORY: \"Do you have any history of heart disease?\" (e.g., heart attack, congestive heart failure)       Please see chart  8. LUNG HISTORY: \"Do you have any history of lung disease?\"  (e.g., pulmonary embolus, asthma, emphysema)      Please see chart  9. PE RISK FACTORS: \"Do you have a history of blood clots?\" (or: recent major surgery, recent prolonged travel, " "bedridden)      denies  10. OTHER SYMPTOMS: \"Do you have any other symptoms?\" (e.g., runny nose, wheezing, chest pain)        Please see note above    Protocols used: Cough-Adult-OH    "

## 2025-01-08 NOTE — TELEPHONE ENCOUNTER
Patient calling stating her cough and SOB has returned after completing the Cipro. Said the inhaler she has been using the past week is not working, please advise.

## 2025-01-10 DIAGNOSIS — J40: Primary | ICD-10-CM

## 2025-01-10 DIAGNOSIS — U07.1: Primary | ICD-10-CM

## 2025-01-10 RX ORDER — NIRMATRELVIR AND RITONAVIR 300-100 MG
3 KIT ORAL 2 TIMES DAILY
Qty: 30 TABLET | Refills: 0 | Status: SHIPPED | OUTPATIENT
Start: 2025-01-10 | End: 2025-01-15

## 2025-01-10 NOTE — TELEPHONE ENCOUNTER
Called pt to make her aware. Pt states she does not want to take the Paxlovid due to the potential GI side effects. Did make pt aware that I am unsure abx would be ordered since COVID is a viral URI.

## 2025-01-10 NOTE — TELEPHONE ENCOUNTER
Pt calling in with update. States she did test positive for COVID. States she is experiencing cough productive for thick, yellow sputum and sinus pain and pressure. She is requesting abx to [facility]. She is requesting Cipro. She is scheduled 1/15 with Dr. Salinas. Will discuss with provider and update pt with further recommendations.

## 2025-01-15 ENCOUNTER — CLINICAL SUPPORT (OUTPATIENT)
Dept: GYNECOLOGY | Facility: CLINIC | Age: 55
End: 2025-01-15
Payer: MEDICARE

## 2025-01-15 ENCOUNTER — OFFICE VISIT (OUTPATIENT)
Dept: PULMONOLOGY | Facility: CLINIC | Age: 55
End: 2025-01-15
Payer: MEDICARE

## 2025-01-15 VITALS
DIASTOLIC BLOOD PRESSURE: 76 MMHG | HEART RATE: 97 BPM | TEMPERATURE: 99.5 F | SYSTOLIC BLOOD PRESSURE: 132 MMHG | OXYGEN SATURATION: 97 %

## 2025-01-15 VITALS — BODY MASS INDEX: 22.66 KG/M2 | SYSTOLIC BLOOD PRESSURE: 122 MMHG | DIASTOLIC BLOOD PRESSURE: 76 MMHG | WEIGHT: 140.4 LBS

## 2025-01-15 DIAGNOSIS — J40: Primary | ICD-10-CM

## 2025-01-15 DIAGNOSIS — U07.1: Primary | ICD-10-CM

## 2025-01-15 DIAGNOSIS — G43.709 CHRONIC MIGRAINE WITHOUT AURA WITHOUT STATUS MIGRAINOSUS, NOT INTRACTABLE: Primary | ICD-10-CM

## 2025-01-15 DIAGNOSIS — U09.9 POST-ACUTE SEQUELAE OF COVID-19 (PASC): ICD-10-CM

## 2025-01-15 PROCEDURE — 96372 THER/PROPH/DIAG INJ SC/IM: CPT

## 2025-01-15 PROCEDURE — 99214 OFFICE O/P EST MOD 30 MIN: CPT | Performed by: INTERNAL MEDICINE

## 2025-01-15 PROCEDURE — G2211 COMPLEX E/M VISIT ADD ON: HCPCS | Performed by: INTERNAL MEDICINE

## 2025-01-15 RX ORDER — MEDROXYPROGESTERONE ACETATE 150 MG/ML
150 INJECTION, SUSPENSION INTRAMUSCULAR ONCE
Status: COMPLETED | OUTPATIENT
Start: 2025-01-15 | End: 2025-01-15

## 2025-01-15 RX ORDER — LEVALBUTEROL INHALATION SOLUTION 1.25 MG/3ML
1.25 SOLUTION RESPIRATORY (INHALATION) 3 TIMES DAILY
Qty: 270 ML | Refills: 1 | Status: SHIPPED | OUTPATIENT
Start: 2025-01-15

## 2025-01-15 RX ORDER — CIPROFLOXACIN 500 MG/1
500 TABLET, FILM COATED ORAL EVERY 12 HOURS SCHEDULED
Qty: 14 TABLET | Refills: 0 | Status: SHIPPED | OUTPATIENT
Start: 2025-01-15 | End: 2025-01-22

## 2025-01-15 RX ORDER — LEVALBUTEROL TARTRATE 45 UG/1
1-2 AEROSOL, METERED ORAL EVERY 4 HOURS PRN
Qty: 15 G | Refills: 3 | Status: SHIPPED | OUTPATIENT
Start: 2025-01-15

## 2025-01-15 RX ORDER — DEXTROMETHORPHAN HYDROBROMIDE AND PROMETHAZINE HYDROCHLORIDE 15; 6.25 MG/5ML; MG/5ML
5 SYRUP ORAL DAILY PRN
COMMUNITY
Start: 2025-01-08

## 2025-01-15 RX ADMIN — MEDROXYPROGESTERONE ACETATE 150 MG: 150 INJECTION, SUSPENSION INTRAMUSCULAR at 09:00

## 2025-01-15 NOTE — PROGRESS NOTES
Name: Carmen Decker      : 1970      MRN: [id number]  Encounter Provider: Brianda Salinas MD  Encounter Date: 1/15/2025   Encounter department: [facility]  :  Assessment & Plan  Bronchitis due to severe acute respiratory syndrome coronavirus 2 (SARS-CoV-2)  Diagnosed with covid-19 2025. She usually gets post-viral infection/bronchitis. She has persistent cough with yellow sputum production. She was previously on ciprofloxacin in November and that cleared her symptoms right up.   Plan:  -Start antibiotics for presumed secondary bacterial infection - recommended doxycycline however due to significant allergies she requested ciprofloxacin  -Complete course of steroids  -Continue to use nebulizers as need - will send xopenex as albuterol causes heartrate elevation  Orders:    levalbuterol (Xopenex) 1.25 mg/3 mL nebulizer solution; Take 3 mL (1.25 mg total) by nebulization 3 (three) times a day    levalbuterol (Xopenex HFA) 45 mcg/act inhaler; Inhale 1-2 puffs every 4 (four) hours as needed for wheezing    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PASC)  Diagnosed with covid-19 2025. She usually gets post-viral infection. She was previously on ciprofloxacin in November and that cleared her symptoms right up. She does have many antibiotic allergies.   Plan:  -Start antibiotics for presumed secondary bacterial infection  -Complete course of steroids  -Continue to use nebulizers as need - will send xopenex as albuterol causes heartrate elevation  Orders:    ciprofloxacin (CIPRO) 500 mg tablet; Take 1 tablet (500 mg total) by mouth every 12 (twelve) hours for 7 days        History of Present Illness   Carmen Decker is a 54 y.o. female who presents for acute sick visit.    She presents for urgent evaluation. She was diagnosed with covid-19 on 2025. She had been exposed to her family member last . She called in and was prescribed steroids taper and paxlovid. Patient did not take  paxlovid as she states she has enough stomach issues. She did take steroids but she took them differently than prescribed. She was prescribed prednisone 40mg x3 days, with plan to decrease by 10 every 3 days until completion. She took 40mg daily for 5 days. She has a couple pills left over so she has been taking 20mg the last 2 days. She states she started to feel better with steroids however once the dose decreased she started to feel worse again.       She reports worsening cough with yellow sputum production as well as sinus pain and pressure. She also reports fevers. She uses symbicort two puffs twice daily and nebulizer. She states that nebulizer makes her heart race and is wondering if there are other options.     She previously was on flonase but developed headache and so she stopped it.     She reports this is similar to what happened to her in the fall where she got a viral infection and then had a month and a half of cough after viral infection. We tried multiple different inhalers and regimens. The only thing that made her feel better was antibiotics - she was prescribed ciprofloxacin.     Review of Systems   Constitutional:  Positive for chills, fatigue and fever.   HENT:  Positive for congestion and sinus pressure.    Respiratory:  Positive for cough.    All other systems reviewed and are negative.    Medical History Reviewed by provider this encounter:  Tobacco  Allergies  Meds  Problems  Med Hx  Surg Hx  Fam Hx     .         Objective   /76 (BP Location: Left arm, Patient Position: Sitting, Cuff Size: Standard)   Pulse 97   Temp 99.5 °F (37.5 °C) (Tympanic Core)   SpO2 97%      Physical Exam  Vitals reviewed.   Constitutional:       General: She is not in acute distress.  HENT:      Head: Normocephalic.      Nose: Congestion present.   Eyes:      Pupils: Pupils are equal, round, and reactive to light.   Cardiovascular:      Rate and Rhythm: Normal rate and regular rhythm.      Pulses:  Normal pulses.      Heart sounds: Normal heart sounds.   Pulmonary:      Effort: Pulmonary effort is normal.      Breath sounds: No wheezing, rhonchi or rales.   Musculoskeletal:         General: Normal range of motion.   Skin:     General: Skin is warm.      Capillary Refill: Capillary refill takes less than 2 seconds.   Neurological:      General: No focal deficit present.      Mental Status: She is alert and oriented to person, place, and time.   Psychiatric:         Mood and Affect: Mood normal.         Behavior: Behavior normal.         Lab Results: I have reviewed pertinent labs.    Radiology Results Review : No pertinent imaging studies reviewed.  Other Study Results: Other Study Results Review : No additional pertinent studies reviewed.  PFT Results Reviewed: reviewed    Brianda Salinas MD  Pulmonary & Critical Care Medicine Fellow PGY-4  [facility]

## 2025-01-15 NOTE — PROGRESS NOTES
Carmen Decker presents for a scheduled medroxyprogesterone 150mg injection. This was administered in the R deltoid without any difficulty.  Pt has the following complaints/concerns NONE .    A notice was sent to Brittany OCHOA to sign off on order.  Pt will schedule next injection in approx 10 weeks.    NDC- 41648-256-28    LOT- [id number]    EXP- 01/31/2026

## 2025-01-16 NOTE — ASSESSMENT & PLAN NOTE
Diagnosed with covid-19 1/8/2025. She usually gets post-viral infection. She was previously on ciprofloxacin in November and that cleared her symptoms right up. She does have many antibiotic allergies.   Plan:  -Start antibiotics for presumed secondary bacterial infection  -Complete course of steroids  -Continue to use nebulizers as need - will send xopenex as albuterol causes heartrate elevation  Orders:    ciprofloxacin (CIPRO) 500 mg tablet; Take 1 tablet (500 mg total) by mouth every 12 (twelve) hours for 7 days

## 2025-01-22 ENCOUNTER — NURSE TRIAGE (OUTPATIENT)
Age: 55
End: 2025-01-22

## 2025-01-22 ENCOUNTER — OFFICE VISIT (OUTPATIENT)
Dept: CARDIOLOGY CLINIC | Facility: CLINIC | Age: 55
End: 2025-01-22
Payer: MEDICARE

## 2025-01-22 VITALS
SYSTOLIC BLOOD PRESSURE: 134 MMHG | DIASTOLIC BLOOD PRESSURE: 76 MMHG | RESPIRATION RATE: 99 BRPM | HEART RATE: 96 BPM | BODY MASS INDEX: 22.28 KG/M2 | HEIGHT: 66 IN | WEIGHT: 138.6 LBS

## 2025-01-22 DIAGNOSIS — R06.09 EXERTIONAL DYSPNEA: ICD-10-CM

## 2025-01-22 DIAGNOSIS — R07.89 CHEST HEAVINESS: ICD-10-CM

## 2025-01-22 DIAGNOSIS — E78.49 OTHER HYPERLIPIDEMIA: Primary | ICD-10-CM

## 2025-01-22 DIAGNOSIS — R06.02 SHORTNESS OF BREATH: ICD-10-CM

## 2025-01-22 PROCEDURE — 99214 OFFICE O/P EST MOD 30 MIN: CPT | Performed by: INTERNAL MEDICINE

## 2025-01-22 NOTE — TELEPHONE ENCOUNTER
Pt stated Pulm Provider: Dr. Naranjo    Actionable item: Recommendations    What is the reason for the call/chief complaint?    Pt calling for persistent symptoms. Pt had OV 1/15 and diagnosed with bronchitis post COVID. She was prescribed Cipro. Today is last day of pt's abx and she reports her cough is persistent. Cough is non-productive. She denies SOB, fevers or chills. Will discuss with provider and update pt with further recommendations.

## 2025-01-22 NOTE — PROGRESS NOTES
Cardiology Follow Up    Carmen Decker  1970  [id number]  [facility]  [address]  [place] [address] [phone]    1. Other hyperlipidemia  2. Chest heaviness  3. Exertional dyspnea  4. Shortness of breath      Discussion: Discussed the findings with the patient.  All testing was within normal parameters.  There is no evidence for cardiac etiology of her cough.  Systolic and diastolic function are normal.  Her cough is productive.  She will follow-up with her PCP and pulmonology.  No cardiac follow-up is required.    Cardiovascular History:  Ms. Decker was seen in 1/25 and follow up of recent testing.  She has had a persistent cough that had improved and then was exacerbated by a COVID infection.  Zio patch showed no sustained arrhythmias.  She had a single 7 beat run of PSVT.  Echocardiography was entirely normal with an estimated ejection fraction of 65%, consistent with prior echocardiographic studies.  Prior evaluation at [facility] in 2023 was similarly negative.  That workup included a negative stress test.      Patient Active Problem List   Diagnosis    Menorrhagia with irregular cycle    Bipolar II disorder (HCC)    Chronic migraine without aura without status migrainosus, not intractable    Cystitis    Constipation    Dense breasts    Depression    Fatigue    Interstitial cystitis    Irritable bowel syndrome    Other hyperlipidemia    Intractable chronic migraine without aura and without status migrainosus    OCD (obsessive compulsive disorder)    Stress incontinence    Tonsillar calculus    Urinary frequency    Insomnia    Neck pain    Right lumbar radiculopathy    Dizziness    Exertional dyspnea    Nasal sinus congestion    Seasonal allergies    Bronchitis    Chronic cough    Altered taste    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PASC)    Hx of colonoscopy    Low blood sugar    Tachycardia     Malignant neoplasm of thyroid gland (HCC)    Multiple thyroid nodules    Myalgia    Chronic nonintractable headache    MDD (major depressive disorder), recurrent severe, without psychosis (HCC)    Vitamin D insufficiency    Leg cramps    Right leg pain    Tingling of face    Gastroesophageal reflux disease    History of herpes genitalis    Menopausal syndrome (hot flushes)    Chest heaviness    Rhinitis, nonallergic, chronic    Mouth sores    Ingrown toenail    Herpes zoster without complication    Shortness of breath     Past Medical History:   Diagnosis Date    Anemia     Anxiety     Asthma     illness induced    Cancer (HCC)     thyroid ca (removed Jan 2022)    SARA I (cervical intraepithelial neoplasia I)     RESOLVED: [id number]    Depression     Endometriosis     Functional ovarian cysts age 17    History of agoraphobia age 17    Hypercholesterolemia     IBS (irritable bowel syndrome) age 17    IC (interstitial cystitis)     Malignant neoplasm of thyroid gland (HCC) 12/14/2021    Migraine     Motion sickness     Multiple thyroid nodules 11/02/2021    Last Assessment & Plan:  Formatting of this note might be different from the original. Carmen Decker is a 51 y.o. female   We had an extensive discussion regarding thyroid nodules, the natural course of thyroid nodules, ultrasound features which can convey an increased risk for malignancy, the general indications for fine needle aspiration, the procedure itself and possible results from a fine needl    Pap smear abnormality of cervix with ASCUS favoring benign     RESOLVED: [id number]    Seasonal allergies     with post-nasal drip and recurrent throat infections    Thyroid cancer (HCC)     Urinary tract infection     Varicella      Social History     Socioeconomic History    Marital status:      Spouse name: Not on file    Number of children: 2    Years of education: Not on file    Highest education level: Not on file   Occupational History    Occupation:     Tobacco Use    Smoking status: Never    Smokeless tobacco: Never   Vaping Use    Vaping status: Never Used   Substance and Sexual Activity    Alcohol use: No    Drug use: No    Sexual activity: Not on file   Other Topics Concern    Not on file   Social History Narrative    Marital history- single as per all scripts    No caffeine use    Jainism affiliation [employer]od[employer]    Uses safety equipment- seatbelts      Social Drivers of Health     Financial Resource Strain: Not on file   Food Insecurity: Not on file   Transportation Needs: Not on file   Physical Activity: Not on file   Stress: Not on file   Social Connections: Not on file   Intimate Partner Violence: Not on file   Housing Stability: Not on file      Family History   Problem Relation Age of Onset    Diabetes Mother     Lung cancer Father 70    Diabetes Maternal Grandmother     Colon cancer Paternal Grandmother 60    Throat cancer Paternal Uncle 60    No Known Problems Maternal Aunt     No Known Problems Paternal Aunt     No Known Problems Paternal Aunt     Diabetes Maternal Grandfather      Past Surgical History:   Procedure Laterality Date    CHOLECYSTECTOMY  2019    CHOLECYSTECTOMY LAPAROSCOPIC  08/2020    COLONOSCOPY  06/2018    Nonbleeding angiodysplastic lesion mid ascending colon, questionable ulcerative proctitis-biopsy was negative for acute or chronic colitis    COLONOSCOPY  06/09/2016    Normal    COLONOSCOPY  05/23/2013    Adenoma at splenic flexure    EGD  08/02/2018    EGD  08/03/2020    Normal.  Biopsy stomach negative for H. pylori, biopsy of the duodenum negative for celiac    EGD AND COLONOSCOPY  01/20/2023    ST. MARIZA Escalante MD.- Normal EGD; Small bile lake stomach./ Normal colonoscopy with ileoscopy. Bx's: No active inflammation, granulomas, organisms, dysplasia or neoplasia; no intestinal metaplasia./ No active inflammation or collagenous colitis, granulomas, dysplasia, or neoplasia.    ENDOTRACHEAL INTUBATION EMERGENT  2012     HEMORRHOID SURGERY  07/2009    Dr Boswell    LAPAROSCOPY      VT HYSTEROSCOPY DIAGNOSTIC SEPARATE PROCEDURE N/A 11/21/2018    Procedure: HYSTEROSCOPY;  Surgeon: Earnest Buchanan MD;  Location: [facility];  Service: Gynecology    VT HYSTEROSCOPY ENDOMETRIAL ABLATION N/A 11/21/2018    Procedure: ABLATION ENDOMETRIAL NOVASURE;  Surgeon: Earnest Buchanan MD;  Location: [facility];  Service: Gynecology    SMALL INTESTINE SURGERY      THYROID SURGERY  01/17/2022    Papillary cancer of thyroid nodules    TOOTH EXTRACTION      TUBAL LIGATION      ONSET: 29 DEC 2011    US GUIDED THYROID BIOPSY  11/24/2021       Current Outpatient Medications:     betamethasone valerate (VALISONE) 0.1 % ointment, , Disp: , Rfl:     budesonide-formoterol (Symbicort) 160-4.5 mcg/act inhaler, Inhale 2 puffs 2 (two) times a day Rinse mouth after use., Disp: 10.2 g, Rfl: 2    Ciclopirox 1 % shampoo, Apply 1 application topically every other day To the scalp for 5 minutes, then rinse thoroughly., Disp: 120 mL, Rfl: 1    ciprofloxacin (CIPRO) 500 mg tablet, Take 1 tablet (500 mg total) by mouth every 12 (twelve) hours for 7 days, Disp: 14 tablet, Rfl: 0    clobetasol (TEMOVATE) 0.05 % external solution, APPLY THIN LAYER ON SCALP TWICE DAILY X 2 WEEKS THEN STOP X 2 WEEKS. REPEAT 2 WEEKS ON AND 2 WEEKS OFF.AVOID FACE/SKIN FOLDS/GROIN, Disp: , Rfl:     colestipol (COLESTID) 1 g tablet, TAKE 1 TABLET BY MOUTH TWICE DAILY, Disp: 270 tablet, Rfl: 3    [facility]S Cortisone Maximum Strength 1 % lotion, APPLY TO AFFECTED AREAS OF SCALP UP TO 4 TIMES A DAY ONLY AS NEEDED., Disp: 99 mL, Rfl: 0    dicyclomine (BENTYL) 10 mg capsule, TAKE 1 CAPSULE (10 MG TOTAL) BY MOUTH 3 (THREE) TIMES A DAY AS NEEDED (ABDOMINAL PAIN), Disp: 270 capsule, Rfl: 1    fluticasone (FLONASE) 50 mcg/act nasal spray, 1 spray into each nostril daily, Disp: 9.9 mL, Rfl: 5    guaiFENesin (ROBITUSSIN) 100 MG/5ML oral liquid, Take 10 mL (200 mg total) by mouth 3 (three) times a day as needed for cough  (Patient not taking: Reported on 12/26/2024), Disp: 120 mL, Rfl: 0    hydrocortisone 2.5 % cream, Apply topically 2 (two) times a day, Disp: 30 g, Rfl: 0    ketoconazole (NIZORAL) 2 % shampoo, Apply 1 application. topically 2 (two) times a week SHMP twice a wk x 8 wk, then PRN.  Leave on for 5 min before rinsing., Disp: 120 mL, Rfl: 1    levalbuterol (Xopenex HFA) 45 mcg/act inhaler, Inhale 1-2 puffs every 4 (four) hours as needed for wheezing, Disp: 15 g, Rfl: 3    levalbuterol (Xopenex) 1.25 mg/3 mL nebulizer solution, Take 3 mL (1.25 mg total) by nebulization 3 (three) times a day, Disp: 270 mL, Rfl: 1    LORazepam (ATIVAN) 1 mg tablet, TAKE 1 & 1/2 TABLETS DAILY AT BEDTIME AS NEEDED, Disp: , Rfl: 1    medroxyPROGESTERone (DEPO-PROVERA) 150 mg/mL injection, TO BE GIVEN IN OFFICE EVERY 10 WEEKS, Disp: 1 mL, Rfl: 4    Meth-Hyo-M Bl-Na Phos-Ph Sal (Uribel) 118 MG CAPS, Take 1 each by mouth as needed, Disp: , Rfl:     metroNIDAZOLE (METROCREAM) 0.75 % cream, Apply topically 2 (two) times a day, Disp: 45 g, Rfl: 3    mometasone (ELOCON) 0.1 % lotion, APPLY THIN LAYER TO AFFECTED AREA ON SCALP TWICE DAILY FOR 2 WEEKS THEN STOP FOR 2 WEEKS. REPEAT AS NEEDED, Disp: , Rfl:     Multiple Vitamin (MULTI VITAMIN DAILY PO), Take by mouth  , Disp: , Rfl:     Multiple Vitamins-Minerals (Multivitamin Adult) CHEW, Chew, Disp: , Rfl:     naproxen (NAPROSYN) 500 mg tablet, as needed for headaches, Disp: , Rfl:     naratriptan (AMERGE) 2.5 MG tablet, , Disp: , Rfl:     pentosan polysulfate (ELMIRON) 100 mg capsule, Take 100 mg by mouth 3 (three) times a day before meals   (Patient not taking: Reported on 11/25/2024), Disp: , Rfl:     predniSONE 10 mg tablet, 4 tabs x 3 days decrease 1 tab every third day, Disp: 30 tablet, Rfl: 0    promethazine-dextromethorphan (PHENERGAN-DM) 6.25-15 mg/5 mL oral syrup, Take 5 mL by mouth daily as needed, Disp: , Rfl:     Tirosint 150 MCG CAPS, Take 1 capsule by mouth daily, Disp: , Rfl:     traMADol  (ULTRAM) 50 mg tablet, , Disp: , Rfl:     Current Facility-Administered Medications:     medroxyPROGESTERone (DEPO-PROVERA) IM injection 150 mg, 150 mg, Intramuscular, Once, Lela Hollis DO    medroxyPROGESTERone (DEPO-PROVERA) IM injection 150 mg, 150 mg, Intramuscular, Once, Lela Hollis DO  Allergies   Allergen Reactions    Latex Itching and Hives     Irritation    Vancomycin Itching and Swelling     Patient had facial swelling, especially around her eyes and severe itching.    Tessalon [Benzonatate] Itching    Azithromycin Diarrhea, Nausea Only and Other (See Comments)    Clarithromycin Other (See Comments)     Does not know    Clavulanic Acid Other (See Comments)    Erythromycin Base Other (See Comments)    Levofloxacin Hives    Lithium Itching    Meropenem Itching     itching    Mirtazapine Other (See Comments)     (remeron)  (remeron)  (remeron)    Moxifloxacin Nausea Only     Excessive vomiting  Excessive vomiting    Nitrofurantoin Itching and Other (See Comments)     Blotchy skin     Sulfamethoxazole      Other reaction(s): Rash    Sulfamethoxazole-Trimethoprim Other (See Comments)    Trimethoprim      Other reaction(s): Rash    Doxycycline Other (See Comments), Itching and Rash     Itching and rash    Indomethacin Rash    Lamotrigine Other (See Comments) and Rash     Can't recall side effects.     Penicillins Rash       Labs: personally reviewed all pertinent labs  Imaging:  personally reviewed all pertinent imaging  Cath:  ECHO:  Stress:  Holter:    Review of Systems:  Review of Systems   Constitutional: Negative.   HENT: Negative.     Eyes: Negative.    Cardiovascular: Negative.    Respiratory: Negative.     Endocrine: Negative.    Hematologic/Lymphatic: Negative.    Skin: Negative.    Musculoskeletal: Negative.    Gastrointestinal: Negative.    Genitourinary: Negative.    Neurological: Negative.    Psychiatric/Behavioral: Negative.     Allergic/Immunologic: Negative.    All other systems reviewed and  are negative.      There were no vitals filed for this visit.  Weight (last 2 days)       None            Physical Exam:  Physical Exam  Vitals reviewed.   Constitutional:       General: She is not in acute distress.     Appearance: She is well-developed. She is not diaphoretic.   HENT:      Head: Normocephalic and atraumatic.   Eyes:      General: No scleral icterus.     Conjunctiva/sclera: Conjunctivae normal.   Neck:      Vascular: No JVD.      Trachea: No tracheal deviation.   Cardiovascular:      Rate and Rhythm: Normal rate and regular rhythm.      Pulses: Intact distal pulses.      Heart sounds: Normal heart sounds. No murmur heard.     No friction rub. No gallop.   Pulmonary:      Effort: Pulmonary effort is normal. No respiratory distress.      Breath sounds: Normal breath sounds. No stridor. No wheezing or rales.   Chest:      Chest wall: No tenderness.   Abdominal:      General: Bowel sounds are normal. There is no distension.      Palpations: Abdomen is soft.      Tenderness: There is no abdominal tenderness.   Musculoskeletal:         General: No tenderness. Normal range of motion.      Cervical back: Normal range of motion and neck supple.   Skin:     General: Skin is warm and dry.      Findings: No erythema.   Neurological:      Mental Status: She is alert and oriented to person, place, and time.      Cranial Nerves: No cranial nerve deficit.      Coordination: Coordination normal.   Psychiatric:         Behavior: Behavior normal.         Thought Content: Thought content normal.         Judgment: Judgment normal.         Bryson Gross MD

## 2025-01-22 NOTE — TELEPHONE ENCOUNTER
Pt also notes pharmacy would not fill xopenex solution for her. Spoke with pharmacy and asked them to run through Medicare. Medicare would not approve with the provided ICD-10 code.

## 2025-01-22 NOTE — TELEPHONE ENCOUNTER
"Reason for Disposition  • Taking antibiotic > 72 hours (3 days) and symptoms (other than fever) not improved    Answer Assessment - Initial Assessment Questions  1. INFECTION: \"What infection is the antibiotic being given for?\"     Bronchitis  2. ANTIBIOTIC: \"What antibiotic are you taking\" \"How many times per day?\"      Cipro  3. DURATION: \"When was the antibiotic started?\"      1/15  4. MAIN CONCERN OR SYMPTOM:  \"What is your main concern right now?\"      Persistent cough  5. BETTER-SAME-WORSE: \"Are you getting better, staying the same, or getting worse compared to when you first started the antibiotics?\" If getting worse, ask: \"In what way?\"       Better  6. FEVER: \"Do you have a fever?\" If Yes, ask: \"What is your temperature, how was it measured, and when did it start?\"      Denies fever  7. SYMPTOMS: \"Are there any other symptoms you're concerned about?\" If Yes, ask: \"When did it start?\"      Cough-non productive  8. FOLLOW-UP APPOINTMENT: \"Do you have a follow-up appointment with your doctor?\"      No    Protocols used: Infection on Antibiotic Follow-up Call-Adult-[place]    "

## 2025-01-23 NOTE — TELEPHONE ENCOUNTER
Pt calling back returning call. Informed of provider message. No questions at this time. She will call us back should she start getting worse.

## 2025-01-24 DIAGNOSIS — J40: Primary | ICD-10-CM

## 2025-01-24 DIAGNOSIS — U07.1: Primary | ICD-10-CM

## 2025-01-24 RX ORDER — PREDNISONE 20 MG/1
TABLET ORAL
Qty: 15 TABLET | Refills: 0 | Status: SHIPPED | OUTPATIENT
Start: 2025-01-24 | End: 2025-02-05

## 2025-01-24 NOTE — TELEPHONE ENCOUNTER
Pt calling stating she feels her cough is getting worse again. No available appts today. She is asking if additional prednisone taper can be ordered.

## 2025-01-24 NOTE — TELEPHONE ENCOUNTER
Called and left VM informing script was sent to pharmacy and to call back with any questions/concerns.

## 2025-02-12 ENCOUNTER — TELEPHONE (OUTPATIENT)
Dept: GYNECOLOGY | Facility: CLINIC | Age: 55
End: 2025-02-12

## 2025-02-12 DIAGNOSIS — A60.00 GENITAL HERPES SIMPLEX, UNSPECIFIED SITE: Primary | ICD-10-CM

## 2025-02-12 RX ORDER — VALACYCLOVIR HYDROCHLORIDE 500 MG/1
500 TABLET, FILM COATED ORAL DAILY
Qty: 90 TABLET | Refills: 0 | Status: SHIPPED | OUTPATIENT
Start: 2025-02-12 | End: 2025-02-21

## 2025-02-12 NOTE — Clinical Note
Patient requested valacyclovir continuous dose.  Prescription was sent.  She has upcoming Depo-Provera visit next month.  Please arrange for provider visit with that shot so we can review and proceed accordingly.

## 2025-02-12 NOTE — PROGRESS NOTES
Patient called, requested suppressive dose of valacyclovir.  Has not been on suppressive dose since 2018, last herpes prescription was 2022.  90-day supply sent.  Recommend she have visit with provider at the time of her upcoming Depo-Provera shot so we can review and proceed accordingly.

## 2025-02-12 NOTE — TELEPHONE ENCOUNTER
Reason for call:   [x] Refill   [] Prior Auth  [x] Other: not on med list - hasn't had in 2 years - asking for maintenance dose     Office:   [] PCP/Provider -   [x] Specialty/Provider -     Medication: Valtrex 500 mg as directed     Pharmacy: [facility]     Does the patient have enough for 3 days?   [] Yes   [x] No - Send as HP to POD

## 2025-02-21 DIAGNOSIS — Z86.19 HISTORY OF HERPES GENITALIS: Primary | ICD-10-CM

## 2025-02-21 RX ORDER — FAMCICLOVIR 250 MG/1
250 TABLET ORAL 2 TIMES DAILY
Qty: 60 TABLET | Refills: 9 | Status: SHIPPED | OUTPATIENT
Start: 2025-02-21 | End: 2025-03-23

## 2025-02-24 ENCOUNTER — TELEPHONE (OUTPATIENT)
Dept: OBGYN CLINIC | Facility: CLINIC | Age: 55
End: 2025-02-24

## 2025-02-24 NOTE — TELEPHONE ENCOUNTER
----- Message from Keshawn Perry MD sent at 2/12/2025  2:18 PM EST -----  Patient requested valacyclovir continuous dose.  Prescription was sent.  She has upcoming Depo-Provera visit next month.  Please arrange for provider visit with that shot so we can review and proceed accordingly.

## 2025-02-24 NOTE — TELEPHONE ENCOUNTER
Left voice mail for patient, able to coordinate appointment for visit and depo on same day, March 25 at 2:15.  Asked patient to call to confirm it this works for her.

## 2025-03-25 ENCOUNTER — OFFICE VISIT (OUTPATIENT)
Dept: GYNECOLOGY | Facility: CLINIC | Age: 55
End: 2025-03-25
Payer: MEDICARE

## 2025-03-25 VITALS — WEIGHT: 141.2 LBS | DIASTOLIC BLOOD PRESSURE: 76 MMHG | BODY MASS INDEX: 22.79 KG/M2 | SYSTOLIC BLOOD PRESSURE: 120 MMHG

## 2025-03-25 DIAGNOSIS — Z30.42 ENCOUNTER FOR SURVEILLANCE OF INJECTABLE CONTRACEPTIVE: ICD-10-CM

## 2025-03-25 DIAGNOSIS — A60.00 GENITAL HERPES SIMPLEX, UNSPECIFIED SITE: Primary | ICD-10-CM

## 2025-03-25 DIAGNOSIS — N92.1 MENORRHAGIA WITH IRREGULAR CYCLE: ICD-10-CM

## 2025-03-25 PROCEDURE — 96372 THER/PROPH/DIAG INJ SC/IM: CPT | Performed by: OBSTETRICS & GYNECOLOGY

## 2025-03-25 PROCEDURE — 99215 OFFICE O/P EST HI 40 MIN: CPT | Performed by: OBSTETRICS & GYNECOLOGY

## 2025-03-25 RX ORDER — MEDROXYPROGESTERONE ACETATE 150 MG/ML
150 INJECTION, SUSPENSION INTRAMUSCULAR ONCE
Status: COMPLETED | OUTPATIENT
Start: 2025-03-25 | End: 2025-03-25

## 2025-03-25 RX ORDER — CIPROFLOXACIN 500 MG/1
TABLET, FILM COATED ORAL
COMMUNITY
Start: 2025-03-20

## 2025-03-25 RX ORDER — ACYCLOVIR 50 MG/G
OINTMENT TOPICAL 4 TIMES DAILY PRN
Qty: 15 G | Refills: 2 | Status: SHIPPED | OUTPATIENT
Start: 2025-03-25

## 2025-03-25 RX ADMIN — MEDROXYPROGESTERONE ACETATE 150 MG: 150 INJECTION, SUSPENSION INTRAMUSCULAR at 16:11

## 2025-03-25 NOTE — PROGRESS NOTES
Assessment & Plan   Diagnoses and all orders for this visit:    Genital herpes simplex, unspecified site    Encounter for surveillance of injectable contraceptive  -     medroxyPROGESTERone (DEPO-PROVERA) IM injection 150 mg    Menorrhagia with irregular cycle    Other orders  -     ciprofloxacin (CIPRO) 500 mg tablet    1. HSV-has had 2+ year history of lesion on her buttocks.  Was given multiple diagnosis including shingles, contact dermatitis among others.  Had biopsy 2/6/2025 by dedicated dermatology with punch biopsy demonstrating herpes.  Patient is having very difficult time dealing with this.  Apparently, she was treated with acyclovir in 2009 for possible herpetic infection.  She had heart racing on this.  Had HSV 1 and 2 PCR testing June 2022 which was negative  Took valacyclovir recently, had flulike symptoms with heart racing  Took Famvir with some flulike symptoms but less notable.  Given prescription for acyclovir 5% ointment with no side effects.  Refill of 15 g tube, refill 2 was sent to local pharmacy.  We discussed findings in detail.  Intermittent versus suppressive treatment was reviewed.  She is very much interested in suppressive treatment, but she has had side effects as noted above.  After long discussion, she requested a refill of acyclovir 5% cream and this was sent to the pharmacy.  She does have active prescription of Famvir and she will plan to take this once daily to see if she has suppression of herpes with minimum side effects.  She will call or return if any issues with this.  2. history of menorrhagia-is on Depo-Provera with amenorrhea.  She did have 5 days of spotting recently which has resolved.  She will continue with every 10-week dosing of Depo-Provera.  Prescription was sent for this.  3.  History of menstrual headaches-improved on Depo-Provera  4.  History of vaginitis/vulvitis-no current symptoms.  Had reaction to vaginal estrogen in the past  5. history of chronic  migraine  6.  History of dense breast  7.  History of tubal ligation  8.  History of ART/IC symptoms  9. perimenopause/menopause-unclear menopausal status given prolonged Depo-Provera use.  Did have FSH of 39 and LH of 18.2 from 7/19/2023.  These are suggestive of menopause.  Patient was counseled she could stop the Provera.  She does not wish to do so given her significant menorrhagia in the past.  She will plan to continue with Depo-Provera and will return with this.  Visit greater than 45 minutes duration, with greater than 50% of time spent counseling and coordinating care.  Follow-up 10 weeks for Depo-Provera.  Has yearly exam December 2025.    Subjective   Patient ID: Carmen Decker is a 54 y.o. female.    Vitals:    03/25/25 1432   BP: 120/76     HPI    The following portions of the patient's history were reviewed and updated as appropriate: allergies, current medications, past family history, past medical history, past social history, past surgical history, and problem list.  Past Medical History:   Diagnosis Date    Anemia     Anxiety     Asthma     illness induced    Cancer (HCC)     thyroid ca (removed Jan 2022)    SARA I (cervical intraepithelial neoplasia I)     RESOLVED: [id number]    Depression     Endometriosis     Functional ovarian cysts age 17    History of agoraphobia age 17    Hypercholesterolemia     IBS (irritable bowel syndrome) age 17    IC (interstitial cystitis)     Malignant neoplasm of thyroid gland (HCC) 12/14/2021    Migraine     Motion sickness     Multiple thyroid nodules 11/02/2021    Last Assessment & Plan:  Formatting of this note might be different from the original. Carmen Decker is a 51 y.o. female   We had an extensive discussion regarding thyroid nodules, the natural course of thyroid nodules, ultrasound features which can convey an increased risk for malignancy, the general indications for fine needle aspiration, the procedure itself and possible results from a fine needl    Pap  smear abnormality of cervix with ASCUS favoring benign     RESOLVED: [id number]    Seasonal allergies     with post-nasal drip and recurrent throat infections    Thyroid cancer (HCC)     Urinary tract infection     Varicella      Past Surgical History:   Procedure Laterality Date    CHOLECYSTECTOMY      CHOLECYSTECTOMY LAPAROSCOPIC  2020    COLONOSCOPY  2018    Nonbleeding angiodysplastic lesion mid ascending colon, questionable ulcerative proctitis-biopsy was negative for acute or chronic colitis    COLONOSCOPY  2016    Normal    COLONOSCOPY  2013    Adenoma at splenic flexure    EGD  2018    EGD  2020    Normal.  Biopsy stomach negative for H. pylori, biopsy of the duodenum negative for celiac    EGD AND COLONOSCOPY  2023    [facility] MD.- Normal EGD; Small bile lake stomach./ Normal colonoscopy with ileoscopy. Bx's: No active inflammation, granulomas, organisms, dysplasia or neoplasia; no intestinal metaplasia./ No active inflammation or collagenous colitis, granulomas, dysplasia, or neoplasia.    ENDOTRACHEAL INTUBATION EMERGENT      HEMORRHOID SURGERY  2009    Dr Boswell    LAPAROSCOPY      GA HYSTEROSCOPY DIAGNOSTIC SEPARATE PROCEDURE N/A 2018    Procedure: HYSTEROSCOPY;  Surgeon: Earnest Buchanan MD;  Location: [facility];  Service: Gynecology    GA HYSTEROSCOPY ENDOMETRIAL ABLATION N/A 2018    Procedure: ABLATION ENDOMETRIAL NOVASURE;  Surgeon: Earnest Buchanan MD;  Location: [facility];  Service: Gynecology    SMALL INTESTINE SURGERY      THYROID SURGERY  2022    Papillary cancer of thyroid nodules    TOOTH EXTRACTION      TUBAL LIGATION      ONSET: 29 DEC 2011    US GUIDED THYROID BIOPSY  2021     OB History    Para Term  AB Living   3 2 2  1 2   SAB IAB Ectopic Multiple Live Births    1   2      # Outcome Date GA Lbr William/2nd Weight Sex Type Anes PTL Lv   3 IAB            2 Term            1 Term                Current  Outpatient Medications:     betamethasone valerate (VALISONE) 0.1 % ointment, , Disp: , Rfl:     budesonide-formoterol (Symbicort) 160-4.5 mcg/act inhaler, Inhale 2 puffs 2 (two) times a day Rinse mouth after use., Disp: 10.2 g, Rfl: 2    Ciclopirox 1 % shampoo, Apply 1 application topically every other day To the scalp for 5 minutes, then rinse thoroughly., Disp: 120 mL, Rfl: 1    ciprofloxacin (CIPRO) 500 mg tablet, , Disp: , Rfl:     clobetasol (TEMOVATE) 0.05 % external solution, APPLY THIN LAYER ON SCALP TWICE DAILY X 2 WEEKS THEN STOP X 2 WEEKS. REPEAT 2 WEEKS ON AND 2 WEEKS OFF.AVOID FACE/SKIN FOLDS/GROIN, Disp: , Rfl:     colestipol (COLESTID) 1 g tablet, TAKE 1 TABLET BY MOUTH TWICE DAILY, Disp: 270 tablet, Rfl: 3    [employer] Cortisone Maximum Strength 1 % lotion, APPLY TO AFFECTED AREAS OF SCALP UP TO 4 TIMES A DAY ONLY AS NEEDED., Disp: 99 mL, Rfl: 0    dicyclomine (BENTYL) 10 mg capsule, TAKE 1 CAPSULE (10 MG TOTAL) BY MOUTH 3 (THREE) TIMES A DAY AS NEEDED (ABDOMINAL PAIN), Disp: 270 capsule, Rfl: 1    dicyclomine (BENTYL) 10 mg capsule, Take 1 capsule (10 mg total) by mouth 3 (three) times a day before meals, Disp: 270 capsule, Rfl: 0    fluticasone (FLONASE) 50 mcg/act nasal spray, 1 spray into each nostril daily, Disp: 9.9 mL, Rfl: 5    hydrocortisone 2.5 % cream, Apply topically 2 (two) times a day, Disp: 30 g, Rfl: 0    ketoconazole (NIZORAL) 2 % shampoo, Apply 1 application. topically 2 (two) times a week SHMP twice a wk x 8 wk, then PRN.  Leave on for 5 min before rinsing., Disp: 120 mL, Rfl: 1    levalbuterol (Xopenex HFA) 45 mcg/act inhaler, Inhale 1-2 puffs every 4 (four) hours as needed for wheezing, Disp: 15 g, Rfl: 3    levalbuterol (Xopenex) 1.25 mg/3 mL nebulizer solution, Take 3 mL (1.25 mg total) by nebulization 3 (three) times a day, Disp: 270 mL, Rfl: 1    LORazepam (ATIVAN) 1 mg tablet, TAKE 1 & 1/2 TABLETS DAILY AT BEDTIME AS NEEDED, Disp: , Rfl: 1    medroxyPROGESTERone (DEPO-PROVERA)  150 mg/mL injection, TO BE GIVEN IN OFFICE EVERY 10 WEEKS, Disp: 1 mL, Rfl: 4    Meth-Hyo-M Bl-Na Phos-Ph Sal (Uribel) 118 MG CAPS, Take 1 each by mouth as needed, Disp: , Rfl:     metroNIDAZOLE (METROCREAM) 0.75 % cream, Apply topically 2 (two) times a day, Disp: 45 g, Rfl: 3    mometasone (ELOCON) 0.1 % lotion, APPLY THIN LAYER TO AFFECTED AREA ON SCALP TWICE DAILY FOR 2 WEEKS THEN STOP FOR 2 WEEKS. REPEAT AS NEEDED, Disp: , Rfl:     Multiple Vitamin (MULTI VITAMIN DAILY PO), Take by mouth  , Disp: , Rfl:     Multiple Vitamins-Minerals (Multivitamin Adult) CHEW, Chew, Disp: , Rfl:     pentosan polysulfate (ELMIRON) 100 mg capsule, Take 100 mg by mouth 3 (three) times a day before meals, Disp: , Rfl:     Tirosint 150 MCG CAPS, Take 1 capsule by mouth daily, Disp: , Rfl:     guaiFENesin (ROBITUSSIN) 100 MG/5ML oral liquid, Take 10 mL (200 mg total) by mouth 3 (three) times a day as needed for cough (Patient not taking: Reported on 12/26/2024), Disp: 120 mL, Rfl: 0    naproxen (NAPROSYN) 500 mg tablet, as needed for headaches, Disp: , Rfl:     naratriptan (AMERGE) 2.5 MG tablet, , Disp: , Rfl:     promethazine-dextromethorphan (PHENERGAN-DM) 6.25-15 mg/5 mL oral syrup, Take 5 mL by mouth daily as needed, Disp: , Rfl:     traMADol (ULTRAM) 50 mg tablet, , Disp: , Rfl:     Current Facility-Administered Medications:     medroxyPROGESTERone (DEPO-PROVERA) IM injection 150 mg, 150 mg, Intramuscular, Once, Lela Hollis,     medroxyPROGESTERone (DEPO-PROVERA) IM injection 150 mg, 150 mg, Intramuscular, Once, Lela Hollis,     medroxyPROGESTERone (DEPO-PROVERA) IM injection 150 mg, 150 mg, Intramuscular, Once,   Allergies   Allergen Reactions    Latex Itching and Hives     Irritation    Vancomycin Itching and Swelling     Patient had facial swelling, especially around her eyes and severe itching.    Tessalon [Benzonatate] Itching    Azithromycin Diarrhea, Nausea Only and Other (See Comments)    Clarithromycin Other (See  Comments)     Does not know    Clavulanic Acid Other (See Comments)    Erythromycin Base Other (See Comments)    Levofloxacin Hives    Lithium Itching    Meropenem Itching     itching    Mirtazapine Other (See Comments)     (remeron)  (remeron)  (remeron)    Moxifloxacin Nausea Only     Excessive vomiting  Excessive vomiting    Nitrofurantoin Itching and Other (See Comments)     Blotchy skin     Sulfamethoxazole      Other reaction(s): Rash    Sulfamethoxazole-Trimethoprim Other (See Comments)    Trimethoprim      Other reaction(s): Rash    Doxycycline Other (See Comments), Itching and Rash     Itching and rash    Indomethacin Rash    Lamotrigine Other (See Comments) and Rash     Can't recall side effects.     Penicillins Rash     Social History     Socioeconomic History    Marital status:      Spouse name: None    Number of children: 2    Years of education: None    Highest education level: None   Occupational History    Occupation: [employer]   Tobacco Use    Smoking status: Never    Smokeless tobacco: Never   Vaping Use    Vaping status: Never Used   Substance and Sexual Activity    Alcohol use: No    Drug use: No    Sexual activity: None   Other Topics Concern    None   Social History Narrative    Marital history- single as per all scripts    No caffeine use    Church affiliation [employer]    Uses safety equipment- seatbelts      Social Drivers of Health     Financial Resource Strain: Not on file   Food Insecurity: Not on file   Transportation Needs: Not on file   Physical Activity: Not on file   Stress: Not on file   Social Connections: Not on file   Intimate Partner Violence: Not on file   Housing Stability: Not on file     Family History   Problem Relation Age of Onset    Diabetes Mother     Lung cancer Father 70    Diabetes Maternal Grandmother     Colon cancer Paternal Grandmother 60    Throat cancer Paternal Uncle 60    No Known Problems Maternal Aunt     No Known Problems Paternal Aunt     No Known  Problems Paternal Aunt     Diabetes Maternal Grandfather        Review of Systems    Objective   Physical Exam  OBGyn Exam     Objective      /76 (BP Location: Left arm, Patient Position: Sitting)   Wt 64 kg (141 lb 3.2 oz)   BMI 22.79 kg/m²     General:   alert and oriented, in no acute distress   Neck:    Breast:    Heart:    Lungs:    Abdomen: Nontender   Vulva:    Vagina:    Cervix:    Uterus:    Adnexa:    Rectum:     Psych:  Normal mood and affect   Skin:  Without obvious lesions   Eyes: symmetric, with normal movements and reactivity   Musculoskeletal:  Normal muscle tone and movements appreciated

## 2025-03-25 NOTE — PROGRESS NOTES
Carmen RAMIREZ Decker presents for a scheduled Depo Provea injection. This was administered in the left deltoid without any difficulty.  Pt has the following complaints/concerns none .    A notice was sent to Dr. Perry to sign off on order.      NDC- 22933-051-37    LOT- [id number]    EXP- 04/30/2027

## 2025-04-10 DIAGNOSIS — R05.9 COUGH, UNSPECIFIED TYPE: ICD-10-CM

## 2025-04-10 RX ORDER — BUDESONIDE AND FORMOTEROL FUMARATE DIHYDRATE 160; 4.5 UG/1; UG/1
2 AEROSOL RESPIRATORY (INHALATION) 2 TIMES DAILY
Qty: 10.2 G | Refills: 2 | Status: SHIPPED | OUTPATIENT
Start: 2025-04-10

## 2025-06-04 ENCOUNTER — CLINICAL SUPPORT (OUTPATIENT)
Dept: GYNECOLOGY | Facility: CLINIC | Age: 55
End: 2025-06-04
Payer: MEDICARE

## 2025-06-04 VITALS — BODY MASS INDEX: 22.47 KG/M2 | SYSTOLIC BLOOD PRESSURE: 120 MMHG | DIASTOLIC BLOOD PRESSURE: 70 MMHG | WEIGHT: 139.2 LBS

## 2025-06-04 DIAGNOSIS — N92.1 MENORRHAGIA WITH IRREGULAR CYCLE: Primary | ICD-10-CM

## 2025-06-04 PROCEDURE — 96372 THER/PROPH/DIAG INJ SC/IM: CPT

## 2025-06-04 RX ORDER — MEDROXYPROGESTERONE ACETATE 150 MG/ML
150 INJECTION, SUSPENSION INTRAMUSCULAR ONCE
Status: COMPLETED | OUTPATIENT
Start: 2025-06-04 | End: 2025-06-04

## 2025-06-04 RX ADMIN — MEDROXYPROGESTERONE ACETATE 150 MG: 150 INJECTION, SUSPENSION INTRAMUSCULAR at 12:34

## 2025-06-04 NOTE — PROGRESS NOTES
Carmen Decker presents for a scheduled medroxyprogesterone 150mg injection. This was administered in the R deltoid without any difficulty.  Pt has the following complaints/concerns none .    A notice was sent to DON Huerta to sign off on order.  Pt will schedule next injection for approx 10 weeks    NDC- 82005-126-37    LOT- [id number]     EXP- 04/30/2027

## 2025-08-08 ENCOUNTER — CONSULT (OUTPATIENT)
Dept: FAMILY MEDICINE CLINIC | Facility: CLINIC | Age: 55
End: 2025-08-08
Payer: MEDICARE

## 2025-08-08 VITALS
OXYGEN SATURATION: 100 % | WEIGHT: 140.6 LBS | BODY MASS INDEX: 22.6 KG/M2 | SYSTOLIC BLOOD PRESSURE: 118 MMHG | RESPIRATION RATE: 16 BRPM | HEART RATE: 72 BPM | HEIGHT: 66 IN | DIASTOLIC BLOOD PRESSURE: 72 MMHG | TEMPERATURE: 98.4 F

## 2025-08-08 DIAGNOSIS — F31.81 BIPOLAR II DISORDER (HCC): ICD-10-CM

## 2025-08-08 DIAGNOSIS — Z01.818 PREOPERATIVE CLEARANCE: Primary | ICD-10-CM

## 2025-08-08 DIAGNOSIS — C73 MALIGNANT NEOPLASM OF THYROID GLAND (HCC): ICD-10-CM

## 2025-08-08 DIAGNOSIS — C44.319 BASAL CELL CARCINOMA (BCC) OF SKIN OF OTHER PART OF FACE: ICD-10-CM

## 2025-08-08 PROBLEM — C44.91 BASAL CELL CARCINOMA: Status: ACTIVE | Noted: 2025-08-08

## 2025-08-08 PROCEDURE — 93000 ELECTROCARDIOGRAM COMPLETE: CPT | Performed by: NURSE PRACTITIONER

## 2025-08-08 PROCEDURE — 99214 OFFICE O/P EST MOD 30 MIN: CPT | Performed by: NURSE PRACTITIONER

## 2025-08-08 PROCEDURE — G2211 COMPLEX E/M VISIT ADD ON: HCPCS | Performed by: NURSE PRACTITIONER

## 2025-08-08 RX ORDER — EPINEPHRINE 0.3 MG/.3ML
INJECTION SUBCUTANEOUS ONCE
COMMUNITY
Start: 2025-03-20

## 2025-08-08 RX ORDER — HYDROCORTISONE 25 MG/G
OINTMENT TOPICAL
COMMUNITY
Start: 2025-05-23

## 2025-08-12 ENCOUNTER — ANESTHESIA EVENT (OUTPATIENT)
Dept: PERIOP | Facility: HOSPITAL | Age: 55
End: 2025-08-12
Payer: MEDICARE

## 2025-08-13 ENCOUNTER — HOSPITAL ENCOUNTER (OUTPATIENT)
Facility: HOSPITAL | Age: 55
Setting detail: OUTPATIENT SURGERY
Discharge: HOME | End: 2025-08-13
Attending: PLASTIC SURGERY | Admitting: PLASTIC SURGERY
Payer: MEDICARE

## 2025-08-13 ENCOUNTER — ANESTHESIA (OUTPATIENT)
Dept: PERIOP | Facility: HOSPITAL | Age: 55
End: 2025-08-13
Payer: MEDICARE

## 2025-08-14 ENCOUNTER — CLINICAL SUPPORT (OUTPATIENT)
Dept: GYNECOLOGY | Facility: CLINIC | Age: 55
End: 2025-08-14
Payer: MEDICARE

## 2025-08-14 ENCOUNTER — TELEPHONE (OUTPATIENT)
Age: 55
End: 2025-08-14

## (undated) DEVICE — PVC URETHRAL CATHETER: Brand: DOVER

## (undated) DEVICE — NOVASURE KIT

## (undated) DEVICE — CYSTO TUBING SINGLE IRRIGATION

## (undated) DEVICE — IV EXTENSION TUBING 33 IN

## (undated) DEVICE — STRL ALLENTOWN HYSTEROSCOPY PK: Brand: CARDINAL HEALTH

## (undated) DEVICE — 2000CC GUARDIAN II: Brand: GUARDIAN

## (undated) DEVICE — PREMIUM DRY TRAY LF: Brand: MEDLINE INDUSTRIES, INC.

## (undated) DEVICE — SCD SEQUENTIAL COMPRESSION COMFORT SLEEVE MEDIUM KNEE LENGTH: Brand: KENDALL SCD

## (undated) DEVICE — GLOVE PI ULTRA TOUCH SZ.7.5